# Patient Record
Sex: MALE | Race: WHITE | Employment: OTHER | URBAN - METROPOLITAN AREA
[De-identification: names, ages, dates, MRNs, and addresses within clinical notes are randomized per-mention and may not be internally consistent; named-entity substitution may affect disease eponyms.]

---

## 2017-08-19 PROBLEM — R47.01 APHASIA: Status: ACTIVE | Noted: 2017-08-19

## 2018-04-10 PROBLEM — Z98.890 S/P CORONARY ANGIOGRAM: Status: ACTIVE | Noted: 2018-04-10

## 2018-12-10 ENCOUNTER — APPOINTMENT (OUTPATIENT)
Dept: GENERAL RADIOLOGY | Age: 58
DRG: 101 | End: 2018-12-10
Payer: MEDICARE

## 2018-12-10 ENCOUNTER — HOSPITAL ENCOUNTER (INPATIENT)
Age: 58
LOS: 2 days | Discharge: HOME | DRG: 101 | End: 2018-12-12
Attending: EMERGENCY MEDICINE | Admitting: INTERNAL MEDICINE
Payer: MEDICARE

## 2018-12-10 ENCOUNTER — APPOINTMENT (OUTPATIENT)
Dept: CT IMAGING | Age: 58
DRG: 101 | End: 2018-12-10
Payer: MEDICARE

## 2018-12-10 DIAGNOSIS — R56.9 SEIZURES (HCC): Primary | ICD-10-CM

## 2018-12-10 DIAGNOSIS — R40.2410 GLASGOW COMA SCALE TOTAL SCORE 13-15, UNSPECIFIED COMA TIMING: ICD-10-CM

## 2018-12-10 LAB
BACTERIA: ABNORMAL /HPF
BASOPHILS ABSOLUTE: 0 K/UL (ref 0–0.2)
BASOPHILS RELATIVE PERCENT: 0.7 %
BILIRUBIN URINE: ABNORMAL MG/DL
BLOOD, URINE: NEGATIVE
CALCIUM IONIZED: 1.15 MMOL/L (ref 1.12–1.32)
CLARITY: ABNORMAL
CO2: 29 MMOL/L (ref 21–32)
COLOR: ABNORMAL
EKG ATRIAL RATE: 89 BPM
EKG DIAGNOSIS: NORMAL
EKG P AXIS: 58 DEGREES
EKG P-R INTERVAL: 184 MS
EKG Q-T INTERVAL: 430 MS
EKG QRS DURATION: 178 MS
EKG QTC CALCULATION (BAZETT): 523 MS
EKG R AXIS: -42 DEGREES
EKG T AXIS: 104 DEGREES
EKG VENTRICULAR RATE: 89 BPM
EOSINOPHILS ABSOLUTE: 0.1 K/UL (ref 0–0.6)
EOSINOPHILS RELATIVE PERCENT: 1.5 %
EPITHELIAL CELLS, UA: ABNORMAL /HPF
GFR AFRICAN AMERICAN: >60
GFR NON-AFRICAN AMERICAN: >60
GLUCOSE BLD-MCNC: 191 MG/DL (ref 70–99)
GLUCOSE URINE: NEGATIVE MG/DL
HCT VFR BLD CALC: 43.4 % (ref 40.5–52.5)
HEMOGLOBIN: 15 G/DL (ref 13.5–17.5)
KETONES, URINE: 15 MG/DL
LEUKOCYTE ESTERASE, URINE: NEGATIVE
LYMPHOCYTES ABSOLUTE: 1.1 K/UL (ref 1–5.1)
LYMPHOCYTES RELATIVE PERCENT: 19.1 %
MCH RBC QN AUTO: 32.4 PG (ref 26–34)
MCHC RBC AUTO-ENTMCNC: 34.5 G/DL (ref 31–36)
MCV RBC AUTO: 93.9 FL (ref 80–100)
MICROSCOPIC EXAMINATION: YES
MONOCYTES ABSOLUTE: 0.3 K/UL (ref 0–1.3)
MONOCYTES RELATIVE PERCENT: 5.7 %
MUCUS: ABNORMAL /LPF
NEUTROPHILS ABSOLUTE: 4.4 K/UL (ref 1.7–7.7)
NEUTROPHILS RELATIVE PERCENT: 73 %
NITRITE, URINE: NEGATIVE
PDW BLD-RTO: 14.1 % (ref 12.4–15.4)
PERFORMED ON: ABNORMAL
PERFORMED ON: NORMAL
PH UA: 6.5
PLATELET # BLD: 254 K/UL (ref 135–450)
PMV BLD AUTO: 8.1 FL (ref 5–10.5)
POC ANION GAP: 12 (ref 10–20)
POC BUN: 8 MG/DL (ref 7–18)
POC CHLORIDE: 99 MMOL/L (ref 99–110)
POC CREATININE: 0.8 MG/DL (ref 0.9–1.3)
POC POTASSIUM: 3.8 MMOL/L (ref 3.5–5.1)
POC SAMPLE TYPE: ABNORMAL
POC SAMPLE TYPE: NORMAL
POC SODIUM: 140 MMOL/L (ref 136–145)
POC TROPONIN I: 0.01 NG/ML (ref 0–0.1)
PROTEIN UA: 100 MG/DL
RBC # BLD: 4.63 M/UL (ref 4.2–5.9)
RBC UA: ABNORMAL /HPF (ref 0–2)
SPECIFIC GRAVITY UA: >=1.03
UROBILINOGEN, URINE: 0.2 E.U./DL
WBC # BLD: 6 K/UL (ref 4–11)
WBC UA: ABNORMAL /HPF (ref 0–5)

## 2018-12-10 PROCEDURE — 2580000003 HC RX 258: Performed by: INTERNAL MEDICINE

## 2018-12-10 PROCEDURE — 1200000000 HC SEMI PRIVATE

## 2018-12-10 PROCEDURE — 6360000004 HC RX CONTRAST MEDICATION: Performed by: EMERGENCY MEDICINE

## 2018-12-10 PROCEDURE — 84484 ASSAY OF TROPONIN QUANT: CPT

## 2018-12-10 PROCEDURE — 80175 DRUG SCREEN QUAN LAMOTRIGINE: CPT

## 2018-12-10 PROCEDURE — 70450 CT HEAD/BRAIN W/O DYE: CPT

## 2018-12-10 PROCEDURE — 99285 EMERGENCY DEPT VISIT HI MDM: CPT

## 2018-12-10 PROCEDURE — 85025 COMPLETE CBC W/AUTO DIFF WBC: CPT

## 2018-12-10 PROCEDURE — 71046 X-RAY EXAM CHEST 2 VIEWS: CPT

## 2018-12-10 PROCEDURE — 81001 URINALYSIS AUTO W/SCOPE: CPT

## 2018-12-10 PROCEDURE — 80047 BASIC METABLC PNL IONIZED CA: CPT

## 2018-12-10 PROCEDURE — 6370000000 HC RX 637 (ALT 250 FOR IP): Performed by: INTERNAL MEDICINE

## 2018-12-10 PROCEDURE — 93005 ELECTROCARDIOGRAM TRACING: CPT | Performed by: EMERGENCY MEDICINE

## 2018-12-10 PROCEDURE — 70496 CT ANGIOGRAPHY HEAD: CPT

## 2018-12-10 PROCEDURE — 70498 CT ANGIOGRAPHY NECK: CPT

## 2018-12-10 RX ORDER — COLCHICINE 0.6 MG/1
0.6 TABLET ORAL PRN
Status: DISCONTINUED | OUTPATIENT
Start: 2018-12-10 | End: 2018-12-10

## 2018-12-10 RX ORDER — LISINOPRIL 40 MG/1
40 TABLET ORAL NIGHTLY
Status: DISCONTINUED | OUTPATIENT
Start: 2018-12-10 | End: 2018-12-12 | Stop reason: HOSPADM

## 2018-12-10 RX ORDER — LIDOCAINE 4 G/G
1 PATCH TOPICAL DAILY
Status: DISCONTINUED | OUTPATIENT
Start: 2018-12-11 | End: 2018-12-11

## 2018-12-10 RX ORDER — DIAZEPAM 5 MG/1
5 TABLET ORAL EVERY 8 HOURS PRN
Status: DISCONTINUED | OUTPATIENT
Start: 2018-12-10 | End: 2018-12-12 | Stop reason: HOSPADM

## 2018-12-10 RX ORDER — ONDANSETRON 2 MG/ML
4 INJECTION INTRAMUSCULAR; INTRAVENOUS EVERY 6 HOURS PRN
Status: DISCONTINUED | OUTPATIENT
Start: 2018-12-10 | End: 2018-12-12 | Stop reason: HOSPADM

## 2018-12-10 RX ORDER — LAMOTRIGINE 150 MG/1
600 TABLET ORAL 2 TIMES DAILY
Status: DISCONTINUED | OUTPATIENT
Start: 2018-12-10 | End: 2018-12-12 | Stop reason: HOSPADM

## 2018-12-10 RX ORDER — CHLORTHALIDONE 25 MG/1
25 TABLET ORAL DAILY
COMMUNITY

## 2018-12-10 RX ORDER — CETIRIZINE HYDROCHLORIDE 10 MG/1
10 TABLET ORAL DAILY
Status: DISCONTINUED | OUTPATIENT
Start: 2018-12-11 | End: 2018-12-12 | Stop reason: HOSPADM

## 2018-12-10 RX ORDER — CHLORTHALIDONE 25 MG/1
12.5 TABLET ORAL EVERY OTHER DAY
Status: DISCONTINUED | OUTPATIENT
Start: 2018-12-11 | End: 2018-12-12 | Stop reason: HOSPADM

## 2018-12-10 RX ORDER — NICOTINE POLACRILEX 4 MG
15 LOZENGE BUCCAL PRN
Status: DISCONTINUED | OUTPATIENT
Start: 2018-12-10 | End: 2018-12-12 | Stop reason: HOSPADM

## 2018-12-10 RX ORDER — ATORVASTATIN CALCIUM 80 MG/1
80 TABLET, FILM COATED ORAL NIGHTLY
Status: DISCONTINUED | OUTPATIENT
Start: 2018-12-11 | End: 2018-12-12 | Stop reason: HOSPADM

## 2018-12-10 RX ORDER — SODIUM CHLORIDE 0.9 % (FLUSH) 0.9 %
10 SYRINGE (ML) INJECTION PRN
Status: DISCONTINUED | OUTPATIENT
Start: 2018-12-10 | End: 2018-12-12 | Stop reason: HOSPADM

## 2018-12-10 RX ORDER — ASPIRIN 81 MG/1
81 TABLET ORAL DAILY
Status: DISCONTINUED | OUTPATIENT
Start: 2018-12-11 | End: 2018-12-12 | Stop reason: HOSPADM

## 2018-12-10 RX ORDER — DEXTROSE MONOHYDRATE 50 MG/ML
100 INJECTION, SOLUTION INTRAVENOUS PRN
Status: DISCONTINUED | OUTPATIENT
Start: 2018-12-10 | End: 2018-12-12 | Stop reason: HOSPADM

## 2018-12-10 RX ORDER — CARVEDILOL 25 MG/1
25 TABLET ORAL 2 TIMES DAILY WITH MEALS
Status: DISCONTINUED | OUTPATIENT
Start: 2018-12-10 | End: 2018-12-12 | Stop reason: HOSPADM

## 2018-12-10 RX ORDER — ALLOPURINOL 300 MG/1
300 TABLET ORAL DAILY
Status: DISCONTINUED | OUTPATIENT
Start: 2018-12-11 | End: 2018-12-12 | Stop reason: HOSPADM

## 2018-12-10 RX ORDER — FAMOTIDINE 20 MG/1
40 TABLET, FILM COATED ORAL DAILY
Status: DISCONTINUED | OUTPATIENT
Start: 2018-12-11 | End: 2018-12-12 | Stop reason: HOSPADM

## 2018-12-10 RX ORDER — NORTRIPTYLINE HYDROCHLORIDE 25 MG/1
75 CAPSULE ORAL NIGHTLY
Status: DISCONTINUED | OUTPATIENT
Start: 2018-12-10 | End: 2018-12-12 | Stop reason: HOSPADM

## 2018-12-10 RX ORDER — GLIPIZIDE 5 MG/1
5 TABLET ORAL DAILY
Status: DISCONTINUED | OUTPATIENT
Start: 2018-12-11 | End: 2018-12-12 | Stop reason: HOSPADM

## 2018-12-10 RX ORDER — AMLODIPINE BESYLATE 10 MG/1
10 TABLET ORAL DAILY
Status: DISCONTINUED | OUTPATIENT
Start: 2018-12-11 | End: 2018-12-12 | Stop reason: HOSPADM

## 2018-12-10 RX ORDER — DEXTROSE MONOHYDRATE 25 G/50ML
12.5 INJECTION, SOLUTION INTRAVENOUS PRN
Status: DISCONTINUED | OUTPATIENT
Start: 2018-12-10 | End: 2018-12-12 | Stop reason: HOSPADM

## 2018-12-10 RX ORDER — LAMOTRIGINE 100 MG/1
200 TABLET ORAL PRN
Status: DISCONTINUED | OUTPATIENT
Start: 2018-12-10 | End: 2018-12-12 | Stop reason: HOSPADM

## 2018-12-10 RX ORDER — SODIUM CHLORIDE 0.9 % (FLUSH) 0.9 %
10 SYRINGE (ML) INJECTION EVERY 12 HOURS SCHEDULED
Status: DISCONTINUED | OUTPATIENT
Start: 2018-12-10 | End: 2018-12-12 | Stop reason: HOSPADM

## 2018-12-10 RX ORDER — COLCHICINE 0.6 MG/1
0.6 TABLET ORAL PRN
COMMUNITY
End: 2021-08-09 | Stop reason: CLARIF

## 2018-12-10 RX ADMIN — LISINOPRIL 40 MG: 40 TABLET ORAL at 23:11

## 2018-12-10 RX ADMIN — IOPAMIDOL 80 ML: 755 INJECTION, SOLUTION INTRAVENOUS at 16:47

## 2018-12-10 RX ADMIN — CARVEDILOL 25 MG: 25 TABLET, FILM COATED ORAL at 23:12

## 2018-12-10 RX ADMIN — Medication 10 ML: at 23:10

## 2018-12-10 RX ADMIN — COLCHICINE 0.6 MG: 0.6 TABLET, FILM COATED ORAL at 23:24

## 2018-12-10 RX ADMIN — LAMOTRIGINE 600 MG: 150 TABLET ORAL at 23:11

## 2018-12-10 RX ADMIN — NORTRIPTYLINE HYDROCHLORIDE 75 MG: 25 CAPSULE ORAL at 23:11

## 2018-12-10 ASSESSMENT — PAIN DESCRIPTION - LOCATION: LOCATION: CHEST

## 2018-12-10 ASSESSMENT — PAIN DESCRIPTION - PAIN TYPE: TYPE: ACUTE PAIN

## 2018-12-10 ASSESSMENT — PAIN DESCRIPTION - ONSET: ONSET: ON-GOING

## 2018-12-10 ASSESSMENT — PAIN DESCRIPTION - PROGRESSION: CLINICAL_PROGRESSION: GRADUALLY WORSENING

## 2018-12-10 ASSESSMENT — PAIN SCALES - GENERAL: PAINLEVEL_OUTOF10: 9

## 2018-12-10 ASSESSMENT — PAIN DESCRIPTION - DESCRIPTORS: DESCRIPTORS: CRUSHING

## 2018-12-10 ASSESSMENT — PAIN DESCRIPTION - ORIENTATION: ORIENTATION: MID

## 2018-12-10 ASSESSMENT — PAIN DESCRIPTION - FREQUENCY: FREQUENCY: INTERMITTENT

## 2018-12-10 NOTE — ED PROVIDER NOTES
history that includes eye surgery. His family history includes Asthma in his mother; Diabetes in his father, mother, and sister; Heart Attack in his father; Heart Disease in his father; High Blood Pressure in his father, mother, and sister; Obesity in his mother. He reports that he has quit smoking. He has never used smokeless tobacco. He reports that he does not drink alcohol or use drugs. Medications     Previous Medications    ALBUTEROL (PROVENTIL) (2.5 MG/3ML) 0.083% NEBULIZER SOLUTION    Take 2.5 mg by nebulization 2 times daily as needed for Wheezing or Shortness of Breath. ALLOPURINOL (ZYLOPRIM) 300 MG TABLET    Take 300 mg by mouth daily. AMLODIPINE (NORVASC) 5 MG TABLET    Take 10 mg by mouth daily     ASPIRIN 81 MG EC TABLET    Take 81 mg by mouth daily    ATORVASTATIN (LIPITOR) 80 MG TABLET    Take 80 mg by mouth daily    CARVEDILOL (COREG) 25 MG TABLET    Take 25 mg by mouth 2 times daily (with meals)     CHLORTHALIDONE (HYGROTON) 25 MG TABLET    Take 12.5 mg by mouth every other day    COLCHICINE (COLCRYS) 0.6 MG TABLET    Take 0.6 mg by mouth as needed for Pain    DIAZEPAM (VALIUM) 5 MG TABLET    Take 5 mg by mouth every 8 hours as needed for Anxiety. FAMOTIDINE (PEPCID) 40 MG TABLET    Take 40 mg by mouth daily    GLIPIZIDE (GLUCOTROL) 5 MG TABLET    Take 1 tablet by mouth daily. LAMOTRIGINE (LAMICTAL) 200 MG TABLET    Take 600 mg by mouth 2 times daily    LAMOTRIGINE (LAMICTAL) 200 MG TABLET    Take 200 mg by mouth as needed    LISINOPRIL (PRINIVIL;ZESTRIL) 40 MG TABLET    Take 40 mg by mouth nightly     LORATADINE (CLARITIN) 10 MG TABLET    Take 10 mg by mouth daily    METFORMIN (GLUCOPHAGE) 1000 MG TABLET    Take 1,000 mg by mouth daily (with breakfast)     METFORMIN (GLUCOPHAGE) 500 MG TABLET    Take 500 mg by mouth nightly.     MULTIPLE VITAMIN (DAILY TITUS) TABS    Take 1 tablet by mouth daily    NORTRIPTYLINE (PAMELOR) 75 MG CAPSULE    Take 75 mg by mouth nightly [facility]  New Prescriptions    No medications on file       DISPOSITION Admitted 12/10/2018 07:29:25 PM       Ines Fontaine MD  12/10/18 2022

## 2018-12-11 LAB
A/G RATIO: 1.3 (ref 1.1–2.2)
ALBUMIN SERPL-MCNC: 3.6 G/DL (ref 3.4–5)
ALP BLD-CCNC: 128 U/L (ref 40–129)
ALT SERPL-CCNC: 23 U/L (ref 10–40)
ANION GAP SERPL CALCULATED.3IONS-SCNC: 12 MMOL/L (ref 3–16)
AST SERPL-CCNC: 21 U/L (ref 15–37)
BASOPHILS ABSOLUTE: 0 K/UL (ref 0–0.2)
BASOPHILS RELATIVE PERCENT: 0.6 %
BILIRUB SERPL-MCNC: 0.4 MG/DL (ref 0–1)
BUN BLDV-MCNC: 10 MG/DL (ref 7–20)
CALCIUM SERPL-MCNC: 9 MG/DL (ref 8.3–10.6)
CHLORIDE BLD-SCNC: 101 MMOL/L (ref 99–110)
CO2: 27 MMOL/L (ref 21–32)
CREAT SERPL-MCNC: 0.9 MG/DL (ref 0.9–1.3)
EKG ATRIAL RATE: 71 BPM
EKG DIAGNOSIS: NORMAL
EKG P AXIS: -22 DEGREES
EKG P-R INTERVAL: 166 MS
EKG Q-T INTERVAL: 460 MS
EKG QRS DURATION: 176 MS
EKG QTC CALCULATION (BAZETT): 499 MS
EKG R AXIS: -53 DEGREES
EKG T AXIS: 115 DEGREES
EKG VENTRICULAR RATE: 71 BPM
EOSINOPHILS ABSOLUTE: 0.1 K/UL (ref 0–0.6)
EOSINOPHILS RELATIVE PERCENT: 2.1 %
GFR AFRICAN AMERICAN: >60
GFR NON-AFRICAN AMERICAN: >60
GLOBULIN: 2.8 G/DL
GLUCOSE BLD-MCNC: 103 MG/DL (ref 70–99)
GLUCOSE BLD-MCNC: 111 MG/DL (ref 70–99)
GLUCOSE BLD-MCNC: 123 MG/DL (ref 70–99)
GLUCOSE BLD-MCNC: 126 MG/DL (ref 70–99)
GLUCOSE BLD-MCNC: 184 MG/DL (ref 70–99)
GLUCOSE BLD-MCNC: 207 MG/DL (ref 70–99)
HCT VFR BLD CALC: 41.4 % (ref 40.5–52.5)
HEMOGLOBIN: 13.8 G/DL (ref 13.5–17.5)
LACTIC ACID: 1 MMOL/L (ref 0.4–2)
LYMPHOCYTES ABSOLUTE: 2.1 K/UL (ref 1–5.1)
LYMPHOCYTES RELATIVE PERCENT: 32.1 %
MAGNESIUM: 1.7 MG/DL (ref 1.8–2.4)
MCH RBC QN AUTO: 31.6 PG (ref 26–34)
MCHC RBC AUTO-ENTMCNC: 33.2 G/DL (ref 31–36)
MCV RBC AUTO: 95.1 FL (ref 80–100)
MONOCYTES ABSOLUTE: 0.4 K/UL (ref 0–1.3)
MONOCYTES RELATIVE PERCENT: 6.6 %
NEUTROPHILS ABSOLUTE: 3.9 K/UL (ref 1.7–7.7)
NEUTROPHILS RELATIVE PERCENT: 58.6 %
PDW BLD-RTO: 13.6 % (ref 12.4–15.4)
PERFORMED ON: ABNORMAL
PLATELET # BLD: 227 K/UL (ref 135–450)
PMV BLD AUTO: 7.8 FL (ref 5–10.5)
POTASSIUM REFLEX MAGNESIUM: 3.6 MMOL/L (ref 3.5–5.1)
RBC # BLD: 4.35 M/UL (ref 4.2–5.9)
SODIUM BLD-SCNC: 140 MMOL/L (ref 136–145)
TOTAL CK: 182 U/L (ref 39–308)
TOTAL PROTEIN: 6.4 G/DL (ref 6.4–8.2)
TROPONIN: <0.01 NG/ML
TROPONIN: <0.01 NG/ML
WBC # BLD: 6.6 K/UL (ref 4–11)

## 2018-12-11 PROCEDURE — 6360000002 HC RX W HCPCS: Performed by: INTERNAL MEDICINE

## 2018-12-11 PROCEDURE — 6370000000 HC RX 637 (ALT 250 FOR IP): Performed by: INTERNAL MEDICINE

## 2018-12-11 PROCEDURE — 83036 HEMOGLOBIN GLYCOSYLATED A1C: CPT

## 2018-12-11 PROCEDURE — 95819 EEG AWAKE AND ASLEEP: CPT

## 2018-12-11 PROCEDURE — 36415 COLL VENOUS BLD VENIPUNCTURE: CPT

## 2018-12-11 PROCEDURE — 93005 ELECTROCARDIOGRAM TRACING: CPT | Performed by: INTERNAL MEDICINE

## 2018-12-11 PROCEDURE — 1200000000 HC SEMI PRIVATE

## 2018-12-11 PROCEDURE — 84484 ASSAY OF TROPONIN QUANT: CPT

## 2018-12-11 PROCEDURE — 80053 COMPREHEN METABOLIC PANEL: CPT

## 2018-12-11 PROCEDURE — 93010 ELECTROCARDIOGRAM REPORT: CPT | Performed by: INTERNAL MEDICINE

## 2018-12-11 PROCEDURE — 2580000003 HC RX 258: Performed by: INTERNAL MEDICINE

## 2018-12-11 PROCEDURE — 83605 ASSAY OF LACTIC ACID: CPT

## 2018-12-11 PROCEDURE — 85025 COMPLETE CBC W/AUTO DIFF WBC: CPT

## 2018-12-11 PROCEDURE — 83735 ASSAY OF MAGNESIUM: CPT

## 2018-12-11 PROCEDURE — 82550 ASSAY OF CK (CPK): CPT

## 2018-12-11 RX ORDER — LIDOCAINE 4 G/G
1 PATCH TOPICAL DAILY
Status: DISCONTINUED | OUTPATIENT
Start: 2018-12-11 | End: 2018-12-12 | Stop reason: HOSPADM

## 2018-12-11 RX ORDER — DEXTROSE MONOHYDRATE 50 MG/ML
100 INJECTION, SOLUTION INTRAVENOUS PRN
Status: DISCONTINUED | OUTPATIENT
Start: 2018-12-11 | End: 2018-12-11 | Stop reason: SDUPTHER

## 2018-12-11 RX ORDER — MULTIVITAMIN WITH FOLIC ACID 400 MCG
1 TABLET ORAL DAILY
Status: DISCONTINUED | OUTPATIENT
Start: 2018-12-11 | End: 2018-12-12 | Stop reason: HOSPADM

## 2018-12-11 RX ORDER — OXYCODONE HYDROCHLORIDE AND ACETAMINOPHEN 5; 325 MG/1; MG/1
1 TABLET ORAL EVERY 6 HOURS PRN
Status: DISCONTINUED | OUTPATIENT
Start: 2018-12-11 | End: 2018-12-12 | Stop reason: HOSPADM

## 2018-12-11 RX ORDER — ALBUTEROL SULFATE 2.5 MG/3ML
2.5 SOLUTION RESPIRATORY (INHALATION) 2 TIMES DAILY PRN
Status: DISCONTINUED | OUTPATIENT
Start: 2018-12-11 | End: 2018-12-12 | Stop reason: HOSPADM

## 2018-12-11 RX ORDER — DEXTROSE MONOHYDRATE 25 G/50ML
12.5 INJECTION, SOLUTION INTRAVENOUS PRN
Status: DISCONTINUED | OUTPATIENT
Start: 2018-12-11 | End: 2018-12-11 | Stop reason: SDUPTHER

## 2018-12-11 RX ORDER — NICOTINE POLACRILEX 4 MG
15 LOZENGE BUCCAL PRN
Status: DISCONTINUED | OUTPATIENT
Start: 2018-12-11 | End: 2018-12-11 | Stop reason: SDUPTHER

## 2018-12-11 RX ADMIN — Medication 10 ML: at 08:36

## 2018-12-11 RX ADMIN — ENOXAPARIN SODIUM 40 MG: 40 INJECTION SUBCUTANEOUS at 08:36

## 2018-12-11 RX ADMIN — CARVEDILOL 25 MG: 25 TABLET, FILM COATED ORAL at 08:35

## 2018-12-11 RX ADMIN — AMLODIPINE BESYLATE 10 MG: 10 TABLET ORAL at 08:35

## 2018-12-11 RX ADMIN — INSULIN LISPRO 1 UNITS: 100 INJECTION, SOLUTION INTRAVENOUS; SUBCUTANEOUS at 22:50

## 2018-12-11 RX ADMIN — INSULIN LISPRO 2 UNITS: 100 INJECTION, SOLUTION INTRAVENOUS; SUBCUTANEOUS at 18:19

## 2018-12-11 RX ADMIN — NORTRIPTYLINE HYDROCHLORIDE 75 MG: 25 CAPSULE ORAL at 21:07

## 2018-12-11 RX ADMIN — ALLOPURINOL 300 MG: 300 TABLET ORAL at 08:35

## 2018-12-11 RX ADMIN — LAMOTRIGINE 600 MG: 150 TABLET ORAL at 08:35

## 2018-12-11 RX ADMIN — THERA TABS 1 TABLET: TAB at 16:52

## 2018-12-11 RX ADMIN — FAMOTIDINE 40 MG: 20 TABLET ORAL at 08:35

## 2018-12-11 RX ADMIN — Medication 10 ML: at 21:10

## 2018-12-11 RX ADMIN — CHLORTHALIDONE 12.5 MG: 25 TABLET ORAL at 08:35

## 2018-12-11 RX ADMIN — ATORVASTATIN CALCIUM 80 MG: 80 TABLET, FILM COATED ORAL at 21:07

## 2018-12-11 RX ADMIN — CETIRIZINE HYDROCHLORIDE 10 MG: 10 TABLET, FILM COATED ORAL at 08:35

## 2018-12-11 RX ADMIN — LISINOPRIL 40 MG: 40 TABLET ORAL at 21:08

## 2018-12-11 RX ADMIN — OXYCODONE AND ACETAMINOPHEN 1 TABLET: 5; 325 TABLET ORAL at 03:00

## 2018-12-11 RX ADMIN — OXYCODONE AND ACETAMINOPHEN 1 TABLET: 5; 325 TABLET ORAL at 21:41

## 2018-12-11 RX ADMIN — LAMOTRIGINE 600 MG: 150 TABLET ORAL at 21:07

## 2018-12-11 RX ADMIN — ASPIRIN 81 MG: 81 TABLET ORAL at 08:35

## 2018-12-11 RX ADMIN — CARVEDILOL 25 MG: 25 TABLET, FILM COATED ORAL at 16:52

## 2018-12-11 ASSESSMENT — PAIN SCALES - GENERAL
PAINLEVEL_OUTOF10: 0
PAINLEVEL_OUTOF10: 9

## 2018-12-11 ASSESSMENT — PAIN DESCRIPTION - ONSET: ONSET: ON-GOING

## 2018-12-11 ASSESSMENT — PAIN DESCRIPTION - DESCRIPTORS: DESCRIPTORS: STABBING

## 2018-12-11 ASSESSMENT — PAIN DESCRIPTION - FREQUENCY: FREQUENCY: CONTINUOUS

## 2018-12-11 ASSESSMENT — PAIN DESCRIPTION - ORIENTATION: ORIENTATION: MID

## 2018-12-11 ASSESSMENT — PAIN DESCRIPTION - LOCATION: LOCATION: CHEST

## 2018-12-11 ASSESSMENT — ACTIVITIES OF DAILY LIVING (ADL): EFFECT OF PAIN ON DAILY ACTIVITIES: DISCOMFORT

## 2018-12-11 ASSESSMENT — PAIN DESCRIPTION - PAIN TYPE: TYPE: ACUTE PAIN

## 2018-12-11 ASSESSMENT — PAIN DESCRIPTION - PROGRESSION: CLINICAL_PROGRESSION: NOT CHANGED

## 2018-12-11 NOTE — PROGRESS NOTES
Clinical Pharmacy Progress Note    Admit date: 12/10/2018     Subjective/Objective:  Pt admitted after tonic-clonic seizure 12/10. Pertinent medications:  Metformin 1000 mg PO qAM  Metformin 500 mg PO qPM    Patient had CTA of neck with contrast on 12/10 PM.    Assessment/Plan:  · Patient has received IV contrast.  Per [facility] policy, Metformin will be held for 48 hours. Have discontinued metformin. · Metformin will be automatically restarted 48hr after contrast if serum creatinine is within FDA approved guidelines (SCr < 1.4 in women / SCr < 1.5 in men). If patient is discharged prior to 48 hours, MetFORMIN will need to be addressed as part of the med reconciliation process. Thanks!   Yvrose Mcmahon, PharmD, [place]  [place] # [phone]  12/11/2018 12:26 PM

## 2018-12-11 NOTE — CONSULTS
Used    Alcohol use No    Drug use: No    Sexual activity: Not Currently     Other Topics Concern    Not on file     Social History Narrative    No narrative on file       Medications: Allergies   Allergen Reactions    Amoxicillin     Doxycycline     Doxycycline Hyclate     Ibuprofen     Penicillins Hives    Phenobarbital     Tetracycline     Aspirin Nausea And Vomiting       Prescriptions Prior to Admission: chlorthalidone (HYGROTON) 25 MG tablet, Take 12.5 mg by mouth every other day  colchicine (COLCRYS) 0.6 MG tablet, Take 0.6 mg by mouth as needed for Pain  nortriptyline (PAMELOR) 75 MG capsule, Take 75 mg by mouth nightly  lamoTRIgine (LAMICTAL) 200 MG tablet, Take 600 mg by mouth 2 times daily  lamoTRIgine (LAMICTAL) 200 MG tablet, Take 200 mg by mouth as needed  atorvastatin (LIPITOR) 80 MG tablet, Take 80 mg by mouth daily  metFORMIN (GLUCOPHAGE) 1000 MG tablet, Take 1,000 mg by mouth daily (with breakfast)   amLODIPine (NORVASC) 5 MG tablet, Take 10 mg by mouth daily   aspirin 81 MG EC tablet, Take 81 mg by mouth daily  carvedilol (COREG) 25 MG tablet, Take 25 mg by mouth 2 times daily (with meals)   famotidine (PEPCID) 40 MG tablet, Take 40 mg by mouth daily  loratadine (CLARITIN) 10 MG tablet, Take 10 mg by mouth daily  Multiple Vitamin (DAILY TITUS) TABS, Take 1 tablet by mouth daily  diazepam (VALIUM) 5 MG tablet, Take 5 mg by mouth every 8 hours as needed for Anxiety. metFORMIN (GLUCOPHAGE) 500 MG tablet, Take 500 mg by mouth nightly. lisinopril (PRINIVIL;ZESTRIL) 40 MG tablet, Take 40 mg by mouth nightly   glipiZIDE (GLUCOTROL) 5 MG tablet, Take 1 tablet by mouth daily. oxyCODONE-acetaminophen (PERCOCET) 5-325 MG per tablet, Take 1 tablet by mouth every 6 hours as needed. allopurinol (ZYLOPRIM) 300 MG tablet, Take 300 mg by mouth daily.     albuterol (PROVENTIL) (2.5 MG/3ML) 0.083% nebulizer solution, Take 2.5 mg by nebulization 2 times daily as needed for Wheezing or Shortness of %    Eosinophils % 1.5 %    Basophils % 0.7 %    Neutrophils # 4.4 1.7 - 7.7 K/uL    Lymphocytes # 1.1 1.0 - 5.1 K/uL    Monocytes # 0.3 0.0 - 1.3 K/uL    Eosinophils # 0.1 0.0 - 0.6 K/uL    Basophils # 0.0 0.0 - 0.2 K/uL   POCT Venous    Collection Time: 12/10/18  4:10 PM   Result Value Ref Range    POC Troponin I 0.01 0.00 - 0.10 ng/mL    Sample Type CONSTANCE     Performed on SEE BELOW    POCT Venous    Collection Time: 12/10/18  4:11 PM   Result Value Ref Range    POC Sodium 140 136 - 145 mmol/L    POC Potassium 3.8 3.5 - 5.1 mmol/L    POC Chloride 99 99 - 110 mmol/L    CO2 29 21 - 32 mmol/L    POC Anion Gap 12 10 - 20    POC Glucose 191 (H) 70 - 99 mg/dl    POC BUN 8 7 - 18 mg/dL    POC Creatinine 0.8 (L) 0.9 - 1.3 mg/dL    GFR Non-African American >60 >60    GFR African American >60     Calcium, Ion 1.15 1.12 - 1.32 mmol/L    Sample Type CONSTANCE     Performed on SEE BELOW    POC URINE with Microscopic    Collection Time: 12/10/18  4:55 PM   Result Value Ref Range    Color, UA Not Entered Straw/Yellow    Clarity, UA Not Entered Clear    Glucose, Ur Negative Negative mg/dL    Bilirubin Urine SMALL (A) Negative mg/dL    Ketones, Urine 15 (A) Negative mg/dL    Specific Gravity, UA >=1.030 1.005 - 1.030    Blood, Urine Negative Negative    pH, UA 6.5 5.0 - 8.0    Protein,  (A) Negative mg/dL    Urobilinogen, Urine 0.2 <2.0 E.U./dL    Nitrite, Urine Negative Negative    Leukocyte Esterase, Urine Negative Negative    Microscopic Examination Yes    Microscopic Urinalysis    Collection Time: 12/10/18  5:10 PM   Result Value Ref Range    Mucus, UA 2+ (A) /LPF    WBC, UA 3-5 0 - 5 /HPF    RBC, UA 0-2 0 - 2 /HPF    Epi Cells 0-2 /HPF    Bacteria, UA 2+ (A) /HPF   POCT Glucose    Collection Time: 12/11/18 12:09 AM   Result Value Ref Range    POC Glucose 111 (H) 70 - 99 mg/dl    Performed on ACCU-CHEK    Troponin    Collection Time: 12/11/18 12:11 AM   Result Value Ref Range    Troponin <0.01 <0.01 ng/mL   EKG 12 Lead K/uL    Basophils # 0.0 0.0 - 0.2 K/uL   Troponin    Collection Time: 12/11/18  5:45 AM   Result Value Ref Range    Troponin <0.01 <0.01 ng/mL       Scheduled Meds:   lidocaine  1 patch Transdermal Daily    allopurinol  300 mg Oral Daily    amLODIPine  10 mg Oral Daily    aspirin  81 mg Oral Daily    atorvastatin  80 mg Oral Nightly    carvedilol  25 mg Oral BID WC    chlorthalidone  12.5 mg Oral Every Other Day    famotidine  40 mg Oral Daily    glipiZIDE  5 mg Oral Daily    lamoTRIgine  600 mg Oral BID    lisinopril  40 mg Oral Nightly    cetirizine  10 mg Oral Daily    nortriptyline  75 mg Oral Nightly    sodium chloride flush  10 mL Intravenous 2 times per day    enoxaparin  40 mg Subcutaneous Daily       Continuous Infusions:    dextrose          ASSESSMENT & RECOMMENDATIONS:   Charmaine Pimentel 55 y/o with known generalized epilepsy disorder who presented with breakthrough seizure. 1. Seizures  2. MRDD  3. HTN  4. DM    ==============  RECOMMENDATIONS:  1. Continue Lamictal 600 mg Bid. 2. Check lamictal level. 3. EEG to assess for any epileptogenic features. 4. Seizure precautions. 5. Patient advised and instructed on seizure precautions he cannot engage in any activity where loss of consciousness would be dangerous to himself  or others (e.g. driving, climbing, operating heavy machinery, swimming alone, etc.) until approved by his physician (seizure-free for at least 3-6 months). He  expressed understanding of all instructions given and states he is mainly  wheelchair bound. 6. Infectious and metabolic workup as per Primary medical team.   7. Check CK level. Discussed at length with patient findings and recommendations.        A copy of this note was provided for Dr Phan Craig MD     Electronically signed by:    EDUARDA [facility]  [phone]     12/11/2018,7:11 AM

## 2018-12-11 NOTE — H&P
Hospital Medicine History & Physical      PCP: Edilma Wiley    Date of Admission: 12/10/2018    Date of Service: Pt seen/examined on 12/11/18 and Admitted to Inpatient with expected LOS greater than two midnights due to medical therapy. Chief Complaint: Seizures      History Of Present Illness:     56 y.o. male Nannette Slaughter who is a past medical history MRDD, generalized epilepsy and non epileptic spells, CAD, DM,  morbid obesity, obstructive sleep apnea of  has known history of seizures on Lamictal at home presented to the emergency room with complaints of witnessed seizures. Patient's sister was also the power of  visit the patient in his facility where he was found to be confused, and saw him to have a generalized clonic tonic seizure, she says the patient stopped breathing for a few seconds became cyanotic and she had to put her fingers in his mouth and give him through rescue breathing. EMS called, on arrival patient's continency breathing but confused. Initially in the emergency room he was slow to respond, on string to questions occasionally, but by the evening his confusion improved and he was alert and oriented to person place and time. He says he's been compliant with his medications. No bowel or bladder incontinence. Underwent CT head and CTA head and neck is negative for acute intracranial abnormalities, or flow limiting vessel stenosis. Labs in the emergency room were unremarkable. For his generalized epilepsy he follows a [place] neurolog[facility], and is maintained on Lamictal.  He was admitted the hospital on 04/2018 for chest pain and underwent cardiac cath, mild blocks and no stents were placed, found to have anterior wall hypokinesia and ejection fraction 35%. While on the floor he did not midsternal nonradiating chest pain 9 x 10 intensity.   Initial troponin negative    Past Medical History:          Diagnosis Date    Arthritis     Asthma     Bronchitis     CAD chronic plaquing of the carotid bifurcation, without any flow-limiting stenosis or occlusion          CT Head WO Contrast   Final Result   1. No acute intracranial abnormality. XR CHEST STANDARD (2 VW)   Final Result      No focal consolidation. ASSESSMENT:    Active Hospital Problems    Diagnosis Date Noted    Seizures ([facility] [address] [R56.9] 06/01/2014     1. Tonic-clonic seizures with post ictal confusion  2. History of CAD  3. Chest pain, recent stress test in 04/2018 was positive, underwent cardiac cath which showed some clot burden with no stents were placed. Initial troponin negative. 4. Left bundle branch block  5. Hypertension: Blood pressure 175/92 on admission  Morbid obesity due to excess calories Body mass index is 61.6 kg/m². - Complicating assessment and treatment. Placing patient at risk for multiple co-morbidities as well as early death and contributing to the patient's presentation.  on weight loss when appropriate. Type 2 diabetes non-insulin-dependent    PLAN:  Continue Lamictal prior to admission. Limited level ordered on admission and we will follow-up at the levels. Ativan ordered when necessary for seizures. Check EEG in morning. Neurology consult for evaluation. DVT Prophylaxis: Lovenox  Diet: Diet NPO Effective Now Exceptions are: Sips with Meds  Code Status: Full Code    PT/OT Eval Status: Ordered    Dispo - Admit as inpatient. I anticipate hospitalization spanning more than two midnights for investigation and treatment of the above medically necessary diagnoses. Nora Sainz MD    Thank you Juan Owusu for the opportunity to be involved in this patient's care. If you have any questions or concerns please feel free to contact me at [phone].

## 2018-12-11 NOTE — PROGRESS NOTES
Patient complaining of non radiating mid sternal chest pain/pressure rating 9/10. Notified on call hospitalist with new order for lidocaine patch and to trend troponins Q6H x 2 occurrences.

## 2018-12-11 NOTE — PROGRESS NOTES
RESPIRATORY THERAPY ASSESSMENT    Name:  Louisa Boland Record Number:  [id number]  Age: 56 y.o. Gender: male  : 1960  Today's Date:  2018  Room:  2498/0918-86    Assessment     Is the patient being admitted for a COPD or Asthma exacerbation? No   (If yes the patient will be seen every 4 hours for the first 24 hours and then reassessed)    Patient Admission Diagnosis      Allergies  Allergies   Allergen Reactions    Amoxicillin     Doxycycline     Doxycycline Hyclate     Ibuprofen     Penicillins Hives    Phenobarbital     Tetracycline     Aspirin Nausea And Vomiting       Minimum Predicted Vital Capacity:     918          Pulmonary History: former smoker  Home Oxygen Therapy:  room air  Home Respiratory Therapy:Albuterol prn  Current Respiratory Therapy:  HHN Albuterol prn          Respiratory Severity Index(RSI)   Patients with orders for inhalation medications, oxygen, or any therapeutic treatment modality will be placed on Respiratory Protocol. They will be assessed with the first treatment and at least every 72 hours thereafter. The following severity scale will be used to determine frequency of treatment intervention.     Smoking History: Smoking History Less than 1ppd or less than 15 pack year = 1    Social History  Social History   Substance Use Topics    Smoking status: Former Smoker    Smokeless tobacco: Never Used    Alcohol use No       Recent Surgical History: None = 0  Past Surgical History  Past Surgical History:   Procedure Laterality Date    EYE SURGERY         Level of Consciousness: Alert, Oriented, and Cooperative = 0    Level of Activity: Walking unassisted = 0    Respiratory Pattern: Regular Pattern; RR 8-20 = 0    Breath Sounds: Clear = 0    Sputum   ,  ,    Cough: Strong, spontaneous, non-productive = 0    Vital Signs   BP (!) 133/53   Pulse 70   Temp 98.3 °F (36.8 °C) (Oral)   Resp 18   Ht 5' 7\" (1.702 m)   Wt (!) 393 lb 4.8 oz (178.4 kg)   SpO2

## 2018-12-11 NOTE — PROGRESS NOTES
Hospitalist Progress Note      PCP: Michael Payer    Date of Admission: 12/10/2018  Hospital day - 1        Medications:  Reviewed    Infusion Medications    dextrose      dextrose       Scheduled Medications    lidocaine  1 patch Transdermal Daily    [START ON 12/12/2018] metFORMIN  1,000 mg Oral Daily with breakfast    metFORMIN  500 mg Oral Nightly    DAILY TITUS  1 tablet Oral Daily    insulin lispro  0-6 Units Subcutaneous TID WC    insulin lispro  0-3 Units Subcutaneous Nightly    allopurinol  300 mg Oral Daily    amLODIPine  10 mg Oral Daily    aspirin  81 mg Oral Daily    atorvastatin  80 mg Oral Nightly    carvedilol  25 mg Oral BID WC    chlorthalidone  12.5 mg Oral Every Other Day    famotidine  40 mg Oral Daily    glipiZIDE  5 mg Oral Daily    lamoTRIgine  600 mg Oral BID    lisinopril  40 mg Oral Nightly    cetirizine  10 mg Oral Daily    nortriptyline  75 mg Oral Nightly    sodium chloride flush  10 mL Intravenous 2 times per day    enoxaparin  40 mg Subcutaneous Daily     PRN Meds: oxyCODONE-acetaminophen, albuterol, glucose, dextrose, glucagon (rDNA), dextrose, diazepam, lamoTRIgine, sodium chloride flush, magnesium hydroxide, ondansetron, glucose, dextrose, glucagon (rDNA), dextrose      Intake/Output Summary (Last 24 hours) at 12/11/18 1218  Last data filed at 12/11/18 0300   Gross per 24 hour   Intake              100 ml   Output              400 ml   Net             -300 ml         Chief Complaint: GTC sz x 1         Subjective: 12.11- feels good, no new sz, hungry      ROS:  A 12 point review of symptoms is unremarkable with the exception of the chief complaint . Patient specifically denies cp  .     Exam performed by me:    BP (!) 133/53   Pulse 70   Temp 98.3 °F (36.8 °C) (Oral)   Resp 18   Ht 5' 7\" (1.702 m)   Wt (!) 393 lb 4.8 oz (178.4 kg)   SpO2 94%   BMI 61.60 kg/m²       General appearance: comfortable, distress- none   HEENT: Atraumatic, Pupils equal, round, and reactive to light. Extra ocular muscles intact. Neck: Supple, with full range of motion. No jugular venous distention. Respiratory:  Clear to  auscultation , accessory muscle use no, Rales/Ronchi no  Cardiovascular: Regular rate and rhythm with normal S1/S2 ,  Abdomen: Soft, non-tender, non-distended with normal bowel sounds. Musculoskelatal: No clubbing, no edema bilaterally. Dorsalis pedal pulses +   And capillary refills time is  <  than 3 secs. Skin: Skin color, texture, turgor normal.  Neurologic:   Neurovascularly intact grossly . Labs:   Recent Labs      12/10/18   1603  12/11/18   0545   WBC  6.0  6.6   HGB  15.0  13.8   HCT  43.4  41.4   PLT  254  227     Recent Labs      12/10/18   1611  12/11/18   0545   NA   --   140   K   --   3.6   CL   --   101   CO2  29  27   BUN   --   10   CREATININE  0.8*  0.9   CALCIUM   --   9.0     Recent Labs      12/11/18   0545   AST  21   ALT  23   BILITOT  0.4   ALKPHOS  128     No results for input(s): INR in the last 72 hours. Recent Labs      12/10/18   1610  12/11/18   0011  12/11/18   0545   TROPONINI  0.01  <0.01  <0.01       No flowsheet data found. Diagnostic Assesment and Plan :   1. Break through [address] , on lamictal, monitor levels, no new sz, sz precautions, neuro eval  2. htn controlled on amlodipine, coreg, hygroton, lisinopril,   Body mass index is 61.6 kg/m². 3. Morbid obesity, advised wt loss  4. Cad, chr sys chf, cont asa, lipitor, stable  5. Chr gout, no joint pain   6. DM ty 2 on OHA, monitor bs         DVT Prophylaxis: lvx   Diet: DIET GENERAL;  Code Status: Full Code    PT/OT Eval Status:. At his/her baseline . This chart was generated in part by using Dragon Dictation system and may contain errors related to that system including errors in grammar, punctuation, and spelling, as well as words and phrases that may be inappropriate. When dictating, effort is made to correct spelling/grammar errors.  If there are

## 2018-12-11 NOTE — PLAN OF CARE
Problem: Falls - Risk of: Intervention: Assess potential safety hazards  Pt free from injury or falls at this time, fall precautions in place, bed in low position, side rail up x2, Rivera Fall Risk: Medium (25-44), bed alarm on, reoriented to room and call light, reminded not to get up without assistance, call light in reach, will continue to monitor. Pt verbalizes understanding of fall risk procedures.

## 2018-12-12 VITALS
HEART RATE: 67 BPM | TEMPERATURE: 97.2 F | OXYGEN SATURATION: 94 % | WEIGHT: 315 LBS | DIASTOLIC BLOOD PRESSURE: 62 MMHG | SYSTOLIC BLOOD PRESSURE: 142 MMHG | BODY MASS INDEX: 49.44 KG/M2 | RESPIRATION RATE: 16 BRPM | HEIGHT: 67 IN

## 2018-12-12 LAB
ESTIMATED AVERAGE GLUCOSE: 162.8 MG/DL
GLUCOSE BLD-MCNC: 146 MG/DL (ref 70–99)
GLUCOSE BLD-MCNC: 148 MG/DL (ref 70–99)
HBA1C MFR BLD: 7.3 %
PERFORMED ON: ABNORMAL
PERFORMED ON: ABNORMAL

## 2018-12-12 PROCEDURE — 2580000003 HC RX 258: Performed by: INTERNAL MEDICINE

## 2018-12-12 PROCEDURE — 6370000000 HC RX 637 (ALT 250 FOR IP): Performed by: INTERNAL MEDICINE

## 2018-12-12 PROCEDURE — 6360000002 HC RX W HCPCS: Performed by: INTERNAL MEDICINE

## 2018-12-12 RX ADMIN — AMLODIPINE BESYLATE 10 MG: 10 TABLET ORAL at 09:12

## 2018-12-12 RX ADMIN — ALLOPURINOL 300 MG: 300 TABLET ORAL at 09:13

## 2018-12-12 RX ADMIN — THERA TABS 1 TABLET: TAB at 09:12

## 2018-12-12 RX ADMIN — CETIRIZINE HYDROCHLORIDE 10 MG: 10 TABLET, FILM COATED ORAL at 09:13

## 2018-12-12 RX ADMIN — ASPIRIN 81 MG: 81 TABLET ORAL at 09:12

## 2018-12-12 RX ADMIN — LAMOTRIGINE 600 MG: 150 TABLET ORAL at 09:25

## 2018-12-12 RX ADMIN — ENOXAPARIN SODIUM 40 MG: 40 INJECTION SUBCUTANEOUS at 09:13

## 2018-12-12 RX ADMIN — Medication 10 ML: at 09:13

## 2018-12-12 RX ADMIN — GLIPIZIDE 5 MG: 5 TABLET ORAL at 09:13

## 2018-12-12 RX ADMIN — INSULIN LISPRO 1 UNITS: 100 INJECTION, SOLUTION INTRAVENOUS; SUBCUTANEOUS at 14:08

## 2018-12-12 RX ADMIN — INSULIN LISPRO 1 UNITS: 100 INJECTION, SOLUTION INTRAVENOUS; SUBCUTANEOUS at 10:43

## 2018-12-12 RX ADMIN — FAMOTIDINE 40 MG: 20 TABLET ORAL at 09:13

## 2018-12-12 RX ADMIN — CARVEDILOL 25 MG: 25 TABLET, FILM COATED ORAL at 09:12

## 2018-12-12 ASSESSMENT — PAIN SCALES - GENERAL: PAINLEVEL_OUTOF10: 0

## 2018-12-12 NOTE — DISCHARGE INSTR - COC
Continuity of Care Form    Patient Name: Alma Hayden   :  1960  MRN:  [id number]    Admit date:  12/10/2018  Discharge date:  ***    Code Status Order: Full Code   Advance Directives:   Advance Care Flowsheet Documentation     Date/Time Healthcare Directive Type of Healthcare Directive Copy in [address] Agent's Name Healthcare Agent's Phone Number    12/10/18 2837  Yes, patient has an advance directive for healthcare treatment  Durable power of  for health care  No, copy requested from family  --  --  --          Admitting Physician:  Juan Figueroa MD  PCP: Brian Kapoor    Discharging Nurse: [facility] Unit/Room#: 9577/2394-15  Discharging Unit Phone Number: ***    Emergency Contact:   Extended Emergency Contact Information  Primary Emergency Contact: Lisa Albright  Address: [address] Phone: [phone]  Relation: Brother/Sister  Secondary Emergency Contact: Whit Yeung  Address: [address] Phone: [phone]  Relation: Brother/Sister    Past Surgical History:  Past Surgical History:   Procedure Laterality Date    EYE SURGERY         Immunization History:   Immunization History   Administered Date(s) Administered    Influenza Virus Vaccine 2018    Pneumococcal Polysaccharide ([id number]) 10/25/2013, 2014, 10/04/2014    Tdap (Boostrix, Adacel) 2013       Active Problems:  Patient Active Problem List   Diagnosis Code    Chest pain R07.9    Hypertension I10    Diabetes mellitus ([facility] [address] E11.9    Orthostatic hypotension I95.1    Syncope R55    Morbid obesity with BMI of 60.0-69.9, adult (HCC) E66.01, Z68.44    Chronic systolic heart failure (HCC) I50.22    Diabetes mellitus type 2 in obese ([facility] [address] E11.69, E66.9    Seizures ([facility] [address] R56.9    Light headed R42    Chest pain R07.9    Dizziness R42

## 2018-12-12 NOTE — PROGRESS NOTES
Neurology Follow Up  Note    Updates:  Seen for seizure. He has not had any further reported seizures. He has no ne complaints. ROS:  Constitutional: denies fatigue, malaise  Eyes: denies any vision changes,  Musculoskeletal: denies any pain or decreased ROM, no functional deficit. Neurological: denies headache, numbness or tingling, speech problems. Exam:  Blood pressure (!) 142/62, pulse 67, temperature 97.2 °F (36.2 °C), temperature source Oral, resp. rate 16, height 5' 7\" (1.702 m), weight (!) 393 lb 4.8 oz (178.4 kg), SpO2 94 %. Constitutional    Vital signs: BP, HR, and RR reviewed   General Alert, no distress, well-nourished  Eyes: fundoscopic exam not visualized. .   Cardiovascular: pulses symmetric in all 4 extremities. No peripheral edema. Psychiatric: cooperative with examination, no  psychotic behavior noted. Neurologic  Mental status:   orientation to person, place and time. General fund of knowledge grossly intact   Memory grossly intact   Attention intact as able to attend well to the exam     Language fluent in conversation   Comprehension intact; follows simple commands  Cranial nerves:   CN2: Visual Fields full w/o extinction on confrontational testing,   CN 3,4,6: extraocular muscles intact,  CN5: facial sensation symmetric   CN7:face symmetric without dysarthria,   CN8: hearing grossly intact  CN9: palate elevated symmetrically  CN11: trap full strength on shoulder shrug  CN12: tongue midline with protrusion  Strength: No prontator drift. Good strength in all 4 extremities   Deep tendon reflexes: normal in all 4 extremities  Sensory: light touch intact in all 4 extremities. No sensory extinction on double simultaneous stimulation  Cerebellar/coordination: finger nose finger normal without ataxia  Tone: normal in all 4 extremities  Gait: deferred due to safety concerns.        Labs:  Hepatic:   Recent Labs      12/11/18   0545   AST  21   ALT  23   BILITOT  0.4   ALKPHOS 128       HGBA1c:  Recent Labs      12/11/18   0545   LABA1C  7.3      12/11/2018 15:45   Total       12/17/2017 12:55   Lamotrigine Lvl 19.7 (H)       Studies:  CTA HEAD W CONTRAST   Final Result   1. Normal CT brain arteriogram.    Mild atherosclerotic chronic plaquing of the carotid bifurcation, without any flow-limiting stenosis or occlusion           CTA NECK W CONTRAST   Final Result   1. Normal CT brain arteriogram.    Mild atherosclerotic chronic plaquing of the carotid bifurcation, without any flow-limiting stenosis or occlusion           CT Head WO Contrast   Final Result   1. No acute intracranial abnormality     EEG 12/11/18  The EEG was abnormal due to the presence of:  Generalized slowing which is a non-specific finding consistent with a generalized disturbance of cerebral functioning including toxic, metabolic, or structural abnormalities that are multi-focal or diffuse      ASSESSMENT & RECOMMENDATIONS:   Troy Vincent 55 y/o with known generalized epilepsy disorder who presented with breakthrough seizure. EEG with no electrographic seizures.     1. Seizures  2. MRDD  3. HTN  4. DM     ==============  RECOMMENDATIONS:  1. Continue Lamictal 600 mg Bid. 2. Repeat  lamictal level in one week as outpatient (to be reported to his [address] [place] [address] [address] whom he should follow up with). 3. No further neurological workup. 4. He may be cleared for d/c from neurological standpoint.      A copy of this note was provided for Dr Brian Colby MD     [facility]  [phone]  Evenings, weekends, and off weeks please discuss with the covering neurologist.

## 2018-12-13 LAB — LAMOTRIGINE LEVEL: 12.6 UG/ML (ref 2.5–15)

## 2019-06-26 ENCOUNTER — HOSPITAL ENCOUNTER (EMERGENCY)
Age: 59
Discharge: HOME | End: 2019-06-26
Attending: EMERGENCY MEDICINE
Payer: MEDICARE

## 2019-06-26 ENCOUNTER — APPOINTMENT (OUTPATIENT)
Dept: GENERAL RADIOLOGY | Age: 59
End: 2019-06-26
Payer: MEDICARE

## 2019-06-26 VITALS
BODY MASS INDEX: 45.1 KG/M2 | HEART RATE: 80 BPM | WEIGHT: 315 LBS | HEIGHT: 70 IN | DIASTOLIC BLOOD PRESSURE: 66 MMHG | TEMPERATURE: 98.8 F | RESPIRATION RATE: 16 BRPM | OXYGEN SATURATION: 96 % | SYSTOLIC BLOOD PRESSURE: 131 MMHG

## 2019-06-26 DIAGNOSIS — R07.89 OTHER CHEST PAIN: Primary | ICD-10-CM

## 2019-06-26 LAB
ANION GAP SERPL CALCULATED.3IONS-SCNC: 12 MMOL/L (ref 3–16)
APTT: 31.8 SEC (ref 26–36)
BASOPHILS ABSOLUTE: 0 K/UL (ref 0–0.2)
BASOPHILS RELATIVE PERCENT: 0.7 %
BUN BLDV-MCNC: 15 MG/DL (ref 7–20)
CALCIUM SERPL-MCNC: 9.2 MG/DL (ref 8.3–10.6)
CHLORIDE BLD-SCNC: 102 MMOL/L (ref 99–110)
CO2: 24 MMOL/L (ref 21–32)
CREAT SERPL-MCNC: 0.9 MG/DL (ref 0.9–1.3)
EKG ATRIAL RATE: 80 BPM
EKG DIAGNOSIS: NORMAL
EKG P AXIS: 36 DEGREES
EKG P-R INTERVAL: 164 MS
EKG Q-T INTERVAL: 434 MS
EKG QRS DURATION: 182 MS
EKG QTC CALCULATION (BAZETT): 500 MS
EKG R AXIS: -35 DEGREES
EKG T AXIS: 115 DEGREES
EKG VENTRICULAR RATE: 80 BPM
EOSINOPHILS ABSOLUTE: 0.1 K/UL (ref 0–0.6)
EOSINOPHILS RELATIVE PERCENT: 2.5 %
GFR AFRICAN AMERICAN: >60
GFR NON-AFRICAN AMERICAN: >60
GLUCOSE BLD-MCNC: 180 MG/DL (ref 70–99)
HCT VFR BLD CALC: 42.2 % (ref 40.5–52.5)
HEMOGLOBIN: 14.1 G/DL (ref 13.5–17.5)
INR BLD: 0.96 (ref 0.86–1.14)
LYMPHOCYTES ABSOLUTE: 1.6 K/UL (ref 1–5.1)
LYMPHOCYTES RELATIVE PERCENT: 28.1 %
MCH RBC QN AUTO: 31.4 PG (ref 26–34)
MCHC RBC AUTO-ENTMCNC: 33.3 G/DL (ref 31–36)
MCV RBC AUTO: 94.3 FL (ref 80–100)
MONOCYTES ABSOLUTE: 0.4 K/UL (ref 0–1.3)
MONOCYTES RELATIVE PERCENT: 7.5 %
NEUTROPHILS ABSOLUTE: 3.5 K/UL (ref 1.7–7.7)
NEUTROPHILS RELATIVE PERCENT: 61.2 %
PDW BLD-RTO: 14.1 % (ref 12.4–15.4)
PLATELET # BLD: 218 K/UL (ref 135–450)
PMV BLD AUTO: 8.6 FL (ref 5–10.5)
POTASSIUM SERPL-SCNC: 4 MMOL/L (ref 3.5–5.1)
PRO-BNP: 102 PG/ML (ref 0–124)
PROTHROMBIN TIME: 11 SEC (ref 9.8–13)
RBC # BLD: 4.47 M/UL (ref 4.2–5.9)
SODIUM BLD-SCNC: 138 MMOL/L (ref 136–145)
TROPONIN: <0.01 NG/ML
TROPONIN: <0.01 NG/ML
WBC # BLD: 5.7 K/UL (ref 4–11)

## 2019-06-26 PROCEDURE — 85610 PROTHROMBIN TIME: CPT

## 2019-06-26 PROCEDURE — 99285 EMERGENCY DEPT VISIT HI MDM: CPT

## 2019-06-26 PROCEDURE — 85730 THROMBOPLASTIN TIME PARTIAL: CPT

## 2019-06-26 PROCEDURE — 6370000000 HC RX 637 (ALT 250 FOR IP): Performed by: EMERGENCY MEDICINE

## 2019-06-26 PROCEDURE — 36415 COLL VENOUS BLD VENIPUNCTURE: CPT

## 2019-06-26 PROCEDURE — 85025 COMPLETE CBC W/AUTO DIFF WBC: CPT

## 2019-06-26 PROCEDURE — 99284 EMERGENCY DEPT VISIT MOD MDM: CPT | Performed by: INTERNAL MEDICINE

## 2019-06-26 PROCEDURE — 93005 ELECTROCARDIOGRAM TRACING: CPT | Performed by: EMERGENCY MEDICINE

## 2019-06-26 PROCEDURE — 6360000002 HC RX W HCPCS: Performed by: EMERGENCY MEDICINE

## 2019-06-26 PROCEDURE — 96374 THER/PROPH/DIAG INJ IV PUSH: CPT

## 2019-06-26 PROCEDURE — 96375 TX/PRO/DX INJ NEW DRUG ADDON: CPT

## 2019-06-26 PROCEDURE — 80048 BASIC METABOLIC PNL TOTAL CA: CPT

## 2019-06-26 PROCEDURE — 84484 ASSAY OF TROPONIN QUANT: CPT

## 2019-06-26 PROCEDURE — 83880 ASSAY OF NATRIURETIC PEPTIDE: CPT

## 2019-06-26 PROCEDURE — 71046 X-RAY EXAM CHEST 2 VIEWS: CPT

## 2019-06-26 RX ORDER — ONDANSETRON 2 MG/ML
4 INJECTION INTRAMUSCULAR; INTRAVENOUS EVERY 6 HOURS PRN
Status: DISCONTINUED | OUTPATIENT
Start: 2019-06-26 | End: 2019-06-26 | Stop reason: HOSPADM

## 2019-06-26 RX ORDER — MORPHINE SULFATE 4 MG/ML
4 INJECTION, SOLUTION INTRAMUSCULAR; INTRAVENOUS ONCE
Status: COMPLETED | OUTPATIENT
Start: 2019-06-26 | End: 2019-06-26

## 2019-06-26 RX ADMIN — MORPHINE SULFATE 4 MG: 4 INJECTION INTRAVENOUS at 09:48

## 2019-06-26 RX ADMIN — ONDANSETRON 4 MG: 2 INJECTION INTRAMUSCULAR; INTRAVENOUS at 09:48

## 2019-06-26 RX ADMIN — NITROGLYCERIN 0.5 INCH: 20 OINTMENT TOPICAL at 09:14

## 2019-06-26 ASSESSMENT — PAIN DESCRIPTION - LOCATION: LOCATION: CHEST

## 2019-06-26 ASSESSMENT — PAIN DESCRIPTION - DESCRIPTORS: DESCRIPTORS: CONSTANT

## 2019-06-26 ASSESSMENT — PAIN SCALES - GENERAL
PAINLEVEL_OUTOF10: 10
PAINLEVEL_OUTOF10: 10

## 2019-06-26 ASSESSMENT — ENCOUNTER SYMPTOMS
NAUSEA: 0
VOMITING: 0
ABDOMINAL PAIN: 0
SHORTNESS OF BREATH: 1
CHEST TIGHTNESS: 0

## 2019-06-26 ASSESSMENT — PAIN DESCRIPTION - PAIN TYPE: TYPE: ACUTE PAIN

## 2019-06-26 ASSESSMENT — PAIN DESCRIPTION - FREQUENCY: FREQUENCY: CONTINUOUS

## 2019-06-26 NOTE — ED NOTES
Pt returned from X ray and roomed in room Ed 6      [facility]  06/26/19 2741

## 2019-06-26 NOTE — ED NOTES
Handoff report given to Justo Porter , nurse updated on plan of care.       Tiff Rosenberg RN  06/26/19 9715

## 2019-06-26 NOTE — ED PROVIDER NOTES
[address] ENCOUNTER          ATTENDING PHYSICIAN NOTE       Date of evaluation: 6/26/2019    Chief Complaint     Chest Pain; Shortness of Breath; and Sweats      History of Present Illness     Marga Dinh is a 66 y.o. male who presents with chest pain     Location: left chest   Onset: several days ago   Quality: sharp and pressure   Radiation: none  Severity: severe   Exacerbating or relieving factors: patient does not ambulate - no change with ambulation   Associated factors: Shortness of breath, denied any associated nausea or vomiting. Patient has a history of cardiomyopathy and congestive heart failure with an EF of 25 to 30%. He had a stress test in April 2018 that was concerning for ischemia, he had a cardiac cath performed at that time that showed no significant stenosis, no PCI was performed. Patient reports compliance with all of his medication. Denied any recent travel. Denied any worsening leg swelling. Review of Systems     Review of Systems   Constitutional: Negative for activity change, appetite change, chills and fever. Respiratory: Positive for shortness of breath. Negative for chest tightness. Cardiovascular: Positive for chest pain. Negative for palpitations and leg swelling. Gastrointestinal: Negative for abdominal pain, nausea and vomiting. Genitourinary: Negative for dysuria and hematuria. Musculoskeletal: Negative. Skin: Negative. Neurological: Negative for dizziness and syncope. Past Medical, Surgical, Family, and Social History     He has a past medical history of Arthritis, Asthma, Bronchitis, CAD (coronary artery disease), CHF (congestive heart failure) ([address] Chronic back pain, Diabetes mellitus ([address] Generalized headaches, Gout, Hypertension, MI, old, Morbid obesity ([address] and Seizures ([address] He has a past surgical history that includes eye surgery.   His family history includes Asthma in his mother; Diabetes in his father, mother, and sister; Heart Attack in his father; Heart Disease in his father; High Blood Pressure in his father, mother, and sister; Obesity in his mother. He reports that he has quit smoking. He has never used smokeless tobacco. He reports that he does not drink alcohol or use drugs. Medications     Discharge Medication List as of 6/26/2019 12:40 PM      CONTINUE these medications which have NOT CHANGED    Details   chlorthalidone (HYGROTON) 25 MG tablet Take 12.5 mg by mouth every other dayHistorical Med      colchicine (COLCRYS) 0.6 MG tablet Take 0.6 mg by mouth as needed for PainHistorical Med      nortriptyline (PAMELOR) 75 MG capsule Take 75 mg by mouth nightlyHistorical Med      !! lamoTRIgine (LAMICTAL) 200 MG tablet Take 600 mg by mouth 2 times dailyHistorical Med      !! lamoTRIgine (LAMICTAL) 200 MG tablet Take 200 mg by mouth as neededHistorical Med      atorvastatin (LIPITOR) 80 MG tablet Take 80 mg by mouth dailyHistorical Med      !! metFORMIN (GLUCOPHAGE) 1000 MG tablet Take 1,000 mg by mouth daily (with breakfast) Historical Med      amLODIPine (NORVASC) 5 MG tablet Take 10 mg by mouth daily Historical Med      aspirin 81 MG EC tablet Take 81 mg by mouth dailyHistorical Med      carvedilol (COREG) 25 MG tablet Take 25 mg by mouth 2 times daily (with meals) Historical Med      famotidine (PEPCID) 40 MG tablet Take 40 mg by mouth dailyHistorical Med      loratadine (CLARITIN) 10 MG tablet Take 10 mg by mouth dailyHistorical Med      Multiple Vitamin (DAILY TITUS) TABS Take 1 tablet by mouth dailyHistorical Med      diazepam (VALIUM) 5 MG tablet Take 5 mg by mouth every 8 hours as needed for Anxiety. !! metFORMIN (GLUCOPHAGE) 500 MG tablet Take 500 mg by mouth nightly. lisinopril (PRINIVIL;ZESTRIL) 40 MG tablet Take 40 mg by mouth nightly Historical Med      glipiZIDE (GLUCOTROL) 5 MG tablet Take 1 tablet by mouth daily. , Disp-60 tablet, R-0      oxyCODONE-acetaminophen (PERCOCET) 5-325 MG per tablet Take 1 tablet by mouth every 6 hours as needed. allopurinol (ZYLOPRIM) 300 MG tablet Take 300 mg by mouth daily. albuterol (PROVENTIL) (2.5 MG/3ML) 0.083% nebulizer solution Take 2.5 mg by nebulization 2 times daily as needed for Wheezing or Shortness of Breath. !! - Potential duplicate medications found. Please discuss with provider. Allergies     He is allergic to amoxicillin; doxycycline; doxycycline hyclate; ibuprofen; penicillins; phenobarbital; tetracycline; and aspirin. Physical Exam     INITIAL VITALS: BP: 134/69, Temp: 98.8 °F (37.1 °C), Pulse: 77, Resp: 22, SpO2: 94 %    Physical Exam   Constitutional: He is oriented to person, place, and time. He appears well-developed and well-nourished. HENT:   Head: Normocephalic. Cardiovascular: Normal rate, regular rhythm, normal heart sounds and intact distal pulses. Pulmonary/Chest: Effort normal and breath sounds normal. No stridor. No respiratory distress. He has no wheezes. He has no rales. Abdominal: Soft. He exhibits no distension and no mass. There is no tenderness. There is no guarding. Musculoskeletal: Normal range of motion. He exhibits no edema. Neurological: He is alert and oriented to person, place, and time. Skin: Skin is warm and dry. Vitals reviewed. Diagnostic Results     EKG at 0905  EKG Interpretation    Interpreted by me    Rhythm: normal sinus   Rate: normal  Axis: LAD  Ectopy: LBBB  Conduction: normal  ST Segments: no acute change  T Waves: no acute change  Q Waves: none    Clinical Impression: no acute ischemia or infarction, unchanged from prior     RADIOLOGY:  XR CHEST STANDARD (2 VW)   Final Result   Impression: Stable, mild cardiomegaly. No acute pulmonary abnormality or significant change.           LABS:   Results for orders placed or performed during the hospital encounter of 06/26/19   CBC auto differential   Result Value Ref Range    WBC 5.7 4.0 - 11.0 K/uL    RBC 4. 47 4.20 - 5.90 M/uL    Hemoglobin 14.1 13.5 - 17.5 g/dL    Hematocrit 42.2 40.5 - 52.5 %    MCV 94.3 80.0 - 100.0 fL    MCH 31.4 26.0 - 34.0 pg    MCHC 33.3 31.0 - 36.0 g/dL    RDW 14.1 12.4 - 15.4 %    Platelets 601 358 - 197 K/uL    MPV 8.6 5.0 - 10.5 fL    Neutrophils % 61.2 %    Lymphocytes % 28.1 %    Monocytes % 7.5 %    Eosinophils % 2.5 %    Basophils % 0.7 %    Neutrophils # 3.5 1.7 - 7.7 K/uL    Lymphocytes # 1.6 1.0 - 5.1 K/uL    Monocytes # 0.4 0.0 - 1.3 K/uL    Eosinophils # 0.1 0.0 - 0.6 K/uL    Basophils # 0.0 0.0 - 0.2 K/uL   Basic Metabolic Panel   Result Value Ref Range    Sodium 138 136 - 145 mmol/L    Potassium 4.0 3.5 - 5.1 mmol/L    Chloride 102 99 - 110 mmol/L    CO2 24 21 - 32 mmol/L    Anion Gap 12 3 - 16    Glucose 180 (H) 70 - 99 mg/dL    BUN 15 7 - 20 mg/dL    CREATININE 0.9 0.9 - 1.3 mg/dL    GFR Non-African American >60 >60    GFR African American >60 >60    Calcium 9.2 8.3 - 10.6 mg/dL   Troponin (lab)   Result Value Ref Range    Troponin <0.01 <0.01 ng/mL   Brain Natriuretic Peptide   Result Value Ref Range    Pro- 0 - 124 pg/mL   PT - INR   Result Value Ref Range    Protime 11.0 9.8 - 13.0 sec    INR 0.96 0.86 - 1.14   PTT   Result Value Ref Range    aPTT 31.8 26.0 - 36.0 sec   Troponin (lab)   Result Value Ref Range    Troponin <0.01 <0.01 ng/mL   EKG 12 Lead   Result Value Ref Range    Ventricular Rate 80 BPM    Atrial Rate 80 BPM    P-R Interval 164 ms    QRS Duration 182 ms    Q-T Interval 434 ms    QTc Calculation (Bazett) 500 ms    P Axis 36 degrees    R Axis -35 degrees    T Axis 115 degrees    Diagnosis       EKG performed in ER and to be interpreted by ER physician. Confirmed by MD, ER ([id number]),  Anastasia Mcdaniel ([facility]), YOBANI ([id number]) on 6/26/2019 3:07:14 PM         RECENT VITALS:  BP: 131/66,Temp: 98.8 °F (37.1 °C), Pulse: 80, Resp: 16, SpO2: 96 %     Procedures     none    ED Course     Nursing Notes, Past Medical Hx, Past Surgical Hx, Social Hx,Allergies, and Family Hx were reviewed. The patient was given the following medications:  Orders Placed This Encounter   Medications    nitroglycerin (NITRO-BID) 2 % ointment 0.5 inch    morphine injection 4 mg    DISCONTD: ondansetron (ZOFRAN) injection 4 mg       CONSULTS:  IP CONSULT TO CARDIOLOGY    MEDICAL DECISIONMAKING / ASSESSMENT / Vandana Michael is a 66 y.o. male with a history of cardiomyopathy and congestive heart failure with an estimated ejection fraction of 25 to 30% who presents today with several days of chest pain described as both sharp and pressure with associated shortness of breath. Patient has had prior cardiac imaging specifically a stress test in April 2018 that was concerning for ischemia. A cardiac cath was performed however patient did not require PCI at that time. Patient also had another stress test and cardiac cath one year prior 2017, no PCI. He has been taking all his medications as prescribed. On ED arrival initial EKG was negative for any ischemia or infarction. Cardiology was consulted. Dr. Elisabeth Garcia came down to evaluate the patient. He did not believe the patient's chest pain was cardiac in nature, thinks this was musculoskeletal, specifically with it being reproducible to palpation. He believes the patient has been sufficiently ruled out for acute MI given his previous cardiac cath. Recommended NSAIDs for his chest pain and rest.  Patient has a cardiologist who he follows with at [facility], advised to follow-up with his cardiologist as an outpatient. Patient was agreeable to discharge plan. Clinical Impression     1.  Other chest pain        Disposition     PATIENT REFERRED TO:  [address] [place] [address]  [phone]    Schedule an appointment as soon as possible for a visit in 1 week        DISCHARGE MEDICATIONS:  Discharge Medication List as of 6/26/2019 12:40 PM          DISPOSITION    Discharge Racquel Campos MD  07/02/19 4776

## 2019-06-26 NOTE — ED NOTES
Bed: 1TR-01  Expected date:   Expected time:   Means of arrival:   Comments:  Thiago Obrien 20, RN  06/26/19 0900

## 2019-06-26 NOTE — CONSULTS
Cardiology Consultation                                                                    Pt Name: Marga Dinh  Age: 66 y.o. Sex: male  : 1960  Location: [facility]    Referring Physician: No att. providers found  Primary cardiologist: Dr. Sudarshan Awad at [facility]      Reason for Consult:     Reason for Consultation/Chief Complaint: Chest pain    HPI:      Marga Dinh is a 66 y.o. male with a past medical history of morbid obesity, mild CAD, HFrEF (EF 20-25%), HTN, DM, HLP, debilitated and uses a wheelchair. Patient came to [facility] ED today with chest discomfort that woke him up from sleep in am. He reports pain is located on the left side of his chest, sharp and dull in nature and unrelated to activity. He has been having such pains the last couple of days, unrelated to position or activity. Cath 2018: normal coronaries, EF 35%. Cath 2017: ostial Cx 30-40%, moderate RCA disease    Echo 2018: moderate LVH, EF 60-48%, diastolic II. ECG c/w NSR, LBBB (QRS 180s), old, similar with prior ECG's)    At the ED, vital signs were stable, no supplemental oxygen, afebrile. CXR normal, ECG unchanged, troponin x 2 negative, proBNP normal.       Histories     Past Medical History:   has a past medical history of Arthritis, Asthma, Bronchitis, CAD (coronary artery disease), CHF (congestive heart failure) ([address] Chronic back pain, Diabetes mellitus ([address] Generalized headaches, Gout, Hypertension, MI, old, Morbid obesity ([address] and Seizures ([address] Surgical History:   has a past surgical history that includes eye surgery. Social History:   reports that he has quit smoking. He has never used smokeless tobacco. He reports that he does not drink alcohol or use drugs. Family History:  No evidence for sudden cardiac death or premature CAD      Medications:       Home Medications  Were reviewed and are listed in nursing record.  and/or listed below  Prior to Admission medications    Medication Sig Start Date End Date Taking? Authorizing Provider   chlorthalidone (HYGROTON) 25 MG tablet Take 12.5 mg by mouth every other day    Historical Provider, MD   colchicine (COLCRYS) 0.6 MG tablet Take 0.6 mg by mouth as needed for Pain    Historical Provider, MD   nortriptyline (PAMELOR) 75 MG capsule Take 75 mg by mouth nightly    Historical Provider, MD   lamoTRIgine (LAMICTAL) 200 MG tablet Take 600 mg by mouth 2 times daily    Historical Provider, MD   lamoTRIgine (LAMICTAL) 200 MG tablet Take 200 mg by mouth as needed    Historical Provider, MD   atorvastatin (LIPITOR) 80 MG tablet Take 80 mg by mouth daily    Historical Provider, MD   metFORMIN (GLUCOPHAGE) 1000 MG tablet Take 1,000 mg by mouth daily (with breakfast)     Historical Provider, MD   amLODIPine (NORVASC) 5 MG tablet Take 10 mg by mouth daily     Historical Provider, MD   aspirin 81 MG EC tablet Take 81 mg by mouth daily    Historical Provider, MD   carvedilol (COREG) 25 MG tablet Take 25 mg by mouth 2 times daily (with meals)     Historical Provider, MD   famotidine (PEPCID) 40 MG tablet Take 40 mg by mouth daily    Historical Provider, MD   loratadine (CLARITIN) 10 MG tablet Take 10 mg by mouth daily    Historical Provider, MD   Multiple Vitamin (DAILY TITUS) TABS Take 1 tablet by mouth daily    Historical Provider, MD   diazepam (VALIUM) 5 MG tablet Take 5 mg by mouth every 8 hours as needed for Anxiety. Historical Provider, MD   metFORMIN (GLUCOPHAGE) 500 MG tablet Take 500 mg by mouth nightly. Historical Provider, MD   lisinopril (PRINIVIL;ZESTRIL) 40 MG tablet Take 40 mg by mouth nightly     Historical Provider, MD   glipiZIDE (GLUCOTROL) 5 MG tablet Take 1 tablet by mouth daily. 10/25/13   Ghazala Redd MD   oxyCODONE-acetaminophen (PERCOCET) 5-325 MG per tablet Take 1 tablet by mouth every 6 hours as needed. Historical Provider, MD   allopurinol (ZYLOPRIM) 300 MG tablet Take 300 mg by mouth daily.       Historical Provider, MD albuterol (PROVENTIL) (2.5 MG/3ML) 0.083% nebulizer solution Take 2.5 mg by nebulization 2 times daily as needed for Wheezing or Shortness of Breath. Historical Provider, MD          Inpatient Medications:      IV drips:      PRN:  ondansetron    Allergy:     Amoxicillin; Doxycycline; Doxycycline hyclate; Ibuprofen; Penicillins; Phenobarbital; Tetracycline; and Aspirin       Review of Systems:     All 12 point review of symptoms completed. Pertinent positives identified in the HPI, all other review of symptoms negative as below. CONSTITUTIONAL: + fatigue  SKIN: No rash or pruritis. EYES: No visual changes or diplopia. No scleral icterus. ENT: No Headaches, hearing loss or vertigo. No mouth sores or sore throat. CARDIOVASCULAR: + chest pain/chest pressure/chest discomfort. No palpitations. No edema. RESPIRATORY: No dyspnea. No cough or wheezing, no sputum production. GASTROINTESTINAL: No N/V/D. No abdominal pain, appetite loss, blood in stools. GENITOURINARY: No dysuria, trouble voiding, or hematuria. MUSCULOSKELETAL:  No gait disturbance, weakness or joint complaints. NEUROLOGICAL: No headache, diplopia, change in muscle strength, numbness or tingling. No change in gait, balance, coordination, mood, affect, memory, mentation, behavior. ENDOCRINE: No excessive thirst, fluid intake, or urination. No tremor. HEMATOLOGIC: No abnormal bruising or bleeding. ALLERGY: No nasal congestion or hives.       Physical Examination:     Vitals:    06/26/19 0918 06/26/19 0920 06/26/19 0939 06/26/19 1215   BP:  110/63 (!) 106/49 131/66   Pulse: 74 73 73 80   Resp:  21 18 16   Temp:       TempSrc:       SpO2:  94% 96% 96%   Weight:       Height:  5' 10\" (1.778 m)         Wt Readings from Last 3 Encounters:   06/26/19 (!) 386 lb (175.1 kg)   12/10/18 (!) 393 lb 4.8 oz (178.4 kg)   04/10/18 (!) 366 lb 10 oz (166.3 kg)         General Appearance:  Alert, cooperative, no distress, appears stated age Appropriate weight

## 2019-06-26 NOTE — ED NOTES
Pt arrived via EMS from home pt placed in trama 1 on arrival. Pt was given 324 asa and one nitro tablet per ems. Pt states has left of mid sternal chest pain that her rates 10/10. Ekg was obtained immedality on arrival and given to MD for interpretation. Pt has hx of chf and had a cardiac cath in April 2018, pt denies any stents.  Pt also states he was short of breath      Lisa Elise RN  06/26/19 9294

## 2019-08-22 NOTE — PROCEDURES
multi-focal or diffuse. Clinical correlation is recommended.   The absence of epileptiform discharges on a single EEG does not rule out a diagnosis of  epilepsy or rule out non-convulsive or complex partial status epilepticus as a cause of altered mental status no

## 2019-10-12 ENCOUNTER — HOSPITAL ENCOUNTER (EMERGENCY)
Age: 59
Discharge: HOME | End: 2019-10-12
Attending: EMERGENCY MEDICINE
Payer: MEDICARE

## 2019-10-12 VITALS
OXYGEN SATURATION: 95 % | DIASTOLIC BLOOD PRESSURE: 74 MMHG | BODY MASS INDEX: 54.95 KG/M2 | SYSTOLIC BLOOD PRESSURE: 144 MMHG | RESPIRATION RATE: 18 BRPM | TEMPERATURE: 98.1 F | HEART RATE: 83 BPM | WEIGHT: 315 LBS

## 2019-10-12 DIAGNOSIS — B02.9 HERPES ZOSTER WITHOUT COMPLICATION: Primary | ICD-10-CM

## 2019-10-12 PROCEDURE — 99282 EMERGENCY DEPT VISIT SF MDM: CPT

## 2019-10-12 RX ORDER — VALACYCLOVIR HYDROCHLORIDE 1 G/1
1000 TABLET, FILM COATED ORAL 3 TIMES DAILY
Qty: 21 TABLET | Refills: 0 | Status: SHIPPED | OUTPATIENT
Start: 2019-10-12 | End: 2019-10-19

## 2019-10-25 ENCOUNTER — HOSPITAL ENCOUNTER (OUTPATIENT)
Age: 59
Setting detail: OBSERVATION
Discharge: HOME | End: 2019-10-27
Attending: EMERGENCY MEDICINE | Admitting: INTERNAL MEDICINE
Payer: MEDICARE

## 2019-10-25 ENCOUNTER — APPOINTMENT (OUTPATIENT)
Dept: GENERAL RADIOLOGY | Age: 59
End: 2019-10-25
Payer: MEDICARE

## 2019-10-25 DIAGNOSIS — R07.9 CHEST PAIN, UNSPECIFIED TYPE: Primary | ICD-10-CM

## 2019-10-25 DIAGNOSIS — R46.89 SPELL OF ABNORMAL BEHAVIOR: ICD-10-CM

## 2019-10-25 LAB
ANION GAP SERPL CALCULATED.3IONS-SCNC: 10 MMOL/L (ref 3–16)
BUN BLDV-MCNC: 11 MG/DL (ref 7–20)
CALCIUM SERPL-MCNC: 8.5 MG/DL (ref 8.3–10.6)
CHLORIDE BLD-SCNC: 103 MMOL/L (ref 99–110)
CO2: 27 MMOL/L (ref 21–32)
CREAT SERPL-MCNC: 0.9 MG/DL (ref 0.9–1.3)
D DIMER: <200 NG/ML DDU (ref 0–229)
GFR AFRICAN AMERICAN: >60
GFR NON-AFRICAN AMERICAN: >60
GLUCOSE BLD-MCNC: 214 MG/DL (ref 70–99)
POTASSIUM REFLEX MAGNESIUM: 3.9 MMOL/L (ref 3.5–5.1)
PRO-BNP: 224 PG/ML (ref 0–124)
SODIUM BLD-SCNC: 140 MMOL/L (ref 136–145)
TROPONIN: <0.01 NG/ML
TROPONIN: <0.01 NG/ML

## 2019-10-25 PROCEDURE — 85379 FIBRIN DEGRADATION QUANT: CPT

## 2019-10-25 PROCEDURE — 84484 ASSAY OF TROPONIN QUANT: CPT

## 2019-10-25 PROCEDURE — 93005 ELECTROCARDIOGRAM TRACING: CPT | Performed by: STUDENT IN AN ORGANIZED HEALTH CARE EDUCATION/TRAINING PROGRAM

## 2019-10-25 PROCEDURE — 71046 X-RAY EXAM CHEST 2 VIEWS: CPT

## 2019-10-25 PROCEDURE — 6370000000 HC RX 637 (ALT 250 FOR IP): Performed by: STUDENT IN AN ORGANIZED HEALTH CARE EDUCATION/TRAINING PROGRAM

## 2019-10-25 PROCEDURE — 99285 EMERGENCY DEPT VISIT HI MDM: CPT

## 2019-10-25 PROCEDURE — 83880 ASSAY OF NATRIURETIC PEPTIDE: CPT

## 2019-10-25 PROCEDURE — 96374 THER/PROPH/DIAG INJ IV PUSH: CPT

## 2019-10-25 PROCEDURE — 6360000002 HC RX W HCPCS: Performed by: STUDENT IN AN ORGANIZED HEALTH CARE EDUCATION/TRAINING PROGRAM

## 2019-10-25 PROCEDURE — 80048 BASIC METABOLIC PNL TOTAL CA: CPT

## 2019-10-25 RX ORDER — LIDOCAINE 4 G/G
1 PATCH TOPICAL ONCE
Status: COMPLETED | OUTPATIENT
Start: 2019-10-25 | End: 2019-10-26

## 2019-10-25 RX ORDER — LIDOCAINE 4 G/G
1 PATCH TOPICAL DAILY
Status: DISCONTINUED | OUTPATIENT
Start: 2019-10-26 | End: 2019-10-25

## 2019-10-25 RX ORDER — MORPHINE SULFATE 4 MG/ML
4 INJECTION, SOLUTION INTRAMUSCULAR; INTRAVENOUS ONCE
Status: DISCONTINUED | OUTPATIENT
Start: 2019-10-25 | End: 2019-10-25

## 2019-10-25 RX ORDER — ASPIRIN 81 MG/1
324 TABLET, CHEWABLE ORAL ONCE
Status: COMPLETED | OUTPATIENT
Start: 2019-10-25 | End: 2019-10-25

## 2019-10-25 RX ORDER — KETOROLAC TROMETHAMINE 30 MG/ML
30 INJECTION, SOLUTION INTRAMUSCULAR; INTRAVENOUS ONCE
Status: COMPLETED | OUTPATIENT
Start: 2019-10-25 | End: 2019-10-25

## 2019-10-25 RX ADMIN — ASPIRIN 81 MG 324 MG: 81 TABLET ORAL at 21:05

## 2019-10-25 RX ADMIN — KETOROLAC TROMETHAMINE 30 MG: 30 INJECTION, SOLUTION INTRAMUSCULAR at 23:55

## 2019-10-25 ASSESSMENT — ENCOUNTER SYMPTOMS
CONSTIPATION: 0
ABDOMINAL PAIN: 0
VOMITING: 0
CHOKING: 0
ABDOMINAL DISTENTION: 0
SHORTNESS OF BREATH: 0
BACK PAIN: 0
APNEA: 0
COUGH: 0
NAUSEA: 0
CHEST TIGHTNESS: 0
WHEEZING: 0
STRIDOR: 0
DIARRHEA: 0
SORE THROAT: 0
BLOOD IN STOOL: 0

## 2019-10-25 ASSESSMENT — PAIN SCALES - GENERAL
PAINLEVEL_OUTOF10: 8
PAINLEVEL_OUTOF10: 9

## 2019-10-25 ASSESSMENT — PAIN DESCRIPTION - PAIN TYPE: TYPE: ACUTE PAIN

## 2019-10-25 ASSESSMENT — PAIN DESCRIPTION - LOCATION: LOCATION: CHEST

## 2019-10-25 ASSESSMENT — PAIN DESCRIPTION - ORIENTATION: ORIENTATION: MID

## 2019-10-26 LAB
ANION GAP SERPL CALCULATED.3IONS-SCNC: 12 MMOL/L (ref 3–16)
BASOPHILS ABSOLUTE: 0 K/UL (ref 0–0.2)
BASOPHILS RELATIVE PERCENT: 0.6 %
BUN BLDV-MCNC: 15 MG/DL (ref 7–20)
CALCIUM SERPL-MCNC: 8.7 MG/DL (ref 8.3–10.6)
CHLORIDE BLD-SCNC: 103 MMOL/L (ref 99–110)
CO2: 26 MMOL/L (ref 21–32)
CREAT SERPL-MCNC: 1 MG/DL (ref 0.9–1.3)
EKG ATRIAL RATE: 76 BPM
EKG DIAGNOSIS: NORMAL
EKG P AXIS: 18 DEGREES
EKG P-R INTERVAL: 192 MS
EKG Q-T INTERVAL: 424 MS
EKG QRS DURATION: 148 MS
EKG QTC CALCULATION (BAZETT): 477 MS
EKG R AXIS: -48 DEGREES
EKG T AXIS: 119 DEGREES
EKG VENTRICULAR RATE: 76 BPM
EOSINOPHILS ABSOLUTE: 0.1 K/UL (ref 0–0.6)
EOSINOPHILS RELATIVE PERCENT: 2 %
GFR AFRICAN AMERICAN: >60
GFR NON-AFRICAN AMERICAN: >60
GLUCOSE BLD-MCNC: 145 MG/DL (ref 70–99)
GLUCOSE BLD-MCNC: 165 MG/DL (ref 70–99)
GLUCOSE BLD-MCNC: 179 MG/DL (ref 70–99)
GLUCOSE BLD-MCNC: 197 MG/DL (ref 70–99)
GLUCOSE BLD-MCNC: 241 MG/DL (ref 70–99)
GLUCOSE BLD-MCNC: 245 MG/DL (ref 70–99)
HCT VFR BLD CALC: 35.5 % (ref 40.5–52.5)
HEMOGLOBIN: 11.9 G/DL (ref 13.5–17.5)
LYMPHOCYTES ABSOLUTE: 2.1 K/UL (ref 1–5.1)
LYMPHOCYTES RELATIVE PERCENT: 30.7 %
MCH RBC QN AUTO: 33.4 PG (ref 26–34)
MCHC RBC AUTO-ENTMCNC: 33.5 G/DL (ref 31–36)
MCV RBC AUTO: 99.7 FL (ref 80–100)
MONOCYTES ABSOLUTE: 0.4 K/UL (ref 0–1.3)
MONOCYTES RELATIVE PERCENT: 6 %
NEUTROPHILS ABSOLUTE: 4.2 K/UL (ref 1.7–7.7)
NEUTROPHILS RELATIVE PERCENT: 60.7 %
PDW BLD-RTO: 13.8 % (ref 12.4–15.4)
PERFORMED ON: ABNORMAL
PLATELET # BLD: 248 K/UL (ref 135–450)
PMV BLD AUTO: 7.3 FL (ref 5–10.5)
POTASSIUM REFLEX MAGNESIUM: 3.7 MMOL/L (ref 3.5–5.1)
RBC # BLD: 3.56 M/UL (ref 4.2–5.9)
SODIUM BLD-SCNC: 141 MMOL/L (ref 136–145)
TROPONIN: <0.01 NG/ML
TROPONIN: <0.01 NG/ML
WBC # BLD: 7 K/UL (ref 4–11)

## 2019-10-26 PROCEDURE — 6360000002 HC RX W HCPCS: Performed by: INTERNAL MEDICINE

## 2019-10-26 PROCEDURE — 85025 COMPLETE CBC W/AUTO DIFF WBC: CPT

## 2019-10-26 PROCEDURE — 6370000000 HC RX 637 (ALT 250 FOR IP): Performed by: INTERNAL MEDICINE

## 2019-10-26 PROCEDURE — 36415 COLL VENOUS BLD VENIPUNCTURE: CPT

## 2019-10-26 PROCEDURE — 99214 OFFICE O/P EST MOD 30 MIN: CPT | Performed by: INTERNAL MEDICINE

## 2019-10-26 PROCEDURE — 80048 BASIC METABOLIC PNL TOTAL CA: CPT

## 2019-10-26 PROCEDURE — 96372 THER/PROPH/DIAG INJ SC/IM: CPT

## 2019-10-26 PROCEDURE — 96375 TX/PRO/DX INJ NEW DRUG ADDON: CPT

## 2019-10-26 PROCEDURE — 6360000002 HC RX W HCPCS

## 2019-10-26 PROCEDURE — 2580000003 HC RX 258: Performed by: INTERNAL MEDICINE

## 2019-10-26 PROCEDURE — G0378 HOSPITAL OBSERVATION PER HR: HCPCS

## 2019-10-26 PROCEDURE — 84484 ASSAY OF TROPONIN QUANT: CPT

## 2019-10-26 RX ORDER — DIPHENHYDRAMINE HYDROCHLORIDE 50 MG/ML
INJECTION INTRAMUSCULAR; INTRAVENOUS
Status: DISPENSED
Start: 2019-10-26 | End: 2019-10-26

## 2019-10-26 RX ORDER — INSULIN LISPRO 100 [IU]/ML
0-6 INJECTION, SOLUTION INTRAVENOUS; SUBCUTANEOUS NIGHTLY
Status: DISCONTINUED | OUTPATIENT
Start: 2019-10-26 | End: 2019-10-27 | Stop reason: HOSPADM

## 2019-10-26 RX ORDER — INSULIN LISPRO 100 [IU]/ML
0-12 INJECTION, SOLUTION INTRAVENOUS; SUBCUTANEOUS EVERY 4 HOURS
Status: DISCONTINUED | OUTPATIENT
Start: 2019-10-26 | End: 2019-10-26

## 2019-10-26 RX ORDER — ACETAMINOPHEN 325 MG/1
650 TABLET ORAL EVERY 4 HOURS PRN
Status: DISCONTINUED | OUTPATIENT
Start: 2019-10-26 | End: 2019-10-27 | Stop reason: HOSPADM

## 2019-10-26 RX ORDER — CHLORTHALIDONE 25 MG/1
12.5 TABLET ORAL EVERY OTHER DAY
Status: DISCONTINUED | OUTPATIENT
Start: 2019-10-26 | End: 2019-10-27 | Stop reason: HOSPADM

## 2019-10-26 RX ORDER — CETIRIZINE HYDROCHLORIDE 10 MG/1
10 TABLET ORAL DAILY
Status: DISCONTINUED | OUTPATIENT
Start: 2019-10-26 | End: 2019-10-27 | Stop reason: HOSPADM

## 2019-10-26 RX ORDER — ALBUTEROL SULFATE 2.5 MG/3ML
2.5 SOLUTION RESPIRATORY (INHALATION) 2 TIMES DAILY PRN
Status: DISCONTINUED | OUTPATIENT
Start: 2019-10-26 | End: 2019-10-27 | Stop reason: HOSPADM

## 2019-10-26 RX ORDER — ATORVASTATIN CALCIUM 80 MG/1
80 TABLET, FILM COATED ORAL DAILY
Status: DISCONTINUED | OUTPATIENT
Start: 2019-10-26 | End: 2019-10-27 | Stop reason: HOSPADM

## 2019-10-26 RX ORDER — CARVEDILOL 25 MG/1
25 TABLET ORAL 2 TIMES DAILY WITH MEALS
Status: DISCONTINUED | OUTPATIENT
Start: 2019-10-26 | End: 2019-10-27 | Stop reason: HOSPADM

## 2019-10-26 RX ORDER — LORAZEPAM 2 MG/ML
2 INJECTION INTRAMUSCULAR
Status: DISCONTINUED | OUTPATIENT
Start: 2019-10-26 | End: 2019-10-27 | Stop reason: HOSPADM

## 2019-10-26 RX ORDER — ONDANSETRON 2 MG/ML
4 INJECTION INTRAMUSCULAR; INTRAVENOUS EVERY 4 HOURS PRN
Status: DISCONTINUED | OUTPATIENT
Start: 2019-10-26 | End: 2019-10-27 | Stop reason: HOSPADM

## 2019-10-26 RX ORDER — LISINOPRIL 40 MG/1
40 TABLET ORAL NIGHTLY
Status: DISCONTINUED | OUTPATIENT
Start: 2019-10-26 | End: 2019-10-27 | Stop reason: HOSPADM

## 2019-10-26 RX ORDER — INSULIN LISPRO 100 [IU]/ML
0-12 INJECTION, SOLUTION INTRAVENOUS; SUBCUTANEOUS
Status: DISCONTINUED | OUTPATIENT
Start: 2019-10-26 | End: 2019-10-27 | Stop reason: HOSPADM

## 2019-10-26 RX ORDER — ASPIRIN 81 MG/1
81 TABLET ORAL DAILY
Status: DISCONTINUED | OUTPATIENT
Start: 2019-10-26 | End: 2019-10-27 | Stop reason: HOSPADM

## 2019-10-26 RX ORDER — SODIUM CHLORIDE 0.9 % (FLUSH) 0.9 %
10 SYRINGE (ML) INJECTION PRN
Status: DISCONTINUED | OUTPATIENT
Start: 2019-10-26 | End: 2019-10-27 | Stop reason: HOSPADM

## 2019-10-26 RX ORDER — AMLODIPINE BESYLATE 10 MG/1
10 TABLET ORAL DAILY
Status: DISCONTINUED | OUTPATIENT
Start: 2019-10-26 | End: 2019-10-27 | Stop reason: HOSPADM

## 2019-10-26 RX ORDER — ALLOPURINOL 300 MG/1
300 TABLET ORAL DAILY
Status: DISCONTINUED | OUTPATIENT
Start: 2019-10-26 | End: 2019-10-27 | Stop reason: HOSPADM

## 2019-10-26 RX ORDER — LAMOTRIGINE 150 MG/1
600 TABLET ORAL 2 TIMES DAILY
Status: DISCONTINUED | OUTPATIENT
Start: 2019-10-26 | End: 2019-10-27 | Stop reason: HOSPADM

## 2019-10-26 RX ORDER — FAMOTIDINE 20 MG/1
40 TABLET, FILM COATED ORAL DAILY
Status: DISCONTINUED | OUTPATIENT
Start: 2019-10-26 | End: 2019-10-27 | Stop reason: HOSPADM

## 2019-10-26 RX ORDER — LORAZEPAM 2 MG/ML
INJECTION INTRAMUSCULAR
Status: COMPLETED
Start: 2019-10-26 | End: 2019-10-26

## 2019-10-26 RX ORDER — DIAZEPAM 5 MG/1
5 TABLET ORAL EVERY 8 HOURS PRN
Status: DISCONTINUED | OUTPATIENT
Start: 2019-10-26 | End: 2019-10-27 | Stop reason: HOSPADM

## 2019-10-26 RX ORDER — DEXTROSE MONOHYDRATE 50 MG/ML
100 INJECTION, SOLUTION INTRAVENOUS PRN
Status: DISCONTINUED | OUTPATIENT
Start: 2019-10-26 | End: 2019-10-27 | Stop reason: HOSPADM

## 2019-10-26 RX ORDER — SODIUM CHLORIDE 0.9 % (FLUSH) 0.9 %
10 SYRINGE (ML) INJECTION EVERY 12 HOURS SCHEDULED
Status: DISCONTINUED | OUTPATIENT
Start: 2019-10-26 | End: 2019-10-27 | Stop reason: HOSPADM

## 2019-10-26 RX ORDER — NORTRIPTYLINE HYDROCHLORIDE 25 MG/1
75 CAPSULE ORAL NIGHTLY
Status: DISCONTINUED | OUTPATIENT
Start: 2019-10-26 | End: 2019-10-27 | Stop reason: HOSPADM

## 2019-10-26 RX ORDER — DEXTROSE MONOHYDRATE 25 G/50ML
12.5 INJECTION, SOLUTION INTRAVENOUS PRN
Status: DISCONTINUED | OUTPATIENT
Start: 2019-10-26 | End: 2019-10-27 | Stop reason: HOSPADM

## 2019-10-26 RX ORDER — NICOTINE POLACRILEX 4 MG
15 LOZENGE BUCCAL PRN
Status: DISCONTINUED | OUTPATIENT
Start: 2019-10-26 | End: 2019-10-27 | Stop reason: HOSPADM

## 2019-10-26 RX ADMIN — INSULIN LISPRO 2 UNITS: 100 INJECTION, SOLUTION INTRAVENOUS; SUBCUTANEOUS at 22:16

## 2019-10-26 RX ADMIN — LAMOTRIGINE 600 MG: 150 TABLET ORAL at 19:55

## 2019-10-26 RX ADMIN — LISINOPRIL 40 MG: 40 TABLET ORAL at 19:54

## 2019-10-26 RX ADMIN — AMLODIPINE BESYLATE 10 MG: 10 TABLET ORAL at 09:42

## 2019-10-26 RX ADMIN — LAMOTRIGINE 600 MG: 150 TABLET ORAL at 10:56

## 2019-10-26 RX ADMIN — ASPIRIN 81 MG: 81 TABLET, COATED ORAL at 09:42

## 2019-10-26 RX ADMIN — LORAZEPAM 2 MG: 2 INJECTION INTRAMUSCULAR; INTRAVENOUS at 00:12

## 2019-10-26 RX ADMIN — CARVEDILOL 25 MG: 25 TABLET, FILM COATED ORAL at 10:56

## 2019-10-26 RX ADMIN — CHLORTHALIDONE 12.5 MG: 25 TABLET ORAL at 09:43

## 2019-10-26 RX ADMIN — NORTRIPTYLINE HYDROCHLORIDE 75 MG: 25 CAPSULE ORAL at 19:54

## 2019-10-26 RX ADMIN — ALLOPURINOL 300 MG: 300 TABLET ORAL at 09:42

## 2019-10-26 RX ADMIN — ATORVASTATIN CALCIUM 80 MG: 80 TABLET, FILM COATED ORAL at 09:42

## 2019-10-26 RX ADMIN — Medication 10 ML: at 09:45

## 2019-10-26 RX ADMIN — CARVEDILOL 25 MG: 25 TABLET, FILM COATED ORAL at 17:46

## 2019-10-26 RX ADMIN — CETIRIZINE HYDROCHLORIDE 10 MG: 10 TABLET, FILM COATED ORAL at 09:42

## 2019-10-26 RX ADMIN — ENOXAPARIN SODIUM 40 MG: 40 INJECTION SUBCUTANEOUS at 09:46

## 2019-10-26 RX ADMIN — Medication 10 ML: at 19:55

## 2019-10-26 RX ADMIN — INSULIN LISPRO 2 UNITS: 100 INJECTION, SOLUTION INTRAVENOUS; SUBCUTANEOUS at 02:50

## 2019-10-26 RX ADMIN — INSULIN LISPRO 2 UNITS: 100 INJECTION, SOLUTION INTRAVENOUS; SUBCUTANEOUS at 12:29

## 2019-10-26 RX ADMIN — FAMOTIDINE 40 MG: 20 TABLET ORAL at 09:42

## 2019-10-26 ASSESSMENT — PAIN DESCRIPTION - DESCRIPTORS: DESCRIPTORS: ACHING

## 2019-10-26 ASSESSMENT — PAIN SCALES - GENERAL
PAINLEVEL_OUTOF10: 0
PAINLEVEL_OUTOF10: 9
PAINLEVEL_OUTOF10: 0

## 2019-10-26 ASSESSMENT — PAIN DESCRIPTION - ONSET: ONSET: ON-GOING

## 2019-10-26 ASSESSMENT — PAIN DESCRIPTION - LOCATION: LOCATION: CHEST

## 2019-10-26 ASSESSMENT — PAIN DESCRIPTION - PROGRESSION: CLINICAL_PROGRESSION: NOT CHANGED

## 2019-10-26 ASSESSMENT — PAIN DESCRIPTION - FREQUENCY: FREQUENCY: INTERMITTENT

## 2019-10-26 ASSESSMENT — PAIN DESCRIPTION - ORIENTATION: ORIENTATION: LEFT

## 2019-10-26 ASSESSMENT — PAIN DESCRIPTION - PAIN TYPE: TYPE: ACUTE PAIN

## 2019-10-27 VITALS
RESPIRATION RATE: 20 BRPM | DIASTOLIC BLOOD PRESSURE: 83 MMHG | HEIGHT: 69 IN | WEIGHT: 315 LBS | HEART RATE: 66 BPM | SYSTOLIC BLOOD PRESSURE: 126 MMHG | OXYGEN SATURATION: 96 % | TEMPERATURE: 97 F | BODY MASS INDEX: 46.65 KG/M2

## 2019-10-27 LAB
ANION GAP SERPL CALCULATED.3IONS-SCNC: 16 MMOL/L (ref 3–16)
BUN BLDV-MCNC: 13 MG/DL (ref 7–20)
CALCIUM SERPL-MCNC: 9.2 MG/DL (ref 8.3–10.6)
CHLORIDE BLD-SCNC: 99 MMOL/L (ref 99–110)
CO2: 25 MMOL/L (ref 21–32)
CREAT SERPL-MCNC: 1 MG/DL (ref 0.9–1.3)
GFR AFRICAN AMERICAN: >60
GFR NON-AFRICAN AMERICAN: >60
GLUCOSE BLD-MCNC: 144 MG/DL (ref 70–99)
GLUCOSE BLD-MCNC: 172 MG/DL (ref 70–99)
PERFORMED ON: ABNORMAL
POTASSIUM REFLEX MAGNESIUM: 4 MMOL/L (ref 3.5–5.1)
SODIUM BLD-SCNC: 140 MMOL/L (ref 136–145)

## 2019-10-27 PROCEDURE — 96372 THER/PROPH/DIAG INJ SC/IM: CPT

## 2019-10-27 PROCEDURE — 2580000003 HC RX 258: Performed by: INTERNAL MEDICINE

## 2019-10-27 PROCEDURE — 80048 BASIC METABOLIC PNL TOTAL CA: CPT

## 2019-10-27 PROCEDURE — 6370000000 HC RX 637 (ALT 250 FOR IP): Performed by: INTERNAL MEDICINE

## 2019-10-27 PROCEDURE — 6360000002 HC RX W HCPCS: Performed by: INTERNAL MEDICINE

## 2019-10-27 PROCEDURE — 36415 COLL VENOUS BLD VENIPUNCTURE: CPT

## 2019-10-27 PROCEDURE — G0378 HOSPITAL OBSERVATION PER HR: HCPCS

## 2019-10-27 RX ADMIN — Medication 10 ML: at 09:59

## 2019-10-27 RX ADMIN — LAMOTRIGINE 600 MG: 150 TABLET ORAL at 09:39

## 2019-10-27 RX ADMIN — ASPIRIN 81 MG: 81 TABLET, COATED ORAL at 09:52

## 2019-10-27 RX ADMIN — ALLOPURINOL 300 MG: 300 TABLET ORAL at 09:51

## 2019-10-27 RX ADMIN — ATORVASTATIN CALCIUM 80 MG: 80 TABLET, FILM COATED ORAL at 09:39

## 2019-10-27 RX ADMIN — CARVEDILOL 25 MG: 25 TABLET, FILM COATED ORAL at 09:39

## 2019-10-27 RX ADMIN — CETIRIZINE HYDROCHLORIDE 10 MG: 10 TABLET, FILM COATED ORAL at 09:39

## 2019-10-27 RX ADMIN — INSULIN LISPRO 2 UNITS: 100 INJECTION, SOLUTION INTRAVENOUS; SUBCUTANEOUS at 09:39

## 2019-10-27 RX ADMIN — FAMOTIDINE 40 MG: 20 TABLET ORAL at 09:39

## 2019-10-27 RX ADMIN — AMLODIPINE BESYLATE 10 MG: 10 TABLET ORAL at 09:39

## 2019-10-27 RX ADMIN — ENOXAPARIN SODIUM 40 MG: 40 INJECTION SUBCUTANEOUS at 09:44

## 2019-10-27 ASSESSMENT — PAIN SCALES - GENERAL
PAINLEVEL_OUTOF10: 0
PAINLEVEL_OUTOF10: 0

## 2019-11-25 ENCOUNTER — HOSPITAL ENCOUNTER (EMERGENCY)
Age: 59
Discharge: HOME | End: 2019-11-25
Attending: EMERGENCY MEDICINE
Payer: MEDICARE

## 2019-11-25 ENCOUNTER — APPOINTMENT (OUTPATIENT)
Dept: GENERAL RADIOLOGY | Age: 59
End: 2019-11-25
Payer: MEDICARE

## 2019-11-25 VITALS
OXYGEN SATURATION: 94 % | TEMPERATURE: 98.7 F | SYSTOLIC BLOOD PRESSURE: 175 MMHG | RESPIRATION RATE: 16 BRPM | DIASTOLIC BLOOD PRESSURE: 95 MMHG

## 2019-11-25 DIAGNOSIS — L03.031 CELLULITIS OF TOE OF RIGHT FOOT: Primary | ICD-10-CM

## 2019-11-25 PROCEDURE — 99283 EMERGENCY DEPT VISIT LOW MDM: CPT

## 2019-11-25 PROCEDURE — 73630 X-RAY EXAM OF FOOT: CPT

## 2019-11-25 RX ORDER — CLINDAMYCIN HYDROCHLORIDE 300 MG/1
300 CAPSULE ORAL 3 TIMES DAILY
Qty: 21 CAPSULE | Refills: 0 | Status: SHIPPED | OUTPATIENT
Start: 2019-11-25 | End: 2019-12-02

## 2019-11-25 RX ORDER — ACETAMINOPHEN 325 MG/1
650 TABLET ORAL ONCE
Status: DISCONTINUED | OUTPATIENT
Start: 2019-11-25 | End: 2019-11-25 | Stop reason: HOSPADM

## 2019-11-25 ASSESSMENT — ENCOUNTER SYMPTOMS
CONSTIPATION: 0
BLOOD IN STOOL: 0
VOMITING: 0
ABDOMINAL PAIN: 0
NAUSEA: 0
RHINORRHEA: 0
COUGH: 0
DIARRHEA: 0
SORE THROAT: 0

## 2019-12-31 ENCOUNTER — HOSPITAL ENCOUNTER (EMERGENCY)
Age: 59
Discharge: HOME | End: 2020-01-01
Attending: EMERGENCY MEDICINE
Payer: MEDICARE

## 2019-12-31 PROCEDURE — 6370000000 HC RX 637 (ALT 250 FOR IP): Performed by: EMERGENCY MEDICINE

## 2019-12-31 PROCEDURE — 99284 EMERGENCY DEPT VISIT MOD MDM: CPT

## 2019-12-31 PROCEDURE — 6370000000 HC RX 637 (ALT 250 FOR IP): Performed by: PODIATRIST

## 2019-12-31 RX ORDER — TRAMADOL HYDROCHLORIDE 50 MG/1
50 TABLET ORAL ONCE
Status: COMPLETED | OUTPATIENT
Start: 2019-12-31 | End: 2019-12-31

## 2019-12-31 RX ORDER — ACETAMINOPHEN 500 MG
1000 TABLET ORAL ONCE
Status: COMPLETED | OUTPATIENT
Start: 2019-12-31 | End: 2019-12-31

## 2019-12-31 RX ORDER — LAMOTRIGINE 100 MG/1
200 TABLET ORAL ONCE
Status: COMPLETED | OUTPATIENT
Start: 2019-12-31 | End: 2019-12-31

## 2019-12-31 RX ADMIN — LAMOTRIGINE 400 MG: 150 TABLET ORAL at 23:35

## 2019-12-31 RX ADMIN — ACETAMINOPHEN 1000 MG: 500 TABLET ORAL at 20:59

## 2019-12-31 RX ADMIN — TRAMADOL HYDROCHLORIDE 50 MG: 50 TABLET, FILM COATED ORAL at 20:59

## 2019-12-31 RX ADMIN — LAMOTRIGINE 200 MG: 100 TABLET ORAL at 22:36

## 2019-12-31 ASSESSMENT — PAIN DESCRIPTION - ORIENTATION: ORIENTATION: RIGHT

## 2019-12-31 ASSESSMENT — PAIN SCALES - GENERAL
PAINLEVEL_OUTOF10: 9
PAINLEVEL_OUTOF10: 5

## 2019-12-31 ASSESSMENT — PAIN DESCRIPTION - PAIN TYPE: TYPE: CHRONIC PAIN

## 2019-12-31 ASSESSMENT — PAIN DESCRIPTION - LOCATION: LOCATION: ANKLE;SHOULDER

## 2020-01-01 VITALS
TEMPERATURE: 97.8 F | RESPIRATION RATE: 17 BRPM | BODY MASS INDEX: 46.65 KG/M2 | WEIGHT: 315 LBS | DIASTOLIC BLOOD PRESSURE: 85 MMHG | HEART RATE: 75 BPM | SYSTOLIC BLOOD PRESSURE: 151 MMHG | OXYGEN SATURATION: 95 % | HEIGHT: 69 IN

## 2020-01-01 NOTE — ED NOTES
Multiple transportation agencies called. Pt requires stretcher transport. First available is 0300 with [facility].      Ashwin Cárdenas RN  12/31/19 2122

## 2020-01-01 NOTE — ED PROVIDER NOTES
tightness and shortness of breath. Cardiovascular: Negative for chest pain, palpitations and leg swelling. Gastrointestinal: Negative for abdominal pain, constipation, diarrhea, nausea and vomiting. Genitourinary: Negative for dysuria, frequency and urgency. Musculoskeletal: Positive for arthralgias, back pain and gait problem. Negative for joint swelling and myalgias. Neurological: Negative for dizziness, seizures, syncope, numbness and headaches. Hematological: Does not bruise/bleed easily. Psychiatric/Behavioral: Negative for agitation, behavioral problems and confusion. Past Medical, Surgical, Family, and Social History     He has a past medical history of Arthritis, Asthma, Bronchitis, CAD (coronary artery disease), CHF (congestive heart failure) ([facility] Chronic back pain, Diabetes mellitus ([facility] Generalized headaches, Gout, Hypertension, MI, old, Morbid obesity ([facility] Psychogenic nonepileptic seizure, and Seizures ([facility] He has a past surgical history that includes eye surgery. His family history includes Asthma in his mother; Diabetes in his father, mother, and sister; Heart Attack in his father; Heart Disease in his father; High Blood Pressure in his father, mother, and sister; Obesity in his mother. He reports that he has quit smoking. He has never used smokeless tobacco. He reports that he does not drink alcohol or use drugs. Medications     Previous Medications    ALBUTEROL (PROVENTIL) (2.5 MG/3ML) 0.083% NEBULIZER SOLUTION    Take 2.5 mg by nebulization 2 times daily as needed for Wheezing or Shortness of Breath. ALLOPURINOL (ZYLOPRIM) 300 MG TABLET    Take 300 mg by mouth daily.       AMLODIPINE (NORVASC) 5 MG TABLET    Take 10 mg by mouth daily     ASPIRIN 81 MG EC TABLET    Take 81 mg by mouth daily    ATORVASTATIN (LIPITOR) 80 MG TABLET    Take 80 mg by mouth daily    CARVEDILOL (COREG) 25 MG TABLET    Take 25 mg by mouth 2 times daily (with meals)     CHLORTHALIDONE inappropriate. When dictating, effort is made to correct spelling/grammar errors. If there are any questions or concerns please feel free to contact the dictating provider for clarification.            Latrell Campos, KEYLA  Resident  12/31/19 9506

## 2020-03-15 ENCOUNTER — HOSPITAL ENCOUNTER (EMERGENCY)
Age: 60
Discharge: HOME | End: 2020-03-16
Attending: EMERGENCY MEDICINE
Payer: MEDICARE

## 2020-03-15 PROCEDURE — 99284 EMERGENCY DEPT VISIT MOD MDM: CPT

## 2020-03-16 ENCOUNTER — APPOINTMENT (OUTPATIENT)
Dept: GENERAL RADIOLOGY | Age: 60
End: 2020-03-16
Payer: MEDICARE

## 2020-03-16 VITALS
DIASTOLIC BLOOD PRESSURE: 73 MMHG | TEMPERATURE: 98.4 F | RESPIRATION RATE: 18 BRPM | WEIGHT: 315 LBS | OXYGEN SATURATION: 98 % | HEART RATE: 67 BPM | SYSTOLIC BLOOD PRESSURE: 138 MMHG | BODY MASS INDEX: 55.38 KG/M2

## 2020-03-16 LAB
ANION GAP SERPL CALCULATED.3IONS-SCNC: 15 MMOL/L (ref 3–16)
BASOPHILS ABSOLUTE: 0 K/UL (ref 0–0.2)
BASOPHILS RELATIVE PERCENT: 0.7 %
BUN BLDV-MCNC: 11 MG/DL (ref 7–20)
CALCIUM SERPL-MCNC: 9.3 MG/DL (ref 8.3–10.6)
CHLORIDE BLD-SCNC: 102 MMOL/L (ref 99–110)
CO2: 20 MMOL/L (ref 21–32)
CREAT SERPL-MCNC: 0.9 MG/DL (ref 0.9–1.3)
EKG ATRIAL RATE: 66 BPM
EKG DIAGNOSIS: NORMAL
EKG P AXIS: 51 DEGREES
EKG P-R INTERVAL: 170 MS
EKG Q-T INTERVAL: 466 MS
EKG QRS DURATION: 182 MS
EKG QTC CALCULATION (BAZETT): 488 MS
EKG R AXIS: -54 DEGREES
EKG T AXIS: 101 DEGREES
EKG VENTRICULAR RATE: 66 BPM
EOSINOPHILS ABSOLUTE: 0.1 K/UL (ref 0–0.6)
EOSINOPHILS RELATIVE PERCENT: 2.1 %
GFR AFRICAN AMERICAN: >60
GFR NON-AFRICAN AMERICAN: >60
GLUCOSE BLD-MCNC: 148 MG/DL (ref 70–99)
HCT VFR BLD CALC: 45.8 % (ref 40.5–52.5)
HEMOGLOBIN: 14.9 G/DL (ref 13.5–17.5)
LYMPHOCYTES ABSOLUTE: 1.5 K/UL (ref 1–5.1)
LYMPHOCYTES RELATIVE PERCENT: 26 %
MCH RBC QN AUTO: 32.7 PG (ref 26–34)
MCHC RBC AUTO-ENTMCNC: 32.5 G/DL (ref 31–36)
MCV RBC AUTO: 100.5 FL (ref 80–100)
MONOCYTES ABSOLUTE: 0.3 K/UL (ref 0–1.3)
MONOCYTES RELATIVE PERCENT: 5.9 %
NEUTROPHILS ABSOLUTE: 3.9 K/UL (ref 1.7–7.7)
NEUTROPHILS RELATIVE PERCENT: 65.3 %
PDW BLD-RTO: 14.3 % (ref 12.4–15.4)
PLATELET # BLD: 207 K/UL (ref 135–450)
PMV BLD AUTO: 8.9 FL (ref 5–10.5)
POTASSIUM REFLEX MAGNESIUM: 4.2 MMOL/L (ref 3.5–5.1)
RBC # BLD: 4.55 M/UL (ref 4.2–5.9)
SODIUM BLD-SCNC: 137 MMOL/L (ref 136–145)
TROPONIN: <0.01 NG/ML
WBC # BLD: 5.9 K/UL (ref 4–11)

## 2020-03-16 PROCEDURE — 85025 COMPLETE CBC W/AUTO DIFF WBC: CPT

## 2020-03-16 PROCEDURE — 84484 ASSAY OF TROPONIN QUANT: CPT

## 2020-03-16 PROCEDURE — 36415 COLL VENOUS BLD VENIPUNCTURE: CPT

## 2020-03-16 PROCEDURE — 80048 BASIC METABOLIC PNL TOTAL CA: CPT

## 2020-03-16 PROCEDURE — 80175 DRUG SCREEN QUAN LAMOTRIGINE: CPT

## 2020-03-16 PROCEDURE — 73562 X-RAY EXAM OF KNEE 3: CPT

## 2020-03-16 PROCEDURE — 71046 X-RAY EXAM CHEST 2 VIEWS: CPT

## 2020-03-16 PROCEDURE — 93005 ELECTROCARDIOGRAM TRACING: CPT | Performed by: STUDENT IN AN ORGANIZED HEALTH CARE EDUCATION/TRAINING PROGRAM

## 2020-03-16 RX ORDER — LORAZEPAM 0.5 MG/1
0.5 TABLET ORAL 2 TIMES DAILY
Qty: 6 TABLET | Refills: 0 | Status: SHIPPED | OUTPATIENT
Start: 2020-03-16 | End: 2020-03-19

## 2020-03-16 ASSESSMENT — ENCOUNTER SYMPTOMS
EYE DISCHARGE: 0
SHORTNESS OF BREATH: 0
VOMITING: 0
COUGH: 0
RHINORRHEA: 0
BACK PAIN: 0
EYE REDNESS: 0
ABDOMINAL PAIN: 0
DIARRHEA: 0

## 2020-03-16 ASSESSMENT — PAIN DESCRIPTION - ORIENTATION: ORIENTATION: RIGHT

## 2020-03-16 ASSESSMENT — PAIN SCALES - GENERAL: PAINLEVEL_OUTOF10: 8

## 2020-03-16 ASSESSMENT — PAIN DESCRIPTION - PAIN TYPE: TYPE: ACUTE PAIN

## 2020-03-16 ASSESSMENT — PAIN DESCRIPTION - LOCATION: LOCATION: ARM

## 2020-03-17 ENCOUNTER — HOSPITAL ENCOUNTER (EMERGENCY)
Age: 60
Discharge: HOME | End: 2020-03-17
Attending: EMERGENCY MEDICINE
Payer: MEDICARE

## 2020-03-17 ENCOUNTER — APPOINTMENT (OUTPATIENT)
Dept: GENERAL RADIOLOGY | Age: 60
End: 2020-03-17
Payer: MEDICARE

## 2020-03-17 VITALS
WEIGHT: 315 LBS | HEART RATE: 66 BPM | SYSTOLIC BLOOD PRESSURE: 154 MMHG | OXYGEN SATURATION: 97 % | DIASTOLIC BLOOD PRESSURE: 78 MMHG | TEMPERATURE: 98.6 F | BODY MASS INDEX: 55.38 KG/M2 | RESPIRATION RATE: 18 BRPM

## 2020-03-17 LAB
ALBUMIN SERPL-MCNC: 4 G/DL (ref 3.4–5)
ALP BLD-CCNC: 128 U/L (ref 40–129)
ALT SERPL-CCNC: 20 U/L (ref 10–40)
ANION GAP SERPL CALCULATED.3IONS-SCNC: 14 MMOL/L (ref 3–16)
AST SERPL-CCNC: 18 U/L (ref 15–37)
BASOPHILS ABSOLUTE: 0.1 K/UL (ref 0–0.2)
BASOPHILS RELATIVE PERCENT: 1 %
BILIRUB SERPL-MCNC: 0.3 MG/DL (ref 0–1)
BILIRUBIN DIRECT: <0.2 MG/DL (ref 0–0.3)
BILIRUBIN URINE: NEGATIVE
BILIRUBIN, INDIRECT: NORMAL MG/DL (ref 0–1)
BLOOD, URINE: NEGATIVE
BUN BLDV-MCNC: 10 MG/DL (ref 7–20)
CALCIUM SERPL-MCNC: 9 MG/DL (ref 8.3–10.6)
CHLORIDE BLD-SCNC: 102 MMOL/L (ref 99–110)
CLARITY: CLEAR
CO2: 25 MMOL/L (ref 21–32)
COLOR: YELLOW
CREAT SERPL-MCNC: 0.9 MG/DL (ref 0.9–1.3)
EOSINOPHILS ABSOLUTE: 0.1 K/UL (ref 0–0.6)
EOSINOPHILS RELATIVE PERCENT: 2.4 %
GFR AFRICAN AMERICAN: >60
GFR NON-AFRICAN AMERICAN: >60
GLUCOSE BLD-MCNC: 129 MG/DL (ref 70–99)
GLUCOSE BLD-MCNC: 138 MG/DL (ref 70–99)
GLUCOSE URINE: 250 MG/DL
HCT VFR BLD CALC: 41.3 % (ref 40.5–52.5)
HEMOGLOBIN: 13.7 G/DL (ref 13.5–17.5)
KETONES, URINE: NEGATIVE MG/DL
LACTIC ACID: 1.5 MMOL/L (ref 0.4–2)
LAMOTRIGINE LEVEL: 21.2 UG/ML (ref 2.5–15)
LEUKOCYTE ESTERASE, URINE: NEGATIVE
LIPASE: 36 U/L (ref 13–60)
LYMPHOCYTES ABSOLUTE: 1.7 K/UL (ref 1–5.1)
LYMPHOCYTES RELATIVE PERCENT: 31.3 %
MCH RBC QN AUTO: 32.1 PG (ref 26–34)
MCHC RBC AUTO-ENTMCNC: 33.1 G/DL (ref 31–36)
MCV RBC AUTO: 96.9 FL (ref 80–100)
MICROSCOPIC EXAMINATION: YES
MONOCYTES ABSOLUTE: 0.4 K/UL (ref 0–1.3)
MONOCYTES RELATIVE PERCENT: 7.3 %
NEUTROPHILS ABSOLUTE: 3.2 K/UL (ref 1.7–7.7)
NEUTROPHILS RELATIVE PERCENT: 58 %
NITRITE, URINE: NEGATIVE
PDW BLD-RTO: 14 % (ref 12.4–15.4)
PERFORMED ON: ABNORMAL
PH UA: 5.5 (ref 5–8)
PLATELET # BLD: 207 K/UL (ref 135–450)
PMV BLD AUTO: 8.5 FL (ref 5–10.5)
POTASSIUM REFLEX MAGNESIUM: 3.8 MMOL/L (ref 3.5–5.1)
PROTEIN UA: 30 MG/DL
RBC # BLD: 4.27 M/UL (ref 4.2–5.9)
RBC UA: NORMAL /HPF (ref 0–4)
SODIUM BLD-SCNC: 141 MMOL/L (ref 136–145)
SPECIFIC GRAVITY UA: >=1.03 (ref 1–1.03)
TOTAL PROTEIN: 6.4 G/DL (ref 6.4–8.2)
TROPONIN: <0.01 NG/ML
URINE TYPE: ABNORMAL
UROBILINOGEN, URINE: 0.2 E.U./DL
WBC # BLD: 5.4 K/UL (ref 4–11)
WBC UA: NORMAL /HPF (ref 0–5)

## 2020-03-17 PROCEDURE — 80048 BASIC METABOLIC PNL TOTAL CA: CPT

## 2020-03-17 PROCEDURE — 99285 EMERGENCY DEPT VISIT HI MDM: CPT

## 2020-03-17 PROCEDURE — 71045 X-RAY EXAM CHEST 1 VIEW: CPT

## 2020-03-17 PROCEDURE — 83605 ASSAY OF LACTIC ACID: CPT

## 2020-03-17 PROCEDURE — 84484 ASSAY OF TROPONIN QUANT: CPT

## 2020-03-17 PROCEDURE — 85025 COMPLETE CBC W/AUTO DIFF WBC: CPT

## 2020-03-17 PROCEDURE — 80076 HEPATIC FUNCTION PANEL: CPT

## 2020-03-17 PROCEDURE — 81001 URINALYSIS AUTO W/SCOPE: CPT

## 2020-03-17 PROCEDURE — 83690 ASSAY OF LIPASE: CPT

## 2020-03-17 ASSESSMENT — ENCOUNTER SYMPTOMS
GASTROINTESTINAL NEGATIVE: 1
RESPIRATORY NEGATIVE: 1
EYES NEGATIVE: 1

## 2020-03-17 NOTE — ED TRIAGE NOTES
Pt arrives by EMS who gives the report of a 15 minute long seizure. EMS states that upon arrival there was active seizure activity so 10 mg of Versed was given. Pt responded well and was post-ictal for a bit, but is now alert and responding.

## 2020-03-17 NOTE — ED PROVIDER NOTES
ED Attending Attestation Note     Date of evaluation: 3/17/2020    This patient was seen by the resident. I have seen and examined the patient, agree with the workup, evaluation, management and diagnosis. The care plan has been discussed. My assessment reveals an overall well-appearing although slightly sleepy 26-year-old male patient who presents after a shaking episode. The patient carries both a diagnosis of seizure and nonepileptic seizures. He was seen here over the weekend for similar symptoms. He was given 10 mg of Versed by EMS. Collateral history which was obtained by the resident physician from the patient's girlfriend says that the episode today did not appear to be consistent with the patient's known history of seizure. No report of trauma or injury. He has no stigmata of trauma. Of note, his Lamictal level that was sent on Sunday is supratherapeutic at approximately 21. The patient states that he takes \"extra doses \"of Lamictal when he feels like he is going to have a seizure. .     Segundo Juárez MD  03/17/20 [id number]

## 2020-03-17 NOTE — ED PROVIDER NOTES
[facility] RESIDENT NOTE       Date of evaluation: 3/17/2020    Chief Complaint     Seizures    History of Present Illness     Selvin Jon is a 64 y.o. male with a past medical history of hypertension hyperlipidemia nonischemic cardiomyopathy morbid obesity, RDD, generalized epilepsy and nonepileptic spells who presented today after he was noted to have seizure activity. Patient is alert and oriented at the time of this interview, but said he does not remember, the events surrounding his seizure activity. The last thing he remembers was sitting in a chair eating lunch. He denies biting his tongue or any tongue pain currently. He denies any infectious symptoms such as fevers, chills, myalgias, sick contacts or recent travel. Denies any burning with urination cough or shortness of breath. Patient lives with his girlfriend who witnessed the latter parts of the seizure. Friend said she went downstairs and he was shaking in his chair. She promptly called EMS on arrival they administered the IM Versed. She denies any evidence of urinary incontinence. He does state the seizure did not appear to be similar to his typical seizures and he did grimace when they administered 10 mgof IM Versed. He also states he was talking during parts of his seizure. Review of Systems     Review of Systems   Constitutional: Negative. HENT: Negative. Eyes: Negative. Respiratory: Negative. Cardiovascular: Negative. Gastrointestinal: Negative. Endocrine: Negative. Genitourinary: Negative. Musculoskeletal: Negative. Neurological: Positive for seizures. Negative for dizziness, facial asymmetry, speech difficulty, weakness, numbness and headaches. All other systems reviewed and are negative.       Past Medical, Surgical, Family, and Social History     He has a past medical history of Arthritis, Asthma, Bronchitis, CAD (coronary artery disease), Differential   Result Value Ref Range    WBC 5.4 4.0 - 11.0 K/uL    RBC 4.27 4.20 - 5.90 M/uL    Hemoglobin 13.7 13.5 - 17.5 g/dL    Hematocrit 41.3 40.5 - 52.5 %    MCV 96.9 80.0 - 100.0 fL    MCH 32.1 26.0 - 34.0 pg    MCHC 33.1 31.0 - 36.0 g/dL    RDW 14.0 12.4 - 15.4 %    Platelets 946 047 - 395 K/uL    MPV 8.5 5.0 - 10.5 fL    Neutrophils % 58.0 %    Lymphocytes % 31.3 %    Monocytes % 7.3 %    Eosinophils % 2.4 %    Basophils % 1.0 %    Neutrophils Absolute 3.2 1.7 - 7.7 K/uL    Lymphocytes Absolute 1.7 1.0 - 5.1 K/uL    Monocytes Absolute 0.4 0.0 - 1.3 K/uL    Eosinophils Absolute 0.1 0.0 - 0.6 K/uL    Basophils Absolute 0.1 0.0 - 0.2 K/uL   Basic Metabolic Panel w/ Reflex to MG   Result Value Ref Range    Sodium 141 136 - 145 mmol/L    Potassium reflex Magnesium 3.8 3.5 - 5.1 mmol/L    Chloride 102 99 - 110 mmol/L    CO2 25 21 - 32 mmol/L    Anion Gap 14 3 - 16    Glucose 138 (H) 70 - 99 mg/dL    BUN 10 7 - 20 mg/dL    CREATININE 0.9 0.9 - 1.3 mg/dL    GFR Non-African American >60 >60    GFR African American >60 >60    Calcium 9.0 8.3 - 10.6 mg/dL   Troponin   Result Value Ref Range    Troponin <0.01 <0.01 ng/mL   Lactic Acid, Plasma   Result Value Ref Range    Lactic Acid 1.5 0.4 - 2.0 mmol/L   Lipase   Result Value Ref Range    Lipase 36.0 13.0 - 60.0 U/L   Hepatic Function Panel   Result Value Ref Range    Total Protein 6.4 6.4 - 8.2 g/dL    Alb 4.0 3.4 - 5.0 g/dL    Alkaline Phosphatase 128 40 - 129 U/L    ALT 20 10 - 40 U/L    AST 18 15 - 37 U/L    Total Bilirubin 0.3 0.0 - 1.0 mg/dL    Bilirubin, Direct <0.2 0.0 - 0.3 mg/dL    Bilirubin, Indirect see below 0.0 - 1.0 mg/dL   Urinalysis, reflex to microscopic (Lab)   Result Value Ref Range    Color, UA Yellow Straw/Yellow    Clarity, UA Clear Clear    Glucose, Ur 250 (A) Negative mg/dL    Bilirubin Urine Negative Negative    Ketones, Urine Negative Negative mg/dL    Specific Gravity, UA >=1.030 1.005 - 1.030    Blood, Urine Negative Negative    pH, UA recommended that the patient continue on his current medication regimen and follow-up as scheduled. The patient does have good followup in place and therefore we elected to discharge the patient and the recommendation to followup with their neurologist as soon as possible. The patient expressed understanding of strict return precautions including increased seizure frequency or evidence of systemic illness such as nausea vomiting or fever/chills. This patient was also evaluated by the attending physician. All care plans werediscussed and agreed upon. Clinical Impression     1.  Seizure disorder [facility])        Disposition     PATIENT REFERRED TO:  [address] [place] [address]  [phone]    Schedule an appointment as soon as possible for a visit         DISCHARGE MEDICATIONS:  New Prescriptions    No medications on file       DISPOSITION Decision To Discharge 03/17/2020 05:31:12 PM        Alee Cardoso MD  Resident  03/17/20 [phone]

## 2020-03-21 ENCOUNTER — HOSPITAL ENCOUNTER (EMERGENCY)
Age: 60
Discharge: HOME | End: 2020-03-22
Attending: EMERGENCY MEDICINE
Payer: MEDICARE

## 2020-03-21 PROCEDURE — 99284 EMERGENCY DEPT VISIT MOD MDM: CPT

## 2020-03-21 ASSESSMENT — ENCOUNTER SYMPTOMS
VOMITING: 0
SHORTNESS OF BREATH: 0
ABDOMINAL PAIN: 0
NAUSEA: 0

## 2020-03-22 ENCOUNTER — APPOINTMENT (OUTPATIENT)
Dept: CT IMAGING | Age: 60
End: 2020-03-22
Payer: MEDICARE

## 2020-03-22 VITALS
SYSTOLIC BLOOD PRESSURE: 123 MMHG | RESPIRATION RATE: 14 BRPM | OXYGEN SATURATION: 94 % | HEART RATE: 68 BPM | DIASTOLIC BLOOD PRESSURE: 57 MMHG | TEMPERATURE: 98 F

## 2020-03-22 LAB
ANION GAP SERPL CALCULATED.3IONS-SCNC: 13 MMOL/L (ref 3–16)
BASOPHILS ABSOLUTE: 0 K/UL (ref 0–0.2)
BASOPHILS RELATIVE PERCENT: 0.7 %
BUN BLDV-MCNC: 9 MG/DL (ref 7–20)
CALCIUM SERPL-MCNC: 9.4 MG/DL (ref 8.3–10.6)
CHLORIDE BLD-SCNC: 98 MMOL/L (ref 99–110)
CO2: 26 MMOL/L (ref 21–32)
CREAT SERPL-MCNC: 0.9 MG/DL (ref 0.9–1.3)
EOSINOPHILS ABSOLUTE: 0.1 K/UL (ref 0–0.6)
EOSINOPHILS RELATIVE PERCENT: 2.2 %
GFR AFRICAN AMERICAN: >60
GFR NON-AFRICAN AMERICAN: >60
GLUCOSE BLD-MCNC: 128 MG/DL (ref 70–99)
GLUCOSE BLD-MCNC: 159 MG/DL (ref 70–99)
HCT VFR BLD CALC: 45.6 % (ref 40.5–52.5)
HEMOGLOBIN: 15.5 G/DL (ref 13.5–17.5)
LYMPHOCYTES ABSOLUTE: 1.6 K/UL (ref 1–5.1)
LYMPHOCYTES RELATIVE PERCENT: 28.8 %
MCH RBC QN AUTO: 32.8 PG (ref 26–34)
MCHC RBC AUTO-ENTMCNC: 34.1 G/DL (ref 31–36)
MCV RBC AUTO: 96.4 FL (ref 80–100)
MONOCYTES ABSOLUTE: 0.3 K/UL (ref 0–1.3)
MONOCYTES RELATIVE PERCENT: 5.3 %
NEUTROPHILS ABSOLUTE: 3.4 K/UL (ref 1.7–7.7)
NEUTROPHILS RELATIVE PERCENT: 63 %
PDW BLD-RTO: 13.8 % (ref 12.4–15.4)
PERFORMED ON: ABNORMAL
PLATELET # BLD: 221 K/UL (ref 135–450)
PMV BLD AUTO: 9 FL (ref 5–10.5)
POTASSIUM REFLEX MAGNESIUM: 5.2 MMOL/L (ref 3.5–5.1)
RBC # BLD: 4.73 M/UL (ref 4.2–5.9)
SODIUM BLD-SCNC: 137 MMOL/L (ref 136–145)
WBC # BLD: 5.4 K/UL (ref 4–11)

## 2020-03-22 PROCEDURE — 70450 CT HEAD/BRAIN W/O DYE: CPT

## 2020-03-22 PROCEDURE — 80048 BASIC METABOLIC PNL TOTAL CA: CPT

## 2020-03-22 PROCEDURE — 85025 COMPLETE CBC W/AUTO DIFF WBC: CPT

## 2020-03-22 PROCEDURE — 80175 DRUG SCREEN QUAN LAMOTRIGINE: CPT

## 2020-03-22 NOTE — ED PROVIDER NOTES
a past surgical history that includes eye surgery. His family history includes Asthma in his mother; Diabetes in his father, mother, and sister; Heart Attack in his father; Heart Disease in his father; High Blood Pressure in his father, mother, and sister; Obesity in his mother. He reports that he has quit smoking. He has never used smokeless tobacco. He reports that he does not drink alcohol or use drugs. Medications     Previous Medications    ALBUTEROL (PROVENTIL) (2.5 MG/3ML) 0.083% NEBULIZER SOLUTION    Take 2.5 mg by nebulization 2 times daily as needed for Wheezing or Shortness of Breath. ALLOPURINOL (ZYLOPRIM) 300 MG TABLET    Take 300 mg by mouth daily. AMLODIPINE (NORVASC) 5 MG TABLET    Take 10 mg by mouth daily     ASPIRIN 81 MG EC TABLET    Take 81 mg by mouth daily    ATORVASTATIN (LIPITOR) 80 MG TABLET    Take 80 mg by mouth daily    CARVEDILOL (COREG) 25 MG TABLET    Take 25 mg by mouth 2 times daily (with meals)     CHLORTHALIDONE (HYGROTON) 25 MG TABLET    Take 12.5 mg by mouth every other day    COLCHICINE (COLCRYS) 0.6 MG TABLET    Take 0.6 mg by mouth as needed for Pain    DIAZEPAM (VALIUM) 5 MG TABLET    Take 5 mg by mouth every 8 hours as needed for Anxiety. FAMOTIDINE (PEPCID) 40 MG TABLET    Take 40 mg by mouth daily    GLIPIZIDE (GLUCOTROL) 5 MG TABLET    Take 1 tablet by mouth daily. LAMOTRIGINE (LAMICTAL) 200 MG TABLET    Take 600 mg by mouth 2 times daily    LAMOTRIGINE (LAMICTAL) 200 MG TABLET    Take 200 mg by mouth as needed    LISINOPRIL (PRINIVIL;ZESTRIL) 40 MG TABLET    Take 40 mg by mouth nightly     LORATADINE (CLARITIN) 10 MG TABLET    Take 10 mg by mouth daily    METFORMIN (GLUCOPHAGE) 1000 MG TABLET    Take 1,000 mg by mouth daily (with breakfast)     METFORMIN (GLUCOPHAGE) 500 MG TABLET    Take 500 mg by mouth nightly.     MULTIPLE VITAMIN (DAILY TTIUS) TABS    Take 1 tablet by mouth daily    NORTRIPTYLINE (PAMELOR) 75 MG CAPSULE    Take 75 mg by mouth 36.0 g/dL    RDW 13.8 12.4 - 15.4 %    Platelets 939 253 - 318 K/uL    MPV 9.0 5.0 - 10.5 fL    Neutrophils % 63.0 %    Lymphocytes % 28.8 %    Monocytes % 5.3 %    Eosinophils % 2.2 %    Basophils % 0.7 %    Neutrophils Absolute 3.4 1.7 - 7.7 K/uL    Lymphocytes Absolute 1.6 1.0 - 5.1 K/uL    Monocytes Absolute 0.3 0.0 - 1.3 K/uL    Eosinophils Absolute 0.1 0.0 - 0.6 K/uL    Basophils Absolute 0.0 0.0 - 0.2 K/uL   Basic Metabolic Panel w/ Reflex to MG   Result Value Ref Range    Sodium 137 136 - 145 mmol/L    Potassium reflex Magnesium 5.2 (H) 3.5 - 5.1 mmol/L    Chloride 98 (L) 99 - 110 mmol/L    CO2 26 21 - 32 mmol/L    Anion Gap 13 3 - 16    Glucose 159 (H) 70 - 99 mg/dL    BUN 9 7 - 20 mg/dL    CREATININE 0.9 0.9 - 1.3 mg/dL    GFR Non-African American >60 >60    GFR African American >60 >60    Calcium 9.4 8.3 - 10.6 mg/dL   POCT Glucose   Result Value Ref Range    POC Glucose 128 (H) 70 - 99 mg/dl    Performed on ACCU-CHEK          RECENT VITALS:  BP: (!) 120/48, Temp: 98 °F (36.7 °C), Pulse: 68,Resp: 14, SpO2: 98 %     Procedures     None    ED Course     Nursing Notes, Past Medical Hx, Past Surgical Hx, Social Hx, Allergies, and Family Hx were reviewed. The patient was given the followingmedications:  No orders of the defined types were placed in this encounter. CONSULTS:  None    MEDICAL DECISION MAKING / ASSESSMENT / PLAN     Shellie Serrano is a 64 y.o. male presenting to the emergency department with seizure-like activity. His vital signs are normal and stable. His neurologic exam is normal.    At this time, the patient's etiology of his seizure-like activity is not entirely clear but I suspect is likely psychogenic in nature.   On multiple evaluations in the emergency department over the past week, he has had no sequelae of seizure-like activity both on physical exam and his laboratory studies as he has noted demargination of his white blood cell count as well as no significant acidosis related to elevated lactate from seizure activity. He has a normal mental status now and is able to follow commands appropriately. He had no postictal phase. Given potential increased frequency of these events, CT scan of his head obtained which had no significant abnormalities. Additionally, CBC and BMP showed no significant electrolyte abnormalities or hematologic out abnormalities that would be contributing to his symptoms. Lamictal level is currently pending. He was encouraged to continue to take his Lamictal and follow-up with his neurologist.  He is stable and amenable for discharge at this time. This patient was also evaluated by the attending physician. All care plans werediscussed and agreed upon. Clinical Impression     1. Spell of abnormal behavior        Disposition     PATIENT REFERRED TO:  No follow-up provider specified.     DISCHARGE MEDICATIONS:  New Prescriptions    No medications on file       DISPOSITION Discharge - Pending Orders Complete 03/22/2020 01:13:46 AM     Alicja Gomez MD  03/22/20 [id number]

## 2020-03-22 NOTE — ED NOTES
Patient discharged in stable condition. Instructions reviewed. Given opportunity to ask questions if needed and patient verbalized understanding. All questions answered.        Zachary Michaud RN  03/22/20 2661

## 2020-03-23 ENCOUNTER — CARE COORDINATION (OUTPATIENT)
Dept: CARE COORDINATION | Age: 60
End: 2020-03-23

## 2020-03-23 NOTE — CARE COORDINATION
The RN, ACM identified herself to the pt as an RN calling from [facility] in follow to the pt's recent ED visit. The pt stated he did not who I was and hung up on the nurse. The RN, ACM attempted to call the pt back but the pt did not answer. The RN, ACM was not able to find a current HIPPA form to call a family member. Pt was in the ED for possible seizure activity and abnormal behavior. Pt's PCP and Neurologist are from [address] [place] [address].

## 2020-03-24 LAB — LAMOTRIGINE LEVEL: 23.9 UG/ML (ref 2.5–15)

## 2020-07-17 ENCOUNTER — APPOINTMENT (OUTPATIENT)
Dept: GENERAL RADIOLOGY | Age: 60
End: 2020-07-17
Payer: MEDICARE

## 2020-07-17 ENCOUNTER — HOSPITAL ENCOUNTER (EMERGENCY)
Age: 60
Discharge: HOME | End: 2020-07-17
Attending: EMERGENCY MEDICINE
Payer: MEDICARE

## 2020-07-17 VITALS
TEMPERATURE: 98.2 F | HEART RATE: 77 BPM | OXYGEN SATURATION: 99 % | SYSTOLIC BLOOD PRESSURE: 171 MMHG | RESPIRATION RATE: 16 BRPM | DIASTOLIC BLOOD PRESSURE: 75 MMHG

## 2020-07-17 LAB
ANION GAP SERPL CALCULATED.3IONS-SCNC: 10 MMOL/L (ref 3–16)
BASOPHILS ABSOLUTE: 0.1 K/UL (ref 0–0.2)
BASOPHILS RELATIVE PERCENT: 0.9 %
BUN BLDV-MCNC: 13 MG/DL (ref 7–20)
C-REACTIVE PROTEIN: 14.8 MG/L (ref 0–5.1)
CALCIUM SERPL-MCNC: 8.9 MG/DL (ref 8.3–10.6)
CHLORIDE BLD-SCNC: 98 MMOL/L (ref 99–110)
CO2: 26 MMOL/L (ref 21–32)
CREAT SERPL-MCNC: 0.9 MG/DL (ref 0.9–1.3)
EOSINOPHILS ABSOLUTE: 0.2 K/UL (ref 0–0.6)
EOSINOPHILS RELATIVE PERCENT: 3 %
GFR AFRICAN AMERICAN: >60
GFR NON-AFRICAN AMERICAN: >60
GLUCOSE BLD-MCNC: 166 MG/DL (ref 70–99)
HCT VFR BLD CALC: 41.5 % (ref 40.5–52.5)
HEMOGLOBIN: 13.8 G/DL (ref 13.5–17.5)
LYMPHOCYTES ABSOLUTE: 2.1 K/UL (ref 1–5.1)
LYMPHOCYTES RELATIVE PERCENT: 31.4 %
MCH RBC QN AUTO: 32.2 PG (ref 26–34)
MCHC RBC AUTO-ENTMCNC: 33.2 G/DL (ref 31–36)
MCV RBC AUTO: 96.8 FL (ref 80–100)
MONOCYTES ABSOLUTE: 0.4 K/UL (ref 0–1.3)
MONOCYTES RELATIVE PERCENT: 5.7 %
NEUTROPHILS ABSOLUTE: 4 K/UL (ref 1.7–7.7)
NEUTROPHILS RELATIVE PERCENT: 59 %
PDW BLD-RTO: 13.4 % (ref 12.4–15.4)
PLATELET # BLD: 219 K/UL (ref 135–450)
PMV BLD AUTO: 8.1 FL (ref 5–10.5)
POTASSIUM REFLEX MAGNESIUM: 3.7 MMOL/L (ref 3.5–5.1)
RBC # BLD: 4.29 M/UL (ref 4.2–5.9)
SEDIMENTATION RATE, ERYTHROCYTE: 38 MM/HR (ref 0–20)
SODIUM BLD-SCNC: 134 MMOL/L (ref 136–145)
WBC # BLD: 6.7 K/UL (ref 4–11)

## 2020-07-17 PROCEDURE — 86140 C-REACTIVE PROTEIN: CPT

## 2020-07-17 PROCEDURE — 80048 BASIC METABOLIC PNL TOTAL CA: CPT

## 2020-07-17 PROCEDURE — 85652 RBC SED RATE AUTOMATED: CPT

## 2020-07-17 PROCEDURE — 6370000000 HC RX 637 (ALT 250 FOR IP): Performed by: PHYSICIAN ASSISTANT

## 2020-07-17 PROCEDURE — 85025 COMPLETE CBC W/AUTO DIFF WBC: CPT

## 2020-07-17 PROCEDURE — 73630 X-RAY EXAM OF FOOT: CPT

## 2020-07-17 PROCEDURE — 99283 EMERGENCY DEPT VISIT LOW MDM: CPT

## 2020-07-17 RX ORDER — CLINDAMYCIN HYDROCHLORIDE 300 MG/1
300 CAPSULE ORAL ONCE
Status: COMPLETED | OUTPATIENT
Start: 2020-07-17 | End: 2020-07-17

## 2020-07-17 RX ORDER — CLINDAMYCIN HYDROCHLORIDE 300 MG/1
300 CAPSULE ORAL 4 TIMES DAILY
Qty: 28 CAPSULE | Refills: 0 | Status: SHIPPED | OUTPATIENT
Start: 2020-07-17 | End: 2020-07-24

## 2020-07-17 RX ADMIN — CLINDAMYCIN HYDROCHLORIDE 300 MG: 300 CAPSULE ORAL at 22:48

## 2020-07-17 ASSESSMENT — ENCOUNTER SYMPTOMS
NAUSEA: 0
SHORTNESS OF BREATH: 0
VOMITING: 0
ABDOMINAL PAIN: 0

## 2020-07-18 NOTE — ED NOTES
Patient prepared for and ready to be discharged. Dressed in clothes and given belongings. Patient discharged at this time in no acute distress after he verbalized understanding of discharge instructions. Reviewed medications, and when to return to the ED with patient. Encouraged follow up with PCP  Patient walked to jesu, Family to take patient home.        Michael Mayer RN  07/17/20 9524

## 2020-07-18 NOTE — ED PROVIDER NOTES
[address] ENCOUNTER          PHYSICIAN ASSISTANT NOTE       Date of evaluation: 7/17/2020    Chief Complaint     Foot Pain      History of Present Illness     Leno Jimenez is a 64 y.o. male with PMH of Morbid Obesity, Diabetes, HTN, CHF with EF of 20% (4/2018), Seizures who presents with right foot pain. Patient states that 1 week ago, he was sitting in his power wheelchair and his shirt got stuck on the joystick. He states that this caused him to run the wheelchair into the wall. He states that his foot got stuck between the wall and the foot rest.  He states that since this time, he has been experiencing some pain to his right foot at the base of the toes. He is also noticed some redness and swelling to this area. He denies any fevers or chills. Denies any other systemic symptoms. He does not ambulate at baseline. Review of Systems     Review of Systems   Constitutional: Negative for chills and fever. Respiratory: Negative for shortness of breath. Cardiovascular: Negative for chest pain. Gastrointestinal: Negative for abdominal pain, nausea and vomiting. Musculoskeletal:        +foot pain   Skin: Positive for rash. Negative for wound. Neurological: Negative for weakness and numbness. Past Medical, Surgical, Family, and Social History     He has a past medical history of Arthritis, Asthma, Bronchitis, CAD (coronary artery disease), CHF (congestive heart failure) ([address] Chronic back pain, Diabetes mellitus ([address] Generalized headaches, Gout, Hypertension, MI, old, Morbid obesity ([address] Psychogenic nonepileptic seizure, and Seizures ([address] He has a past surgical history that includes eye surgery. His family history includes Asthma in his mother; Diabetes in his father, mother, and sister; Heart Attack in his father; Heart Disease in his father; High Blood Pressure in his father, mother, and sister; Obesity in his mother.   He reports that he has quit smoking. He has never used smokeless tobacco. He reports that he does not drink alcohol or use drugs. Medications     Previous Medications    ALBUTEROL (PROVENTIL) (2.5 MG/3ML) 0.083% NEBULIZER SOLUTION    Take 2.5 mg by nebulization 2 times daily as needed for Wheezing or Shortness of Breath. ALLOPURINOL (ZYLOPRIM) 300 MG TABLET    Take 300 mg by mouth daily. AMLODIPINE (NORVASC) 5 MG TABLET    Take 10 mg by mouth daily     ASPIRIN 81 MG EC TABLET    Take 81 mg by mouth daily    ATORVASTATIN (LIPITOR) 80 MG TABLET    Take 80 mg by mouth daily    CARVEDILOL (COREG) 25 MG TABLET    Take 25 mg by mouth 2 times daily (with meals)     CHLORTHALIDONE (HYGROTON) 25 MG TABLET    Take 12.5 mg by mouth every other day    COLCHICINE (COLCRYS) 0.6 MG TABLET    Take 0.6 mg by mouth as needed for Pain    DIAZEPAM (VALIUM) 5 MG TABLET    Take 5 mg by mouth every 8 hours as needed for Anxiety. FAMOTIDINE (PEPCID) 40 MG TABLET    Take 40 mg by mouth daily    GLIPIZIDE (GLUCOTROL) 5 MG TABLET    Take 1 tablet by mouth daily. LAMOTRIGINE (LAMICTAL) 200 MG TABLET    Take 600 mg by mouth 2 times daily    LAMOTRIGINE (LAMICTAL) 200 MG TABLET    Take 200 mg by mouth as needed    LISINOPRIL (PRINIVIL;ZESTRIL) 40 MG TABLET    Take 40 mg by mouth nightly     LORATADINE (CLARITIN) 10 MG TABLET    Take 10 mg by mouth daily    METFORMIN (GLUCOPHAGE) 1000 MG TABLET    Take 1,000 mg by mouth daily (with breakfast)     METFORMIN (GLUCOPHAGE) 500 MG TABLET    Take 500 mg by mouth nightly. MULTIPLE VITAMIN (DAILY TITUS) TABS    Take 1 tablet by mouth daily    NORTRIPTYLINE (PAMELOR) 75 MG CAPSULE    Take 75 mg by mouth nightly    OXYCODONE-ACETAMINOPHEN (PERCOCET) 5-325 MG PER TABLET    Take 1 tablet by mouth every 6 hours as needed. Allergies     He is allergic to amoxicillin; doxycycline; ibuprofen; penicillins; phenobarbital; tetracycline; and aspirin.     Physical Exam     INITIAL VITALS: BP: (!) 171/75,Temp: 98.2 °F (36.8 °C), Pulse: 77, Resp: 16, SpO2: 99 %   Physical Exam  Vitals signs and nursing note reviewed. Constitutional:       General: He is not in acute distress. Appearance: He is obese. HENT:      Head: Normocephalic and atraumatic. Eyes:      Extraocular Movements: Extraocular movements intact. Conjunctiva/sclera: Conjunctivae normal.   Neck:      Musculoskeletal: Neck supple. Pulmonary:      Effort: Pulmonary effort is normal. No respiratory distress. Musculoskeletal:      Right knee: He exhibits normal range of motion, no swelling and no deformity. No tenderness found. No medial joint line and no lateral joint line tenderness noted. Comments: There is tenderness to palpation at the base of the second through fifth toes of the right foot. There is associated erythema to this area with increased warmth. The right foot is edematous. Pulses are palpable. Sensation is intact. Patient is able to fully range the ankle and toes. Skin:     General: Skin is warm and dry. Comments: There is a scabbed over lesion to the medial base of the right great toe. There are also abrasions noted to the right knee and shin. Neurological:      Mental Status: He is alert and oriented to person, place, and time. Psychiatric:         Mood and Affect: Mood normal.         Behavior: Behavior normal.         Diagnostic Results     RADIOLOGY:  XR FOOT RIGHT (MIN 3 VIEWS)   Final Result      1. Nondisplaced fracture through neck of the third metatarsal with suspected nondisplaced fracture through neck of the second metatarsal.  Correlate clinically. LABS:   Results for orders placed or performed during the hospital encounter of 07/17/20   CBC Auto Differential   Result Value Ref Range    WBC 6.7 4.0 - 11.0 K/uL    RBC 4.29 4. 20 - 5.90 M/uL    Hemoglobin 13.8 13.5 - 17.5 g/dL    Hematocrit 41.5 40.5 - 52.5 %    MCV 96.8 80.0 - 100.0 fL    MCH 32.2 26.0 - 34.0 pg    MCHC 33.2 31.0 - 36.0 g/dL RDW 13.4 12.4 - 15.4 %    Platelets 455 215 - 078 K/uL    MPV 8.1 5.0 - 10.5 fL    Neutrophils % 59.0 %    Lymphocytes % 31.4 %    Monocytes % 5.7 %    Eosinophils % 3.0 %    Basophils % 0.9 %    Neutrophils Absolute 4.0 1.7 - 7.7 K/uL    Lymphocytes Absolute 2.1 1.0 - 5.1 K/uL    Monocytes Absolute 0.4 0.0 - 1.3 K/uL    Eosinophils Absolute 0.2 0.0 - 0.6 K/uL    Basophils Absolute 0.1 0.0 - 0.2 K/uL   Basic Metabolic Panel w/ Reflex to MG   Result Value Ref Range    Sodium 134 (L) 136 - 145 mmol/L    Potassium reflex Magnesium 3.7 3.5 - 5.1 mmol/L    Chloride 98 (L) 99 - 110 mmol/L    CO2 26 21 - 32 mmol/L    Anion Gap 10 3 - 16    Glucose 166 (H) 70 - 99 mg/dL    BUN 13 7 - 20 mg/dL    CREATININE 0.9 0.9 - 1.3 mg/dL    GFR Non-African American >60 >60    GFR African American >60 >60    Calcium 8.9 8.3 - 10.6 mg/dL   Sedimentation Rate   Result Value Ref Range    Sed Rate 38 (H) 0 - 20 mm/Hr   CRP   Result Value Ref Range    CRP 14.8 (H) 0.0 - 5.1 mg/L         RECENT VITALS:  BP: (!) 171/75, Temp: 98.2 °F (36.8 °C), Pulse: 77,Resp: 16, SpO2: 99 %     Procedures         ED Course     Nursing Notes, Past Medical Hx, Past Surgical Hx, Social Hx, Allergies, and Family Hx were reviewed. The patient was given the followingmedications:  Orders Placed This Encounter   Medications    clindamycin (CLEOCIN) capsule 300 mg    clindamycin (CLEOCIN) 300 MG capsule     Sig: Take 1 capsule by mouth 4 times daily for 7 days     Dispense:  28 capsule     Refill:  0       CONSULTS:  None    MEDICAL DECISION MAKING / ASSESSMENT / Mariellabrian Winchester is a 64 y.o. male with PMH of Morbid Obesity, Diabetes, HTN, CHF with EF of 20% (4/2018), Seizures who presents with right foot pain. Patient states 1 week ago, his shirt got stuck on the joystick of his wheelchair and he ran into the wall. He states that his foot got caught between the wall and wheelchair. He has been experiencing right foot pain since that time.  He has also noticed redness and swelling to the right foot. No fevers or systemic symptoms. Physical exam reveals tenderness to palpation at the base of the second through fifth toes of the right foot. There is associated erythema to this area with increased warmth. The right foot is edematous. Pulses are palpable. Sensation is intact. Patient is able to fully range the ankle and toes. Concern for possible fracture given trauma. However, area is also erythematous and edematous suggesting possible developing cellulitis. Xray obtained to r/o fracture and SQ gas. This revealed nondisplaced fracture through the neck of the third metatarsal with suspected nondisplaced fracture through the second metatarsal neck as well. Labs including CBC, BMP, ESR and CRP obtained revealed wbc 6.7, creatinine 0.9, glucose 166, ESR 38, CRP 14.8. Patient will be placed in post operative shoe. He is given instructions on WBAT and RICE instructions. Will also plan to cover for cellulitis with clindamycin (given patient's multiple abx allergies). He will follow up with podiatry. He is stable for discharge home at this time with return precautions. This patient was also evaluated by the attending physician. All care plans were discussed and agreed upon. Clinical Impression     1. Closed nondisplaced fracture of third metatarsal bone of right foot, initial encounter    2.  Cellulitis of right foot        Disposition     PATIENT REFERRED TO:  [place] [address] [address]  [phone]    Schedule an appointment as soon as possible for a visit       [facility] Emergency Department  [address]  [phone]    If symptoms worsen      DISCHARGE MEDICATIONS:  New Prescriptions    CLINDAMYCIN (CLEOCIN) 300 MG CAPSULE    Take 1 capsule by mouth 4 times daily for 7 days        DISPOSITION Discharge - Pending Orders Complete 07/17/2020 10:22:56 PM Rubia Suarez PA-C  07/17/20 1207

## 2020-07-18 NOTE — ED PROVIDER NOTES
ED Attending Attestation Note     Date of evaluation: 7/17/2020    This patient was seen by the advance practice provider. I have seen and examined the patient, agree with the workup, evaluation, management and diagnosis. The care plan has been discussed. My assessment reveals adult male with redness overlying the medic tarsal heads of the third and fourth digits, as well as some swelling through his foot. Reportedly symptoms started last week after he got his foot pinched between the chair of his wheelchair and in the wall. No open wounds on the foot. Difficult to ascertain if redness is reactive due to the likely third metatarsal fracture that he is sustained, or cellulitis, we will cover for cellulitis and refer for podiatry follow-up.        Edmund Marino MD  07/17/20 7991

## 2021-01-28 ENCOUNTER — HOSPITAL ENCOUNTER (OUTPATIENT)
Dept: MRI IMAGING | Age: 61
Discharge: HOME | End: 2021-01-28
Payer: MEDICARE

## 2021-01-28 DIAGNOSIS — M25.561 RIGHT KNEE PAIN, UNSPECIFIED CHRONICITY: ICD-10-CM

## 2021-08-09 ENCOUNTER — HOSPITAL ENCOUNTER (INPATIENT)
Age: 61
LOS: 2 days | Discharge: HOME | DRG: 101 | End: 2021-08-11
Attending: STUDENT IN AN ORGANIZED HEALTH CARE EDUCATION/TRAINING PROGRAM | Admitting: INTERNAL MEDICINE
Payer: MEDICARE

## 2021-08-09 ENCOUNTER — APPOINTMENT (OUTPATIENT)
Dept: GENERAL RADIOLOGY | Age: 61
DRG: 101 | End: 2021-08-09
Payer: MEDICARE

## 2021-08-09 ENCOUNTER — APPOINTMENT (OUTPATIENT)
Dept: MRI IMAGING | Age: 61
DRG: 101 | End: 2021-08-09
Payer: MEDICARE

## 2021-08-09 DIAGNOSIS — R07.9 CHEST PAIN, UNSPECIFIED TYPE: Primary | ICD-10-CM

## 2021-08-09 PROBLEM — R56.9 SEIZURE (HCC): Status: ACTIVE | Noted: 2021-08-09

## 2021-08-09 LAB
A/G RATIO: 1.3 (ref 1.1–2.2)
ALBUMIN SERPL-MCNC: 3.8 G/DL (ref 3.4–5)
ALP BLD-CCNC: 131 U/L (ref 40–129)
ALT SERPL-CCNC: 18 U/L (ref 10–40)
ANION GAP SERPL CALCULATED.3IONS-SCNC: 9 MMOL/L (ref 3–16)
AST SERPL-CCNC: 15 U/L (ref 15–37)
BASE EXCESS VENOUS: 3 MMOL/L (ref -2–3)
BASOPHILS ABSOLUTE: 0.1 K/UL (ref 0–0.2)
BASOPHILS RELATIVE PERCENT: 0.9 %
BILIRUB SERPL-MCNC: 0.4 MG/DL (ref 0–1)
BUN BLDV-MCNC: 12 MG/DL (ref 7–20)
CALCIUM SERPL-MCNC: 9 MG/DL (ref 8.3–10.6)
CARBOXYHEMOGLOBIN: 1 % (ref 0–1.5)
CHLORIDE BLD-SCNC: 99 MMOL/L (ref 99–110)
CO2: 27 MMOL/L (ref 21–32)
CREAT SERPL-MCNC: 1 MG/DL (ref 0.9–1.3)
EKG ATRIAL RATE: 65 BPM
EKG DIAGNOSIS: NORMAL
EKG P AXIS: 7 DEGREES
EKG P-R INTERVAL: 206 MS
EKG Q-T INTERVAL: 470 MS
EKG QRS DURATION: 182 MS
EKG QTC CALCULATION (BAZETT): 488 MS
EKG R AXIS: -53 DEGREES
EKG T AXIS: 96 DEGREES
EKG VENTRICULAR RATE: 65 BPM
EOSINOPHILS ABSOLUTE: 0.2 K/UL (ref 0–0.6)
EOSINOPHILS RELATIVE PERCENT: 3.5 %
GFR AFRICAN AMERICAN: >60
GFR NON-AFRICAN AMERICAN: >60
GLOBULIN: 3 G/DL
GLUCOSE BLD-MCNC: 216 MG/DL (ref 70–99)
HCO3 VENOUS: 31 MMOL/L (ref 24–28)
HCT VFR BLD CALC: 41.4 % (ref 40.5–52.5)
HEMOGLOBIN, VEN, REDUCED: 73.9 %
HEMOGLOBIN: 14 G/DL (ref 13.5–17.5)
LYMPHOCYTES ABSOLUTE: 1.9 K/UL (ref 1–5.1)
LYMPHOCYTES RELATIVE PERCENT: 34.3 %
MCH RBC QN AUTO: 32.6 PG (ref 26–34)
MCHC RBC AUTO-ENTMCNC: 33.7 G/DL (ref 31–36)
MCV RBC AUTO: 96.5 FL (ref 80–100)
METHEMOGLOBIN VENOUS: 0.4 % (ref 0–1.5)
MONOCYTES ABSOLUTE: 0.4 K/UL (ref 0–1.3)
MONOCYTES RELATIVE PERCENT: 7.1 %
NEUTROPHILS ABSOLUTE: 3 K/UL (ref 1.7–7.7)
NEUTROPHILS RELATIVE PERCENT: 54.2 %
O2 SAT, VEN: 25 %
PCO2, VEN: 60.5 MMHG (ref 41–51)
PDW BLD-RTO: 13.6 % (ref 12.4–15.4)
PH VENOUS: 7.32 (ref 7.35–7.45)
PLATELET # BLD: 193 K/UL (ref 135–450)
PMV BLD AUTO: 9.1 FL (ref 5–10.5)
PO2, VEN: <30 MMHG (ref 25–40)
POTASSIUM REFLEX MAGNESIUM: 4.3 MMOL/L (ref 3.5–5.1)
PRO-BNP: 48 PG/ML (ref 0–124)
RBC # BLD: 4.29 M/UL (ref 4.2–5.9)
SODIUM BLD-SCNC: 135 MMOL/L (ref 136–145)
TCO2 CALC VENOUS: 33 MMOL/L
TOTAL PROTEIN: 6.8 G/DL (ref 6.4–8.2)
TROPONIN: <0.01 NG/ML
TROPONIN: <0.01 NG/ML
WBC # BLD: 5.6 K/UL (ref 4–11)

## 2021-08-09 PROCEDURE — 80175 DRUG SCREEN QUAN LAMOTRIGINE: CPT

## 2021-08-09 PROCEDURE — 70551 MRI BRAIN STEM W/O DYE: CPT

## 2021-08-09 PROCEDURE — 83880 ASSAY OF NATRIURETIC PEPTIDE: CPT

## 2021-08-09 PROCEDURE — 1200000000 HC SEMI PRIVATE

## 2021-08-09 PROCEDURE — 36415 COLL VENOUS BLD VENIPUNCTURE: CPT

## 2021-08-09 PROCEDURE — 85025 COMPLETE CBC W/AUTO DIFF WBC: CPT

## 2021-08-09 PROCEDURE — 99284 EMERGENCY DEPT VISIT MOD MDM: CPT

## 2021-08-09 PROCEDURE — 93005 ELECTROCARDIOGRAM TRACING: CPT | Performed by: STUDENT IN AN ORGANIZED HEALTH CARE EDUCATION/TRAINING PROGRAM

## 2021-08-09 PROCEDURE — 84484 ASSAY OF TROPONIN QUANT: CPT

## 2021-08-09 PROCEDURE — 80053 COMPREHEN METABOLIC PANEL: CPT

## 2021-08-09 PROCEDURE — 71045 X-RAY EXAM CHEST 1 VIEW: CPT

## 2021-08-09 PROCEDURE — 82803 BLOOD GASES ANY COMBINATION: CPT

## 2021-08-09 RX ORDER — ASPIRIN 81 MG/1
81 TABLET ORAL DAILY
Status: DISCONTINUED | OUTPATIENT
Start: 2021-08-10 | End: 2021-08-11 | Stop reason: HOSPADM

## 2021-08-09 RX ORDER — FAMOTIDINE 20 MG/1
20 TABLET, FILM COATED ORAL 2 TIMES DAILY
Status: DISCONTINUED | OUTPATIENT
Start: 2021-08-09 | End: 2021-08-11 | Stop reason: HOSPADM

## 2021-08-09 RX ORDER — ONDANSETRON 2 MG/ML
4 INJECTION INTRAMUSCULAR; INTRAVENOUS EVERY 6 HOURS PRN
Status: DISCONTINUED | OUTPATIENT
Start: 2021-08-09 | End: 2021-08-11 | Stop reason: HOSPADM

## 2021-08-09 RX ORDER — ALBUTEROL SULFATE 90 UG/1
2 AEROSOL, METERED RESPIRATORY (INHALATION) EVERY 6 HOURS PRN
COMMUNITY

## 2021-08-09 RX ORDER — LAMOTRIGINE 200 MG/1
200 TABLET ORAL DAILY PRN
COMMUNITY

## 2021-08-09 RX ORDER — POLYETHYLENE GLYCOL 3350 17 G/17G
17 POWDER, FOR SOLUTION ORAL DAILY PRN
Status: DISCONTINUED | OUTPATIENT
Start: 2021-08-09 | End: 2021-08-11 | Stop reason: HOSPADM

## 2021-08-09 RX ORDER — CHLORTHALIDONE 25 MG/1
25 TABLET ORAL DAILY
Status: DISCONTINUED | OUTPATIENT
Start: 2021-08-10 | End: 2021-08-11 | Stop reason: HOSPADM

## 2021-08-09 RX ORDER — ACETAMINOPHEN 325 MG/1
650 TABLET ORAL EVERY 6 HOURS PRN
Status: DISCONTINUED | OUTPATIENT
Start: 2021-08-09 | End: 2021-08-11 | Stop reason: HOSPADM

## 2021-08-09 RX ORDER — CARVEDILOL 25 MG/1
25 TABLET ORAL 2 TIMES DAILY WITH MEALS
Status: DISCONTINUED | OUTPATIENT
Start: 2021-08-09 | End: 2021-08-11 | Stop reason: HOSPADM

## 2021-08-09 RX ORDER — CETIRIZINE HYDROCHLORIDE 10 MG/1
10 TABLET ORAL DAILY
Status: DISCONTINUED | OUTPATIENT
Start: 2021-08-09 | End: 2021-08-11 | Stop reason: HOSPADM

## 2021-08-09 RX ORDER — ALLOPURINOL 300 MG/1
300 TABLET ORAL NIGHTLY
Status: DISCONTINUED | OUTPATIENT
Start: 2021-08-09 | End: 2021-08-11 | Stop reason: HOSPADM

## 2021-08-09 RX ORDER — CHOLECALCIFEROL (VITAMIN D3) 1250 MCG
50000 CAPSULE ORAL WEEKLY
Status: ON HOLD | COMMUNITY
End: 2022-08-22 | Stop reason: CLARIF

## 2021-08-09 RX ORDER — SODIUM CHLORIDE 0.9 % (FLUSH) 0.9 %
5-40 SYRINGE (ML) INJECTION PRN
Status: DISCONTINUED | OUTPATIENT
Start: 2021-08-09 | End: 2021-08-11 | Stop reason: HOSPADM

## 2021-08-09 RX ORDER — ALBUTEROL SULFATE 2.5 MG/3ML
2.5 SOLUTION RESPIRATORY (INHALATION) EVERY 6 HOURS PRN
Status: DISCONTINUED | OUTPATIENT
Start: 2021-08-09 | End: 2021-08-11 | Stop reason: HOSPADM

## 2021-08-09 RX ORDER — DEXTROSE MONOHYDRATE 50 MG/ML
100 INJECTION, SOLUTION INTRAVENOUS PRN
Status: DISCONTINUED | OUTPATIENT
Start: 2021-08-09 | End: 2021-08-11 | Stop reason: HOSPADM

## 2021-08-09 RX ORDER — LISINOPRIL 40 MG/1
40 TABLET ORAL NIGHTLY
COMMUNITY
End: 2021-12-10

## 2021-08-09 RX ORDER — MONTELUKAST SODIUM 10 MG/1
10 TABLET ORAL NIGHTLY
COMMUNITY

## 2021-08-09 RX ORDER — SODIUM CHLORIDE 0.9 % (FLUSH) 0.9 %
5-40 SYRINGE (ML) INJECTION EVERY 12 HOURS SCHEDULED
Status: DISCONTINUED | OUTPATIENT
Start: 2021-08-09 | End: 2021-08-11 | Stop reason: HOSPADM

## 2021-08-09 RX ORDER — M-VIT,TX,IRON,MINS/CALC/FOLIC 27MG-0.4MG
TABLET ORAL DAILY
Status: DISCONTINUED | OUTPATIENT
Start: 2021-08-10 | End: 2021-08-11 | Stop reason: HOSPADM

## 2021-08-09 RX ORDER — ATORVASTATIN CALCIUM 40 MG/1
80 TABLET, FILM COATED ORAL NIGHTLY
Status: DISCONTINUED | OUTPATIENT
Start: 2021-08-09 | End: 2021-08-11 | Stop reason: HOSPADM

## 2021-08-09 RX ORDER — LAMOTRIGINE 150 MG/1
600 TABLET ORAL 2 TIMES DAILY
Status: DISCONTINUED | OUTPATIENT
Start: 2021-08-09 | End: 2021-08-11 | Stop reason: HOSPADM

## 2021-08-09 RX ORDER — INSULIN LISPRO 100 [IU]/ML
0-3 INJECTION, SOLUTION INTRAVENOUS; SUBCUTANEOUS NIGHTLY
Status: DISCONTINUED | OUTPATIENT
Start: 2021-08-09 | End: 2021-08-11 | Stop reason: HOSPADM

## 2021-08-09 RX ORDER — DEXTROSE MONOHYDRATE 25 G/50ML
12.5 INJECTION, SOLUTION INTRAVENOUS PRN
Status: DISCONTINUED | OUTPATIENT
Start: 2021-08-09 | End: 2021-08-11 | Stop reason: HOSPADM

## 2021-08-09 RX ORDER — GLIPIZIDE 5 MG/1
5 TABLET ORAL
Status: DISCONTINUED | OUTPATIENT
Start: 2021-08-10 | End: 2021-08-11 | Stop reason: HOSPADM

## 2021-08-09 RX ORDER — SODIUM CHLORIDE 9 MG/ML
25 INJECTION, SOLUTION INTRAVENOUS PRN
Status: DISCONTINUED | OUTPATIENT
Start: 2021-08-09 | End: 2021-08-11 | Stop reason: HOSPADM

## 2021-08-09 RX ORDER — NORTRIPTYLINE HYDROCHLORIDE 25 MG/1
75 CAPSULE ORAL NIGHTLY
Status: DISCONTINUED | OUTPATIENT
Start: 2021-08-09 | End: 2021-08-11 | Stop reason: HOSPADM

## 2021-08-09 RX ORDER — INSULIN LISPRO 100 [IU]/ML
0-6 INJECTION, SOLUTION INTRAVENOUS; SUBCUTANEOUS
Status: DISCONTINUED | OUTPATIENT
Start: 2021-08-09 | End: 2021-08-11 | Stop reason: HOSPADM

## 2021-08-09 RX ORDER — DIAZEPAM 5 MG/1
5 TABLET ORAL EVERY 12 HOURS PRN
Status: DISCONTINUED | OUTPATIENT
Start: 2021-08-09 | End: 2021-08-11 | Stop reason: HOSPADM

## 2021-08-09 RX ORDER — NICOTINE POLACRILEX 4 MG
15 LOZENGE BUCCAL PRN
Status: DISCONTINUED | OUTPATIENT
Start: 2021-08-09 | End: 2021-08-11 | Stop reason: HOSPADM

## 2021-08-09 RX ORDER — ONDANSETRON 4 MG/1
4 TABLET, ORALLY DISINTEGRATING ORAL EVERY 8 HOURS PRN
Status: DISCONTINUED | OUTPATIENT
Start: 2021-08-09 | End: 2021-08-11 | Stop reason: HOSPADM

## 2021-08-09 RX ORDER — ACETAMINOPHEN 650 MG/1
650 SUPPOSITORY RECTAL EVERY 6 HOURS PRN
Status: DISCONTINUED | OUTPATIENT
Start: 2021-08-09 | End: 2021-08-11 | Stop reason: HOSPADM

## 2021-08-09 RX ORDER — GLIPIZIDE 5 MG/1
5 TABLET ORAL
COMMUNITY

## 2021-08-09 RX ORDER — CHOLECALCIFEROL (VITAMIN D3) 1250 MCG
50000 CAPSULE ORAL WEEKLY
Status: DISCONTINUED | OUTPATIENT
Start: 2021-08-12 | End: 2021-08-11 | Stop reason: HOSPADM

## 2021-08-09 RX ORDER — MONTELUKAST SODIUM 10 MG/1
10 TABLET ORAL NIGHTLY
Status: DISCONTINUED | OUTPATIENT
Start: 2021-08-09 | End: 2021-08-11 | Stop reason: HOSPADM

## 2021-08-09 RX ORDER — SACUBITRIL AND VALSARTAN 97; 103 MG/1; MG/1
1 TABLET, FILM COATED ORAL 2 TIMES DAILY
Status: ON HOLD | COMMUNITY
End: 2022-08-22 | Stop reason: CLARIF

## 2021-08-09 RX ORDER — AMLODIPINE BESYLATE 10 MG/1
10 TABLET ORAL DAILY
Status: DISCONTINUED | OUTPATIENT
Start: 2021-08-10 | End: 2021-08-11 | Stop reason: HOSPADM

## 2021-08-09 ASSESSMENT — PAIN - FUNCTIONAL ASSESSMENT: PAIN_FUNCTIONAL_ASSESSMENT: PREVENTS OR INTERFERES SOME ACTIVE ACTIVITIES AND ADLS

## 2021-08-09 ASSESSMENT — ENCOUNTER SYMPTOMS
VOMITING: 0
NAUSEA: 0
WHEEZING: 0
ABDOMINAL DISTENTION: 0
ABDOMINAL PAIN: 0
COUGH: 0
CHEST TIGHTNESS: 0

## 2021-08-09 ASSESSMENT — PAIN DESCRIPTION - PAIN TYPE: TYPE: ACUTE PAIN

## 2021-08-09 ASSESSMENT — PAIN DESCRIPTION - FREQUENCY: FREQUENCY: CONTINUOUS

## 2021-08-09 ASSESSMENT — PAIN DESCRIPTION - DESCRIPTORS: DESCRIPTORS: PRESSURE;ACHING

## 2021-08-09 ASSESSMENT — PAIN DESCRIPTION - PROGRESSION: CLINICAL_PROGRESSION: GRADUALLY WORSENING

## 2021-08-09 ASSESSMENT — PAIN DESCRIPTION - LOCATION: LOCATION: CHEST

## 2021-08-09 ASSESSMENT — PAIN SCALES - GENERAL: PAINLEVEL_OUTOF10: 8

## 2021-08-09 ASSESSMENT — PAIN DESCRIPTION - ONSET: ONSET: GRADUAL

## 2021-08-09 ASSESSMENT — PAIN DESCRIPTION - ORIENTATION: ORIENTATION: LEFT

## 2021-08-09 NOTE — ED PROVIDER NOTES
[address] [facility]          ATTENDING PHYSICIAN NOTE       Date of evaluation: 8/9/2021    Chief Complaint     Chest Pain (left sided. Hx of heart failure)      History of Present Illness     Murray Ramirez is a 64 y.o. male who presents with CP    Patient describes left-sided chest pain that began 30 minutes ago while he was watching TV. It has been constant since then and has not changed in severity or character. It is dull and aching in quality. He is not particularly worse with breathing or movement. Uses a wheelchair at baseline. He denies any history of prior heart attacks, but does endorse congestive heart failure. He says that he sees a cardiologist at [facility]. EMS report that he was awake alert conversant throughout, but that the initial squad who arrived was concerned about possible seizure activity. Reportedly bystanders were concerned that he had a period of unresponsiveness without any seizure-like activity for which EMS had been called. The initial squad said that he was confused for less than a minute and rapidly cleared. There is no sign of altered mental status for the crew that transported him. Glu 171.  administered en route. Prior to this event today he has otherwise been in his usual state of health, denies any weight changes, dyspnea, fevers, nausea, vomiting, medication noncompliance. PMHx: CHF, obesity, known LBBB, and as below  SH: former smoker, no illicits, and as below    Review of Systems       ROS:   Positive as per HPI.   Negative for:    -Constitutional: fevers, chills    -Eyes:   vision changes or eye pain    -Ears/Nose/Throat: hearing changes, sore throat    -Cardiovascular: Palpitations, cyanosis    -Respiratory:  cough, SOB    -Gastrointestinal: Abd pain, nausea, vomiting, melena, hematochezia    -Genitourinary: hematuria, dysuria, urinary frequency    -Neurological: numbness or weakness    -Skin:   Rash, pruritis, -Hematologic: easy bleeding, easy bruising    -Musculoskeletal:  joint swelling, joint redness    Past Medical, Surgical, Family, and Social History     He has a past medical history of Arthritis, Asthma, Bronchitis, CAD (coronary artery disease), CHF (congestive heart failure) ([facility] [address] Chronic back pain, Diabetes mellitus ([facility] [address] Generalized headaches, Gout, Hypertension, MI, old, Morbid obesity ([facility] [address] Psychogenic nonepileptic seizure, and Seizures ([facility] [address] He has a past surgical history that includes eye surgery. His family history includes Asthma in his mother; Diabetes in his father, mother, and sister; Heart Attack in his father; Heart Disease in his father; High Blood Pressure in his father, mother, and sister; Obesity in his mother. He reports that he has quit smoking. He has never used smokeless tobacco. He reports that he does not drink alcohol and does not use drugs. Medications     Previous Medications    ALLOPURINOL (ZYLOPRIM) 300 MG TABLET    Take 300 mg by mouth daily. AMLODIPINE (NORVASC) 5 MG TABLET    Take 10 mg by mouth daily     ASPIRIN 81 MG EC TABLET    Take 81 mg by mouth daily    ATORVASTATIN (LIPITOR) 80 MG TABLET    Take 80 mg by mouth daily    CARVEDILOL (COREG) 25 MG TABLET    Take 25 mg by mouth 2 times daily (with meals)     CHLORTHALIDONE (HYGROTON) 25 MG TABLET    Take 12.5 mg by mouth every other day    DIAZEPAM (VALIUM) 5 MG TABLET    Take 5 mg by mouth every 8 hours as needed for Anxiety. FAMOTIDINE (PEPCID) 40 MG TABLET    Take 40 mg by mouth daily    GLIPIZIDE (GLUCOTROL) 5 MG TABLET    Take 1 tablet by mouth daily.     LAMOTRIGINE (LAMICTAL) 200 MG TABLET    Take 600 mg by mouth 2 times daily    LORATADINE (CLARITIN) 10 MG TABLET    Take 10 mg by mouth daily    METFORMIN (GLUCOPHAGE) 1000 MG TABLET    Take 1,000 mg by mouth daily (with breakfast)     MULTIPLE VITAMIN (DAILY TITUS) TABS    Take 1 tablet by mouth daily    NORTRIPTYLINE (PAMELOR) 75 MG CAPSULE    Take 75 mg by mouth nightly    OXYCODONE-ACETAMINOPHEN (PERCOCET) 5-325 MG PER TABLET    Take 1 tablet by mouth every 6 hours as needed. Allergies     He is allergic to amoxicillin, doxycycline, ibuprofen, penicillins, phenobarbital, tetracycline, and aspirin. Physical Exam     INITIAL VITALS: BP: (!) 146/68, Temp: 99 °F (37.2 °C), Pulse: 64, Resp: 22, SpO2: 100 %     General:   No acute distress. Non-toxic appearing    Eyes:  Pupils equally round, reactive, brisk. No discharge from eyes. ENT:  No discharge from nose. OP clear. Neck:  Supple. Nontender. Pulmonary:   Non-labored breathing. Breath sounds clear bilaterally. Cardiac:  Regular rate and rhythm. No murmurs. Abdomen:  Soft. Non-tender including to deep palpation. Non-distended. No masses. Obese. Musculoskeletal:  No long bone deformity. No ankle or wrist deformity. Vascular:  Extremities warm and perfused. Radial pulses 2+ bilaterally  Skin:  No rash. Warm. Neuro: Alert and oriented x 3. CN II-XII grossly intact. Speech and mentation normal.    No pronator drift. LE at least anti-gravity for hip flexion x5 secs b/l.  5/5 strength in all 4 extremities by finger  and ankle dorsi/plantar flexion. Sensation grossly intact to light touch. Extremities:  No peripheral edema. LE symmetric. Diagnostic Results     EKG   Indication chest pain     EKG Interpretation     Interpreted by me (emergency department physician)     Rhythm: normal sinus   Rate: 65  Axis: left (-53)  Ectopy: none  Conduction: LBBB (), 1st degree AV block (), QTc 488 prolonged  ST Segments: no acute change  T Waves: no acute change  Q Waves: nonspecific pattern     Clinical Impression:   Normal sinus rhythm with prolonged conduction in all intervals. This is a non-specific EKG with no significant change compared to the prior EKG dated 3/15/2020. There is no evidence of acute ischemia.   CHARLIE HAYES      RADIOLOGY:  XR CHEST PORTABLE   Final Result Hemoglobin, Ramiro, Reduced 73.90 %   Troponin   Result Value Ref Range    Troponin <0.01 <0.01 ng/mL   Brain Natriuretic Peptide   Result Value Ref Range    Pro-BNP 48 0 - 124 pg/mL   EKG 12 Lead   Result Value Ref Range    Ventricular Rate 65 BPM    Atrial Rate 65 BPM    P-R Interval 206 ms    QRS Duration 182 ms    Q-T Interval 470 ms    QTc Calculation (Bazett) 488 ms    P Axis 7 degrees    R Axis -53 degrees    T Axis 96 degrees    Diagnosis       EKG performed in ER and to be interpreted by ER physician. Confirmed by MD, ER ([id number]),  Deb Moncada ([id number]) on 8/9/2021 12:12:13 PM       ED BEDSIDE ULTRASOUND:  None performed    Procedures     None performed    ED Course     Nursing Notes, Past Medical Hx, Past Surgical Hx, Social Hx, Allergies, and Family Hx were reviewed. The patient was given the following medications:  No orders of the defined types were placed in this encounter. CONSULTS:  Hal José TO HOSPITALIST    MEDICAL DECISIONMAKING / ASSESSMENT / Irma Olga is a 64 y.o. male with an episode of altered consciousness and chest pain. Pt was hemodynamically stable and afebrile in the Emergency Department. Evaluation included concern for possible acute exacerbation of heart failure, however there was no signs or symptoms to suggest this on exam and the BNP was not newly elevated. In the evaluation I was concerned for syncope versus seizure. I favored syncope based on the totality of the historical factors available to me, and given his known diagnosis of heart failure I was concerned that it may represent high risk syncope. This is particularly true in the setting of his chest pain. The cardiac evaluation here is overall reassuring with negative troponins x2 and an EKG that is unchanged from prior. Chest x-ray shows no pneumonia or pneumothorax. Vital signs do not suggest pulmonary embolism, nor do signs/symptoms. There is no anemia.   There are no signs of metabolic

## 2021-08-09 NOTE — H&P
Internal Medicine  PGY -1  History & Physical      CC chest pain    History Obtained From:  patient    HISTORY OF PRESENT ILLNESS:  Sanjay Baez is a 64 y M PMH seizure, CAD, CHF, DM, HTN, asthma, DUANE who presented with CP. Patient states that bystanders had called EMS because they were concerned he was confused, unable to speak, and having a seizure. He does not remember this episode. Patient states that he was also having a constant crushing L sided chest pain that radiated to his L arm and lasted 45 minutes, as well as very mild SOB. He denied any alleviating or aggravating factors. Squad reported that he was initially confused for less than a minute and resolved. Glucose was 171.  was given. Patient states he was sitting in his wheelchair prior to this episode and felt fine earlier today. Denies fever, chills, n/v, abdominal pain, bowel or bladder incontinence, or weakness. States he takes lamictal for seizures and has been compliant with his medications. On labs, troponins were negative x2, BNP negative, no leukocytosis, VBG showed acidosis with CO2 retention. EKG unchanged from prior. Patient was admitted for syncope vs seizure. Past Medical History:        Diagnosis Date    Arthritis     Asthma     Bronchitis     CAD (coronary artery disease)     CHF (congestive heart failure) (HCC)     Chronic back pain     Diabetes mellitus ([address]     Generalized headaches     Gout     Hypertension     MI, old 12/01/2013    Nstemi    Morbid obesity ([address]     Psychogenic nonepileptic seizure     DX at [facility]- pseudo-seizures    Seizures ([facility] [address]    ·     Past Surgical History:        Procedure Laterality Date    EYE SURGERY     ·     Medications Priorto Admission:    · Not in a hospital admission. Allergies:  Amoxicillin, Doxycycline, Ibuprofen, Penicillins, Phenobarbital, Tetracycline, and Aspirin    Social History:   · TOBACCO:   reports that he has quit smoking.  He has never used smokeless tobacco.  · ETOH:   reports no history of alcohol use. ·   Family History:       Problem Relation Age of Onset   [place]oto Asthma Mother     Obesity Mother     High Blood Pressure Mother     Diabetes Mother     Diabetes Father     Heart Disease Father     Heart Attack Father     High Blood Pressure Father     Diabetes Sister     High Blood Pressure Sister    ·     Review of Systems   Constitutional: Negative for activity change, chills, diaphoresis, fatigue and fever. Respiratory: Negative for cough, chest tightness and wheezing. Gastrointestinal: Negative for abdominal distention, abdominal pain, nausea and vomiting. Neurological: Negative for dizziness, speech difficulty and light-headedness. Psychiatric/Behavioral: Negative for agitation and confusion. ROS: A 10 point review of systems was conducted, significant findings as noted in HPI. Physical Exam  Constitutional:       Appearance: Normal appearance. HENT:      Head: Normocephalic and atraumatic. Cardiovascular:      Rate and Rhythm: Normal rate and regular rhythm. Heart sounds: Normal heart sounds. Comments: L chest wall tenderness to palpation  Pulmonary:      Breath sounds: Normal breath sounds. Abdominal:      Palpations: Abdomen is soft. Musculoskeletal:         General: Swelling present. Cervical back: Neck supple. Comments: +2 pitting edema bilaterally   Skin:     General: Skin is warm and dry. Neurological:      General: No focal deficit present. Mental Status: He is alert and oriented to person, place, and time.       Comments: Strength 5/5 extremities, sensation intact, CN II-XII intact   Psychiatric:         Mood and Affect: Mood normal.         Behavior: Behavior normal.       Physical exam:       Vitals:    08/09/21 1509   BP: (!) 149/66   Pulse: 63   Resp: 20   Temp:    SpO2: 96%       DATA:    Labs:  CBC:   Recent Labs     08/09/21  1222   WBC 5.6   HGB 14.0   HCT 41.4    high-grade stenosis or aneurysm.       ASSESSMENT AND PLAN:    Chest pain  -troponins negative x2  -EKG unchanged from prior  -tender to palpation  -continue aspirin 81 mg daily and lipitor 80 mg nightly  -considering low EF, will consult cardiology on recommendations regarding pacemaker  -stress test    Confusion  -likely syncopal episode vs seizure  -afebrile, no leukocytosis or metabolic derangement   -MRI ordered to rule out stroke    History of seizure  -continue lamictal  -Lamotrigine level ordered    Chronic HFrEF  -last echo 9/2020 with EF 25-30%  -CXR without acute cardiopulmonary disease  -continue entresto and coreg  -daily weights  -strict I and O  -repeat echo tomorrow    CO2 retention  -VBG shows respiratory acidosis with pH 7.318, pCO2 60.5, HCO3 31  -possibly related to below and contributing to episode of confusion    DUANE   -CPAP at night    Type 2 DM  -POCT, hypoglycemia protocol  -low dose sliding scale    HTN  -continue home medications    Will discuss with attending physician Dr. Jaun Miller Status: Full code  FEN: regular diet  PPX:   DISPO: EMMIE Ng DO  8/9/2021,  4:41 PM

## 2021-08-09 NOTE — ED NOTES
Home Medication List is complete. Spoke with patient; he is knowledgeable about his medications. Confirmed with pharmacy fills from [address] [place] [address].       Changes made to the Home Medication List:   Removed:    Colchicine   Albuterol nebs     Added:   Vitamin D weekly   Montelukast   Entresto    Changes:    Famotidine to prn   Percocet to q4h prn   Diazepam to q12h prn      Faye WeberD., [facility]   8/9/2021 5:08 PM  Wireless: [phone]

## 2021-08-09 NOTE — ED TRIAGE NOTES
Pt presents to ED via EMS for chest pain. EMS states pt may have had a seizure and was mildly postictal upon their arrival.  Pt GCS 15 at this time. Pt c/o left-sided chest pain and has Hx of heart failure. Pt is on O2 upon arrival at 2 L/min. Pt is not on home O2.

## 2021-08-10 PROBLEM — G40.919 BREAKTHROUGH SEIZURE (HCC): Status: ACTIVE | Noted: 2021-08-09

## 2021-08-10 PROBLEM — G40.309 NONINTRACTABLE GENERALIZED IDIOPATHIC EPILEPSY WITHOUT STATUS EPILEPTICUS (HCC): Status: ACTIVE | Noted: 2021-08-10

## 2021-08-10 LAB
ANION GAP SERPL CALCULATED.3IONS-SCNC: 10 MMOL/L (ref 3–16)
BASOPHILS ABSOLUTE: 0 K/UL (ref 0–0.2)
BASOPHILS RELATIVE PERCENT: 0.8 %
BUN BLDV-MCNC: 10 MG/DL (ref 7–20)
CALCIUM SERPL-MCNC: 9.2 MG/DL (ref 8.3–10.6)
CHLORIDE BLD-SCNC: 101 MMOL/L (ref 99–110)
CO2: 26 MMOL/L (ref 21–32)
CREAT SERPL-MCNC: 0.9 MG/DL (ref 0.9–1.3)
EOSINOPHILS ABSOLUTE: 0.2 K/UL (ref 0–0.6)
EOSINOPHILS RELATIVE PERCENT: 3.7 %
GFR AFRICAN AMERICAN: >60
GFR NON-AFRICAN AMERICAN: >60
GLUCOSE BLD-MCNC: 156 MG/DL (ref 70–99)
GLUCOSE BLD-MCNC: 185 MG/DL (ref 70–99)
GLUCOSE BLD-MCNC: 191 MG/DL (ref 70–99)
GLUCOSE BLD-MCNC: 195 MG/DL (ref 70–99)
GLUCOSE BLD-MCNC: 199 MG/DL (ref 70–99)
GLUCOSE BLD-MCNC: 254 MG/DL (ref 70–99)
HCT VFR BLD CALC: 39.7 % (ref 40.5–52.5)
HEMOGLOBIN: 13.6 G/DL (ref 13.5–17.5)
LV EF: 43 %
LVEF MODALITY: NORMAL
LYMPHOCYTES ABSOLUTE: 2.1 K/UL (ref 1–5.1)
LYMPHOCYTES RELATIVE PERCENT: 36.7 %
MAGNESIUM: 1.7 MG/DL (ref 1.8–2.4)
MAGNESIUM: 2.1 MG/DL (ref 1.8–2.4)
MCH RBC QN AUTO: 32.3 PG (ref 26–34)
MCHC RBC AUTO-ENTMCNC: 34.2 G/DL (ref 31–36)
MCV RBC AUTO: 94.3 FL (ref 80–100)
MONOCYTES ABSOLUTE: 0.4 K/UL (ref 0–1.3)
MONOCYTES RELATIVE PERCENT: 6.3 %
NEUTROPHILS ABSOLUTE: 3 K/UL (ref 1.7–7.7)
NEUTROPHILS RELATIVE PERCENT: 52.5 %
PDW BLD-RTO: 13.6 % (ref 12.4–15.4)
PERFORMED ON: ABNORMAL
PLATELET # BLD: 182 K/UL (ref 135–450)
PMV BLD AUTO: 8.7 FL (ref 5–10.5)
POTASSIUM REFLEX MAGNESIUM: 4 MMOL/L (ref 3.5–5.1)
RBC # BLD: 4.21 M/UL (ref 4.2–5.9)
SODIUM BLD-SCNC: 137 MMOL/L (ref 136–145)
WBC # BLD: 5.7 K/UL (ref 4–11)

## 2021-08-10 PROCEDURE — 6370000000 HC RX 637 (ALT 250 FOR IP): Performed by: STUDENT IN AN ORGANIZED HEALTH CARE EDUCATION/TRAINING PROGRAM

## 2021-08-10 PROCEDURE — 6360000002 HC RX W HCPCS

## 2021-08-10 PROCEDURE — APPNB60 APP NON BILLABLE TIME 46-60 MINS: Performed by: NURSE PRACTITIONER

## 2021-08-10 PROCEDURE — 97166 OT EVAL MOD COMPLEX 45 MIN: CPT

## 2021-08-10 PROCEDURE — 80048 BASIC METABOLIC PNL TOTAL CA: CPT

## 2021-08-10 PROCEDURE — 6360000004 HC RX CONTRAST MEDICATION: Performed by: STUDENT IN AN ORGANIZED HEALTH CARE EDUCATION/TRAINING PROGRAM

## 2021-08-10 PROCEDURE — C8929 TTE W OR WO FOL WCON,DOPPLER: HCPCS

## 2021-08-10 PROCEDURE — 83735 ASSAY OF MAGNESIUM: CPT

## 2021-08-10 PROCEDURE — 6360000002 HC RX W HCPCS: Performed by: STUDENT IN AN ORGANIZED HEALTH CARE EDUCATION/TRAINING PROGRAM

## 2021-08-10 PROCEDURE — 97535 SELF CARE MNGMENT TRAINING: CPT

## 2021-08-10 PROCEDURE — 2580000003 HC RX 258: Performed by: STUDENT IN AN ORGANIZED HEALTH CARE EDUCATION/TRAINING PROGRAM

## 2021-08-10 PROCEDURE — 85025 COMPLETE CBC W/AUTO DIFF WBC: CPT

## 2021-08-10 PROCEDURE — 1200000000 HC SEMI PRIVATE

## 2021-08-10 PROCEDURE — 99223 1ST HOSP IP/OBS HIGH 75: CPT | Performed by: PSYCHIATRY & NEUROLOGY

## 2021-08-10 PROCEDURE — 36415 COLL VENOUS BLD VENIPUNCTURE: CPT

## 2021-08-10 PROCEDURE — 99223 1ST HOSP IP/OBS HIGH 75: CPT | Performed by: INTERNAL MEDICINE

## 2021-08-10 PROCEDURE — 83036 HEMOGLOBIN GLYCOSYLATED A1C: CPT

## 2021-08-10 RX ORDER — HYDRALAZINE HYDROCHLORIDE 10 MG/1
5 TABLET, FILM COATED ORAL EVERY 8 HOURS PRN
Status: DISCONTINUED | OUTPATIENT
Start: 2021-08-10 | End: 2021-08-11 | Stop reason: HOSPADM

## 2021-08-10 RX ORDER — MAGNESIUM SULFATE IN WATER 40 MG/ML
2000 INJECTION, SOLUTION INTRAVENOUS ONCE
Status: COMPLETED | OUTPATIENT
Start: 2021-08-10 | End: 2021-08-10

## 2021-08-10 RX ADMIN — NORTRIPTYLINE HYDROCHLORIDE 75 MG: 25 CAPSULE ORAL at 05:02

## 2021-08-10 RX ADMIN — ALLOPURINOL 300 MG: 300 TABLET ORAL at 22:05

## 2021-08-10 RX ADMIN — CETIRIZINE HYDROCHLORIDE 10 MG: 10 TABLET, FILM COATED ORAL at 00:42

## 2021-08-10 RX ADMIN — CETIRIZINE HYDROCHLORIDE 10 MG: 10 TABLET, FILM COATED ORAL at 08:25

## 2021-08-10 RX ADMIN — SACUBITRIL AND VALSARTAN 1 TABLET: 97; 103 TABLET, FILM COATED ORAL at 08:26

## 2021-08-10 RX ADMIN — SODIUM CHLORIDE 25 ML: 9 INJECTION, SOLUTION INTRAVENOUS at 10:56

## 2021-08-10 RX ADMIN — INSULIN LISPRO 1 UNITS: 100 INJECTION, SOLUTION INTRAVENOUS; SUBCUTANEOUS at 16:56

## 2021-08-10 RX ADMIN — CARVEDILOL 25 MG: 25 TABLET, FILM COATED ORAL at 00:42

## 2021-08-10 RX ADMIN — AMLODIPINE BESYLATE 10 MG: 10 TABLET ORAL at 08:25

## 2021-08-10 RX ADMIN — SACUBITRIL AND VALSARTAN 1 TABLET: 97; 103 TABLET, FILM COATED ORAL at 22:07

## 2021-08-10 RX ADMIN — MONTELUKAST 10 MG: 10 TABLET, FILM COATED ORAL at 05:02

## 2021-08-10 RX ADMIN — MAGNESIUM SULFATE HEPTAHYDRATE 2000 MG: 2 INJECTION, SOLUTION INTRAVENOUS at 10:59

## 2021-08-10 RX ADMIN — CHLORTHALIDONE 25 MG: 25 TABLET ORAL at 08:25

## 2021-08-10 RX ADMIN — MONTELUKAST 10 MG: 10 TABLET, FILM COATED ORAL at 22:05

## 2021-08-10 RX ADMIN — ATORVASTATIN CALCIUM 80 MG: 40 TABLET, FILM COATED ORAL at 00:42

## 2021-08-10 RX ADMIN — ENOXAPARIN SODIUM 40 MG: 40 INJECTION SUBCUTANEOUS at 08:25

## 2021-08-10 RX ADMIN — ASPIRIN 81 MG: 81 TABLET, COATED ORAL at 08:25

## 2021-08-10 RX ADMIN — Medication 10 ML: at 08:26

## 2021-08-10 RX ADMIN — CARVEDILOL 25 MG: 25 TABLET, FILM COATED ORAL at 16:54

## 2021-08-10 RX ADMIN — GLIPIZIDE 5 MG: 5 TABLET ORAL at 08:25

## 2021-08-10 RX ADMIN — INSULIN LISPRO 3 UNITS: 100 INJECTION, SOLUTION INTRAVENOUS; SUBCUTANEOUS at 12:15

## 2021-08-10 RX ADMIN — NORTRIPTYLINE HYDROCHLORIDE 75 MG: 25 CAPSULE ORAL at 22:05

## 2021-08-10 RX ADMIN — PERFLUTREN 1.65 MG: 6.52 INJECTION, SUSPENSION INTRAVENOUS at 16:35

## 2021-08-10 RX ADMIN — MULTIPLE VITAMINS W/ MINERALS TAB 1 TABLET: TAB at 08:25

## 2021-08-10 RX ADMIN — FAMOTIDINE 20 MG: 20 TABLET, FILM COATED ORAL at 00:41

## 2021-08-10 RX ADMIN — LAMOTRIGINE 600 MG: 150 TABLET ORAL at 08:27

## 2021-08-10 RX ADMIN — CARVEDILOL 25 MG: 25 TABLET, FILM COATED ORAL at 08:25

## 2021-08-10 RX ADMIN — ENOXAPARIN SODIUM 40 MG: 40 INJECTION SUBCUTANEOUS at 00:42

## 2021-08-10 RX ADMIN — LAMOTRIGINE 600 MG: 150 TABLET ORAL at 00:43

## 2021-08-10 RX ADMIN — ATORVASTATIN CALCIUM 80 MG: 40 TABLET, FILM COATED ORAL at 22:05

## 2021-08-10 RX ADMIN — Medication 10 ML: at 00:45

## 2021-08-10 RX ADMIN — FAMOTIDINE 20 MG: 20 TABLET, FILM COATED ORAL at 22:05

## 2021-08-10 RX ADMIN — GLIPIZIDE 5 MG: 5 TABLET ORAL at 16:54

## 2021-08-10 RX ADMIN — INSULIN LISPRO 1 UNITS: 100 INJECTION, SOLUTION INTRAVENOUS; SUBCUTANEOUS at 08:31

## 2021-08-10 RX ADMIN — FAMOTIDINE 20 MG: 20 TABLET, FILM COATED ORAL at 08:25

## 2021-08-10 RX ADMIN — ALLOPURINOL 300 MG: 300 TABLET ORAL at 00:42

## 2021-08-10 ASSESSMENT — PAIN SCALES - GENERAL: PAINLEVEL_OUTOF10: 0

## 2021-08-10 NOTE — CONSULTS
Neurology Consult Note  Reason for Consult: \"breakthrough seizure on lamictal, AED recs. (-) MRI  Chief complaint: possible seizure  Dr Zack Spain MD asked me to see Kamilah De La Garza in consultation for evaluation of possible seizure. History of Present Illness:  Kamilah De La Garza is a 64 y.o. male with a history of seizures who presents with possible breakthrough seizure. He cannot remember what happened leading up to admission. He denies any recent tongue biting (though he is edentulous) or incontinent episodes. He came to the ER for chest pain. EMS reports indicate possible seizure on their initial visit - he was confused and not speaking coherently and this lasted less than a minute. At the time of my visit he is back to his baseline. He denies any missed doses of LTG. He has been on this for several years. He sees Dr. Landry Tidwell at [facility]. He has not been vomiting or ill recently. He reports he has been eating well and getting enough sleep. He does not drink alcohol or use illicit drugs. He denies any new medication changes. He has been in a wheelchair for the last 8-10 years. Prior AEDs: depakote, phenobarbital. He underwent EMU monitoring in the fall of 2020 which was non-diagnostic. 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 He does not drive. He has his own apartment but his girlfriend is in the same building and checks on him frequently.     Medical History:  Past Medical History:   Diagnosis Date    Arthritis     Asthma     Bronchitis     CAD (coronary artery disease)     CHF (congestive heart failure) (HCC)     Chronic back pain     Diabetes mellitus (HCC)     Generalized headaches     Gout     Hypertension     MI, old 12/01/2013    Nstemi    Morbid obesity ([facility] [address]     Psychogenic nonepileptic seizure     DX at [facility]- pseudo-seizures    Seizures ([facility] [address]      Past Surgical History:   Procedure Laterality Date    EYE SURGERY       Medications Prior to Admission: Mother     High Blood Pressure Mother     Diabetes Mother     Diabetes Father     Heart Disease Father     Heart Attack Father     High Blood Pressure Father     Diabetes Sister     High Blood Pressure Sister      Social History     Tobacco Use   Smoking Status Former Smoker   Smokeless Tobacco Never Used     Social History     Substance and Sexual Activity   Drug Use No     Social History     Substance and Sexual Activity   Alcohol Use No       ROS:  Constitutional- No weight loss or fevers  Eyes- No diplopia. No photophobia. Ears/nose/throat- No dysphagia. No Dysarthria  Cardiovascular- No palpitations. No chest pain  Respiratory- No dyspnea. No Cough  Gastrointestinal- No Abdominal pain. No Vomiting. Genitourinary- No incontinence. No urinary retention  Musculoskeletal- No myalgia. No arthralgia  Skin- No rash. No easy bruising. Psychiatric- No depression. No anxiety  Endocrine- +diabetes. No thyroid issues. Hematologic- No bleeding difficulty. No fatigue  Neurologic- No weakness. No Headache. Exam:  Blood pressure 136/74, pulse 67, temperature 97.5 °F (36.4 °C), temperature source Oral, resp. rate 18, height 5' 10\" (1.778 m), weight (!) 407 lb (184.6 kg), SpO2 95 %. Constitutional    Vital signs: BP, HR, and RR reviewed   General Alert, no distress, well-nourished  Eyes: unable to visualize fundi   Cardiovascular: pulses symmetric in all 4 extremities. No peripheral edema. Psychiatric: cooperative with examination, no  psychotic behavior noted.   Neurologic  Mental status: eyes open spontaneously  orientation to person and place, month, and year   General fund of knowledge grossly intact; names current president as \"Matthieu\" and last president as Reinaldo Gathers   Memory grossly intact; cannot name first president    Attention intact as able to attend well to the exam; able to read clock   Language fluent in conversation   Comprehension intact; follows simple commands and 2 step commands crossing midline  Cranial nerves:   CN2: Visual Fields full w/o extinction on confrontational testing  CN 3,4,6: extraocular muscles intact, pupils equal round and reactive  CN5: facial sensation symmetric   CN7: face symmetric without dysarthria  CN8: hearing grossly intact  CN9: palate elevated symmetrically  CN11: trap full strength on shoulder shrug  CN12: tongue midline with protrusion  Strength: No prontator drift. Good strength in all 4 extremities   Deep tendon reflexes: normal in all 4 extremities  Sensory: light touch intact in all 4 extremities. No sensory extinction on double simultaneous stimulation  Cerebellar/coordination: finger nose finger normal without ataxia  Tone: normal in all 4 extremities  Gait: wheelchair bound x 10 years    Labs  Creatinine 0.9 mg/dL      Studies  MRI brain: No evidence of acute abnormality identified. Summary of EMU admission 9/29/20-10/02/20: This prolonged 4 day video EEG monitoring did not capture any of   this patients typical events/seizures, despite AED decrease,   multiple nights of sleep deprivation, and multiple trials of   photic stimulation. Interictal EEG was remarkable for the   presence of frequent isolated generalized spike and wave   discharges, occasionally occurring in bursts, as well as frequent   polyspike and wave discharges. Collectively, the presence of these discharges is most consistent   with a diagnosis of seizures of generalized onset. Generalized   epileptiform activity represents the interictal expression of   generalized idiopathic (genetic) epilepsy.  No definitive   conclusions can be made in regards to the staring spell events   that he had described as these were not captured.      Scheduled Medications   magnesium sulfate  2,000 mg Intravenous Once    allopurinol  300 mg Oral Nightly    amLODIPine  10 mg Oral Daily    aspirin  81 mg Oral Daily    atorvastatin  80 mg Oral Nightly    carvedilol  25 mg Oral BID WC    chlorthalidone 25 mg Oral Daily    [START ON 8/12/2021] Vitamin D3  50,000 Units Oral Weekly    glipiZIDE  5 mg Oral BID AC    lamoTRIgine  600 mg Oral BID    montelukast  10 mg Oral Nightly    therapeutic multivitamin-minerals   Oral Daily    nortriptyline  75 mg Oral Nightly    sacubitril-valsartan  1 tablet Oral BID    cetirizine  10 mg Oral Daily    sodium chloride flush  5-40 mL Intravenous 2 times per day    enoxaparin  40 mg Subcutaneous Daily    insulin lispro  0-6 Units Subcutaneous TID WC    insulin lispro  0-3 Units Subcutaneous Nightly    famotidine  20 mg Oral BID     Impression:  Kamilah De La Garza is a 64 y.o. male with a history of seizures who presents with possible seizure. Per history in chart and given by patient, unclear if epileptic or non-epileptic in nature. No provocation identified. He reports compliance with Lamotrigine. Recommendations:  - Check Lamictal level (ordered 8/9)  - Continue observation x 24 hours. Notify neurology of any acute neurologic changes.   - No change to AED regimen at this time as discussed with Dr. Landry Tidwell (patient's neurologist)  - No indication for cEEG as he is at his neurocognitive baseline   - He does not drive  - F/U with Dr. Landry Tidwell as outpatient upon discharge. A copy of this note was provided for Dr Zack Spain MD.      Travis Chaney NP  Neurology and Neurocritical care  [facility] Neurology line: [phone]  [employer]: [facility] Neurology & Neurocritical Care NPs    I spent 60 minutes in the care of this patient. Over 50% of that time was in face-to-face counseling regarding disease process, diagnostic testing, preventative measures, and answering patient and family questions.

## 2021-08-10 NOTE — PROGRESS NOTES
Progress Note    Admit Date: 8/9/2021  Day: 2  Diet: Diet NPO Exceptions are: Other (Specify); Specify Other Exceptions: no caffeine    CC: Chest pain    Interval history:   Pt seen at beside  No further complaints since admission  Pt denies any fever, chills, nausea and vomiting. HPI:  Pt is a  64 y M PMHx of  seizure, CAD, CHF, DM, HTN, asthma, DUANE who presented with chest pain and seizure like symptoms. Patient states that bystanders had called EMS because they were concerned he was confused, unable to speak, and having a seizure. He does not remember this episode. Patient states that he was also having a constant crushing L sided chest pain that radiated to his L arm and lasted 45 minutes, as well as very mild SOB. He denied any alleviating or aggravating factors. Squad reported that he was initially confused for less than a minute and resolved. Glucose was 171.  was given. Patient states he was sitting in his wheelchair prior to this episode and felt fine earlier today. Denies fever, chills, n/v, abdominal pain, bowel or bladder incontinence, or weakness. States he takes lamictal for seizures and has been compliant with his medications.      ER course: On labs, troponins were negative x2, BNP negative, no leukocytosis, VBG showed acidosis with CO2 retention. EKG unchanged from prior. Patient was admitted for syncope vs seizure.     Medications:     Scheduled Meds:   allopurinol  300 mg Oral Nightly    amLODIPine  10 mg Oral Daily    aspirin  81 mg Oral Daily    atorvastatin  80 mg Oral Nightly    carvedilol  25 mg Oral BID WC    chlorthalidone  25 mg Oral Daily    [START ON 8/12/2021] Vitamin D3  50,000 Units Oral Weekly    glipiZIDE  5 mg Oral BID AC    lamoTRIgine  600 mg Oral BID    montelukast  10 mg Oral Nightly    therapeutic multivitamin-minerals   Oral Daily    nortriptyline  75 mg Oral Nightly    sacubitril-valsartan  1 tablet Oral BID    cetirizine  10 mg Oral Daily    sodium chloride flush  5-40 mL Intravenous 2 times per day    enoxaparin  40 mg Subcutaneous Daily    insulin lispro  0-6 Units Subcutaneous TID WC    insulin lispro  0-3 Units Subcutaneous Nightly    famotidine  20 mg Oral BID     Continuous Infusions:   sodium chloride      dextrose       PRN Meds:albuterol, diazePAM, sodium chloride flush, sodium chloride, ondansetron **OR** ondansetron, polyethylene glycol, acetaminophen **OR** acetaminophen, glucose, dextrose, glucagon (rDNA), dextrose, perflutren lipid microspheres    Objective:   Vitals:   T-max:  Patient Vitals for the past 8 hrs:   BP Temp Temp src Pulse Resp SpO2   08/10/21 0330 139/79 97.6 °F (36.4 °C) Oral 60 18 96 %   08/10/21 0026 (!) 157/83 97.6 °F (36.4 °C) Oral 65 18 94 %     No intake or output data in the 24 hours ending 08/10/21 0651    Review of Systems   All other systems reviewed and are negative. Physical Exam  Constitutional:       Appearance: Normal appearance. HENT:      Head: Normocephalic and atraumatic. Cardiovascular:      Rate and Rhythm: Normal rate and regular rhythm. Heart sounds: Normal heart sounds. Comments: L chest wall tenderness to palpation  Pulmonary:      Breath sounds: Normal breath sounds. Abdominal:      Palpations: Abdomen is soft. Musculoskeletal:         General: Swelling present. Cervical back: Neck supple. Comments: +2 pitting edema bilaterally   Skin:     General: Skin is warm and dry. Neurological:      General: No focal deficit present. Mental Status: He is alert and oriented to person, place, and time.       Comments: Strength 5/5 extremities, sensation intact, CN II-XII intact   Psychiatric:         Mood and Affect: Mood normal.         Behavior: Behavior normal.      LABS:    CBC:   Recent Labs     08/09/21  1222 08/10/21  0435   WBC 5.6 5.7   HGB 14.0 13.6   HCT 41.4 39.7*    182   MCV 96.5 94.3     Renal:    Recent Labs     08/09/21  1222 08/10/21  0435 * 137   K 4.3 4.0   CL 99 101   CO2 27 26   BUN 12 10   CREATININE 1.0 0.9   GLUCOSE 216* 199*   CALCIUM 9.0 9.2   MG  --  1.70*   ANIONGAP 9 10     Hepatic:   Recent Labs     08/09/21  1222   AST 15   ALT 18   BILITOT 0.4   PROT 6.8   LABALBU 3.8   ALKPHOS 131*     Troponin:   Recent Labs     08/09/21  1222 08/09/21  1431   TROPONINI <0.01 <0.01     BNP: No results for input(s): BNP in the last 72 hours. Lipids: No results for input(s): CHOL, HDL in the last 72 hours. Invalid input(s): LDLCALCU, TRIGLYCERIDE  ABGs:  No results for input(s): PHART, RRU3ZWG, PO2ART, HVQ1QQR, BEART, THGBART, R7IIESYY, EYT1VLA in the last 72 hours. INR: No results for input(s): INR in the last 72 hours. Lactate: No results for input(s): LACTATE in the last 72 hours. Cultures:  -----------------------------------------------------------------  RAD:   MRI BRAIN WO CONTRAST   Final Result      No evidence of acute abnormality identified. XR CHEST PORTABLE   Final Result      No evidence for acute cardiopulmonary disease. NM MYOCARDIAL SPECT REST EXERCISE OR RX    (Results Pending)       Assessment/Plan:   63 yo M with a PMHx of seizure, CAD, CHF, DM, HTN, asthma, DUANE who presented with an episode of confusion, dysarthria, and seizure like symptoms. Chest pain 2/2 likely cardiogenic   Pt has a BMI of 58.40. Pt has DM. EF 25% from last echo.  Pt has HTN  -troponins negative x2  -EKG unchanged from prior  -tender to palpation  -continue aspirin 81 mg daily and lipitor 80 mg nightly  -considering low EF, will consult cardiology on recommendations regarding pacemaker  -stress test RX- today pending results     Confusion 2/2 seizure  Pt has a history of seizures on medications.    -likely syncopal episode vs seizure  -afebrile, no leukocytosis or metabolic derangement   -MRI - negative for stroke or neurological causes   -continue lamictal  -Lamotrigine level ordered     Chronic HFrEF  Last echo 9/2020 with EF 25-30%  -CXR without acute cardiopulmonary disease  -continue entresto and coreg  -daily weights  -strict I and O  -repeat echo today     CO2 retention  -VBG shows respiratory acidosis with pH 7.318, pCO2 60.5, HCO3 31  -possibly related to below and contributing to episode of confusion  - o2 therapy when needed - NC 2 L     DUANE   -CPAP at night     Type 2 DM  -POCT, hypoglycemia protocol  -low dose sliding scale     HTN  -continue home medications-   -Norvasc 10mg   -Carvedilol 25mg   -Chlorthalidone 25mg      Gout  -continue allopurinol     Code Status:Full  FEN: NPO   PPX: Protonix   DISPO: Violeta Guardado MD, PGY-1  08/10/21  6:51 AM    This patient has been staffed and discussed with .

## 2021-08-10 NOTE — CONSULTS
Cardiology Consultation                                                                    Pt Name: Mayela Aragon  Age: 64 y.o. Sex: male  : 1960  Location: 6309/6309-01    Referring Physician: Jordis Goldberg, MD  Primary cardiologist:  cardiology / Dr Chetna Mesa      Reason for Consult:     Reason for Consultation/Chief Complaint: atypical chest pain    HPI:     Mayela Aragon is a 64 y.o. male with a past medical history of HFrEF due to [facility] (HTN), patient does not have an ICD/CRT-D, morbid obesity, MRDD, seizure / pseudoseizure disorder, asthma, DM.      Echo 2018: mo LVH, EF 81-68%, diastolic II     Nuc : ischemia in LAD and RCA territories     Cath 2018: minimal plaquing, otherwise normal coronaries, EF 35%. Nuc 2021: Scars in the inferior and anterior wall, LVEF 40%. Echo 2020: Mild LVD, LVEF 25-30%, regional wall motion abnormalities, no significant valvular abnormalities, normal RV. Patient presented to the emergency room on  via EMS when bystanders were concerned regarding his mental status (confused, unable to speak, having a seizure with no recollection of event). At the emergency room, patient was afebrile, vital signs within normal limits, normal oxygen saturation on room air. Patient reported mild left-sided chest discomfort, pressure-like, 3 out of 10 in intensity, it lasted about 20 minutes. Symptoms were not associated with acid reflux, were not pleuritic, nonpositional. He denies any similar symptoms over the last several months. Patient was admitted for rule out ACS and cardiology was consulted today. He was given full dose aspirin. Troponin x2.01, ECG consistent with NSR 65 bpm, first-degree AV block, LAFB, LBBB (180 ms).       Histories     Past Medical History:   has a past medical history of Arthritis, Asthma, Bronchitis, CAD (coronary artery disease), CHF (congestive heart failure) ([address] Chronic back pain, Diabetes mellitus ([facility] [address] Generalized headaches, Gout, Hypertension, MI, old, Morbid obesity ([facility] [address] Psychogenic nonepileptic seizure, and Seizures ([facility] [address] Surgical History:   has a past surgical history that includes eye surgery. Social History:   reports that he has quit smoking. He has never used smokeless tobacco. He reports that he does not drink alcohol and does not use drugs. Family History:  No evidence for sudden cardiac death or premature CAD      Medications:       Home Medications  Were reviewed and are listed in nursing record. and/or listed below  Prior to Admission medications    Medication Sig Start Date End Date Taking?  Authorizing Provider   albuterol sulfate HFA (VENTOLIN HFA) 108 (90 Base) MCG/ACT inhaler Inhale 2 puffs into the lungs every 6 hours as needed for Wheezing   Yes Historical Provider, MD   lamoTRIgine (LAMICTAL) 200 MG tablet Take 200 mg by mouth daily as needed   Yes Historical Provider, MD   glipiZIDE (GLUCOTROL) 5 MG tablet Take 5 mg by mouth 2 times daily (before meals)   Yes Historical Provider, MD   Cholecalciferol (VITAMIN D3) 1.25 MG (35923 UT) CAPS Take 50,000 Units by mouth once a week Takes on Thursdays   Yes Historical Provider, MD   sacubitril-valsartan (ENTRESTO)  MG per tablet Take 1 tablet by mouth 2 times daily   Yes Historical Provider, MD   montelukast (SINGULAIR) 10 MG tablet Take 10 mg by mouth nightly   Yes Historical Provider, MD   lisinopril (PRINIVIL;ZESTRIL) 40 MG tablet Take 40 mg by mouth nightly   Yes Historical Provider, MD   chlorthalidone (HYGROTON) 25 MG tablet Take 25 mg by mouth daily    Yes Historical Provider, MD   nortriptyline (PAMELOR) 75 MG capsule Take 75 mg by mouth nightly   Yes Historical Provider, MD   lamoTRIgine (LAMICTAL) 200 MG tablet Take 600 mg by mouth 2 times daily   Yes Historical Provider, MD   atorvastatin (LIPITOR) 80 MG tablet Take 80 mg by mouth nightly    Yes Historical Provider, MD   metFORMIN (GLUCOPHAGE) 1000 MG tablet Take 1,000 mg by mouth 2 times daily (with meals)    Yes Historical Provider, MD   amLODIPine (NORVASC) 10 MG tablet Take 10 mg by mouth daily    Yes Historical Provider, MD   aspirin 81 MG EC tablet Take 81 mg by mouth daily   Yes Historical Provider, MD   carvedilol (COREG) 25 MG tablet Take 25 mg by mouth 2 times daily (with meals)    Yes Historical Provider, MD   famotidine (PEPCID) 40 MG tablet Take 40 mg by mouth daily as needed (indigestion)    Yes Historical Provider, MD   loratadine (CLARITIN) 10 MG tablet Take 10 mg by mouth nightly    Yes Historical Provider, MD   Multiple Vitamin (DAILY TITUS) TABS Take 1 tablet by mouth daily   Yes Historical Provider, MD   diazepam (VALIUM) 5 MG tablet Take 5 mg by mouth every 12 hours as needed for Anxiety. Yes Historical Provider, MD   oxyCODONE-acetaminophen (PERCOCET) 5-325 MG per tablet Take 1 tablet by mouth every 4 hours as needed.     Yes Historical Provider, MD   allopurinol (ZYLOPRIM) 300 MG tablet Take 300 mg by mouth nightly    Yes Historical Provider, MD          Inpatient Medications:   magnesium sulfate  2,000 mg Intravenous Once    allopurinol  300 mg Oral Nightly    amLODIPine  10 mg Oral Daily    aspirin  81 mg Oral Daily    atorvastatin  80 mg Oral Nightly    carvedilol  25 mg Oral BID WC    chlorthalidone  25 mg Oral Daily    [START ON 8/12/2021] Vitamin D3  50,000 Units Oral Weekly    glipiZIDE  5 mg Oral BID AC    lamoTRIgine  600 mg Oral BID    montelukast  10 mg Oral Nightly    therapeutic multivitamin-minerals   Oral Daily    nortriptyline  75 mg Oral Nightly    sacubitril-valsartan  1 tablet Oral BID    cetirizine  10 mg Oral Daily    sodium chloride flush  5-40 mL Intravenous 2 times per day    enoxaparin  40 mg Subcutaneous Daily    insulin lispro  0-6 Units Subcutaneous TID WC    insulin lispro  0-3 Units Subcutaneous Nightly    famotidine  20 mg Oral BID       IV drips:   sodium chloride 25 mL (08/10/21 1056)    dextrose PRN:  hydrALAZINE, albuterol, diazePAM, sodium chloride flush, sodium chloride, ondansetron **OR** ondansetron, polyethylene glycol, acetaminophen **OR** acetaminophen, glucose, dextrose, glucagon (rDNA), dextrose, perflutren lipid microspheres    Allergy:     Amoxicillin, Doxycycline, Ibuprofen, Penicillins, Phenobarbital, Tetracycline, and Aspirin       Review of Systems:     All 12 point review of symptoms completed. Pertinent positives identified in the HPI, all other review of symptoms negative as below. CONSTITUTIONAL: No fatigue  SKIN: No rash or pruritis. EYES: No visual changes or diplopia. No scleral icterus. ENT: No Headaches, hearing loss or vertigo. No mouth sores or sore throat. CARDIOVASCULAR: No chest pain/chest pressure/chest discomfort. No palpitations. No edema. RESPIRATORY: No dyspnea. No cough or wheezing, no sputum production. GASTROINTESTINAL: No N/V/D. No abdominal pain, appetite loss, blood in stools. GENITOURINARY: No dysuria, trouble voiding, or hematuria. MUSCULOSKELETAL:  No gait disturbance, weakness or joint complaints. NEUROLOGICAL: No headache, diplopia, change in muscle strength, numbness or tingling. No change in gait, balance, coordination, mood, affect, memory, mentation, behavior. ENDOCRINE: No excessive thirst, fluid intake, or urination. No tremor. HEMATOLOGIC: No abnormal bruising or bleeding. ALLERGY: No nasal congestion or hives.       Physical Examination:     Vitals:    08/10/21 0026 08/10/21 0330 08/10/21 0748 08/10/21 1148   BP: (!) 157/83 139/79 (!) 151/88 136/74   Pulse: 65 60 62 67   Resp: 18 18 17 18   Temp: 97.6 °F (36.4 °C) 97.6 °F (36.4 °C) 97.9 °F (36.6 °C) 97.5 °F (36.4 °C)   TempSrc: Oral Oral Oral Oral   SpO2: 94% 96% 97% 95%   Weight:       Height:           Wt Readings from Last 3 Encounters:   08/09/21 (!) 407 lb (184.6 kg)   03/17/20 (!) 375 lb (170.1 kg)   03/16/20 (!) 375 lb (170.1 kg)         General Appearance:  Alert, cooperative, no distress, appears stated age Appropriate weight   Head:  Normocephalic, without obvious abnormality, atraumatic   Eyes:  PERRL, conjunctiva/corneas clear EOM intact  Ears normal   Throat no lesions       Nose: Nares normal, no drainage or sinus tenderness   Throat: Lips, mucosa, and tongue normal   Neck: Supple, symmetrical, trachea midline, no adenopathy, thyroid: not enlarged, symmetric, no tenderness/mass/nodules, no carotid bruit. Lungs:   Respirations unlabored, clear to auscultation bilaterally, without any wheezes, rubs or ronchi. Chest Wall:  No tenderness or deformity   Heart:  Regular rhythm, rate is controlled, S1, S2 normal, there is no murmur, there is no rub or gallop, no jvd, no bilateral lower extremity edema   Abdomen:   Soft, non-tender, bowel sounds active all four quadrants,  no masses, no organomegaly       Extremities: Extremities normal, atraumatic, no cyanosis. Pulses: 2+ and symmetric   Skin: Skin color, texture, turgor normal, no rashes or lesions   Pysch: Normal mood and affect   Neurologic: Normal gross motor and sensory exam.  Cranial nerves intact        Labs:     Recent Labs     08/09/21  1222 08/10/21  0435   * 137   K 4.3 4.0   BUN 12 10   CREATININE 1.0 0.9   CL 99 101   CO2 27 26   GLUCOSE 216* 199*   CALCIUM 9.0 9.2   MG  --  1.70*     Recent Labs     08/09/21  1222 08/09/21  1222 08/10/21  0435   WBC 5.6  --  5.7   HGB 14.0  --  13.6   HCT 41.4  --  39.7*     --  182   MCV 96.5   < > 94.3    < > = values in this interval not displayed. No results for input(s): CHOLTOT, TRIG, HDL in the last 72 hours. Invalid input(s): LIPIDCOMM, CHOLHDL, VLDCHOL, LDL  No results for input(s): PTT, INR in the last 72 hours. Invalid input(s): PT  Recent Labs     08/09/21  1222 08/09/21  1431   TROPONINI <0.01 <0.01     No results for input(s): BNP in the last 72 hours. No results for input(s): TSH in the last 72 hours.   No results for input(s): CHOL, HDL, including ECG's and telemetry, current and old medical records. The note was completed using EMR and Dragon dictation system. Every effort was made to ensure accuracy; however, inadvertent computerized transcription errors may be present. All questions and concerns were addressed to the patient/family. Alternatives to my treatment were discussed. I would like to thank you for providing me the opportunity to participate in the care of your patient. If you have any questions, please do not hesitate to contact me.      Jared Dang MD, [facility] [place], [address]  [facility] [address] [address]  Ph: [phone]  Fax: [phone]

## 2021-08-10 NOTE — PROGRESS NOTES
Occupational Therapy   Occupational Therapy Initial Assessment/ Treatment/ Discharge  Date: 8/10/2021   Patient Name: Brad Daley  MRN: [id number]     : 1960    Date of Service: 8/10/2021    Discharge Recommendations:     OT Equipment Recommendations  Equipment Needed: No  Brad Daley scored a  on the [facility] ADL Inpatient form. At this time, no further OT is recommended upon discharge. Recommend patient returns to prior setting with prior services. Assessment   Performance deficits / Impairments: Decreased balance;Decreased endurance  Assessment: Prior to admission pt was living alone, was independent with ADLs and IADLs, transportation services for community level mobility. Pt was using his electric scooter for mobility in his apartment. Pt now presents nearly back to his baseline, demonstrating spv for stand pivot transfer from bed to wc and simulated toilet transfer. Pt demonstrates no acute OT needs at this time, is planning to dc home alone with community services PRN. Will sign off on acute OT at this time. Prognosis: Fair  Decision Making: Medium Complexity  OT Education: OT Role;Plan of Care  Patient Education: verb understanding  REQUIRES OT FOLLOW UP: No  Activity Tolerance  Activity Tolerance: Patient Tolerated treatment well  Safety Devices  Safety Devices in place: Yes  Type of devices: Left in chair;Nurse notified;Call light within reach; Chair alarm in place           Patient Diagnosis(es): The encounter diagnosis was Chest pain, unspecified type. has a past medical history of Arthritis, Asthma, Bronchitis, CAD (coronary artery disease), CHF (congestive heart failure) ([address] Chronic back pain, Diabetes mellitus ([address] Generalized headaches, Gout, Hypertension, MI, old, Morbid obesity ([address] Psychogenic nonepileptic seizure, and Seizures ([address] has a past surgical history that includes eye surgery.            Restrictions  Position Activity Restriction  Other position/activity restrictions: up with assist    Subjective   General  Additional Pertinent Hx: Pt admitted to ED with c/o L side chest pain, episode of confusion and decreased responsiveness while in his community room at his apartment. CXR: (-), brain MRI: (-) ECHO: pending. PMHx includes: Arthritis, Asthma, Bronchitis, CAD (coronary artery disease), CHF (congestive heart failure) ([address] Chronic back pain, Diabetes mellitus ([address] Generalized headaches, Gout, Hypertension, MI, old (12/01/2013), Morbid obesity ([address] Psychogenic nonepileptic seizure, and Seizures ([facility] [address] Family / Caregiver Present: No  Referring Practitioner: Servando Mendez DO  Diagnosis: seizure  Subjective  Subjective: Pt semi supine in bed upon arrival, agreeable to OT eval and treat. Social/Functional History  Social/Functional History  Lives With: Alone (apartment for people with disabilities)  Type of Home: Apartment  Home Layout: One level  Home Access: Level entry, Elevator  Bathroom Shower/Tub: Walk-in shower, Shower chair without back  Bathroom Toilet: Standard (sink nearby for leverage)  Bathroom Equipment: Grab bars in shower  Home Equipment: Electric scooter, [employer], 4 wheeled walker (pull cord)  ADL Assistance: Independent  Homemaking Assistance: Independent  Ambulation Assistance: Independent (pt mainly uses electric scooter, occasionally 4ww for short distances.  Can fit scooter into bathroom)  Transfer Assistance: Independent  Active : No  Patient's  Info: SAY services  Leisure & Hobbies: bowling; artwork  Additional Comments: pt denies recent falls       Objective   Vision: Impaired  Vision Exceptions: Wears glasses for distance  Hearing: Within functional limits    Orientation  Overall Orientation Status: Within Functional Limits     Balance  Sitting Balance: Supervision  Standing Balance: Stand by assistance (stand pivot from bed to scooter)  Standing Balance  Time: 10-20 sec  Activity: stand pivot from bed to

## 2021-08-10 NOTE — CARE COORDINATION
CM following for  D/c planning :     Prior to admission pt was living alone, was independent with ADLs and IADLs, transportation services for community level mobility. Pt was using his electric scooter for mobility in his apartment. Admitted w/ CP ; cardiology  Consulted:  No stress test  At this time , Echo today ( still pending )  and  Plans to d/c w/  [facility] for  Two weeks at time of discharge . Cont to follow     Cont to follow should  And needs arise . Electronically signed by Paul Quiñones RN on 8/10/2021 at 4:50 PM         Paul Quiñones RN Case Manager  [facility]  [address] [place] [address] [facility].   [address] [place] [id number] [phone]  Fax [phone]

## 2021-08-11 VITALS
SYSTOLIC BLOOD PRESSURE: 126 MMHG | TEMPERATURE: 98.3 F | HEART RATE: 65 BPM | HEIGHT: 70 IN | RESPIRATION RATE: 18 BRPM | WEIGHT: 315 LBS | OXYGEN SATURATION: 95 % | BODY MASS INDEX: 45.1 KG/M2 | DIASTOLIC BLOOD PRESSURE: 74 MMHG

## 2021-08-11 LAB
ANION GAP SERPL CALCULATED.3IONS-SCNC: 11 MMOL/L (ref 3–16)
BASOPHILS ABSOLUTE: 0.1 K/UL (ref 0–0.2)
BASOPHILS RELATIVE PERCENT: 0.9 %
BUN BLDV-MCNC: 10 MG/DL (ref 7–20)
CALCIUM SERPL-MCNC: 9.6 MG/DL (ref 8.3–10.6)
CHLORIDE BLD-SCNC: 100 MMOL/L (ref 99–110)
CO2: 26 MMOL/L (ref 21–32)
CREAT SERPL-MCNC: 1 MG/DL (ref 0.9–1.3)
EOSINOPHILS ABSOLUTE: 0.2 K/UL (ref 0–0.6)
EOSINOPHILS RELATIVE PERCENT: 3.5 %
ESTIMATED AVERAGE GLUCOSE: 208.7 MG/DL
GFR AFRICAN AMERICAN: >60
GFR NON-AFRICAN AMERICAN: >60
GLUCOSE BLD-MCNC: 168 MG/DL (ref 70–99)
GLUCOSE BLD-MCNC: 185 MG/DL (ref 70–99)
GLUCOSE BLD-MCNC: 306 MG/DL (ref 70–99)
HBA1C MFR BLD: 8.9 %
HCT VFR BLD CALC: 40.7 % (ref 40.5–52.5)
HEMOGLOBIN: 13.9 G/DL (ref 13.5–17.5)
LAMOTRIGINE LEVEL: 15.1 UG/ML (ref 2.5–15)
LYMPHOCYTES ABSOLUTE: 1.9 K/UL (ref 1–5.1)
LYMPHOCYTES RELATIVE PERCENT: 30.6 %
MAGNESIUM: 1.9 MG/DL (ref 1.8–2.4)
MCH RBC QN AUTO: 32.5 PG (ref 26–34)
MCHC RBC AUTO-ENTMCNC: 34.1 G/DL (ref 31–36)
MCV RBC AUTO: 95.2 FL (ref 80–100)
MONOCYTES ABSOLUTE: 0.4 K/UL (ref 0–1.3)
MONOCYTES RELATIVE PERCENT: 6.8 %
NEUTROPHILS ABSOLUTE: 3.6 K/UL (ref 1.7–7.7)
NEUTROPHILS RELATIVE PERCENT: 58.2 %
PDW BLD-RTO: 13.6 % (ref 12.4–15.4)
PERFORMED ON: ABNORMAL
PERFORMED ON: ABNORMAL
PLATELET # BLD: 193 K/UL (ref 135–450)
PMV BLD AUTO: 8.8 FL (ref 5–10.5)
POTASSIUM REFLEX MAGNESIUM: 3.9 MMOL/L (ref 3.5–5.1)
RBC # BLD: 4.28 M/UL (ref 4.2–5.9)
SODIUM BLD-SCNC: 137 MMOL/L (ref 136–145)
WBC # BLD: 6.2 K/UL (ref 4–11)

## 2021-08-11 PROCEDURE — 6360000002 HC RX W HCPCS

## 2021-08-11 PROCEDURE — 2580000003 HC RX 258: Performed by: STUDENT IN AN ORGANIZED HEALTH CARE EDUCATION/TRAINING PROGRAM

## 2021-08-11 PROCEDURE — 99232 SBSQ HOSP IP/OBS MODERATE 35: CPT | Performed by: NURSE PRACTITIONER

## 2021-08-11 PROCEDURE — 85025 COMPLETE CBC W/AUTO DIFF WBC: CPT

## 2021-08-11 PROCEDURE — 6360000002 HC RX W HCPCS: Performed by: STUDENT IN AN ORGANIZED HEALTH CARE EDUCATION/TRAINING PROGRAM

## 2021-08-11 PROCEDURE — 93246 EXT ECG>7D<15D RECORDING: CPT | Performed by: INTERNAL MEDICINE

## 2021-08-11 PROCEDURE — 36415 COLL VENOUS BLD VENIPUNCTURE: CPT

## 2021-08-11 PROCEDURE — 6370000000 HC RX 637 (ALT 250 FOR IP): Performed by: STUDENT IN AN ORGANIZED HEALTH CARE EDUCATION/TRAINING PROGRAM

## 2021-08-11 PROCEDURE — 80048 BASIC METABOLIC PNL TOTAL CA: CPT

## 2021-08-11 PROCEDURE — 99233 SBSQ HOSP IP/OBS HIGH 50: CPT | Performed by: INTERNAL MEDICINE

## 2021-08-11 PROCEDURE — 6370000000 HC RX 637 (ALT 250 FOR IP)

## 2021-08-11 PROCEDURE — 83735 ASSAY OF MAGNESIUM: CPT

## 2021-08-11 RX ORDER — SPIRONOLACTONE 25 MG/1
12.5 TABLET ORAL DAILY
Qty: 45 TABLET | Refills: 1 | Status: SHIPPED | OUTPATIENT
Start: 2021-08-11

## 2021-08-11 RX ORDER — POTASSIUM CHLORIDE 20 MEQ/1
20 TABLET, EXTENDED RELEASE ORAL 2 TIMES DAILY WITH MEALS
Status: DISCONTINUED | OUTPATIENT
Start: 2021-08-11 | End: 2021-08-11 | Stop reason: HOSPADM

## 2021-08-11 RX ORDER — MAGNESIUM SULFATE IN WATER 40 MG/ML
2000 INJECTION, SOLUTION INTRAVENOUS ONCE
Status: COMPLETED | OUTPATIENT
Start: 2021-08-11 | End: 2021-08-11

## 2021-08-11 RX ADMIN — CETIRIZINE HYDROCHLORIDE 10 MG: 10 TABLET, FILM COATED ORAL at 08:40

## 2021-08-11 RX ADMIN — AMLODIPINE BESYLATE 10 MG: 10 TABLET ORAL at 08:40

## 2021-08-11 RX ADMIN — LAMOTRIGINE 600 MG: 150 TABLET ORAL at 08:41

## 2021-08-11 RX ADMIN — LAMOTRIGINE 600 MG: 150 TABLET ORAL at 01:02

## 2021-08-11 RX ADMIN — MULTIPLE VITAMINS W/ MINERALS TAB 1 TABLET: TAB at 08:40

## 2021-08-11 RX ADMIN — GLIPIZIDE 5 MG: 5 TABLET ORAL at 08:31

## 2021-08-11 RX ADMIN — Medication 10 ML: at 00:09

## 2021-08-11 RX ADMIN — SODIUM CHLORIDE 25 ML: 9 INJECTION, SOLUTION INTRAVENOUS at 10:46

## 2021-08-11 RX ADMIN — ASPIRIN 81 MG: 81 TABLET, COATED ORAL at 08:40

## 2021-08-11 RX ADMIN — FAMOTIDINE 20 MG: 20 TABLET, FILM COATED ORAL at 08:40

## 2021-08-11 RX ADMIN — Medication 10 ML: at 08:43

## 2021-08-11 RX ADMIN — MAGNESIUM SULFATE HEPTAHYDRATE 2000 MG: 2 INJECTION, SOLUTION INTRAVENOUS at 10:49

## 2021-08-11 RX ADMIN — POTASSIUM CHLORIDE 20 MEQ: 1500 TABLET, EXTENDED RELEASE ORAL at 10:40

## 2021-08-11 RX ADMIN — CHLORTHALIDONE 25 MG: 25 TABLET ORAL at 08:40

## 2021-08-11 RX ADMIN — SACUBITRIL AND VALSARTAN 1 TABLET: 97; 103 TABLET, FILM COATED ORAL at 09:52

## 2021-08-11 RX ADMIN — ENOXAPARIN SODIUM 40 MG: 40 INJECTION SUBCUTANEOUS at 08:41

## 2021-08-11 RX ADMIN — CARVEDILOL 25 MG: 25 TABLET, FILM COATED ORAL at 08:40

## 2021-08-11 ASSESSMENT — PAIN SCALES - GENERAL
PAINLEVEL_OUTOF10: 0
PAINLEVEL_OUTOF10: 0

## 2021-08-11 NOTE — PROGRESS NOTES
Cardiology Consult Service  Daily Progress Note        Admit Date:  8/9/2021  Primary cardiologist: [facility] / Dr Ozzy Davis    Reason for Consultation/Chief Complaint: atypical chest pain    Subjective:     Silvana Veliz a 64 y. o. male with a past medical history of HFrEF due to [facility] (HTN), patient does not have an ICD/CRT-D, morbid obesity, MRDD, seizure / pseudoseizure disorder, asthma, DM.      Echo 04/2018: mo LVH, EF 51-47%, diastolic II     Nuc 55/2158: ischemia in LAD and RCA territories     Cath 04/2018: minimal plaquing, otherwise normal coronaries, EF 35%.       Nuc 01/2021: Scars in the inferior and anterior wall, LVEF 40%.    Echo 09/2020: Mild LVD, LVEF 25-30%, regional wall motion abnormalities, no significant valvular abnormalities, normal RV.     Patient presented to the emergency room on 8/9 via EMS when bystanders were concerned regarding his mental status (confused, unable to speak, having a seizure with no recollection of event). At the emergency room, patient was afebrile, vital signs within normal limits, normal oxygen saturation on room air. Patient reported mild left-sided chest discomfort, pressure-like, 3 out of 10 in intensity, it lasted about 20 minutes. Symptoms were not associated with acid reflux, were not pleuritic, nonpositional. He denies any similar symptoms over the last several months. Patient was admitted for rule out ACS and cardiology was consulted today. He was given full dose aspirin.      Troponin x2.01, ECG consistent with NSR 65 bpm, first-degree AV block, LAFB, LBBB (180 ms). Echo 08/2021: Normal LV, LVEF 40-45%, normal RV and valves. Interval history: There were no overnight events. Patient denies any chest pains. Creatinine stable at 1.0.     Objective:     Medications:   magnesium sulfate  2,000 mg Intravenous Once    potassium chloride  20 mEq Oral BID WC    allopurinol  300 mg Oral Nightly    amLODIPine  10 mg Oral Daily    aspirin  81 mg Oral obvious abnormality, atraumatic   Eyes:  PERRL, conjunctiva/corneas clear EOM intact  Ears normal   Throat no lesions       Nose: Nares normal, no drainage or sinus tenderness   Throat: Lips, mucosa, and tongue normal   Neck: Supple, symmetrical, trachea midline, no adenopathy, thyroid: not enlarged, symmetric, no tenderness/mass/nodules, no carotid bruit. Lungs:   Normal respiratory rate, lungs clear to auscultation without any wheezes, rubs or ronchi bilaterally. Chest Wall:  No tenderness or deformity   Heart:  Regular rhythm, rate is controlled, S1, S2 normal, there is no murmur, there is no rub or gallop, no jvd, no bilateral lower extremity edema   Abdomen:   Soft, non-tender, bowel sounds active all four quadrants,  no masses, no organomegaly       Extremities: Extremities normal, atraumatic, no cyanosis. Pulses: 2+ and symmetric   Skin: Skin color, texture, turgor normal, no rashes or lesions   Pysch: Normal mood and affect   Neurologic: Normal gross motor and sensory exam.  Cranial nerves intact       Labs:   Recent Labs     08/09/21  1222 08/10/21  0435 08/10/21  1522 08/11/21  0659   * 137  --  137   K 4.3 4.0  --  3.9   BUN 12 10  --  10   CREATININE 1.0 0.9  --  1.0   CL 99 101  --  100   CO2 27 26  --  26   GLUCOSE 216* 199*  --  185*   CALCIUM 9.0 9.2  --  9.6   MG  --  1.70* 2.10 1.90     Recent Labs     08/09/21  1222 08/09/21  1222 08/10/21  0435 08/10/21  0435 08/11/21  0659   WBC 5.6  --  5.7  --  6.2   HGB 14.0  --  13.6  --  13.9   HCT 41.4  --  39.7*  --  40.7     --  182  --  193   MCV 96.5   < > 94.3   < > 95.2    < > = values in this interval not displayed. No results for input(s): CHOLTOT, TRIG, HDL in the last 72 hours. Invalid input(s): LIPIDCOMM, CHOLHDL, VLDCHOL, LDL  No results for input(s): PTT, INR in the last 72 hours.     Invalid input(s): PT  Recent Labs     08/09/21  1222 08/09/21  1431   TROPONINI <0.01 <0.01     No results for input(s): BNP in the last 72 hours. No results for input(s): NTPROBNP in the last 72 hours. No results for input(s): TSH in the last 72 hours. Imaging:       Assessment & Plan:     1.  Atypical chest pain:  Patient symptoms were quite atypical. He was ruled out for ACS with unremarkable troponin (ECG is nondiagnostic due to LBBB). Patient has not had any recent exertional symptoms to suggest significant CAD. He had an unremarkable LHC in 2018 revealing minimal CAD. Unfortunately his nuclear stress test x2 were abnormal (falsely abnormal).    -I would not repeat a stress test at this time  -No further testing is necessary.     2.  Essential hypertension:  Patient's blood pressure is well controlled.     -Continue amlodipine 10 mg daily, carvedilol 25 twice daily, chlorthalidone 25 daily, Entresto 97/103 1 tab p.o. twice daily.     3. Chronic HFrEF:   Patient is euvolemic and hemo stable. His NYHA FC is IIIA due to morbid obesity, ACC stage C. His systolic heart failure is nonischemic in etiology (hypertension induced and LBBB). He did have evidence of possible scar formation on nuclear stress test and regional wall motion abnormalities on echo, therefore I cannot exclude infiltrative cardiomyopathy     -C/w current meds per #2.   -Will obtain an echo this admission. If LVEF remains low, may consider outpatient cardiac MRI (will defer to patient's primary cardiologist at [facility]). After cardiac MRI, patient would benefit from CRT-D (will defer to his primary cardiologist at [facility])  -We will start low-dose spironolactone 12.5 mg daily     4. Recent seizure activity:  I cannot exclude syncopal episode prior to this admission. Patient is at high risk for tachyarrhythmias which could precipitate seizure activity and/or syncope.      -Zio monitor for 2 weeks prior to release patient home. Patient may be discharged home today on the above meds and follow up with me in 1 week with a BMP prior to visit. Please call me with any questions. I have spent 35 minutes of face to face time with the patient with more than 50% spent counseling and coordinating care. I have personally reviewed the reports and images of labs, radiological studies, cardiac studies including ECG's and telemetry, current and old medical records. The note was completed using EMR and Dragon dictation system. Every effort was made to ensure accuracy; however, inadvertent computerized transcription errors may be present. All questions and concerns were addressed to the patient/family. Alternatives to my treatment were discussed. Thank you for allowing to us to participate in the care or Pippa Waller. Please call our service with questions.     Kimberly Edward MD, [facility], [address]  The [address] [address]  Ph: [phone]  Fax: [phone]

## 2021-08-11 NOTE — CARE COORDINATION
Case Management Assessment            Discharge Note                    Date / Time of Note: 8/11/2021 9:40 AM                  Discharge Note Completed by: ALEJANDRO Cárdenas    Patient Name: Get Copeland   YOB: 1960  Diagnosis: Seizure [facility]) [R56.9]  Chest pain, unspecified type [R07.9]   Date / Time: 8/9/2021 11:32 AM    Current PCP: Mallika Schuler patient: No    Hospitalization in the last 30 days: No    Advance Directives:  Code Status: Full Code  [place] DNR form completed and on chart: Not Indicated    Financial:  Payor: MEDICARE / Plan: MEDICARE PART A AND B / Product Type: *No Product type* /      Pharmacy:    [facility]/pharmacy #[id number]- [address] - P [phone] - F [phone]  [address] [place] [address]  Phone: [phone] Fax: [phone]      Assistance purchasing medications?:    Assistance provided by Case Management: None at this time    Does patient want to participate in local refill/ meds to beds program?:      Meds To Beds General Rules:  1. Can ONLY be done Monday- Friday between 8:30am-5pm  2. Prescription(s) must be in pharmacy by 3pm to be filled same day  3. Copy of patient's insurance/ prescription drug card and patient face sheet must be sent along with the prescription(s)  4. Cost of Rx cannot be added to hospital bill. If financial assistance is needed, please contact unit  or ;  or  CANNOT provide pharmacy voucher for patients co-pays  5.  Patients can then  the prescription on their way out of the hospital at discharge, or pharmacy can deliver to the bedside if staff is available. (payment due at time of pick-up or delivery - cash, check, or card accepted)     Able to afford home medications/ co-pay costs: Yes    ADLS:  Current PT AM-PAC Score:   /24  Current OT AM-PAC Score: 21 /24      DISCHARGE Disposition: Home- No Services Needed    LOC at

## 2021-08-11 NOTE — PROGRESS NOTES
14 day Zio placed on pt per Dr. Santo Hardin [id number]. Pt educated and all questions answered. Bedside RN made aware.

## 2021-08-11 NOTE — PROGRESS NOTES
Neurology Progress Note    Updates:  No events overnight. Patient doing well. Exam:  -Mental status: Alert and oriented to person, place, month, year pleasant & appropriate  -Speech & Language: no aphasia; no dysarthria  -Cranial nerves: pupils symmetric; chronic dysconjugate gaze; eyes midline; no facial asymmetry  -Motor: moving all extremities symmetrically and fully  -Other: no adventitious movements noted  Other Systems  -General Appearance: well-developed, well-nourished, no apparent distress  -Neck: supple  -Lungs: breathing unlabored, regular, no audible wheezes  -CV: pulses strong x 4 extremities  -Abd: flat    Neurological ROS: negative for - confusion, speech problems or weakness     Labs:  Glucose 168 mg/dL    Studies:  MRI brain 8/9/21: no acute abnormality     Impression:  Mayela Aragon is a 64 y.o. male with a history of seizures (on LTG) who presents with possible seizure and atypical chest pain. Recommendations:  - Continue home AED regimen (LTG)  - LTG level sent - need not return prior to d/c  - F/U with Dr. Tony Bull    Neurology will sign off. OK to d/c when otherwise ready.      A copy of this note was provided for Dr Jordis Goldberg, MD.     Uriel Lawler, AINSLEY  [facility] Neurology line: [phone]  [employer]: [facility] NPs

## 2021-08-11 NOTE — DISCHARGE SUMMARY
INTERNAL MEDICINE DEPARTMENT AT [address] [place] [address]  DISCHARGE SUMMARY    Patient ID: Murray Ramirez                                             Discharge Date: 8/11/2021   Patient's PCP: Bismark Alvarado                                          Discharge Physician: Brian Jose MD MD  Admit Date: 8/9/2021   Admitting Physician: [facility], MD    PROBLEMS DURING HOSPITALIZATION:  Present on Admission:   Breakthrough seizure ([facility] [address] [address]   Nonintractable generalized idiopathic epilepsy without status epilepticus ([facility] [address]   Chest pain   Morbid obesity with BMI of 50.0-59.9, adult ([facility] [address]      DISCHARGE DIAGNOSES:    HPI:  Pt is a  64 y M PMHx of  seizure, CAD, CHF, DM, HTN, asthma, DUANE who presented with chest pain and seizure like symptoms. Patient states that bystanders had called EMS because they were concerned he was confused, unable to speak, and having a seizure. He does not remember this episode. Patient states that he was also having a constant crushing L sided chest pain that radiated to his L arm and lasted 45 minutes, as well as very mild SOB. He denied any alleviating or aggravating factors. Squad reported that he was initially confused for less than a minute and resolved. Glucose was 171.  was given. Patient states he was sitting in his wheelchair prior to this episode and felt fine earlier today. Denies fever, chills, n/v, abdominal pain, bowel or bladder incontinence, or weakness. States he takes lamictal for seizures and has been compliant with his medications. The following issues were addressed during hospitalization:    Confusion 2/2 seizure like symptoms   Pt was seen by bystanders with having jerking movements and LOC. Pt had post ictal symptoms. Neurology was consulted and they concluded the pts symptoms could be psychogenic or non compliance with medication.  His Lamictal was continued through out his stay and was prescribed this to take until seen by his PCP/neurologist for further kg)   SpO2 99%   BMI 58.40 kg/m²   Constitutional:       Appearance: Normal appearance. HENT:      Head: Normocephalic and atraumatic. Cardiovascular:      Rate and Rhythm: Normal rate and regular rhythm.      Heart sounds: Normal heart sounds.      Comments: L chest wall tenderness to palpation  Pulmonary:      Breath sounds: Normal breath sounds. Abdominal:      Palpations: Abdomen is soft. Musculoskeletal:         General: Swelling present.      Cervical back: Neck supple.      Comments: +2 pitting edema bilaterally   Skin:     General: Skin is warm and dry.    Neurological:      General: No focal deficit present.      Mental Status: He is alert and oriented to person, place, and time.      Comments: Strength 5/5 extremities, sensation intact, CN II-XII intact   Psychiatric:         Mood and Affect: Mood normal.         Behavior: Behavior normal.     Consults: cardiology and neurology  Significant Diagnostic Studies:   ECHO of heart  Treatments: IV hydration  Disposition: long term care facility  Discharged Condition: Stable  Follow Up: Primary Care Physician in one week    DISCHARGE MEDICATION:     Medication List      CONTINUE taking these medications    allopurinol 300 MG tablet  Commonly known as: ZYLOPRIM     amLODIPine 10 MG tablet  Commonly known as: NORVASC     aspirin 81 MG EC tablet     atorvastatin 80 MG tablet  Commonly known as: LIPITOR     carvedilol 25 MG tablet  Commonly known as: COREG     chlorthalidone 25 MG tablet  Commonly known as: HYGROTON     Daily Cookie Tabs     diazePAM 5 MG tablet  Commonly known as: VALIUM     Entresto  MG per tablet  Generic drug: sacubitril-valsartan     famotidine 40 MG tablet  Commonly known as: PEPCID     glipiZIDE 5 MG tablet  Commonly known as: GLUCOTROL     * lamoTRIgine 200 MG tablet  Commonly known as: LAMICTAL     * lamoTRIgine 200 MG tablet  Commonly known as: LAMICTAL     lisinopril 40 MG tablet  Commonly known as: PRINIVIL;ZESTRIL loratadine 10 MG tablet  Commonly known as: CLARITIN     metFORMIN 1000 MG tablet  Commonly known as: GLUCOPHAGE     montelukast 10 MG tablet  Commonly known as: SINGULAIR     nortriptyline 75 MG capsule  Commonly known as: PAMELOR     oxyCODONE-acetaminophen 5-325 MG per tablet  Commonly known as: PERCOCET     Ventolin  (90 Base) MCG/ACT inhaler  Generic drug: albuterol sulfate HFA     Vitamin D3 1.25 MG (13208 UT) Caps         * This list has 2 medication(s) that are the same as other medications prescribed for you. Read the directions carefully, and ask your doctor or other care provider to review them with you. Activity: activity as tolerated  Diet: regular diet  Wound Care: none needed    Time Spent on discharge is more than 30 minutes    Signed:   Beryl Welsh MD,  MD, PGY-1  8/11/2021

## 2021-08-12 NOTE — ED PROVIDER NOTES
(congestive heart failure) (HCC), Chronic back pain, Diabetes mellitus ([facility] Generalized headaches, Gout, Hypertension, MI, old, Morbid obesity ([facility] [address] Psychogenic nonepileptic seizure, and Seizures ([facility] [address] He has a past surgical history that includes eye surgery. His family history includes Asthma in his mother; Diabetes in his father, mother, and sister; Heart Attack in his father; Heart Disease in his father; High Blood Pressure in his father, mother, and sister; Obesity in his mother. He reports that he has quit smoking. He has never used smokeless tobacco. He reports that he does not drink alcohol or use drugs. Medications     Previous Medications    ALBUTEROL (PROVENTIL) (2.5 MG/3ML) 0.083% NEBULIZER SOLUTION    Take 2.5 mg by nebulization 2 times daily as needed for Wheezing or Shortness of Breath. ALLOPURINOL (ZYLOPRIM) 300 MG TABLET    Take 300 mg by mouth daily. AMLODIPINE (NORVASC) 5 MG TABLET    Take 10 mg by mouth daily     ASPIRIN 81 MG EC TABLET    Take 81 mg by mouth daily    ATORVASTATIN (LIPITOR) 80 MG TABLET    Take 80 mg by mouth daily    CARVEDILOL (COREG) 25 MG TABLET    Take 25 mg by mouth 2 times daily (with meals)     CHLORTHALIDONE (HYGROTON) 25 MG TABLET    Take 12.5 mg by mouth every other day    COLCHICINE (COLCRYS) 0.6 MG TABLET    Take 0.6 mg by mouth as needed for Pain    DIAZEPAM (VALIUM) 5 MG TABLET    Take 5 mg by mouth every 8 hours as needed for Anxiety. FAMOTIDINE (PEPCID) 40 MG TABLET    Take 40 mg by mouth daily    GLIPIZIDE (GLUCOTROL) 5 MG TABLET    Take 1 tablet by mouth daily.     LAMOTRIGINE (LAMICTAL) 200 MG TABLET    Take 600 mg by mouth 2 times daily    LAMOTRIGINE (LAMICTAL) 200 MG TABLET    Take 200 mg by mouth as needed    LISINOPRIL (PRINIVIL;ZESTRIL) 40 MG TABLET    Take 40 mg by mouth nightly     LORATADINE (CLARITIN) 10 MG TABLET    Take 10 mg by mouth daily    METFORMIN (GLUCOPHAGE) 1000 MG TABLET    Take 1,000 mg by mouth daily (with breakfast) METFORMIN (GLUCOPHAGE) 500 MG TABLET    Take 500 mg by mouth nightly. MULTIPLE VITAMIN (DAILY TITUS) TABS    Take 1 tablet by mouth daily    NORTRIPTYLINE (PAMELOR) 75 MG CAPSULE    Take 75 mg by mouth nightly    OXYCODONE-ACETAMINOPHEN (PERCOCET) 5-325 MG PER TABLET    Take 1 tablet by mouth every 6 hours as needed. Allergies     He is allergic to amoxicillin; doxycycline; doxycycline hyclate; ibuprofen; penicillins; phenobarbital; tetracycline; and aspirin. Physical Exam     INITIAL VITALS: BP: 113/80, Temp: 98.4 °F (36.9 °C), Pulse: 72, Resp: 18, SpO2: 98 %   Physical Exam  Vitals signs reviewed. Constitutional:       General: He is not in acute distress. Appearance: He is obese. He is not ill-appearing, toxic-appearing or diaphoretic. HENT:      Head: Normocephalic and atraumatic. Jaw: There is normal jaw occlusion. Mouth/Throat:      Lips: No lesions. Tongue: No lesions. Tongue does not deviate from midline. Eyes:      Comments: PERRLA   Cardiovascular:      Pulses: No decreased pulses. Radial pulses are 2+ on the right side and 2+ on the left side. Heart sounds: Heart sounds are distant (limited due to body habitus). No murmur. No systolic murmur. No diastolic murmur. No friction rub. No gallop. Pulmonary:      Effort: Pulmonary effort is normal.      Breath sounds: No decreased breath sounds, wheezing, rhonchi or rales. Abdominal:      General: Abdomen is flat. Tenderness: There is no abdominal tenderness. There is no guarding or rebound. Hernia: No hernia is present. Musculoskeletal:      Right shoulder: He exhibits tenderness. Right elbow: He exhibits normal range of motion. Right knee: He exhibits no swelling, no effusion and no deformity. Tenderness found. No medial joint line and no lateral joint line tenderness noted. Neurological:      Mental Status: He is alert. GCS: GCS eye subscore is 4. GCS verbal subscore is 5. GCS motor subscore is 6. Cranial Nerves: Cranial nerves are intact. Sensory: Sensation is intact. Motor: Motor function is intact. No atrophy. Comments: NIHSS: 0          DiagnosticResults     EKG   Interpreted in conjunction with emergencydepartment physician No att. providers found  Rhythm: normal sinus   Rate: normal  Axis: left  Ectopy: none  Conduction: LBBB (complete)  ST Segments: normal  T Ryanne Rival in  I and aVl  Q Waves: none  Clinical Impression: normal sinus rhythm with LBBB  Comparison:  When compared to EKG from 10/25/2019, LBBB appears old without any additional acute changes. RADIOLOGY:  XR KNEE RIGHT (3 VIEWS)   Final Result     No acute fracture or traumatic malalignment. XR CHEST STANDARD (2 VW)   Final Result     Bibasilar airspace opacities which may represent an inflammatory or    infectious process.               LABS:   Results for orders placed or performed during the hospital encounter of 18/19/41   Basic Metabolic Panel w/ Reflex to MG   Result Value Ref Range    Sodium 137 136 - 145 mmol/L    Potassium reflex Magnesium 4.2 3.5 - 5.1 mmol/L    Chloride 102 99 - 110 mmol/L    CO2 20 (L) 21 - 32 mmol/L    Anion Gap 15 3 - 16    Glucose 148 (H) 70 - 99 mg/dL    BUN 11 7 - 20 mg/dL    CREATININE 0.9 0.9 - 1.3 mg/dL    GFR Non-African American >60 >60    GFR African American >60 >60    Calcium 9.3 8.3 - 10.6 mg/dL   CBC Auto Differential   Result Value Ref Range    WBC 5.9 4.0 - 11.0 K/uL    RBC 4.55 4.20 - 5.90 M/uL    Hemoglobin 14.9 13.5 - 17.5 g/dL    Hematocrit 45.8 40.5 - 52.5 %    .5 (H) 80.0 - 100.0 fL    MCH 32.7 26.0 - 34.0 pg    MCHC 32.5 31.0 - 36.0 g/dL    RDW 14.3 12.4 - 15.4 %    Platelets 930 111 - 067 K/uL    MPV 8.9 5.0 - 10.5 fL    Neutrophils % 65.3 %    Lymphocytes % 26.0 %    Monocytes % 5.9 %    Eosinophils % 2.1 %    Basophils % 0.7 %    Neutrophils Absolute 3.9 1.7 - 7.7 K/uL    Lymphocytes Absolute 1.5 1.0 - 5.1 K/uL Monocytes Absolute 0.3 0.0 - 1.3 K/uL    Eosinophils Absolute 0.1 0.0 - 0.6 K/uL    Basophils Absolute 0.0 0.0 - 0.2 K/uL   Troponin   Result Value Ref Range    Troponin <0.01 <0.01 ng/mL       ED BEDSIDE ULTRASOUND:  N/a     RECENT VITALS:  BP: 129/71, Temp: 98.4 °F (36.9 °C), Pulse: 65,Resp: 18, SpO2: 98 %     Procedures     N/a     ED Course     Nursing Notes, Past Medical Hx, Past Surgical Hx, Social Hx, Allergies, and Family Hx were reviewed. The patient was given the followingmedications:  Orders Placed This Encounter   Medications    LORazepam (ATIVAN) 0.5 MG tablet     Sig: Take 1 tablet by mouth 2 times daily for 3 days. Dispense:  6 tablet     Refill:  0       CONSULTS:  None    MEDICAL DECISION MAKING / ASSESSMENT / PLAN     Jesus Alberto Vargas is a 64 y.o. male with a past medical history of CAD, seizures versus pseudoseizures, diabetes presenting after an episode of altered mental status and possible seizure. ED Course as of Mar 16 0251   Mon Mar 16, 2020   264 68-year-old male with a medical history of CAD, COPD, seizures on lamotrigine presenting from his assisted living facility for altered mental status. On initial exam, patient is hemodynamically within normal limits and in no acute distress. I am most suspicious for a seizure versus PNES as a cause of his symptoms. Patient does not appear postictal at this time. He has a nonfocal neurologic exam, I am less suspicious for ischemic stroke as a cause of his symptoms given that his initial NIH stroke scale is 0. Only other symptoms patient is reporting is some right-sided cramping chest pain, that I feel is likely related to musculoskeletal pain but we will also pursue a work-up for ACS at this time. [LF]   0102 Patient reports that he slipped when care providers attempted to stand him from his chair. He is now reporting pain over the medial aspect of his right knee, I do not note any obvious injury or deformity.   We will plan to obtain plain films of the affected joint here.    [LF]   0156 Patient's chest x-ray read as bilateral infiltrates concerning for an infectious versus inflammatory process. Given the patient has no infectious pulmonary symptoms currently and is afebrile with a normal oxygen saturation, feel this is likely more related to baseline atelectasis as opposed to an acute infectious process that would require antibiotics. This appears similar in nature when compared to chest x-ray from October 2019. Workup otherwise within normal limits such that I feel patient is stable for discharge home. I discussed return precautions with patient at length and provided him with a prescription for Ativan bridge. Still pending lamotrigine levels, which will not result during ED admission. Patient discharged home in stable condition. [LF]      ED Course User Index  [LF] Harley Bolton MD         This patient was also evaluated by the attending physician. All care plans werediscussed and agreed upon. Clinical Impression     1. Seizure-like activity (HCC)        Disposition     PATIENT REFERRED TO:  Kavya Madrid MD      DISCHARGE MEDICATIONS:  New Prescriptions    LORAZEPAM (ATIVAN) 0.5 MG TABLET    Take 1 tablet by mouth 2 times daily for 3 days.        DISPOSITION  Discharge to to home        Harley Bolton MD  Resident  03/16/20 7344 no anemia, no easy bruising, no jaundice, no swollen lymph nodes.

## 2021-09-07 PROCEDURE — 93248 EXT ECG>7D<15D REV&INTERPJ: CPT | Performed by: INTERNAL MEDICINE

## 2021-09-15 DIAGNOSIS — R55 SYNCOPE, UNSPECIFIED SYNCOPE TYPE: ICD-10-CM

## 2021-11-26 ENCOUNTER — APPOINTMENT (OUTPATIENT)
Dept: CT IMAGING | Age: 61
End: 2021-11-26
Payer: MEDICARE

## 2021-11-26 ENCOUNTER — APPOINTMENT (OUTPATIENT)
Dept: GENERAL RADIOLOGY | Age: 61
End: 2021-11-26
Payer: MEDICARE

## 2021-11-26 ENCOUNTER — HOSPITAL ENCOUNTER (EMERGENCY)
Age: 61
Discharge: HOME | End: 2021-11-26
Attending: EMERGENCY MEDICINE
Payer: MEDICARE

## 2021-11-26 VITALS
DIASTOLIC BLOOD PRESSURE: 76 MMHG | TEMPERATURE: 98.4 F | HEIGHT: 69 IN | HEART RATE: 73 BPM | RESPIRATION RATE: 20 BRPM | BODY MASS INDEX: 46.65 KG/M2 | WEIGHT: 315 LBS | OXYGEN SATURATION: 100 % | SYSTOLIC BLOOD PRESSURE: 172 MMHG
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
## 2021-11-26 LAB
ANION GAP SERPL CALCULATED.3IONS-SCNC: 11 MMOL/L (ref 3–16)
BASOPHILS ABSOLUTE: 0.1 K/UL (ref 0–0.2)
BASOPHILS RELATIVE PERCENT: 1 %
BUN BLDV-MCNC: 10 MG/DL (ref 7–20)
CALCIUM SERPL-MCNC: 8.8 MG/DL (ref 8.3–10.6)
CHLORIDE BLD-SCNC: 97 MMOL/L (ref 99–110)
CO2: 29 MMOL/L (ref 21–32)
CREAT SERPL-MCNC: 0.9 MG/DL (ref 0.9–1.3)
EOSINOPHILS ABSOLUTE: 0.1 K/UL (ref 0–0.6)
EOSINOPHILS RELATIVE PERCENT: 2.8 %
GFR AFRICAN AMERICAN: >60
GFR NON-AFRICAN AMERICAN: >60
GLUCOSE BLD-MCNC: 233 MG/DL (ref 70–99)
HCT VFR BLD CALC: 42.4 % (ref 40.5–52.5)
HEMOGLOBIN: 14.1 G/DL (ref 13.5–17.5)
LYMPHOCYTES ABSOLUTE: 1.5 K/UL (ref 1–5.1)
LYMPHOCYTES RELATIVE PERCENT: 28.9 %
MCH RBC QN AUTO: 31.8 PG (ref 26–34)
MCHC RBC AUTO-ENTMCNC: 33.2 G/DL (ref 31–36)
MCV RBC AUTO: 95.9 FL (ref 80–100)
MONOCYTES ABSOLUTE: 0.3 K/UL (ref 0–1.3)
MONOCYTES RELATIVE PERCENT: 5.9 %
NEUTROPHILS ABSOLUTE: 3.3 K/UL (ref 1.7–7.7)
NEUTROPHILS RELATIVE PERCENT: 61.4 %
PDW BLD-RTO: 13.5 % (ref 12.4–15.4)
PLATELET # BLD: 214 K/UL (ref 135–450)
PMV BLD AUTO: 8.9 FL (ref 5–10.5)
POTASSIUM REFLEX MAGNESIUM: 3.8 MMOL/L (ref 3.5–5.1)
RBC # BLD: 4.42 M/UL (ref 4.2–5.9)
SODIUM BLD-SCNC: 137 MMOL/L (ref 136–145)
WBC # BLD: 5.3 K/UL (ref 4–11)

## 2021-11-26 PROCEDURE — 70450 CT HEAD/BRAIN W/O DYE: CPT

## 2021-11-26 PROCEDURE — 36415 COLL VENOUS BLD VENIPUNCTURE: CPT

## 2021-11-26 PROCEDURE — 73030 X-RAY EXAM OF SHOULDER: CPT

## 2021-11-26 PROCEDURE — 99285 EMERGENCY DEPT VISIT HI MDM: CPT

## 2021-11-26 PROCEDURE — 73560 X-RAY EXAM OF KNEE 1 OR 2: CPT

## 2021-11-26 PROCEDURE — 73060 X-RAY EXAM OF HUMERUS: CPT

## 2021-11-26 PROCEDURE — 80048 BASIC METABOLIC PNL TOTAL CA: CPT

## 2021-11-26 PROCEDURE — 85025 COMPLETE CBC W/AUTO DIFF WBC: CPT

## 2021-11-26 ASSESSMENT — PAIN DESCRIPTION - FREQUENCY: FREQUENCY: CONTINUOUS

## 2021-11-26 ASSESSMENT — PAIN SCALES - GENERAL
PAINLEVEL_OUTOF10: 8
PAINLEVEL_OUTOF10: 9

## 2021-11-26 ASSESSMENT — PAIN DESCRIPTION - PAIN TYPE: TYPE: ACUTE PAIN

## 2021-11-26 ASSESSMENT — PAIN DESCRIPTION - DESCRIPTORS: DESCRIPTORS: ACHING

## 2021-11-26 ASSESSMENT — PAIN DESCRIPTION - LOCATION
LOCATION: HEAD
LOCATION: HEAD;SHOULDER;KNEE

## 2021-11-26 NOTE — ED PROVIDER NOTES
ED Attending Attestation Note     Date of evaluation: 11/26/2021    This patient was seen by the resident. I have seen and examined the patient, agree with the workup, evaluation, management and diagnosis. The care plan has been discussed. My assessment reveals a well-appearing gentleman in no acute distress with GCS 15, no external evidence of head trauma, no cervical spine midline tenderness, normal range of motion of his bilateral upper and lower extremities. He reports chronic knee pain.     Mechanical fall from wheelchair  Has R arm/shoulder, head and bilateral knee pain  Not on anticoagulation          Ousmane Parra MD  11/26/21 8708

## 2021-11-26 NOTE — ED PROVIDER NOTES
[id number] [facility] RESIDENT NOTE       Date of evaluation: 11/26/2021    Chief Complaint     Fall (mechanical fall hit head shoulder pain)      History of Present Illness     Elizabeth Chen is a 64 y.o. male who presented with fall. Pt experienced a mechanical fall where he lost balance and fell on the floor. He normally uses wheel chair for ambulation. He was trying to move from his wheelchair to the bed when he had the fall. Pt is complaining of pain in the back of his head, right shoulder pain, right arm pain, pain in both of his knees which all started after having the fall. Pt also experienced a fall last night but he had only little of pain in the back of head after that fall. He denied any lightheadedness/dizziness or LOC before and after both falls. Aside from the pain he has been having, he had no other complaints. Denies fever, night sweats, chills, chest pain, cough, dyspnea, palpitations, nausea, vomiting, diarrhea, dysuria. Review of Systems     Review of Systems  See above    Past Medical, Surgical, Family, and Social History     He has a past medical history of Arthritis, Asthma, Bronchitis, CAD (coronary artery disease), CHF (congestive heart failure) ([address] Chronic back pain, Diabetes mellitus ([address] Generalized headaches, Gout, Hypertension, MI, old, Morbid obesity ([address] Psychogenic nonepileptic seizure, and Seizures ([address] He has a past surgical history that includes eye surgery. His family history includes Asthma in his mother; Diabetes in his father, mother, and sister; Heart Attack in his father; Heart Disease in his father; High Blood Pressure in his father, mother, and sister; Obesity in his mother. He reports that he has quit smoking. He has never used smokeless tobacco. He reports that he does not drink alcohol and does not use drugs.     Medications     Previous Medications    ALBUTEROL SULFATE HFA (VENTOLIN HFA) 108 (90 BASE) MCG/ACT INHALER    Inhale 2 puffs into the lungs every 6 hours as needed for Wheezing    ALLOPURINOL (ZYLOPRIM) 300 MG TABLET    Take 300 mg by mouth nightly     AMLODIPINE (NORVASC) 10 MG TABLET    Take 10 mg by mouth daily     ASPIRIN 81 MG EC TABLET    Take 81 mg by mouth daily    ATORVASTATIN (LIPITOR) 80 MG TABLET    Take 80 mg by mouth nightly     CARVEDILOL (COREG) 25 MG TABLET    Take 25 mg by mouth 2 times daily (with meals)     CHLORTHALIDONE (HYGROTON) 25 MG TABLET    Take 25 mg by mouth daily     CHOLECALCIFEROL (VITAMIN D3) 1.25 MG (68339 UT) CAPS    Take 50,000 Units by mouth once a week Takes on Thursdays    DIAZEPAM (VALIUM) 5 MG TABLET    Take 5 mg by mouth every 12 hours as needed for Anxiety. FAMOTIDINE (PEPCID) 40 MG TABLET    Take 40 mg by mouth daily as needed (indigestion)     GLIPIZIDE (GLUCOTROL) 5 MG TABLET    Take 5 mg by mouth 2 times daily (before meals)    LAMOTRIGINE (LAMICTAL) 200 MG TABLET    Take 600 mg by mouth 2 times daily    LAMOTRIGINE (LAMICTAL) 200 MG TABLET    Take 200 mg by mouth daily as needed    LISINOPRIL (PRINIVIL;ZESTRIL) 40 MG TABLET    Take 40 mg by mouth nightly    LORATADINE (CLARITIN) 10 MG TABLET    Take 10 mg by mouth nightly     METFORMIN (GLUCOPHAGE) 1000 MG TABLET    Take 1,000 mg by mouth 2 times daily (with meals)     MONTELUKAST (SINGULAIR) 10 MG TABLET    Take 10 mg by mouth nightly    MULTIPLE VITAMIN (DAILY TITUS) TABS    Take 1 tablet by mouth daily    NORTRIPTYLINE (PAMELOR) 75 MG CAPSULE    Take 75 mg by mouth nightly    OXYCODONE-ACETAMINOPHEN (PERCOCET) 5-325 MG PER TABLET    Take 1 tablet by mouth every 4 hours as needed. SACUBITRIL-VALSARTAN (ENTRESTO)  MG PER TABLET    Take 1 tablet by mouth 2 times daily    SPIRONOLACTONE (ALDACTONE) 25 MG TABLET    Take 0.5 tablets by mouth daily       Allergies     He is allergic to amoxicillin, doxycycline, ibuprofen, penicillins, phenobarbital, tetracycline, and aspirin.     Physical Exam 2 VIEWS)   Final Result      No acute bony pathology identified      XR KNEE RIGHT (1-2 VIEWS)   Final Result      No acute bony pathology seen. Severe degenerative changes. Moderate joint effusion possibly related to degenerative disease. Loose bodies likely          LABS:   Results for orders placed or performed during the hospital encounter of 11/26/21   CBC auto differential   Result Value Ref Range    WBC 5.3 4.0 - 11.0 K/uL    RBC 4.42 4.20 - 5.90 M/uL    Hemoglobin 14.1 13.5 - 17.5 g/dL    Hematocrit 42.4 40.5 - 52.5 %    MCV 95.9 80.0 - 100.0 fL    MCH 31.8 26.0 - 34.0 pg    MCHC 33.2 31.0 - 36.0 g/dL    RDW 13.5 12.4 - 15.4 %    Platelets 013 585 - 538 K/uL    MPV 8.9 5.0 - 10.5 fL    Neutrophils % 61.4 %    Lymphocytes % 28.9 %    Monocytes % 5.9 %    Eosinophils % 2.8 %    Basophils % 1.0 %    Neutrophils Absolute 3.3 1.7 - 7.7 K/uL    Lymphocytes Absolute 1.5 1.0 - 5.1 K/uL    Monocytes Absolute 0.3 0.0 - 1.3 K/uL    Eosinophils Absolute 0.1 0.0 - 0.6 K/uL    Basophils Absolute 0.1 0.0 - 0.2 K/uL   Basic Metabolic Panel w/ Reflex to MG   Result Value Ref Range    Sodium 137 136 - 145 mmol/L    Potassium reflex Magnesium 3.8 3.5 - 5.1 mmol/L    Chloride 97 (L) 99 - 110 mmol/L    CO2 29 21 - 32 mmol/L    Anion Gap 11 3 - 16    Glucose 233 (H) 70 - 99 mg/dL    BUN 10 7 - 20 mg/dL    CREATININE 0.9 0.9 - 1.3 mg/dL    GFR Non-African American >60 >60    GFR African American >60 >60    Calcium 8.8 8.3 - 10.6 mg/dL       ED BEDSIDE ULTRASOUND:      RECENT VITALS:  BP: (!) 172/76, Temp: 98.4 °F (36.9 °C),Pulse: 73, Resp: 20, SpO2: 100 %     Procedures         ED Course     Nursing Notes, Past Medical Hx, Past Surgical Hx, Social Hx, Allergies, and FamilyHx were reviewed. The patient was giventhe following medications:  No orders of the defined types were placed in this encounter.       CONSULTS:  None    MEDICAL DECISION MAKING / ASSESSMENT / PLAN     Ellen Ballesteros is a 64 y.o. male with PMH of

## 2021-11-26 NOTE — ED NOTES
Patient prepared for and ready to be discharged. Patient discharged at this time in no acute distress after verbalizing understanding of discharge instructions. Patient left after receiving After Visit Summary instructions.       Olive Marley RN  11/26/21 6775

## 2021-12-10 ENCOUNTER — OFFICE VISIT (OUTPATIENT)
Dept: CARDIOLOGY CLINIC | Age: 61
End: 2021-12-10
Payer: MEDICARE

## 2021-12-10 VITALS
WEIGHT: 315 LBS | BODY MASS INDEX: 50.62 KG/M2 | HEART RATE: 71 BPM | SYSTOLIC BLOOD PRESSURE: 158 MMHG | OXYGEN SATURATION: 96 % | DIASTOLIC BLOOD PRESSURE: 76 MMHG | HEIGHT: 66 IN

## 2021-12-10 DIAGNOSIS — I50.22 CHRONIC SYSTOLIC HEART FAILURE (HCC): ICD-10-CM

## 2021-12-10 DIAGNOSIS — I10 HYPERTENSION, UNSPECIFIED TYPE: Primary | ICD-10-CM

## 2021-12-10 PROCEDURE — G8484 FLU IMMUNIZE NO ADMIN: HCPCS | Performed by: INTERNAL MEDICINE

## 2021-12-10 PROCEDURE — 1036F TOBACCO NON-USER: CPT | Performed by: INTERNAL MEDICINE

## 2021-12-10 PROCEDURE — G8417 CALC BMI ABV UP PARAM F/U: HCPCS | Performed by: INTERNAL MEDICINE

## 2021-12-10 PROCEDURE — 99214 OFFICE O/P EST MOD 30 MIN: CPT | Performed by: INTERNAL MEDICINE

## 2021-12-10 PROCEDURE — 3017F COLORECTAL CA SCREEN DOC REV: CPT | Performed by: INTERNAL MEDICINE

## 2021-12-10 PROCEDURE — G8427 DOCREV CUR MEDS BY ELIG CLIN: HCPCS | Performed by: INTERNAL MEDICINE

## 2021-12-10 RX ORDER — CARVEDILOL 12.5 MG/1
12.5 TABLET ORAL 2 TIMES DAILY WITH MEALS
Qty: 60 TABLET | Refills: 2 | Status: ON HOLD | OUTPATIENT
Start: 2021-12-10 | End: 2022-08-22 | Stop reason: CLARIF

## 2021-12-10 RX ORDER — CARVEDILOL 25 MG/1
12.5 TABLET ORAL 2 TIMES DAILY WITH MEALS
Qty: 60 TABLET | Refills: 0 | Status: SHIPPED
Start: 2021-12-10 | End: 2021-12-10

## 2021-12-10 NOTE — PATIENT INSTRUCTIONS
1. Stop taking lisinopril  2. Start taking Coreg 12.5 mg twice daily  3.  Take your BP and HR daily for the next 2 weeks and call with readings, Joanna Osborne, ph: [phone]

## 2021-12-10 NOTE — PROGRESS NOTES
Cc: HFrEF due to HTN, LBBB    HPI:     Andrea Player a 60 y. o. male with a past medical history of HFrEF due to NICMO (HTN), patient does not have an ICD/CRT-D, morbid obesity, MRDD, seizure / pseudoseizure disorder, asthma, DM.      Echo 04/2018: mo LVH, EF 95-13%, diastolic II     Nuc 64/1057: [id number] in LAD and RCA territories     Cath 04/2018: minimal plaquing, otherwise normal coronaries, EF 35%.     Echo 09/2020: Mild LVD, LVEF 25-30%, regional wall motion abnormalities, no significant valvular abnormalities, normal RV.     Nuc 01/2021: Scars in the inferior and anterior wall, LVEF 40%.     Patient was admitted at [facility] for recurrent seizure and atypical chest pains.      ECG 8/9/21: NSR 65 bpm, first-degree AV block, LAFB, LBBB (180 ms) - old.     Echo 08/2021: Normal LV, LVEF 40-45%, normal RV and valves. Labs 11/26/21: Cr 0.9, K 3.8. Patient is here for a follow up with his caregiver. He uses a motorized wheelchair. He reports /70s at home. His diet has excess salt. He is compliant with his meds but takes coreg 12.5 mg once every TTSat and takes Entresto and lisinopril. Histories     Past Medical History:   has a past medical history of Arthritis, Asthma, Bronchitis, CAD (coronary artery disease), CHF (congestive heart failure) ([address] Chronic back pain, Diabetes mellitus ([address] Generalized headaches, Gout, Hypertension, MI, old, Morbid obesity ([address] Psychogenic nonepileptic seizure, and Seizures ([address] Surgical History:   has a past surgical history that includes eye surgery. Social History:   reports that he has quit smoking. He has never used smokeless tobacco. He reports that he does not drink alcohol and does not use drugs. Family History:  No evidence for sudden cardiac death or premature CAD      Medications:     Home medications were reviewed and are listed below    Prior to Admission medications    Medication Sig Start Date End Date Taking?  Authorizing Provider carvedilol (COREG) 25 MG tablet Take 0.5 tablets by mouth 2 times daily (with meals) 1/2 a pill T, Thurs and Saturday 12/10/21  Yes Medina Rankin MD   spironolactone (ALDACTONE) 25 MG tablet Take 0.5 tablets by mouth daily 8/11/21  Yes Carlos Crockett DO   albuterol sulfate HFA (VENTOLIN HFA) 108 (90 Base) MCG/ACT inhaler Inhale 2 puffs into the lungs every 6 hours as needed for Wheezing   Yes Historical Provider, MD   lamoTRIgine (LAMICTAL) 200 MG tablet Take 200 mg by mouth daily as needed   Yes Historical Provider, MD   glipiZIDE (GLUCOTROL) 5 MG tablet Take 5 mg by mouth 2 times daily (before meals)   Yes Historical Provider, MD   Cholecalciferol (VITAMIN D3) 1.25 MG (81927 UT) CAPS Take 50,000 Units by mouth once a week Takes on Thursdays   Yes Historical Provider, MD   sacubitril-valsartan (ENTRESTO)  MG per tablet Take 1 tablet by mouth 2 times daily   Yes Historical Provider, MD   montelukast (SINGULAIR) 10 MG tablet Take 10 mg by mouth nightly   Yes Historical Provider, MD   chlorthalidone (HYGROTON) 25 MG tablet Take 25 mg by mouth daily    Yes Historical Provider, MD   nortriptyline (PAMELOR) 75 MG capsule Take 75 mg by mouth nightly   Yes Historical Provider, MD   lamoTRIgine (LAMICTAL) 200 MG tablet Take 600 mg by mouth 2 times daily   Yes Historical Provider, MD   atorvastatin (LIPITOR) 80 MG tablet Take 80 mg by mouth nightly    Yes Historical Provider, MD   metFORMIN (GLUCOPHAGE) 1000 MG tablet Take 1,000 mg by mouth 2 times daily (with meals)    Yes Historical Provider, MD   amLODIPine (NORVASC) 10 MG tablet Take 10 mg by mouth daily    Yes Historical Provider, MD   aspirin 81 MG EC tablet Take 81 mg by mouth daily   Yes Historical Provider, MD   famotidine (PEPCID) 40 MG tablet Take 40 mg by mouth daily as needed (indigestion)    Yes Historical Provider, MD   loratadine (CLARITIN) 10 MG tablet Take 10 mg by mouth nightly    Yes Historical Provider, MD   Multiple Vitamin (DAILY TITUS) TABS Take 1 tablet by mouth daily   Yes Historical Provider, MD   diazepam (VALIUM) 5 MG tablet Take 5 mg by mouth every 12 hours as needed for Anxiety. Yes Historical Provider, MD   oxyCODONE-acetaminophen (PERCOCET) 5-325 MG per tablet Take 1 tablet by mouth every 4 hours as needed. Yes Historical Provider, MD   allopurinol (ZYLOPRIM) 300 MG tablet Take 300 mg by mouth nightly    Yes Historical Provider, MD          Allergy:     Amoxicillin, Doxycycline, Ibuprofen, Penicillins, Phenobarbital, Tetracycline, and Aspirin       Review of Systems:     All 12 point review of symptoms completed. Pertinent positives identified in the HPI, all other review of symptoms negative as below. CONSTITUTIONAL: No fatigue  SKIN: No rash or pruritis. EYES: No visual changes or diplopia. No scleral icterus. ENT: No Headaches, hearing loss or vertigo. No mouth sores or sore throat. CARDIOVASCULAR: No chest pain/chest pressure/chest discomfort. No palpitations. No edema. RESPIRATORY: No dyspnea. No cough or wheezing, no sputum production. GASTROINTESTINAL: No N/V/D. No abdominal pain, appetite loss, blood in stools. GENITOURINARY: No dysuria, trouble voiding, or hematuria. MUSCULOSKELETAL:  No gait disturbance, weakness or joint complaints. NEUROLOGICAL: No headache, diplopia, change in muscle strength, numbness or tingling. No change in gait, balance, coordination, mood, affect, memory, mentation, behavior. PSHYCH: No anxiety, loss of interest, change in sexual behavior, feelings of self-harm, or confusion. ENDOCRINE: No excessive thirst, fluid intake, or urination. No tremor. HEMATOLOGIC: No abnormal bruising or bleeding. ALLERGY: No nasal congestion or hives.       Physical Examination:     Vitals:    12/10/21 1458   BP: (!) 158/76   Pulse: 71   SpO2: 96%   Weight: (!) 381 lb (172.8 kg)   Height: 5' 6\" (1.676 m)       Wt Readings from Last 3 Encounters:   12/10/21 (!) 381 lb (172.8 kg)   11/26/21 (!) 400 lb (181.4 kg)   08/09/21 (!) 407 lb (184.6 kg)         General Appearance:  Alert, cooperative, no distress, appears stated age Appropriate weight   Head:  Normocephalic, without obvious abnormality, atraumatic   Eyes:  PERRL, conjunctiva/corneas clear EOM intact  Ears normal   Throat no lesions       Nose: Nares normal, no drainage or sinus tenderness   Throat: Lips, mucosa, and tongue normal   Neck: Supple, symmetrical, trachea midline, no adenopathy, thyroid: not enlarged, symmetric, no tenderness/mass/nodules, no carotid bruit       Lungs:   Clear to auscultation bilaterally, respirations unlabored   Chest Wall:  No tenderness or deformity   Heart:  Regular rhythm, rate is controlled, S1, S2 normal, there is no murmur, there is no rub or gallop, cannot assess jvd, min bilateral lower extremity edema   Abdomen:   Soft, non-tender, bowel sounds active all four quadrants,  no masses, no organomegaly       Extremities: Extremities normal, atraumatic, no cyanosis   Pulses: 2+ and symmetric   Skin: Skin color, texture, turgor normal, no rashes or lesions   Pysch: Normal mood and affect   Neurologic: Normal gross motor and sensory exam.  Cranial nerves intact        Labs:     Lab Results   Component Value Date    WBC 5.3 11/26/2021    HGB 14.1 11/26/2021    HCT 42.4 11/26/2021    MCV 95.9 11/26/2021     11/26/2021     Lab Results   Component Value Date     11/26/2021    K 3.8 11/26/2021    CL 97 (L) 11/26/2021    CO2 29 11/26/2021    BUN 10 11/26/2021    CREATININE 0.9 11/26/2021    GLUCOSE 233 (H) 11/26/2021    CALCIUM 8.8 11/26/2021    PROT 6.8 08/09/2021    LABALBU 3.8 08/09/2021    BILITOT 0.4 08/09/2021    ALKPHOS 131 (H) 08/09/2021    AST 15 08/09/2021    ALT 18 08/09/2021    LABGLOM >60 11/26/2021    GFRAA >60 11/26/2021    AGRATIO 1.3 08/09/2021    GLOB 3.0 08/09/2021         Lab Results   Component Value Date    CHOL 117 06/02/2014    CHOL 141 12/02/2013    CHOL 129 10/25/2013     Lab Results Component Value Date    TRIG 157 (H) 06/02/2014    TRIG 216 (H) 12/02/2013    TRIG 245 (H) 10/25/2013     Lab Results   Component Value Date    HDL 38 (L) 08/19/2017    HDL 38 (L) 06/02/2014    HDL 46 12/02/2013     Lab Results   Component Value Date    LDLCALC 48 08/19/2017    LDLCALC 48 06/02/2014    LDLCALC 52 12/02/2013     Lab Results   Component Value Date    LABVLDL 42 08/19/2017    LABVLDL 31 06/02/2014    LABVLDL 43 12/02/2013     No results found for: CHOLHDLRATIO    Lab Results   Component Value Date    INR 0.96 06/26/2019    INR 0.99 04/09/2018    INR 0.94 06/01/2014    PROTIME 11.0 06/26/2019    PROTIME 11.2 04/09/2018    PROTIME 10.2 06/01/2014       The ASCVD Risk score (Danial Valentin, et al., 2013) failed to calculate for the following reasons:    Cannot find a previous HDL lab    Cannot find a previous total cholesterol lab      Assessment / Plan:      Diagnosis Orders   1. Hypertension, unspecified type     2. Chronic systolic heart failure (HCC)          1.  Atypical chest pain:  Patient symptoms were quite atypical. He was ruled out for ACS with unremarkable troponin (ECG is nondiagnostic due to LBBB). Patient has not had any recent exertional symptoms to suggest significant CAD. He had an unremarkable LHC in 2018 revealing minimal CAD. Unfortunately his nuclear stress test x2 were abnormal (falsely abnormal).    -I would not repeat a stress test at this time  -No further testing is necessary.     2.  Essential hypertension:  Patient's blood pressure is elevated.      -Continue with amlodipine 10 daily (may stop if BP too low)  - Will increase Coreg to 12.5 bid   - Cw Entresto 97/103 mg bid and stop lisinopril  - Cw chlorthalidone 25 daily and nicol 12.5 daily  - Check BP and HR x 2 weeks and call us with values for further med adjustments. - Low salt diet     3. Chronic HFrEF:   Patient is euvolemic and hemo stable. His NYHA FC is IIIA due to morbid obesity, ACC stage C.  His systolic heart failure is nonischemic in etiology (hypertension induced and LBBB). He did have evidence of possible scar formation on nuclear stress test and regional wall motion abnormalities on echo, therefore I cannot exclude infiltrative cardiomyopathy     -Adjust meds per #2.   -Will reassess LVEF once optimized on HF meds. Return in about 5 weeks (around 1/14/2022). I have spent 35 minutes of face to face time with the patient with more than 50% spent counseling and coordinating care. I have personally reviewed the reports and images of labs, radiological studies, cardiac studies including ECG's and telemetry, current and old medical records. The note was completed using EMR and Dragon dictation system. Every effort was made to ensure accuracy; however, inadvertent computerized transcription errors may be present. All questions and concerns were addressed to the patient/family. Alternatives to my treatment were discussed. I would like to thank you for providing me the opportunity to participate in the care of your patient. If you have any questions, please do not hesitate to contact me.      Nayely Ramírez MD, [facility] - [place] [address] Phone: [phone]  Heart Failure Hotline: [phone]  Fax: [phone]

## 2021-12-15 ENCOUNTER — TELEPHONE (OUTPATIENT)
Dept: CARDIOLOGY CLINIC | Age: 61
End: 2021-12-15

## 2022-02-13 ENCOUNTER — APPOINTMENT (OUTPATIENT)
Dept: CT IMAGING | Age: 62
DRG: 101 | End: 2022-02-13
Payer: MEDICARE

## 2022-02-13 ENCOUNTER — HOSPITAL ENCOUNTER (INPATIENT)
Age: 62
LOS: 2 days | Discharge: HOME | DRG: 101 | End: 2022-02-16
Attending: EMERGENCY MEDICINE | Admitting: FAMILY MEDICINE
Payer: MEDICARE

## 2022-02-13 ENCOUNTER — APPOINTMENT (OUTPATIENT)
Dept: GENERAL RADIOLOGY | Age: 62
DRG: 101 | End: 2022-02-13
Payer: MEDICARE

## 2022-02-13 DIAGNOSIS — R47.01 APHASIA: Primary | ICD-10-CM

## 2022-02-13 DIAGNOSIS — R46.89 SPELL OF ABNORMAL BEHAVIOR: ICD-10-CM

## 2022-02-13 PROCEDURE — 70450 CT HEAD/BRAIN W/O DYE: CPT

## 2022-02-13 PROCEDURE — 99282 EMERGENCY DEPT VISIT SF MDM: CPT

## 2022-02-13 PROCEDURE — 71045 X-RAY EXAM CHEST 1 VIEW: CPT

## 2022-02-14 ENCOUNTER — APPOINTMENT (OUTPATIENT)
Dept: MRI IMAGING | Age: 62
DRG: 101 | End: 2022-02-14
Payer: MEDICARE

## 2022-02-14 PROBLEM — R56.9 SEIZURE (HCC): Status: ACTIVE | Noted: 2022-02-14

## 2022-02-14 LAB
ANION GAP SERPL CALCULATED.3IONS-SCNC: 14 MMOL/L (ref 3–16)
BASOPHILS ABSOLUTE: 0 K/UL (ref 0–0.2)
BASOPHILS RELATIVE PERCENT: 0.7 %
BUN BLDV-MCNC: 16 MG/DL (ref 7–20)
CALCIUM SERPL-MCNC: 9.7 MG/DL (ref 8.3–10.6)
CHLORIDE BLD-SCNC: 97 MMOL/L (ref 99–110)
CO2: 24 MMOL/L (ref 21–32)
CREAT SERPL-MCNC: 1 MG/DL (ref 0.9–1.3)
EKG ATRIAL RATE: 89 BPM
EKG DIAGNOSIS: NORMAL
EKG P AXIS: 70 DEGREES
EKG P-R INTERVAL: 202 MS
EKG Q-T INTERVAL: 418 MS
EKG QRS DURATION: 162 MS
EKG QTC CALCULATION (BAZETT): 508 MS
EKG R AXIS: -44 DEGREES
EKG T AXIS: 118 DEGREES
EKG VENTRICULAR RATE: 89 BPM
EOSINOPHILS ABSOLUTE: 0.1 K/UL (ref 0–0.6)
EOSINOPHILS RELATIVE PERCENT: 1.6 %
GFR AFRICAN AMERICAN: >60
GFR NON-AFRICAN AMERICAN: >60
GLUCOSE BLD-MCNC: 150 MG/DL (ref 70–99)
GLUCOSE BLD-MCNC: 161 MG/DL (ref 70–99)
GLUCOSE BLD-MCNC: 178 MG/DL (ref 70–99)
GLUCOSE BLD-MCNC: 184 MG/DL (ref 70–99)
GLUCOSE BLD-MCNC: 222 MG/DL (ref 70–99)
HCT VFR BLD CALC: 44.2 % (ref 40.5–52.5)
HEMOGLOBIN: 15 G/DL (ref 13.5–17.5)
LYMPHOCYTES ABSOLUTE: 1.5 K/UL (ref 1–5.1)
LYMPHOCYTES RELATIVE PERCENT: 24.8 %
MCH RBC QN AUTO: 32.5 PG (ref 26–34)
MCHC RBC AUTO-ENTMCNC: 34 G/DL (ref 31–36)
MCV RBC AUTO: 95.6 FL (ref 80–100)
MONOCYTES ABSOLUTE: 0.3 K/UL (ref 0–1.3)
MONOCYTES RELATIVE PERCENT: 4.3 %
NEUTROPHILS ABSOLUTE: 4.2 K/UL (ref 1.7–7.7)
NEUTROPHILS RELATIVE PERCENT: 68.6 %
PDW BLD-RTO: 14.1 % (ref 12.4–15.4)
PERFORMED ON: ABNORMAL
PLATELET # BLD: 275 K/UL (ref 135–450)
PMV BLD AUTO: 8.5 FL (ref 5–10.5)
POTASSIUM SERPL-SCNC: 4.1 MMOL/L (ref 3.5–5.1)
PROLACTIN: 10.5 NG/ML
RBC # BLD: 4.62 M/UL (ref 4.2–5.9)
SODIUM BLD-SCNC: 135 MMOL/L (ref 136–145)
WBC # BLD: 6.2 K/UL (ref 4–11)

## 2022-02-14 PROCEDURE — 2580000003 HC RX 258: Performed by: FAMILY MEDICINE

## 2022-02-14 PROCEDURE — 85025 COMPLETE CBC W/AUTO DIFF WBC: CPT

## 2022-02-14 PROCEDURE — 6370000000 HC RX 637 (ALT 250 FOR IP): Performed by: INTERNAL MEDICINE

## 2022-02-14 PROCEDURE — 84146 ASSAY OF PROLACTIN: CPT

## 2022-02-14 PROCEDURE — 6370000000 HC RX 637 (ALT 250 FOR IP): Performed by: FAMILY MEDICINE

## 2022-02-14 PROCEDURE — 2060000000 HC ICU INTERMEDIATE R&B

## 2022-02-14 PROCEDURE — 36415 COLL VENOUS BLD VENIPUNCTURE: CPT

## 2022-02-14 PROCEDURE — APPNB60 APP NON BILLABLE TIME 46-60 MINS: Performed by: NURSE PRACTITIONER

## 2022-02-14 PROCEDURE — 80048 BASIC METABOLIC PNL TOTAL CA: CPT

## 2022-02-14 PROCEDURE — 2500000003 HC RX 250 WO HCPCS: Performed by: INTERNAL MEDICINE

## 2022-02-14 PROCEDURE — 6360000002 HC RX W HCPCS: Performed by: FAMILY MEDICINE

## 2022-02-14 PROCEDURE — 93005 ELECTROCARDIOGRAM TRACING: CPT | Performed by: EMERGENCY MEDICINE

## 2022-02-14 PROCEDURE — 95813 EEG EXTND MNTR 61-119 MIN: CPT

## 2022-02-14 RX ORDER — FAMOTIDINE 20 MG/1
20 TABLET, FILM COATED ORAL DAILY PRN
Status: DISCONTINUED | OUTPATIENT
Start: 2022-02-14 | End: 2022-02-16 | Stop reason: HOSPADM

## 2022-02-14 RX ORDER — SODIUM CHLORIDE 0.9 % (FLUSH) 0.9 %
5-40 SYRINGE (ML) INJECTION PRN
Status: DISCONTINUED | OUTPATIENT
Start: 2022-02-14 | End: 2022-02-16 | Stop reason: HOSPADM

## 2022-02-14 RX ORDER — GLIPIZIDE 5 MG/1
5 TABLET ORAL
Status: DISCONTINUED | OUTPATIENT
Start: 2022-02-14 | End: 2022-02-16 | Stop reason: HOSPADM

## 2022-02-14 RX ORDER — ALBUTEROL SULFATE 2.5 MG/3ML
2.5 SOLUTION RESPIRATORY (INHALATION) EVERY 4 HOURS PRN
Status: DISCONTINUED | OUTPATIENT
Start: 2022-02-14 | End: 2022-02-16 | Stop reason: HOSPADM

## 2022-02-14 RX ORDER — CHLORTHALIDONE 25 MG/1
25 TABLET ORAL DAILY
Status: DISCONTINUED | OUTPATIENT
Start: 2022-02-14 | End: 2022-02-16 | Stop reason: HOSPADM

## 2022-02-14 RX ORDER — ONDANSETRON 2 MG/ML
4 INJECTION INTRAMUSCULAR; INTRAVENOUS EVERY 6 HOURS PRN
Status: DISCONTINUED | OUTPATIENT
Start: 2022-02-14 | End: 2022-02-16 | Stop reason: HOSPADM

## 2022-02-14 RX ORDER — MULTIVITAMIN WITH IRON
1 TABLET ORAL DAILY
Status: DISCONTINUED | OUTPATIENT
Start: 2022-02-14 | End: 2022-02-16 | Stop reason: HOSPADM

## 2022-02-14 RX ORDER — DIAZEPAM 5 MG/1
5 TABLET ORAL EVERY 12 HOURS PRN
Status: DISCONTINUED | OUTPATIENT
Start: 2022-02-14 | End: 2022-02-16 | Stop reason: HOSPADM

## 2022-02-14 RX ORDER — NICOTINE POLACRILEX 4 MG
15 LOZENGE BUCCAL PRN
Status: DISCONTINUED | OUTPATIENT
Start: 2022-02-14 | End: 2022-02-16 | Stop reason: HOSPADM

## 2022-02-14 RX ORDER — MONTELUKAST SODIUM 10 MG/1
10 TABLET ORAL NIGHTLY
Status: DISCONTINUED | OUTPATIENT
Start: 2022-02-14 | End: 2022-02-16 | Stop reason: HOSPADM

## 2022-02-14 RX ORDER — NORTRIPTYLINE HYDROCHLORIDE 25 MG/1
75 CAPSULE ORAL NIGHTLY
Status: DISCONTINUED | OUTPATIENT
Start: 2022-02-14 | End: 2022-02-16 | Stop reason: HOSPADM

## 2022-02-14 RX ORDER — INSULIN LISPRO 100 [IU]/ML
0-6 INJECTION, SOLUTION INTRAVENOUS; SUBCUTANEOUS NIGHTLY
Status: DISCONTINUED | OUTPATIENT
Start: 2022-02-14 | End: 2022-02-16 | Stop reason: HOSPADM

## 2022-02-14 RX ORDER — AMLODIPINE BESYLATE 10 MG/1
10 TABLET ORAL DAILY
Status: DISCONTINUED | OUTPATIENT
Start: 2022-02-14 | End: 2022-02-16 | Stop reason: HOSPADM

## 2022-02-14 RX ORDER — CHOLECALCIFEROL (VITAMIN D3) 1250 MCG
50000 CAPSULE ORAL WEEKLY
Status: DISCONTINUED | OUTPATIENT
Start: 2022-02-14 | End: 2022-02-14

## 2022-02-14 RX ORDER — LORAZEPAM 2 MG/ML
1 INJECTION INTRAMUSCULAR EVERY 5 MIN PRN
Status: DISCONTINUED | OUTPATIENT
Start: 2022-02-14 | End: 2022-02-16 | Stop reason: HOSPADM

## 2022-02-14 RX ORDER — CLINDAMYCIN PHOSPHATE 600 MG/50ML
600 INJECTION INTRAVENOUS EVERY 8 HOURS
Status: DISCONTINUED | OUTPATIENT
Start: 2022-02-14 | End: 2022-02-16 | Stop reason: HOSPADM

## 2022-02-14 RX ORDER — LAMOTRIGINE 150 MG/1
600 TABLET ORAL 2 TIMES DAILY
Status: DISCONTINUED | OUTPATIENT
Start: 2022-02-14 | End: 2022-02-16 | Stop reason: HOSPADM

## 2022-02-14 RX ORDER — ONDANSETRON 4 MG/1
4 TABLET, ORALLY DISINTEGRATING ORAL EVERY 8 HOURS PRN
Status: DISCONTINUED | OUTPATIENT
Start: 2022-02-14 | End: 2022-02-16 | Stop reason: HOSPADM

## 2022-02-14 RX ORDER — SODIUM CHLORIDE 9 MG/ML
25 INJECTION, SOLUTION INTRAVENOUS PRN
Status: DISCONTINUED | OUTPATIENT
Start: 2022-02-14 | End: 2022-02-16 | Stop reason: HOSPADM

## 2022-02-14 RX ORDER — ACETAMINOPHEN 650 MG/1
650 SUPPOSITORY RECTAL EVERY 6 HOURS PRN
Status: DISCONTINUED | OUTPATIENT
Start: 2022-02-14 | End: 2022-02-16 | Stop reason: HOSPADM

## 2022-02-14 RX ORDER — DEXTROSE MONOHYDRATE 50 MG/ML
100 INJECTION, SOLUTION INTRAVENOUS PRN
Status: DISCONTINUED | OUTPATIENT
Start: 2022-02-14 | End: 2022-02-16 | Stop reason: HOSPADM

## 2022-02-14 RX ORDER — ASPIRIN 81 MG/1
81 TABLET ORAL DAILY
Status: DISCONTINUED | OUTPATIENT
Start: 2022-02-14 | End: 2022-02-16 | Stop reason: HOSPADM

## 2022-02-14 RX ORDER — SODIUM CHLORIDE 0.9 % (FLUSH) 0.9 %
5-40 SYRINGE (ML) INJECTION EVERY 12 HOURS SCHEDULED
Status: DISCONTINUED | OUTPATIENT
Start: 2022-02-14 | End: 2022-02-16 | Stop reason: HOSPADM

## 2022-02-14 RX ORDER — INSULIN LISPRO 100 [IU]/ML
0-12 INJECTION, SOLUTION INTRAVENOUS; SUBCUTANEOUS
Status: DISCONTINUED | OUTPATIENT
Start: 2022-02-14 | End: 2022-02-16 | Stop reason: HOSPADM

## 2022-02-14 RX ORDER — CARVEDILOL 12.5 MG/1
12.5 TABLET ORAL
Status: DISCONTINUED | OUTPATIENT
Start: 2022-02-15 | End: 2022-02-16 | Stop reason: HOSPADM

## 2022-02-14 RX ORDER — DEXTROSE MONOHYDRATE 25 G/50ML
25 INJECTION, SOLUTION INTRAVENOUS
Status: DISCONTINUED | OUTPATIENT
Start: 2022-02-14 | End: 2022-02-14 | Stop reason: CLARIF

## 2022-02-14 RX ORDER — ATORVASTATIN CALCIUM 80 MG/1
80 TABLET, FILM COATED ORAL NIGHTLY
Status: DISCONTINUED | OUTPATIENT
Start: 2022-02-14 | End: 2022-02-14

## 2022-02-14 RX ORDER — POLYETHYLENE GLYCOL 3350 17 G/17G
17 POWDER, FOR SOLUTION ORAL DAILY PRN
Status: DISCONTINUED | OUTPATIENT
Start: 2022-02-14 | End: 2022-02-16 | Stop reason: HOSPADM

## 2022-02-14 RX ORDER — OXYCODONE HYDROCHLORIDE AND ACETAMINOPHEN 5; 325 MG/1; MG/1
1 TABLET ORAL EVERY 4 HOURS PRN
Status: DISCONTINUED | OUTPATIENT
Start: 2022-02-14 | End: 2022-02-16 | Stop reason: HOSPADM

## 2022-02-14 RX ORDER — CETIRIZINE HYDROCHLORIDE 10 MG/1
10 TABLET ORAL DAILY
Status: DISCONTINUED | OUTPATIENT
Start: 2022-02-14 | End: 2022-02-16 | Stop reason: HOSPADM

## 2022-02-14 RX ORDER — ACETAMINOPHEN 325 MG/1
650 TABLET ORAL EVERY 6 HOURS PRN
Status: DISCONTINUED | OUTPATIENT
Start: 2022-02-14 | End: 2022-02-16 | Stop reason: HOSPADM

## 2022-02-14 RX ORDER — CLINDAMYCIN HYDROCHLORIDE 300 MG/1
300 CAPSULE ORAL 4 TIMES DAILY
Status: ON HOLD | COMMUNITY
End: 2022-03-09 | Stop reason: HOSPADM

## 2022-02-14 RX ORDER — SPIRONOLACTONE 25 MG/1
12.5 TABLET ORAL DAILY
Status: DISCONTINUED | OUTPATIENT
Start: 2022-02-14 | End: 2022-02-14

## 2022-02-14 RX ORDER — LORAZEPAM 2 MG/ML
4 INJECTION INTRAMUSCULAR PRN
Status: DISCONTINUED | OUTPATIENT
Start: 2022-02-14 | End: 2022-02-16 | Stop reason: HOSPADM

## 2022-02-14 RX ORDER — ALLOPURINOL 300 MG/1
300 TABLET ORAL NIGHTLY
Status: DISCONTINUED | OUTPATIENT
Start: 2022-02-14 | End: 2022-02-16 | Stop reason: HOSPADM

## 2022-02-14 RX ADMIN — INSULIN LISPRO 2 UNITS: 100 INJECTION, SOLUTION INTRAVENOUS; SUBCUTANEOUS at 23:41

## 2022-02-14 RX ADMIN — CLINDAMYCIN PHOSPHATE 600 MG: 600 INJECTION, SOLUTION INTRAVENOUS at 15:26

## 2022-02-14 RX ADMIN — SACUBITRIL AND VALSARTAN 1 TABLET: 97; 103 TABLET, FILM COATED ORAL at 12:45

## 2022-02-14 RX ADMIN — LAMOTRIGINE 600 MG: 150 TABLET ORAL at 11:05

## 2022-02-14 RX ADMIN — CHLORTHALIDONE 25 MG: 25 TABLET ORAL at 12:44

## 2022-02-14 RX ADMIN — FAMOTIDINE 20 MG: 20 TABLET ORAL at 12:45

## 2022-02-14 RX ADMIN — OXYCODONE HYDROCHLORIDE AND ACETAMINOPHEN 1 TABLET: 5; 325 TABLET ORAL at 17:10

## 2022-02-14 RX ADMIN — NORTRIPTYLINE HYDROCHLORIDE 75 MG: 25 CAPSULE ORAL at 20:59

## 2022-02-14 RX ADMIN — LAMOTRIGINE 600 MG: 150 TABLET ORAL at 20:59

## 2022-02-14 RX ADMIN — DIAZEPAM 5 MG: 5 TABLET ORAL at 18:37

## 2022-02-14 RX ADMIN — CLINDAMYCIN PHOSPHATE 600 MG: 600 INJECTION, SOLUTION INTRAVENOUS at 23:24

## 2022-02-14 RX ADMIN — SODIUM CHLORIDE, PRESERVATIVE FREE 10 ML: 5 INJECTION INTRAVENOUS at 21:00

## 2022-02-14 RX ADMIN — ENOXAPARIN SODIUM 40 MG: 100 INJECTION SUBCUTANEOUS at 12:29

## 2022-02-14 RX ADMIN — THERA TABS 1 TABLET: TAB at 12:57

## 2022-02-14 RX ADMIN — AMLODIPINE BESYLATE 10 MG: 10 TABLET ORAL at 12:44

## 2022-02-14 RX ADMIN — OXYCODONE HYDROCHLORIDE AND ACETAMINOPHEN 1 TABLET: 5; 325 TABLET ORAL at 23:24

## 2022-02-14 RX ADMIN — ASPIRIN 81 MG: 81 TABLET, COATED ORAL at 12:45

## 2022-02-14 RX ADMIN — INSULIN LISPRO 2 UNITS: 100 INJECTION, SOLUTION INTRAVENOUS; SUBCUTANEOUS at 19:11

## 2022-02-14 RX ADMIN — MONTELUKAST 10 MG: 10 TABLET, FILM COATED ORAL at 20:59

## 2022-02-14 RX ADMIN — SACUBITRIL AND VALSARTAN 1 TABLET: 97; 103 TABLET, FILM COATED ORAL at 20:59

## 2022-02-14 RX ADMIN — ALLOPURINOL 300 MG: 300 TABLET ORAL at 20:59

## 2022-02-14 RX ADMIN — CETIRIZINE HYDROCHLORIDE 10 MG: 10 TABLET, FILM COATED ORAL at 12:54

## 2022-02-14 RX ADMIN — SODIUM CHLORIDE, PRESERVATIVE FREE 10 ML: 5 INJECTION INTRAVENOUS at 12:57

## 2022-02-14 ASSESSMENT — PAIN - FUNCTIONAL ASSESSMENT: PAIN_FUNCTIONAL_ASSESSMENT: PREVENTS OR INTERFERES SOME ACTIVE ACTIVITIES AND ADLS

## 2022-02-14 ASSESSMENT — PAIN SCALES - GENERAL
PAINLEVEL_OUTOF10: 6
PAINLEVEL_OUTOF10: 6
PAINLEVEL_OUTOF10: 0

## 2022-02-14 ASSESSMENT — PAIN SCALES - PAIN ASSESSMENT IN ADVANCED DEMENTIA (PAINAD)
NEGVOCALIZATION: 0
CONSOLABILITY: 0
FACIALEXPRESSION: 0
BREATHING: 0
BODYLANGUAGE: 0
TOTALSCORE: 0

## 2022-02-14 ASSESSMENT — PAIN DESCRIPTION - ORIENTATION: ORIENTATION: LOWER

## 2022-02-14 ASSESSMENT — PAIN DESCRIPTION - PROGRESSION: CLINICAL_PROGRESSION: GRADUALLY WORSENING

## 2022-02-14 ASSESSMENT — PAIN DESCRIPTION - ONSET: ONSET: GRADUAL

## 2022-02-14 ASSESSMENT — PAIN DESCRIPTION - DESCRIPTORS: DESCRIPTORS: ACHING;DISCOMFORT

## 2022-02-14 ASSESSMENT — PAIN DESCRIPTION - FREQUENCY: FREQUENCY: CONTINUOUS

## 2022-02-14 ASSESSMENT — PAIN DESCRIPTION - LOCATION: LOCATION: BACK

## 2022-02-14 ASSESSMENT — PAIN DESCRIPTION - PAIN TYPE: TYPE: CHRONIC PAIN

## 2022-02-14 NOTE — PROGRESS NOTES
Pt admitted to room 5509 from the ED. Pt only responding to his name but, not following directions. Pt had a witness seizure that lasted about 30 sec. Pt head turned to the right along with eyes deviated to the right with lip twitching. Pt was post-ictal when he arrived to the unit as well as now. VSS with the exception of the B/P. A 4 eye was completed with the oncoming nurse. Pt placed on camera. Will continue to monitor.

## 2022-02-14 NOTE — PROGRESS NOTES
RN accompanied patient to MRI for safety. Upon arrival patient had spontaneous loss of bowel and bladder function, patient wasn't able to  respond to voice from MRI staff, RN was only able to see subtle twitching. Neurology NP notified, Charge nurse called to bring ativan  to this RN, by the time medication arrived patient was responding. Patient brought back to room and cleaned up of bowel and urine. Neurology NP placed new orders. Will continue to monitor.

## 2022-02-14 NOTE — PROGRESS NOTES
Physician Progress Note      PATIENT:               Maral Merritt  CSN #:                  [id number]  :                       1960  ADMIT DATE:       2022 11:23 PM  [address] [place] DATE:  RESPONDING  PROVIDER #:        Tammi Guzmán MD          QUERY TEXT:    Pt admitted with seizures. Pt noted to have PMH CHF and is receiving Coreg and   Norvasc. If possible, please document in progress notes and discharge summary   if you are evaluating and/or treating any of the following: The medical record reflects the following:  Risk Factors: 63 y.o. male with HTN, PMH Morbid obesity, seizures  Clinical Indicators: Per last ECHO 2021 EF 40-45%  Treatment: Norvasc, Coreg  Options provided:  -- Chronic Systolic CHF/HFrEF  -- Other - I will add my own diagnosis  -- Disagree - Not applicable / Not valid  -- Disagree - Clinically unable to determine / Unknown  -- Refer to Clinical Documentation Reviewer    PROVIDER RESPONSE TEXT:    This patient has chronic systolic CHF/HFrEF. Query created by: Kimberly Erwin on 2022 10:59 AM      QUERY TEXT:    Patient admitted with BMI 62.77. Morbid obesity only listed under PMH and   active problem list without current date. If possible, please document in   progress notes and discharge summary if you are evaluating and /or treating   any of the following: The medical record reflects the following:  Risk Factors: 63 y.o. male with HTN, seizures, PMH Morbid obesity  Clinical Indicators: BMI 62.77 kg/m2  Treatment: I&Os, wts  Options provided:  -- Morbid obesity w/BMI 62.77 kg/m2  -- Other - I will add my own diagnosis  -- Disagree - Not applicable / Not valid  -- Disagree - Clinically unable to determine / Unknown  -- Refer to Clinical Documentation Reviewer    PROVIDER RESPONSE TEXT:    This patient has morbid obesity w/BMI 62.77 kg/m2.     Query created by: Kimberly Erwin on 2022 11:03 AM      Electronically signed by:  Tammi Guzmán MD 2022 11:31 AM

## 2022-02-14 NOTE — CONSULTS
Neurology / Neurocritical Care Consult Note    Delta Puente MD is requesting this consult. Reason for Consult: Concern for seizure   Admission Chief Complaint: spell concerning for seizure    History of Present Illness     Nette Masters is a 63 y.o. y/o male with PMH significant for asthma, CAD, CHF, DM, MI, PNES, and seizures who presented from home after having an event of generalized tonic clonic movements that started one hour prior to presentation. Prior AEDs: depakote, phenobarbital. He underwent EMU monitoring in the fall of 2020 which was non-diagnostic. 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 He does not drive. He has his own apartment but his girlfriend is in the same building and checks on him frequently. History taken from chart review as patient is not verbally responding at this time.         REVIEW OF SYSTEMS:   MELL    Past Medical, Surgical, Family, and Social History   PAST MEDICAL HISTORY:  Past Medical History:   Diagnosis Date    Arthritis     Asthma     Bronchitis     CAD (coronary artery disease)     CHF (congestive heart failure) ([address]     Chronic back pain     Diabetes mellitus ([address]     Generalized headaches     Gout     Hypertension     MI, old 12/01/2013    [place]    Morbid obesity ([address]     Psychogenic nonepileptic seizure     DX [facility]- pseudo-seizures    Seizures ([address]      SURGICAL HISTORY:  Past Surgical History:   Procedure Laterality Date    EYE SURGERY       FAMILY HISTORY & SOCIAL HISTORY:  Family history non-contributory  Family History   Problem Relation Age of Onset    Asthma Mother     Obesity Mother     High Blood Pressure Mother     Diabetes Mother     Diabetes Father     Heart Disease Father     Heart Attack Father     High Blood Pressure Father     Diabetes Sister     High Blood Pressure Sister      Social History     Tobacco History     Smoking Status  Former Smoker    Smokeless Tobacco Use  Never Used Alcohol History     Alcohol Use Status  No          Drug Use     Drug Use Status  No          Sexual Activity     Sexually Active  Not Currently                Allergies & Outpatient Medications   ALLERGIES:  Allergies   Allergen Reactions    Amoxicillin     Doxycycline     Ibuprofen     Penicillins Hives    Phenobarbital     Tetracycline     Aspirin Nausea And Vomiting     Sister says he can take the baby asprin     HOME MEDICATIONS:  Current Discharge Medication List      CONTINUE these medications which have NOT CHANGED    Details   cyanocobalamin 1000 MCG tablet Take 1,000 mcg by mouth daily      clindamycin (CLEOCIN) 300 MG capsule Take 300 mg by mouth 4 times daily       carvedilol (COREG) 12.5 MG tablet Take 1 tablet by mouth 2 times daily (with meals)  Qty: 60 tablet, Refills: 2    Associated Diagnoses: Chronic systolic heart failure ([address]  Hypertension, unspecified type      albuterol sulfate HFA (VENTOLIN HFA) 108 (90 Base) MCG/ACT inhaler Inhale 2 puffs into the lungs every 6 hours as needed for Wheezing      glipiZIDE (GLUCOTROL) 5 MG tablet Take 5 mg by mouth 2 times daily (before meals)      sacubitril-valsartan (ENTRESTO)  MG per tablet Take 1 tablet by mouth 2 times daily      montelukast (SINGULAIR) 10 MG tablet Take 10 mg by mouth nightly      chlorthalidone (HYGROTON) 25 MG tablet Take 25 mg by mouth daily       nortriptyline (PAMELOR) 75 MG capsule Take 75 mg by mouth nightly      !! lamoTRIgine (LAMICTAL) 200 MG tablet Take 600 mg by mouth 2 times daily      metFORMIN (GLUCOPHAGE) 1000 MG tablet Take 1,000 mg by mouth 2 times daily (with meals)       amLODIPine (NORVASC) 10 MG tablet Take 10 mg by mouth daily       aspirin 81 MG EC tablet Take 81 mg by mouth daily      famotidine (PEPCID) 40 MG tablet Take 40 mg by mouth daily as needed (indigestion)       loratadine (CLARITIN) 10 MG tablet Take 10 mg by mouth nightly       diazepam (VALIUM) 5 MG tablet Take 5 mg by mouth every 12 hours as needed for Anxiety. oxyCODONE-acetaminophen (PERCOCET) 5-325 MG per tablet Take 1 tablet by mouth every 4 hours as needed. allopurinol (ZYLOPRIM) 300 MG tablet Take 300 mg by mouth nightly       spironolactone (ALDACTONE) 25 MG tablet Take 0.5 tablets by mouth daily  Qty: 45 tablet, Refills: 1      !! lamoTRIgine (LAMICTAL) 200 MG tablet Take 200 mg by mouth daily as needed      Cholecalciferol (VITAMIN D3) 1.25 MG (02603 UT) CAPS Take 50,000 Units by mouth once a week Takes on Thursdays      atorvastatin (LIPITOR) 80 MG tablet Take 80 mg by mouth nightly       Multiple Vitamin (DAILY TITUS) TABS Take 1 tablet by mouth daily       ! ! - Potential duplicate medications found. Please discuss with provider. Physical Exam   PHYSICAL EXAM:  Vitals:    02/14/22 0500 02/14/22 0600 02/14/22 0615 02/14/22 0715   BP: (!) 156/82 (!) 154/126  (!) 168/84   Pulse: 93 96  98   Resp: 21 18     Temp:  98.8 °F (37.1 °C)     TempSrc:  Oral     SpO2: 95% 97%     Weight:   (!) 388 lb 14.3 oz (176.4 kg)          General: Alert, no distress, well-nourished  Neurologic  Mental status: Alert. Not verbally responding. Follows commands in all 4 extremities     Cranial nerves:   CN2: Visual fields full w/o extinction on confrontational testing CN 3,4,6: Pupils equal and reactive to light, extraocular muscles intact  CN5: Facial sensation symmetric   CN7: Face symmetric  CN8: Hearing symmetric to spoken voice  CN9: Palate elevated symmetrically  CN11: Traps full strength on shoulder shrug  CN12: Tongue midline with protrusion    Motor Exam:  Strong and symmetric bilaterally throughout       OTHER SYSTEMS:  Cardiovascular: Warm, appears well perfused   Respiratory: Easy, non-labored respiratory pattern   Abdominal: Abdomen is without distention   Extremities: Upper and lower extremities are atraumatic in appearance without deformity. No swelling or erythema.        Diagnostic Testing Results   IMAGES:  Images personally reviewed and agree w/ radiology interpretation. Head CT w/o Contrast:   No acute intracranial hemorrhage or mass effect. Mild atrophy. LABS:  All results below personally reviewed. Pertinent positives & negatives are addressed in Impression & Recommendations below. LABS   Metabolic Panel Recent Labs     02/14/22  0156   *   K 4.1   CL 97*   CO2 24   BUN 16   CREATININE 1.0   GLUCOSE 178*   CALCIUM 9.7      CBC / Coags Recent Labs     02/14/22  0156   WBC 6.2   RBC 4.62   HGB 15.0   HCT 44.2         Other No results for input(s): LABA1C, LDLCALC, TRIG, TSH, [id number], FOLATE, LABSALI, COVID19 in the last 72 hours. No results for input(s): PHENYTOIN, KEPPRA, LACOSA, LAMO, VALPROATE, LACTSEPSIS, LACTA in the last 72 hours. CURRENT SCHEDULED MEDICATIONS   Inpatient Medications     allopurinol, 300 mg, Oral, Nightly    [Held by provider] metFORMIN, 1,000 mg, Oral, BID WC    amLODIPine, 10 mg, Oral, Daily    aspirin, 81 mg, Oral, Daily    cetirizine, 10 mg, Oral, Daily    multivitamin, 1 tablet, Oral, Daily    nortriptyline, 75 mg, Oral, Nightly    lamoTRIgine, 600 mg, Oral, BID    chlorthalidone, 25 mg, Oral, Daily    [Held by provider] glipiZIDE, 5 mg, Oral, BID AC    sacubitril-valsartan, 1 tablet, Oral, BID    montelukast, 10 mg, Oral, Nightly    spironolactone, 12.5 mg, Oral, Daily    [START ON 2/15/2022] carvedilol, 12.5 mg, Oral, Once per day on Tue Thu Sat    sodium chloride flush, 5-40 mL, IntraVENous, 2 times per day    enoxaparin, 40 mg, SubCUTAneous, Daily    insulin lispro, 0-12 Units, SubCUTAneous, TID WC    insulin lispro, 0-6 Units, SubCUTAneous, Nightly   Infusions    sodium chloride      dextrose        Antibiotics   Recent Abx Admin      No antibiotic orders with administrations found. IMPRESSION & RECOMMENDATIONS     IMPRESSION: 63year old man with seizures and psychogenic non-epileptic spells.   Will hook up to cvEEG for differential diagnosis of spells. Spell semiology 1: staring with repeated licking of upper lip.   15 seconds  Spell semiology 2: generalized shaking      RECOMMENDATIONS:  -Continue home Lamictal 600mg PO BID  -cvEEG   -if electrographic seizures on cvEEG, add keppra 500mg IV BID  -Seizure precautions   -Q4H vital signs and neuro checks    EDUARDA LINCOLN - CNP   Neurology & Neurocritical Care   Neurology Line: [phone]  [employer]: [facility] NPs  2/14/2022 12:34 PM

## 2022-02-14 NOTE — PROGRESS NOTES
CONTINUOUS EEG    Name:  Louisa Boland Record Number:  [id number]  Age: 63 y.o. Gender: male  : 1960  Today's Date:  2022  Room:  5509/5509-01  Vital Signs   BP (!) 168/84   Pulse 98   Temp 98.8 °F (37.1 °C) (Oral)   Resp 18   Wt (!) 388 lb 14.3 oz (176.4 kg)   SpO2 97%   BMI 62.77 kg/m²           Continuous EEG Testing Start Time:  1144    Continuous EEG Testing End Time:      Comments: Taiwo sandoval [facility] contacted 1157 to begin monitoring. Impedence on all leads are within normal limits. Plan of Care: Begin monitoring.     Electronically signed by Brannon Cervantes on 2022 at 12:03 PM

## 2022-02-14 NOTE — RT PROTOCOL NOTE
RT Inhaler-Nebulizer Bronchodilator Protocol Note    There is a bronchodilator order in the chart from a provider indicating to follow the RT Bronchodilator Protocol and there is an Initiate RT Inhaler-Nebulizer Bronchodilator Protocol order as well (see protocol at bottom of note). CXR Findings:  XR CHEST PORTABLE    Result Date: 2/14/2022  No acute findings in the chest.        The findings from the last RT Protocol Assessment were as follows:   History Pulmonary Disease: Smoker 15 pack years or more  Respiratory Pattern: Regular pattern and RR 12-20 bpm  Breath Sounds: Slightly diminished and/or crackles  Cough: Strong, spontaneous, non-productive  Indication for Bronchodilator Therapy: On home bronchodilators  Bronchodilator Assessment Score: 3    Aerosolized bronchodilator medication orders have been revised according to the RT Inhaler-Nebulizer Bronchodilator Protocol below. Respiratory Therapist to perform RT Therapy Protocol Assessment initially then follow the protocol. Repeat RT Therapy Protocol Assessment PRN for score 0-3 or on second treatment, BID, and PRN for scores above 3. No Indications - adjust the frequency to every 6 hours PRN wheezing or bronchospasm, if no treatments needed after 48 hours then discontinue using Per Protocol order mode. If indication present, adjust the RT bronchodilator orders based on the Bronchodilator Assessment Score as indicated below. Use Inhaler orders unless patient has one or more of the following: on home nebulizer, not able to hold breath for 10 seconds, is not alert and oriented, cannot activate and use MDI correctly, or respiratory rate 25 breaths per minute or more, then use the equivalent nebulizer order(s) with same Frequency and PRN reasons based on the score. If a patient is on this medication at home then do not decrease Frequency below that used at home.     0-3 - enter or revise RT bronchodilator order(s) to equivalent RT Bronchodilator order with Frequency of every 4 hours PRN for wheezing or increased work of breathing using Per Protocol order mode. 4-6 - enter or revise RT Bronchodilator order(s) to two equivalent RT bronchodilator orders with one order with BID Frequency and one order with Frequency of every 4 hours PRN wheezing or increased work of breathing using Per Protocol order mode. 7-10 - enter or revise RT Bronchodilator order(s) to two equivalent RT bronchodilator orders with one order with TID Frequency and one order with Frequency of every 4 hours PRN wheezing or increased work of breathing using Per Protocol order mode. 11-13 - enter or revise RT Bronchodilator order(s) to one equivalent RT bronchodilator order with QID Frequency and an Albuterol order with Frequency of every 4 hours PRN wheezing or increased work of breathing using Per Protocol order mode. Greater than 13 - enter or revise RT Bronchodilator order(s) to one equivalent RT bronchodilator order with every 4 hours Frequency and an Albuterol order with Frequency of every 2 hours PRN wheezing or increased work of breathing using Per Protocol order mode. RT to enter RT Home Evaluation for COPD & MDI Assessment order using Per Protocol order mode.     Electronically signed by Trinity Nguyễn RCP on 2/14/2022 at 8:30 AM

## 2022-02-14 NOTE — PROGRESS NOTES
Patient in 67. Patient not speaking at this time but looks at nurse when name is called. Does not follow commands at this time. Specialty mattress ordered. Will continue to monitor.      Vitals:    02/14/22 0715   BP: (!) 168/84   Pulse: 98   Resp:    Temp:    SpO2:

## 2022-02-14 NOTE — PROGRESS NOTES
4 Eyes Admission Assessment     I agree as the admission nurse that 2 RN's have performed a thorough Head to Toe Skin Assessment on the patient. ALL assessment sites listed below have been assessed on admission. Areas assessed by both nurses:  [x]   Head, Face, and Ears   [x]   Shoulders, Back, and Chest  [x]   Arms, Elbows, and Hands   [x]   Coccyx, Sacrum, and Ischium Blanchable redness on buttocks, small bruise possible on buttocks  [x]   Legs, Feet, and Heels        Does the Patient have Skin Breakdown?   No         James Prevention initiated:  Yes   Wound Care Orders initiated:  No      [facility] nurse consulted for Pressure Injury (Stage 3,4, Unstageable, DTI, NWPT, and Complex wounds) or James score 18 or lower:  No      Nurse 1 eSignature: Electronically signed by Colletta Snider, RN on 2/14/22 at 7:48 AM EST    **SHARE this note so that the co-signing nurse is able to place an eSignature**    Nurse 2 eSignature: Electronically signed by Roxy Jensen RN on 2/14/22 at 8:02 AM EST

## 2022-02-14 NOTE — PROGRESS NOTES
Patient is now following simple nurse commands. Stated name and that he is in the hospital. Denies pain at this time. Will continue to monitor. Patient has been taking antibiotics for left ring finger infection. Pharmacy verified medication and added to home med list. Attending aware.      Vitals:    02/14/22 1230   BP: 135/84   Pulse: 92   Resp: 18   Temp: 98.1 °F (36.7 °C)   SpO2: 93%

## 2022-02-14 NOTE — ED PROVIDER NOTES
[address] ENCOUNTER          ATTENDING PHYSICIAN NOTE       Date of evaluation: 2/13/2022    Chief Complaint     Seizures (had some \"seizure-like\" activity per girlfriend. Per EMS patient was slightly confused when they arrived and became more alert over time. )      History of Present Illness     Conchis Richardson is a 63 y.o. male who presents to the emergency department after a possible seizure. The patient has a known history of a seizure disorder. He is on Lamictal according to her chart review. EMS reports that they were called for seizure-like activity. On their arrival the patient was confused and did not really participate in the history. He was awake. They took history from significant other on scene who noted that patient had reportedly had seizure activity. The patient had a slight improvement of his mental status during the time of transport. With the patient's significant other who notes that the patient has had some generalized weakness and been off generally throughout the course of the day he was walking to the bathroom and got up and became very shaky and started to go down to the ground she attempted stop him and asked when she called 911. Patient does usually get around the house in apartment with assistance of a motorized scooter. Patient also is noted to have spells and episodes like this from time to time when Carlos Baig gets out of his schedule\"    Review of Systems     As documented in the HPI, otherwise all other systems were reviewed and were negative. Past Medical, Surgical, Family, and Social History     He has a past medical history of Arthritis, Asthma, Bronchitis, CAD (coronary artery disease), CHF (congestive heart failure) ([address] Chronic back pain, Diabetes mellitus ([address] Generalized headaches, Gout, Hypertension, MI, old, Morbid obesity ([address] Psychogenic nonepileptic seizure, and Seizures ([address]   He has a past surgical history that includes eye surgery. His family history includes Asthma in his mother; Diabetes in his father, mother, and sister; Heart Attack in his father; Heart Disease in his father; High Blood Pressure in his father, mother, and sister; Obesity in his mother. He reports that he has quit smoking. He has never used smokeless tobacco. He reports that he does not drink alcohol and does not use drugs. Medications     Previous Medications    ALBUTEROL SULFATE HFA (VENTOLIN HFA) 108 (90 BASE) MCG/ACT INHALER    Inhale 2 puffs into the lungs every 6 hours as needed for Wheezing    ALLOPURINOL (ZYLOPRIM) 300 MG TABLET    Take 300 mg by mouth nightly     AMLODIPINE (NORVASC) 10 MG TABLET    Take 10 mg by mouth daily     ASPIRIN 81 MG EC TABLET    Take 81 mg by mouth daily    ATORVASTATIN (LIPITOR) 80 MG TABLET    Take 80 mg by mouth nightly     CARVEDILOL (COREG) 12.5 MG TABLET    Take 1 tablet by mouth 2 times daily (with meals)    CHLORTHALIDONE (HYGROTON) 25 MG TABLET    Take 25 mg by mouth daily     CHOLECALCIFEROL (VITAMIN D3) 1.25 MG (44361 UT) CAPS    Take 50,000 Units by mouth once a week Takes on Thursdays    DIAZEPAM (VALIUM) 5 MG TABLET    Take 5 mg by mouth every 12 hours as needed for Anxiety.      FAMOTIDINE (PEPCID) 40 MG TABLET    Take 40 mg by mouth daily as needed (indigestion)     GLIPIZIDE (GLUCOTROL) 5 MG TABLET    Take 5 mg by mouth 2 times daily (before meals)    LAMOTRIGINE (LAMICTAL) 200 MG TABLET    Take 600 mg by mouth 2 times daily    LAMOTRIGINE (LAMICTAL) 200 MG TABLET    Take 200 mg by mouth daily as needed    LORATADINE (CLARITIN) 10 MG TABLET    Take 10 mg by mouth nightly     METFORMIN (GLUCOPHAGE) 1000 MG TABLET    Take 1,000 mg by mouth 2 times daily (with meals)     MONTELUKAST (SINGULAIR) 10 MG TABLET    Take 10 mg by mouth nightly    MULTIPLE VITAMIN (DAILY TITUS) TABS    Take 1 tablet by mouth daily    NORTRIPTYLINE (PAMELOR) 75 MG CAPSULE    Take 75 mg by mouth nightly    OXYCODONE-ACETAMINOPHEN (PERCOCET) 5-325 MG PER TABLET    Take 1 tablet by mouth every 4 hours as needed. SACUBITRIL-VALSARTAN (ENTRESTO)  MG PER TABLET    Take 1 tablet by mouth 2 times daily    SPIRONOLACTONE (ALDACTONE) 25 MG TABLET    Take 0.5 tablets by mouth daily       Allergies     He is allergic to amoxicillin, doxycycline, ibuprofen, penicillins, phenobarbital, tetracycline, and aspirin.     Physical Exam     INITIAL VITALS: BP: (!) 159/72, Temp: 98.6 °F (37 °C), Pulse: 88, Resp: 18, SpO2: 98 %   General: 53-year-old male sitting in bed no apparent cardiorespiratory distress  HEENT:  head is atraumatic, pupils equal round and reactive to light, sclera are clear, oropharynx is nonerythematous  Neck: supple, no lymphadenopathy  Chest: nonlabored respirations, equal chest rise bilaterally, no accessory muscle use, clear to auscultation bilaterally  Cardiovascular: Regular, rate, and rhythm, 2+ radial pulses bilaterally, capillary refill 2 seconds  Abdominal: Soft, nontender, nondistended, positive bowel sounds throughout, no rebound or guarding  Skin: Warm, dry well perfused, no rashes  Musculoskeletal: no obvious deformities, no tenderness to palpation diffusely  Neurologic: Patient is awake and alert will regard to loud verbal stimuli will answer brief questions, moves all 4 extremities equally sensation intact light touch in all 4 extremities equally, cranial nerves II through XII intact, the patient has an expressive aphasia is able to answer questions occasionally to 1 or 2 word responses    Diagnostic Results     EKG   Left axis deviation left bundle branch block sinus rhythm prolonged QRS at 162 ms KS about 202 ms QTC of 508 ms no evidence of any significant ST segment changes that would be consistent with active ischemia comparison to a prior EKG that was done on 8/9/2021 the left bundle branch block is old    RADIOLOGY:  XR CHEST PORTABLE   Final Result   No acute findings in the chest.          CT HEAD WO CONTRAST Final Result      No acute intracranial hemorrhage or mass effect. Mild atrophy. LABS:   Results for orders placed or performed during the hospital encounter of 93/61/38   Basic Metabolic Panel   Result Value Ref Range    Sodium 135 (L) 136 - 145 mmol/L    Potassium 4.1 3.5 - 5.1 mmol/L    Chloride 97 (L) 99 - 110 mmol/L    CO2 24 21 - 32 mmol/L    Anion Gap 14 3 - 16    Glucose 178 (H) 70 - 99 mg/dL    BUN 16 7 - 20 mg/dL    CREATININE 1.0 0.9 - 1.3 mg/dL    GFR Non-African American >60 >60    GFR African American >60 >60    Calcium 9.7 8.3 - 10.6 mg/dL   CBC Auto Differential   Result Value Ref Range    WBC 6.2 4.0 - 11.0 K/uL    RBC 4.62 4.20 - 5.90 M/uL    Hemoglobin 15.0 13.5 - 17.5 g/dL    Hematocrit 44.2 40.5 - 52.5 %    MCV 95.6 80.0 - 100.0 fL    MCH 32.5 26.0 - 34.0 pg    MCHC 34.0 31.0 - 36.0 g/dL    RDW 14.1 12.4 - 15.4 %    Platelets 208 396 - 985 K/uL    MPV 8.5 5.0 - 10.5 fL    Neutrophils % 68.6 %    Lymphocytes % 24.8 %    Monocytes % 4.3 %    Eosinophils % 1.6 %    Basophils % 0.7 %    Neutrophils Absolute 4.2 1.7 - 7.7 K/uL    Lymphocytes Absolute 1.5 1.0 - 5.1 K/uL    Monocytes Absolute 0.3 0.0 - 1.3 K/uL    Eosinophils Absolute 0.1 0.0 - 0.6 K/uL    Basophils Absolute 0.0 0.0 - 0.2 K/uL         RECENT VITALS:  BP: (!) 153/76, Temp: 98.6 °F (37 °C), Pulse: 88, Resp: 18, SpO2: 97 %       ED Course     Nursing Notes, Past Medical Hx, Past Surgical Hx, Social Hx, Allergies, and Family Hx were reviewed. The patient was given the following medications:  No orders of the defined types were placed in this encounter. CONSULTS:  IP CONSULT TO HOSPITALIST  IP CONSULT TO HOSPITALIST    MEDICAL DECISION MAKING / ASSESSMENT / Aleksandra Teresa is a 63 y.o. male who presented to the emergency department with the complaint of a possible seizure. Patient has a known history of seizures is on Lamictal also has a history of psychogenic nonepileptiform seizure-like activity. According to the significant other patient had an episode earlier today where he had some nondescript shaking type episode and fell down to the ground. The significant other also noted that the patient had been somewhat confused and off throughout the course of the day today with some occasional trouble speaking. On physical examination had no outward signs of any trauma. Initial assessment the patient was found to have significant difficulty in participating with the examination appeared to be somewhat confused and ultimately was found to likely have significant aphasia. Review of the patient's records shows that he has had similar deficits with prior seizures so concern at this time is for a prolonged postictal period,  Omnroe's paralysis type picture, or potential new stroke. Given the fact that the patient had some symptoms of speech difficulty earlier in the day according to family and the fact that the patient has had a similar presentation with postictal periods in the past I did not feels if the patient was a candidate for TPA. He did have a CT of the head which showed no intracranial hemorrhage or mass-effect CBC and a basic metabolic profile that were without any significant abnormalities and chest x-ray with no acute cardiopulmonary disease. Clinical Impression     1. Aphasia    2. Spell of abnormal behavior        Disposition     PATIENT REFERRED TO:  No follow-up provider specified.     DISCHARGE MEDICATIONS:  New Prescriptions    No medications on file       DISPOSITION Decision To Admit 02/14/2022 03:26:42 AM         Tommy Villalobos MD  02/14/22 5812

## 2022-02-14 NOTE — PROGRESS NOTES
Patient calls out to nurse for needs, including warm blankets and that he spilled his water. Answering orientation questions, to person and place, not aware of times and situation. Complained of right knee pain (baseline, gave percocet, per [facility]). Tolerating diet and drinking well. Continuous EEG in place. Will continue to monitor.    Vitals:    02/14/22 1715   BP: 131/86   Pulse: 93   Resp: 18   Temp: 97.9 °F (36.6 °C)   SpO2: 98%

## 2022-02-14 NOTE — PROGRESS NOTES
Hospitalist Progress Note      PCP: Kimberli Munoz    Date of Admission: 2/13/2022    CC seizure. Hospital course  Patient is 61-year-old gentleman with history of  Nonischemic cardiomyopathy, hypertension, MRDD, seizure, diabetes mellitus type 2 came into ER for confusion. Subjective  Patient seen and examined today. Confused. Open up eyes. Show me thumbs up. ROS limited. Assessment plan  #Acute metabolic encephalopathy concern for seizures. #Seizures. On continuous EEG. Seizure prophylaxis  Continue home Vimpat. Neurology on board. On Lamictal 600 mg twice daily. #Nonischemic cardiomyopathy  #Heart failure with reduced EF not in exacerbation. Continue aspirin, amlodipine, chlorthalidone, Entresto    #Diabetes mellitus type 2  Hold Metformin, glipizide  Contention sliding scale. History of MRDD    Patient is a recent incision and drainage of hand at outside facility. no cultures availabel to review. Will start on clindamycin for now. Continue to monitor.         Medications:  Reviewed    Infusion Medications    sodium chloride      dextrose       Scheduled Medications    allopurinol  300 mg Oral Nightly    [Held by provider] metFORMIN  1,000 mg Oral BID     amLODIPine  10 mg Oral Daily    aspirin  81 mg Oral Daily    cetirizine  10 mg Oral Daily    multivitamin  1 tablet Oral Daily    nortriptyline  75 mg Oral Nightly    lamoTRIgine  600 mg Oral BID    chlorthalidone  25 mg Oral Daily    [Held by provider] glipiZIDE  5 mg Oral BID AC    sacubitril-valsartan  1 tablet Oral BID    montelukast  10 mg Oral Nightly    [START ON 2/15/2022] carvedilol  12.5 mg Oral Once per day on Tue Thu Sat    sodium chloride flush  5-40 mL IntraVENous 2 times per day    enoxaparin  40 mg SubCUTAneous Daily    insulin lispro  0-12 Units SubCUTAneous TID     insulin lispro  0-6 Units SubCUTAneous Nightly    clindamycin (CLEOCIN) IV  600 mg IntraVENous Q8H     PRN Meds: oxyCODONE-acetaminophen, diazePAM, famotidine, albuterol, sodium chloride flush, sodium chloride, LORazepam, ondansetron **OR** ondansetron, polyethylene glycol, acetaminophen **OR** acetaminophen, thiamine (VITAMIN B1) IVPB, LORazepam, dextrose bolus (hypoglycemia), glucose, glucagon (rDNA), dextrose, dextrose bolus (hypoglycemia) **OR** dextrose bolus (hypoglycemia)    No intake or output data in the 24 hours ending 02/14/22 1506    Physical Exam Performed:    /84   Pulse 92   Temp 98.1 °F (36.7 °C) (Oral)   Resp 18   Wt (!) 388 lb 14.3 oz (176.4 kg)   SpO2 93%   BMI 62.77 kg/m²     General appearance: Elderly, ill looking. HEENT: Pupils equal, round, and reactive to light. Conjunctivae/corneas clear. Neck: Supple, with full range of motion. No jugular venous distention. Trachea midline. Respiratory:  Normal respiratory effort. Clear to auscultation, bilaterally without Rales/Wheezes/Rhonchi. Cardiovascular: Regular rate and rhythm with normal S1/S2 without murmurs, rubs or gallops. Abdomen: Soft, non-tender, non-distended with normal bowel sounds. Musculoskeletal: No clubbing, cyanosis or edema bilaterally. Neurologic: Opens eyes. Follows some command by grasping hand. Exam limited due to clinical condition  Psychiatric: Exam limited due to clinical condition. Capillary Refill: Brisk,< 3 seconds   Peripheral Pulses: +2 palpable, equal bilaterally       Labs:   Recent Labs     02/14/22  0156   WBC 6.2   HGB 15.0   HCT 44.2        Recent Labs     02/14/22  0156   *   K 4.1   CL 97*   CO2 24   BUN 16   CREATININE 1.0   CALCIUM 9.7     No results for input(s): AST, ALT, BILIDIR, BILITOT, ALKPHOS in the last 72 hours. No results for input(s): INR in the last 72 hours. No results for input(s): Darvin Killings in the last 72 hours.     Urinalysis:      Lab Results   Component Value Date    NITRU Negative 03/17/2020    WBCUA 0-2 03/17/2020    BACTERIA 2+ 12/10/2018    RBCUA 0-2 03/17/2020    BLOODU Negative 03/17/2020    SPECGRAV >=1.030 03/17/2020    GLUCOSEU 250 03/17/2020       Radiology:  XR CHEST PORTABLE   Final Result   No acute findings in the chest.          CT HEAD WO CONTRAST   Final Result      No acute intracranial hemorrhage or mass effect. Mild atrophy. Assessment/Plan:    Active Hospital Problems    Diagnosis Date Noted    Seizure [facility]) [R56.9] 02/14/2022         DVT Prophylaxis: Lovenox  Diet: ADULT DIET; Regular  Code Status: Full Code    PT/OT Eval Status: Once more stable. Dispo -inpatient. Pending clinical course.     Fei Perea MD     This chart was likely completed using voice recognition technology and may contain unintended grammatical , phraseology,and/or punctuation errors

## 2022-02-14 NOTE — CARE COORDINATION
Case Management Assessment           Initial Evaluation                Date / Time of Evaluation: 2/14/2022 4:03 PM                 Assessment Completed by: Arnold Glasgow RN    Patient Name: Adrian Sharp     YOB: 1960  Diagnosis: Aphasia [R47.01]  Seizure ([address] [R56.9]  Spell of abnormal behavior [R46.89]     Date / Time: 2/13/2022 11:23 PM    Patient Admission Status: Inpatient    If patient is discharged prior to next notation, then this note serves as note for discharge by case management. Current PCP: Mallika Schuler Patient: No    Chart Reviewed: Yes  Patient/ Family Interviewed: Yes    Initial assessment completed at bedside with: patient    Hospitalization in the last 30 days: No    Emergency Contacts:  Extended Emergency Contact Information  Primary Emergency Contact: Lisa Albright  Address: Becka Sommer, [address] [place] [address] Phone: [phone]  Relation: Brother/Sister  Secondary Emergency Contact: Whit Yeung  Address: Becka Sommer, [address] [place] [address] Phone: [phone]  Relation: Brother/Sister    Advance Directives:   Code Status: Full Code        Financial  Payor: Jasmyn Iqbal / Plan: MEDICARE PART A AND B / Product Type: *No Product type* /     Pre-cert required for SNF: No    Pharmacy    [id number] [facility] - P [phone] - F [phone]  [address] [place] [id number]  Phone: [phone] Fax: [phone]      Potential assistance Purchasing Medications:    Does Patient want to participate in local refill/ meds to beds program?:      Meds To Beds General Rules:  1. Can ONLY be done Monday- Friday between 8:30am-5pm  2. Prescription(s) must be in pharmacy by 3pm to be filled same day  3. Copy of patient's insurance/ prescription drug card and patient face sheet must be sent along with the prescription(s)  4.  Cost of Rx cannot be added to the discharge plan Yes    Freedom of choice list was provided with basic dialogue that supports the patient's individualized plan of care/goals and shares the quality data associated with the providers.  Yes    Care Transition patient: No    Cristobal Mehta RN  [facility]  Case Management Department  Ph: [phone]

## 2022-02-14 NOTE — H&P
[facility] ([facility]) Internal Medicine History and Physical    Chief Complaint   Patient presents with    Seizures     had some \"seizure-like\" activity per girlfriend. Per EMS patient was slightly confused when they arrived and became more alert over time. HPI:  63 y.o. yo male with history of  has a past medical history of Arthritis, Asthma, Bronchitis, CAD (coronary artery disease), CHF (congestive heart failure) ([address] Chronic back pain, Diabetes mellitus ([address] Generalized headaches, Gout, Hypertension, MI, old, Morbid obesity ([address] Psychogenic nonepileptic seizure, and Seizures ([address] he presents to [facility] ed  From home after significant other noticed him having tonic clonic movements that began 1hr prior to arrival lasting several minutes, no inciting event, exacerbating or relieving factors. Since then he has been persistently confused and intermittently nonverbal. In the ed head ct is normal. No glaring electrolyte abnormalities. Will admit for seizures. And for neuro to evaluate.      ROS:  Unable to perform 2/2 pt's condition    Past Medical History:  Past Medical History:   Diagnosis Date    Arthritis     Asthma     Bronchitis     CAD (coronary artery disease)     [facility]congest[facility] failure) ([facility])     Chronic back pain     Diabetes mellitus ([address]     Generalized headaches     Gout     Hypertension     MI, old 12/01/2013    Nstemi    Morbid obesity ([address]     Psychogenic nonepileptic seizure     DX at [facility]- pseudo-seizures    Seizures ([address]          Surgical History:  Social History     Socioeconomic History    Marital status: Single     Spouse name: Not on file    Number of children: Not on file    Years of education: Not on file    Highest education level: Not on file   Occupational History    Not on file   Tobacco Use    Smoking status: Former Smoker    Smokeless tobacco: Never Used   Substance and Sexual Activity    Alcohol use: No    Drug use: No    Sexual activity: Not Currently   Other Topics Concern    Not on file   Social History Narrative    Not on file     Social Determinants of Health     Financial Resource Strain:     Difficulty of Paying Living Expenses: Not on file   Food Insecurity:     Worried About Running Out of Food in the Last Year: Not on file    Mary of Food in the Last Year: Not on file   Transportation Needs:     Lack of Transportation (Medical): Not on file    Lack of Transportation (Non-Medical): Not on file   Physical Activity:     Days of Exercise per Week: Not on file    Minutes of Exercise per Session: Not on file   Stress:     Feeling of Stress : Not on file   Social Connections:     Frequency of Communication with Friends and Family: Not on file    Frequency of Social Gatherings with Friends and Family: Not on file    Attends Anabaptist Services: Not on file    Active Member of [address] [place] [address] [employer] or Organizations: Not on file    Attends Club or Organization Meetings: Not on file    Marital Status: Not on file   Intimate Partner Violence:     Fear of Current or Ex-Partner: Not on file    Emotionally Abused: Not on file    Physically Abused: Not on file    Sexually Abused: Not on file   Housing Stability:     Unable to Pay for Housing in the Last Year: Not on file    Number of [place] in the Last Year: Not on file    Unstable Housing in the Last Year: Not on file         Family History:  Family History   Problem Relation Age of Onset    Asthma Mother     Obesity Mother     High Blood Pressure Mother     Diabetes Mother     Diabetes Father     Heart Disease Father     Heart Attack Father     High Blood Pressure Father     Diabetes Sister     High Blood Pressure Sister        Home Meds:  No current facility-administered medications on file prior to encounter.      Current Outpatient Medications on File Prior to Encounter   Medication Sig Dispense Refill    carvedilol (COREG) 12.5 MG tablet Take 1 tablet by mouth 2 times daily (with meals) 60 tablet 2    spironolactone (ALDACTONE) 25 MG tablet Take 0.5 tablets by mouth daily 45 tablet 1    albuterol sulfate HFA (VENTOLIN HFA) 108 (90 Base) MCG/ACT inhaler Inhale 2 puffs into the lungs every 6 hours as needed for Wheezing      lamoTRIgine (LAMICTAL) 200 MG tablet Take 200 mg by mouth daily as needed      glipiZIDE (GLUCOTROL) 5 MG tablet Take 5 mg by mouth 2 times daily (before meals)      Cholecalciferol (VITAMIN D3) 1.25 MG (67469 UT) CAPS Take 50,000 Units by mouth once a week Takes on Thursdays      sacubitril-valsartan (ENTRESTO)  MG per tablet Take 1 tablet by mouth 2 times daily      montelukast (SINGULAIR) 10 MG tablet Take 10 mg by mouth nightly      chlorthalidone (HYGROTON) 25 MG tablet Take 25 mg by mouth daily       nortriptyline (PAMELOR) 75 MG capsule Take 75 mg by mouth nightly      lamoTRIgine (LAMICTAL) 200 MG tablet Take 600 mg by mouth 2 times daily      atorvastatin (LIPITOR) 80 MG tablet Take 80 mg by mouth nightly       metFORMIN (GLUCOPHAGE) 1000 MG tablet Take 1,000 mg by mouth 2 times daily (with meals)       amLODIPine (NORVASC) 10 MG tablet Take 10 mg by mouth daily       aspirin 81 MG EC tablet Take 81 mg by mouth daily      famotidine (PEPCID) 40 MG tablet Take 40 mg by mouth daily as needed (indigestion)       loratadine (CLARITIN) 10 MG tablet Take 10 mg by mouth nightly       Multiple Vitamin (DAILY TITUS) TABS Take 1 tablet by mouth daily      diazepam (VALIUM) 5 MG tablet Take 5 mg by mouth every 12 hours as needed for Anxiety.  oxyCODONE-acetaminophen (PERCOCET) 5-325 MG per tablet Take 1 tablet by mouth every 4 hours as needed.        allopurinol (ZYLOPRIM) 300 MG tablet Take 300 mg by mouth nightly          Physical Exam:  Vitals:    02/14/22 0430   BP: (!) 155/79   Pulse: 91   Resp: 10   Temp:    SpO2: 97%         Based on new provisions and guidance offered in setting of COVID 19 outbreak and in order to preserve personal protective equipment in accordance with the flexibilities announced by CMS limited examination is performed. General: Well appearing, not diaphoretic, no acute distress  Head: Normocephalic, atraumatic  Eyes: No ocular discharge, no scleral injection, no icterus  Ears: Normal external auricles  Nose: No rhinorrhea, no epistaxis  Throat: Not examined, no difficulty swallowing  Pulm: No apparent respiratory distress  Cardio: Normal rate, regular rhythm, no peripheral edema  GI: non-distended  Neuro: confused, cn2-12 grossly intact, strength 5/5 bilaterally. Psych: Cooperative  Skin: no jaundice    Assessment and Plan:   # breakthrough seizures  # epilepsy  # htn  # acute encephalopathy    - seizure precautions  - npo, ivf  - ativan for seizures prn  - neurology consulted for breakthrough seizures, postictal  - consider continuous eeg  - Continue to monitor electrolytes and replete as appropriate  - Pt high risk for vte, lmwh, scd for vte ppx.  - Continue medications for chronic problems    I have personally reviewed pertinent laboratory, ekg and imaging results, as well as documentation in the patient's emr. Laboratory and imaging orders per the above plan have been addressed, see orders above.  Patient's case, assessment and plan have been discussed on this date with patient

## 2022-02-14 NOTE — PLAN OF CARE
Problem: Safety:  Goal: Ability to remain free from injury will improve  Outcome: Ongoing   Pt remains free from injury at this time. Pt currently on bedrest. AVS and bed alarm activated for safety, bed locked and in lowest position. Call light is in reach.

## 2022-02-15 LAB
ANION GAP SERPL CALCULATED.3IONS-SCNC: 13 MMOL/L (ref 3–16)
BUN BLDV-MCNC: 16 MG/DL (ref 7–20)
CALCIUM SERPL-MCNC: 9.2 MG/DL (ref 8.3–10.6)
CHLORIDE BLD-SCNC: 96 MMOL/L (ref 99–110)
CO2: 26 MMOL/L (ref 21–32)
CREAT SERPL-MCNC: 1 MG/DL (ref 0.9–1.3)
GFR AFRICAN AMERICAN: >60
GFR NON-AFRICAN AMERICAN: >60
GLUCOSE BLD-MCNC: 160 MG/DL (ref 70–99)
GLUCOSE BLD-MCNC: 165 MG/DL (ref 70–99)
GLUCOSE BLD-MCNC: 240 MG/DL (ref 70–99)
GLUCOSE BLD-MCNC: 288 MG/DL (ref 70–99)
GLUCOSE BLD-MCNC: 295 MG/DL (ref 70–99)
HCT VFR BLD CALC: 39.8 % (ref 40.5–52.5)
HEMOGLOBIN: 13.3 G/DL (ref 13.5–17.5)
MAGNESIUM: 1.6 MG/DL (ref 1.8–2.4)
MCH RBC QN AUTO: 32 PG (ref 26–34)
MCHC RBC AUTO-ENTMCNC: 33.4 G/DL (ref 31–36)
MCV RBC AUTO: 95.6 FL (ref 80–100)
PDW BLD-RTO: 14.6 % (ref 12.4–15.4)
PERFORMED ON: ABNORMAL
PLATELET # BLD: 243 K/UL (ref 135–450)
PMV BLD AUTO: 8.5 FL (ref 5–10.5)
POTASSIUM REFLEX MAGNESIUM: 3.4 MMOL/L (ref 3.5–5.1)
RBC # BLD: 4.17 M/UL (ref 4.2–5.9)
SODIUM BLD-SCNC: 135 MMOL/L (ref 136–145)
WBC # BLD: 5.4 K/UL (ref 4–11)

## 2022-02-15 PROCEDURE — 6370000000 HC RX 637 (ALT 250 FOR IP): Performed by: FAMILY MEDICINE

## 2022-02-15 PROCEDURE — 97530 THERAPEUTIC ACTIVITIES: CPT

## 2022-02-15 PROCEDURE — 97116 GAIT TRAINING THERAPY: CPT

## 2022-02-15 PROCEDURE — 83735 ASSAY OF MAGNESIUM: CPT

## 2022-02-15 PROCEDURE — 97162 PT EVAL MOD COMPLEX 30 MIN: CPT

## 2022-02-15 PROCEDURE — 95813 EEG EXTND MNTR 61-119 MIN: CPT

## 2022-02-15 PROCEDURE — 2500000003 HC RX 250 WO HCPCS: Performed by: INTERNAL MEDICINE

## 2022-02-15 PROCEDURE — 97167 OT EVAL HIGH COMPLEX 60 MIN: CPT

## 2022-02-15 PROCEDURE — 6360000002 HC RX W HCPCS: Performed by: FAMILY MEDICINE

## 2022-02-15 PROCEDURE — 99232 SBSQ HOSP IP/OBS MODERATE 35: CPT | Performed by: NURSE PRACTITIONER

## 2022-02-15 PROCEDURE — 85027 COMPLETE CBC AUTOMATED: CPT

## 2022-02-15 PROCEDURE — 36415 COLL VENOUS BLD VENIPUNCTURE: CPT

## 2022-02-15 PROCEDURE — 80048 BASIC METABOLIC PNL TOTAL CA: CPT

## 2022-02-15 PROCEDURE — 2060000000 HC ICU INTERMEDIATE R&B

## 2022-02-15 PROCEDURE — 2580000003 HC RX 258: Performed by: FAMILY MEDICINE

## 2022-02-15 PROCEDURE — 6360000002 HC RX W HCPCS: Performed by: INTERNAL MEDICINE

## 2022-02-15 RX ORDER — MAGNESIUM SULFATE IN WATER 40 MG/ML
2000 INJECTION, SOLUTION INTRAVENOUS ONCE
Status: COMPLETED | OUTPATIENT
Start: 2022-02-15 | End: 2022-02-15

## 2022-02-15 RX ADMIN — INSULIN LISPRO 6 UNITS: 100 INJECTION, SOLUTION INTRAVENOUS; SUBCUTANEOUS at 17:34

## 2022-02-15 RX ADMIN — INSULIN LISPRO 2 UNITS: 100 INJECTION, SOLUTION INTRAVENOUS; SUBCUTANEOUS at 07:58

## 2022-02-15 RX ADMIN — MAGNESIUM SULFATE HEPTAHYDRATE 2000 MG: 2 INJECTION, SOLUTION INTRAVENOUS at 09:19

## 2022-02-15 RX ADMIN — LAMOTRIGINE 600 MG: 150 TABLET ORAL at 08:08

## 2022-02-15 RX ADMIN — OXYCODONE HYDROCHLORIDE AND ACETAMINOPHEN 1 TABLET: 5; 325 TABLET ORAL at 03:36

## 2022-02-15 RX ADMIN — CLINDAMYCIN PHOSPHATE 600 MG: 600 INJECTION, SOLUTION INTRAVENOUS at 07:20

## 2022-02-15 RX ADMIN — SACUBITRIL AND VALSARTAN 1 TABLET: 97; 103 TABLET, FILM COATED ORAL at 22:14

## 2022-02-15 RX ADMIN — SODIUM CHLORIDE, PRESERVATIVE FREE 10 ML: 5 INJECTION INTRAVENOUS at 07:57

## 2022-02-15 RX ADMIN — ENOXAPARIN SODIUM 40 MG: 100 INJECTION SUBCUTANEOUS at 07:57

## 2022-02-15 RX ADMIN — CETIRIZINE HYDROCHLORIDE 10 MG: 10 TABLET, FILM COATED ORAL at 07:56

## 2022-02-15 RX ADMIN — INSULIN LISPRO 3 UNITS: 100 INJECTION, SOLUTION INTRAVENOUS; SUBCUTANEOUS at 21:01

## 2022-02-15 RX ADMIN — CHLORTHALIDONE 25 MG: 25 TABLET ORAL at 07:57

## 2022-02-15 RX ADMIN — CLINDAMYCIN PHOSPHATE 600 MG: 600 INJECTION, SOLUTION INTRAVENOUS at 16:41

## 2022-02-15 RX ADMIN — INSULIN LISPRO 4 UNITS: 100 INJECTION, SOLUTION INTRAVENOUS; SUBCUTANEOUS at 12:05

## 2022-02-15 RX ADMIN — LAMOTRIGINE 600 MG: 150 TABLET ORAL at 22:14

## 2022-02-15 RX ADMIN — CARVEDILOL 12.5 MG: 12.5 TABLET, FILM COATED ORAL at 07:57

## 2022-02-15 RX ADMIN — SODIUM CHLORIDE, PRESERVATIVE FREE 10 ML: 5 INJECTION INTRAVENOUS at 21:12

## 2022-02-15 RX ADMIN — OXYCODONE HYDROCHLORIDE AND ACETAMINOPHEN 1 TABLET: 5; 325 TABLET ORAL at 16:40

## 2022-02-15 RX ADMIN — ASPIRIN 81 MG: 81 TABLET, COATED ORAL at 07:57

## 2022-02-15 RX ADMIN — ALLOPURINOL 300 MG: 300 TABLET ORAL at 21:09

## 2022-02-15 RX ADMIN — THERA TABS 1 TABLET: TAB at 07:57

## 2022-02-15 RX ADMIN — SACUBITRIL AND VALSARTAN 1 TABLET: 97; 103 TABLET, FILM COATED ORAL at 08:08

## 2022-02-15 RX ADMIN — NORTRIPTYLINE HYDROCHLORIDE 75 MG: 25 CAPSULE ORAL at 21:10

## 2022-02-15 RX ADMIN — AMLODIPINE BESYLATE 10 MG: 10 TABLET ORAL at 07:57

## 2022-02-15 RX ADMIN — MONTELUKAST 10 MG: 10 TABLET, FILM COATED ORAL at 21:09

## 2022-02-15 RX ADMIN — DIAZEPAM 5 MG: 5 TABLET ORAL at 03:36

## 2022-02-15 RX ADMIN — OXYCODONE HYDROCHLORIDE AND ACETAMINOPHEN 1 TABLET: 5; 325 TABLET ORAL at 09:21

## 2022-02-15 ASSESSMENT — PAIN DESCRIPTION - ORIENTATION: ORIENTATION: LOWER

## 2022-02-15 ASSESSMENT — PAIN SCALES - GENERAL
PAINLEVEL_OUTOF10: 6
PAINLEVEL_OUTOF10: 6
PAINLEVEL_OUTOF10: 0
PAINLEVEL_OUTOF10: 0

## 2022-02-15 ASSESSMENT — PAIN DESCRIPTION - PROGRESSION: CLINICAL_PROGRESSION: NOT CHANGED

## 2022-02-15 ASSESSMENT — PAIN DESCRIPTION - FREQUENCY: FREQUENCY: CONTINUOUS

## 2022-02-15 ASSESSMENT — PAIN DESCRIPTION - ONSET: ONSET: ON-GOING

## 2022-02-15 ASSESSMENT — PAIN DESCRIPTION - LOCATION: LOCATION: BACK

## 2022-02-15 ASSESSMENT — PAIN - FUNCTIONAL ASSESSMENT: PAIN_FUNCTIONAL_ASSESSMENT: PREVENTS OR INTERFERES SOME ACTIVE ACTIVITIES AND ADLS

## 2022-02-15 ASSESSMENT — PAIN DESCRIPTION - DESCRIPTORS: DESCRIPTORS: ACHING;CONSTANT;DISCOMFORT

## 2022-02-15 ASSESSMENT — PAIN DESCRIPTION - PAIN TYPE: TYPE: CHRONIC PAIN

## 2022-02-15 NOTE — PROGRESS NOTES
NEUROLOGY / NEUROCRITICAL CARE PROGRESS NOTE       Patient Name: Waleska Springer YOB: 1960   Sex: Male Age: 63 yrs     CC / Reason for Consult: breakthrough seizure    Interval Hx / Changes over last 24 hours:     Reviewed cvEEG report, no electrographic seizures or push button events      ROS: no new complaints    HISTORY   Admission HPI:   The patient is a 63 y.o. male with significant past medical history of epileptic and non-epileptic spells, CAD, CHF, DM 2. He presneted to the ER after an event that appeared generalized tonic clonic movements. He is on Lamotrigine at home. Previously he had been on depakote and phenobarbital.           PMH Past Medical History:   Diagnosis Date    Arthritis     Asthma     Bronchitis     CAD (coronary artery disease)     CHF (congestive heart failure) (HCC)     Chronic back pain     [facility]     Generalized headaches     Gout     Hypertension     MI, old 12/01/2013    Nstemi    Morbid obesity ([facility]     Psychogenic nonepileptic seizure     DX at [facility]- pseudo-seizures    Seizures (HCC)       Allergies Allergies   Allergen Reactions    Amoxicillin     Doxycycline     Ibuprofen     Penicillins Hives    Phenobarbital     Tetracycline     Aspirin Nausea And Vomiting     Sister says he can take the baby asprin      Diet ADULT DIET; Regular   Isolation No active isolations     LABS   Metabolic Panel Recent Labs     02/14/22  0156 02/15/22  0604   * 135*   K 4.1 3.4*   CL 97* 96*   CO2 24 26   BUN 16 16   CREATININE 1.0 1.0   GLUCOSE 178* 160*   CALCIUM 9.7 9.2   MG  --  1.60*      CBC / Coags Recent Labs     02/14/22  0156 02/15/22  0604   WBC 6.2 5.4   RBC 4.62 4.17*   HGB 15.0 13.3*   HCT 44.2 39.8*    243      Other No results for input(s): LABA1C, LDLCALC, TRIG, TSH, [id number], FOLATE, LABSALI, COVID19 in the last 72 hours.   No results for input(s): PHENYTOIN, KEPPRA, LACOSA, LAMO, VALPROATE, LACTSEPSIS, LACTA in the last 72 hours. CURRENT SCHEDULED MEDICATIONS   Inpatient Medications     magnesium sulfate, 2,000 mg, IntraVENous, Once    allopurinol, 300 mg, Oral, Nightly    [Held by provider] metFORMIN, 1,000 mg, Oral, BID WC    amLODIPine, 10 mg, Oral, Daily    aspirin, 81 mg, Oral, Daily    cetirizine, 10 mg, Oral, Daily    multivitamin, 1 tablet, Oral, Daily    nortriptyline, 75 mg, Oral, Nightly    lamoTRIgine, 600 mg, Oral, BID    chlorthalidone, 25 mg, Oral, Daily    [Held by provider] glipiZIDE, 5 mg, Oral, BID AC    sacubitril-valsartan, 1 tablet, Oral, BID    montelukast, 10 mg, Oral, Nightly    carvedilol, 12.5 mg, Oral, Once per day on  Sat    sodium chloride flush, 5-40 mL, IntraVENous, 2 times per day    enoxaparin, 40 mg, SubCUTAneous, Daily    insulin lispro, 0-12 Units, SubCUTAneous, TID WC    insulin lispro, 0-6 Units, SubCUTAneous, Nightly    clindamycin (CLEOCIN) IV, 600 mg, IntraVENous, Q8H   Infusions    sodium chloride      dextrose        Antibiotics   Recent Abx Admin                   clindamycin (CLEOCIN) 600 mg in dextrose 5 % 50 mL IVPB (mg) 600 mg New Bag 02/15/22 0720     600 mg New Bag 22 2324     600 mg New Bag  1526                  DIAGNOSTICS   IMAGES:  Images personally reviewed and agree w/ radiology interpretation. CVEE2022 11:44am - 21:00pm   SEIZURES:  None  INTERICTAL:  Near continuous fluctuating 1-2 Hz generalized periodic spike and polyspike discharges (GPDs) that do not clear build up or evolve. PUSHBUTTONS:  11:51am for unclear reason, but no change in the background is observed. GPDs are ongoing   BACKGROUND:  Abundant poly frequency delta/theta slowing of the background with frequent superimposed faster frequencies.     EKG:  regular     CLINICAL INTERPRETATION:  This was an abnormal tracing for age and state due to a severe generalized slow wave abnormality indicating diffuse cerebral dysfunction which can be of multiple causes, including structural, or vascular abnormalities, toxic/metabolic conditions, hydrocephalus, or postictal conditions. Ongoing GPDs lie along the ictal-interictal continuum and may be associated with or represent seizures. On going status cannot be rules out. Clinical correlation is advised. PHYSICAL EXAMINATION     PHYSICAL EXAM:  Vitals:    02/14/22 1915 02/14/22 2300 02/15/22 0320 02/15/22 0654   BP: (!) 148/88 127/72 125/85 (!) 143/92   Pulse: 92 91 81 73   Resp: 18 18 18 16   Temp: 98.5 °F (36.9 °C) 98.5 °F (36.9 °C) 98.6 °F (37 °C) 98.5 °F (36.9 °C)   TempSrc: Oral Oral Oral Oral   SpO2: 95% 94% 95% 95%   Weight:             General: Alert, no distress, obese   Neurologic  Mental status:   orientation to person, place, time, situation   Attention intact as able to attend well to the exam     Language fluent in conversation   Comprehension intact; follows simple commands    Cranial nerves:   CN2: Visual fields full w/o extinction on confrontational testing   CN 3,4,6: Pupils equal and reactive to light, extraocular muscles intact  CN5: Facial sensation symmetric   CN7: Face symmetric bilaterally   CN8: Hearing symmetric to spoken voice  CN9: Palate elevated symmetrically  CN11: Traps full strength on shoulder shrug  CN12: Tongue midline with protrusion    Motor Exam:  Strong and symmetric in the bilateral upper and lower extremities    Sensory: light touch intact and symmetric in all 4 extremities. OTHER SYSTEMS:  Cardiovascular: Warm, appears well perfused   Respiratory: Easy, non-labored respiratory pattern   Abdominal: Abdomen is without distention   Extremities: Upper and lower extremities are atraumatic in appearance without deformity. No swelling or erythema. ASSESSMENT & RECOMMENDATIONS   Assessment:63year old man with seizures and psychogenic non-epileptic spells. Will hook up to cvEEG for differential diagnosis of spells.      Spell semiology 1: staring with repeated licking of upper lip.   15 seconds  Spell semiology 2: generalized shaking      Plan:  -Continue home Lamictal 600mg PO BID  -cvEEG              -if electrographic seizures on cvEEG, add keppra 500mg IV BID  -Seizure precautions   -Q4H vital signs and neuro checks  -Ok to get up to bathroom and sit in chair with Gali Díaz   Neurology & Neurocritical Care   Neurology Line: [phone]  Perfect[employer]: [facility]rology [facility] Neuro Critical [facility] NPs  2/15/2022 10:15 AM

## 2022-02-15 NOTE — PROGRESS NOTES
Physical Therapy    Facility/Department: [facility] [address]  Initial Assessment and treatment    NAME: Lorrene Meckel  : 1960  MRN: [id number]    Date of Service: 2/15/2022    Discharge Recommendations:    Lorrene Meckel scored a 10/24 on the AM-PAC short mobility form. Current research shows that an AM-PAC score of 17 or less is typically not associated with a discharge to the patient's home setting. Based on the patient's AM-PAC score and their current functional mobility deficits, it is recommended that the patient have 3-5 sessions per week of Physical Therapy at d/c to increase the patient's independence. Please see assessment section for further patient specific details. PT Equipment Recommendations  Equipment Needed: No (defer to next level of care)    Assessment   Body structures, Functions, Activity limitations: Decreased functional mobility ; Decreased balance;Decreased strength;Decreased safe awareness;Decreased endurance  Assessment: Patient tolerated session fair with mobility being limited due to weakness and overall decreased activity tolerance. Patient able to transfer to EOB with SBA but then required assist x 2 to stand from raised bed and take steps over to bedside chair. Patient demonstrating unsteady gait during transfer and was unable to come to full stand from bedside chair due to weakness. Patient reports plan to d/c home where he lives alone with limited assist; shows overall decreased insight into deficits as he is currently declining inpatient therapy upon discharge. Recommend continued skilled PT. Will follow while in acute care setting to maximize independence with mobility. If d/c home, recommend 24 hour supervision/assist x 2 and S3 home therapy. Treatment Diagnosis: impaired mobility associated with aphasia  Decision Making: Medium Complexity  PT Education: General Safety;Goals;Gait Training;PT Role;Plan of Care; Functional Mobility Training;Transfer Training  Patient Education: patient would benefit from continued reinforcement. REQUIRES PT FOLLOW UP: Yes  Activity Tolerance  Activity Tolerance: Patient limited by fatigue;Patient limited by endurance       Patient Diagnosis(es): The primary encounter diagnosis was Aphasia. A diagnosis of Spell of abnormal behavior was also pertinent to this visit. has a past medical history of Arthritis, Asthma, Bronchitis, CAD (coronary artery disease), CHF (congestive heart failure) ([address] Chronic back pain, Diabetes mellitus ([address] Generalized headaches, Gout, Hypertension, MI, old, Morbid obesity ([address] Psychogenic nonepileptic seizure, and Seizures ([address] has a past surgical history that includes eye surgery. Restrictions  Position Activity Restriction  Other position/activity restrictions: -Ok to get up to bathroom and sit in chair with cvEEG-per neurology note, activity as tolerated, seizure precautions  Vision/Hearing  Vision: Within Functional Limits  Hearing: Within functional limits     Subjective  General  Chart Reviewed: Yes  Patient assessed for rehabilitation services?: Yes  Additional Pertinent Hx: Patient is a 62 y/o male admitted 2/13 with seizures. CT head and chest x-ray (-) for acute findings. PMH significant for asthma, CAD, CHF, diabetes, MI, HTN, chronic back pain, morbid obesity, seizures. Response To Previous Treatment: Not applicable  Family / Caregiver Present: No  Referring Practitioner: Fei Perea MD  Referral Date : 02/15/22  Diagnosis: aphasia  Follows Commands: Within Functional Limits  General Comment  Comments: Patient supine in bed upon arrival.  Subjective  Subjective: Patient agreeable to PT evaluation with encouragement.   Pain Screening  Patient Currently in Pain: Denies  Vital Signs  Patient Currently in Pain: Denies       Orientation  Orientation  Overall Orientation Status: Within Functional Limits (to conversation)  Social/Functional History  Social/Functional History  Lives With: Alone  Type of Home: Apartment (apt for people with disabilities)  Home Layout: One level  Home Access: Level entry,Elevator  Bathroom Toilet: Standard (sink next to commode for leverage)  Bathroom Equipment: Grab bars in shower (shower stool)  Bathroom Accessibility:  (pt is able to fit scooter into bathroom)  Home Equipment: 4 wheeled walker,Electric scooter,Wheelchair-manual  ADL Assistance: [address] [place] [address] Avenue: [address] [place] [address] Place:  (pt is non-ambulatory)  Transfer Assistance: Independent  Active : No  Patient's  Info: SAY services  Leisure & Hobbies: bowling, art  Cognition   Cognition  Overall Cognitive Status: WFL  Cognition Comment: except for decreased insight into deficits    Objective          AROM RLE (degrees)  RLE AROM: WFL  AROM LLE (degrees)  LLE AROM : WFL  Strength RLE  Strength RLE: Exception  Comment: appears grossly weakened with functional mobility. Strength LLE  Strength LLE: Exception  Comment: appears grossly weakened with functional mobility.         Bed mobility  Supine to Sit: Stand by assistance (head of bed elevated, increased time/effort)  Scooting: Stand by assistance (to EOB)  Transfers  Sit to Stand: 2 Person Assistance;Dependent/Total (mod assist x 2 from EOB on fourth trial, 3 unsuccussful attempts prior)  Stand to sit: 2 Person Assistance;Dependent/Total (mod assist x 2 to bedside chair, poor eccentric control)  Bed to Chair: 2 Person Assistance;Dependent/Total (mod assist x 2 to take steps without an assistive device)  Comment: patient attempted to transfer sit to stand from recliner, able to obtain 50% stance with min assist but unable to come to full stance  Ambulation  Ambulation?: Yes  Ambulation 1  Surface: level tile  Device: No Device  Assistance: 2 Person assistance;Dependent/Total (mod assist x 2)  Quality of Gait: decreased step length/height bilaterally with shuffling steps, unsteady, impulsive, going to sit prior to being fully turned to chair, wide base of support  Distance: 3-4 steps from EOB to bedside chair  Stairs/Curb  Stairs?: No     Balance  Sitting - Static: Fair;+ (supervision to sit EOB)  Standing - Dynamic: Poor (mod assist x 2 to take steps)        Plan   Plan  Times per week: 2-5  Current Treatment Recommendations: [place] Training,Patient/Caregiver Education & Training,Transfer Training,Gait Training,Functional Mobility Training  Safety Devices  Type of devices: Nurse notified,Chair alarm in place,Call light within reach,Gait belt,Left in chair    OutComes Score                                                  AM-PAC Score  AM-PAC Inpatient Mobility Raw Score : 10 (02/15/22 1505)  AM-PAC Inpatient T-Scale Score : 32.29 (02/15/22 1505)  Mobility Inpatient CMS 0-100% Score: 76.75 (02/15/22 1505)  Mobility Inpatient CMS G-Code Modifier : CL (02/15/22 1505)          Goals  Short term goals  Time Frame for Short term goals: discharge  Short term goal 1: patient will perform bed mobility with supervision  Short term goal 2: patient will perform transfers sit<>stand with minimal assist  Short term goal 3: patient will perform transfers bed<>chair with minimal assistance  Patient Goals   Patient goals : to go home       Therapy Time   Individual Concurrent Group Co-treatment   Time In 1326         Time Out 1404         Minutes 38         Timed Code Treatment Minutes: 23 Minutes    Timed Code Treatment Minutes:  23 Minutes    Total Treatment Minutes:    23 minutes treatment + 15 minutes evaluation = 38 total treatment minutes  If patient discharges prior to next session this note will serve as a discharge summary. Please see below for the latest assessment towards goals.    [address]

## 2022-02-15 NOTE — PROGRESS NOTES
Patient a/o x4. Vitals stable. Pain treated with PO medication. Anxious overnight. No signs of seizure activity. cEEG in place. Resting fairly well overnight.

## 2022-02-15 NOTE — PROGRESS NOTES
Occupational Therapy   Occupational Therapy Initial Assessment/tx  Date: 2/15/2022   Patient Name: Trenton Méndez  MRN: [id number]     : 1960    Date of Service: 2/15/2022    Discharge Recommendations:  Trenton Méndez scored a 17/24 on the AM-PAC ADL Inpatient form. Current research shows that an AM-PAC score of 17 or less is typically not associated with a discharge to the patient's home setting. Based on the patient's AM-PAC score and their current ADL deficits, it is recommended that the patient have 3-5 sessions per week of Occupational Therapy at d/c to increase the patient's independence. Please see assessment section for further patient specific details. If patient discharges prior to next session this note will serve as a discharge summary. Please see below for the latest assessment towards goals. OT Equipment Recommendations  Equipment Needed: No    Assessment   Performance deficits / Impairments: Decreased functional mobility ; Decreased ADL status  Assessment: Pt admitted with possible seizure, is on continuous EEG, but allowed to transfer and go to bathroom while on continuous EEG per neurology note. He is functioning below baseline level, needing mod A of 2 to transfer to [address]. Prior to admission, pt used scooter in apt, was indep with pivot transfers, ADLs, and IADLs. Recommend continued inpatient OT at discharge to increase functional status. If pt returns home, he needs 24 hr A of 2 and home OT (level S3). Treatment Diagnosis: impaired ADLs and mobiltiy  Prognosis: Good  Decision Making: High Complexity  OT Education: OT Role;Transfer Training  Patient Education: needs practice  REQUIRES OT FOLLOW UP: Yes  Activity Tolerance  Activity Tolerance: Patient Tolerated treatment well  Safety Devices  Safety Devices in place: Yes  Type of devices: Left in chair;Nurse notified;Call light within reach; Chair alarm in place           Patient Diagnosis(es): The primary encounter diagnosis was Aphasia. A diagnosis of Spell of abnormal behavior was also pertinent to this visit. has a past medical history of Arthritis, Asthma, Bronchitis, CAD (coronary artery disease), CHF (congestive heart failure) ([address] Chronic back pain, Diabetes mellitus ([address] Generalized headaches, Gout, Hypertension, MI, old, Morbid obesity ([address] Psychogenic nonepileptic seizure, and Seizures ([facility] has a past surgical history that includes eye surgery. Treatment Diagnosis: impaired ADLs and mobiltiy      Restrictions  Position Activity Restriction  Other position/activity restrictions: -Ok to get up to bathroom and sit in chair with cvEEG-per neurology note, activity as tolerated, seizure precautions    Subjective   General  Chart Reviewed: Yes  Patient assessed for rehabilitation services?: Yes  Additional Pertinent Hx: PMH:  asthma, CAD, CHF, DM, HTN, chronic back pain, gout, MI, seizure  Family / Caregiver Present: No  Referring Practitioner: Dr. Cassandra Ruby  Diagnosis: Pt admitted with possible seizure, AMS, non-verbal-head CT=neg, CXR=neg  Subjective  Subjective: Pt in bed, agreeable to work with OT.   Patient Currently in Pain: Denies  Vital Signs  Patient Currently in Pain: Denies  Social/Functional History  Social/Functional History  Lives With: Alone  Type of Home: Apartment (apt for people with disabilities)  Home Layout: One level  Home Access: Level entry,Elevator  Bathroom Toilet: Standard (sink next to commode for leverage)  Bathroom Equipment: Grab bars in shower (shower stool)  Bathroom Accessibility:  (pt is able to fit scooter into bathroom)  Home Equipment: 4 wheeled walker,Electric scooter,Wheelchair-manual  ADL Assistance: Independent  Homemaking Assistance: Independent  Ambulation Assistance:  (pt is non-ambulatory)  Transfer Assistance: Independent  Active : No  Patient's  Info: SAY services  Leisure & Hobbies: bowling, art       Objective        Orientation  Overall Orientation Status: Within Functional Limits (to conversation)     Toilet Transfers  Toilet - Technique: Stand pivot  Equipment Used:  (simulated 3 in1 commode)  Toilet Transfer: 2 Person assistance (mod A of 2)  ADL  Feeding: Independent        Bed mobility  Supine to Sit: Stand by assistance (with head of bed elevated, increased time/effort)  Scooting: Stand by assistance  Transfers  Sit to stand: 2 Person assistance (3 trials without success, on 4th trial from elevated bed with mod A of 2)  Stand to sit: 2 Person assistance (no eccentric control)  Transfer Comments: sit to stand from arm chair with Janelle, to partial stance     Cognition  Overall Cognitive Status: [facility]  Cognition Comment: except for decreased insight into deficits                 LUE AROM (degrees)  LUE AROM : WFL  Left Hand AROM (degrees)  Left Hand AROM: WFL  RUE AROM (degrees)  RUE AROM : WFL  Right Hand AROM (degrees)  Right Hand AROM: WFL  LUE Strength  Gross LUE Strength: WFL  RUE Strength  Gross RUE Strength: WFL     Hand Dominance  Hand Dominance: Right         Patient participated in OT eval and tx including ADLs and transfer     Plan   Plan  Times per week: 2-5  Current Treatment Recommendations: Balance Training,Functional Mobility Training,Endurance Training,Self-Care / ADL,Safety Education & Training    G-Code     OutComes Score                                                  AM-PAC Score        AM-[facility] Inpatient Daily Activity Raw Score: 17 (02/15/22 1410)  AM-PAC Inpatient ADL T-Scale Score : 37.26 (02/15/22 1410)  ADL Inpatient CMS 0-100% Score: 50.11 (02/15/22 1410)  ADL Inpatient CMS G-Code Modifier : CK (02/15/22 1410)    Goals  Short term goals  Time Frame for Short term goals: discharge  Short term goal 1: pt to transfer to 3 in1 commode with Janelle  Short term goal 2: pt to move supine to sit I/S  Short term goal 3: pt to participate in LE dressing while seated on side of bed  Patient Goals   Patient goals : to return home, be indep       Therapy Time   Individual Concurrent Group Co-treatment   Time In 0186         Time Out 1404         Minutes 38              Timed Code Treatment Minutes:   23    Total Treatment Minutes:  LORIE BrunsonR/L 94 31 11

## 2022-02-15 NOTE — PROGRESS NOTES
CONTINUOUS EEG    Name:  Louisa Boland Record Number:  [id number]  Age: 63 y.o. Gender: male  : 1960  Today's Date:  2022  Room:  55095509-01  Vital Signs   BP (!) 148/88   Pulse 92   Temp 98.5 °F (36.9 °C) (Oral)   Resp 18   Wt (!) 388 lb 14.3 oz (176.4 kg)   SpO2 95%   BMI 62.77 kg/m²       Patient currently on continuous EEG monitoring. All EEG leads are currently in place with no current issues. Verified [facility] connection via team viewer. Checked in with patient RN for current plan of care. Comments:Lead reattached. Continue monitoring. All leads within 10K limit.     Electronically signed by Alvin Evans on 2022 at 9:11 PM

## 2022-02-15 NOTE — PLAN OF CARE
Problem: Coping:  Goal: Ability to cope will improve  Description: Ability to cope will improve  Outcome: Ongoing   Becomes tearful at times, gave meds per [facility]. Problem: Health Behavior:  Goal: Ability to manage health-related needs will improve  Description: Ability to manage health-related needs will improve  Outcome: Ongoing       Problem: Physical Regulation:  Goal: Ability to maintain a stable neurologic state will improve  Description: Ability to maintain a stable neurologic state will improve  Outcome: Ongoing   Neurologic state improving.

## 2022-02-15 NOTE — PLAN OF CARE
Problem: Safety:  Goal: Ability to remain free from injury will improve  2/15/2022 0244 by Kael Owens RN  Outcome: Ongoing   No signs of seizure activity this shift. CEEG in place. Problem: Self-Concept:  Goal: Level of anxiety will decrease  2/15/2022 0244 by Kael Owens RN  Outcome: Ongoing  Patient remains anxious and restless overnight. Problem: Pain:  Goal: Control of chronic pain  Outcome: Ongoing  Complains of chronic pain to back and knee. Treated with PO medication.

## 2022-02-15 NOTE — PROGRESS NOTES
Hospitalist Progress Note      PCP: Jae Blue    Date of Admission: 2/13/2022    CC seizure. Hospital course  Patient is 61-year-old gentleman with history of  Nonischemic cardiomyopathy, hypertension, MRDD, seizure, diabetes mellitus type 2 came into ER for confusion. Subjective  Patient is alert oriented x3 denies any nausea, vomiting, fever, chills. He knows that he is in the hospital and he came in because of seizures. Assessment plan  #Acute metabolic encephalopathy concern for seizures. #Seizures. On continuous EEG. Did not showed any epileptiform discharges. Seizure prophylaxis  Continue home Vimpat. Neurology on board. On Lamictal 600 mg twice daily. #Nonischemic cardiomyopathy  #Heart failure with reduced EF not in exacerbation. Continue aspirin, amlodipine, chlorthalidone, Entresto    #Diabetes mellitus type 2  Hold Metformin, glipizide  Contention sliding scale. History of MRDD    Patient is a recent incision and drainage of hand at outside facility. no cultures availabel to review. on clindamycin for now. Continue to monitor.         Medications:  Reviewed    Infusion Medications    sodium chloride      dextrose       Scheduled Medications    allopurinol  300 mg Oral Nightly    [Held by provider] metFORMIN  1,000 mg Oral BID WC    amLODIPine  10 mg Oral Daily    aspirin  81 mg Oral Daily    cetirizine  10 mg Oral Daily    multivitamin  1 tablet Oral Daily    nortriptyline  75 mg Oral Nightly    lamoTRIgine  600 mg Oral BID    chlorthalidone  25 mg Oral Daily    [Held by provider] glipiZIDE  5 mg Oral BID AC    sacubitril-valsartan  1 tablet Oral BID    montelukast  10 mg Oral Nightly    carvedilol  12.5 mg Oral Once per day on Tue Thu Sat    sodium chloride flush  5-40 mL IntraVENous 2 times per day    enoxaparin  40 mg SubCUTAneous Daily    insulin lispro  0-12 Units SubCUTAneous TID WC    insulin lispro  0-6 Units SubCUTAneous Nightly    clindamycin (CLEOCIN) IV  600 mg IntraVENous Q8H     PRN Meds: oxyCODONE-acetaminophen, diazePAM, famotidine, albuterol, sodium chloride flush, sodium chloride, LORazepam, ondansetron **OR** ondansetron, polyethylene glycol, acetaminophen **OR** acetaminophen, LORazepam, dextrose bolus (hypoglycemia), glucose, glucagon (rDNA), dextrose, dextrose bolus (hypoglycemia) **OR** dextrose bolus (hypoglycemia)      Intake/Output Summary (Last 24 hours) at 2/15/2022 1210  Last data filed at 2/15/2022 1000  Gross per 24 hour   Intake 480 ml   Output 0 ml   Net 480 ml       Physical Exam Performed:    /79   Pulse 81   Temp 98.7 °F (37.1 °C) (Oral)   Resp 18   Wt (!) 388 lb 14.3 oz (176.4 kg)   SpO2 92%   BMI 62.77 kg/m²     General appearance: Elderly, on continuous EEG  HEENT: Pupils equal, round, and reactive to light. Conjunctivae/corneas clear. Neck: Supple, with full range of motion. No jugular venous distention. Trachea midline. Respiratory:  Normal respiratory effort. Clear to auscultation, bilaterally without Rales/Wheezes/Rhonchi. Cardiovascular: Regular rate and rhythm with normal S1/S2 without murmurs, rubs or gallops. Abdomen: Soft, non-tender, non-distended with normal bowel sounds. Musculoskeletal: No clubbing, cyanosis or edema bilaterally. Left index finger covered with dressing. Neurologic: Alert oriented x3, CN II to XII intact, no focal neuro deficit noted. Psychiatric: Alert and oriented, calm. Capillary Refill: Brisk,< 3 seconds   Peripheral Pulses: +2 palpable, equal bilaterally       Labs:   Recent Labs     02/14/22  0156 02/15/22  0604   WBC 6.2 5.4   HGB 15.0 13.3*   HCT 44.2 39.8*    243     Recent Labs     02/14/22  0156 02/15/22  0604   * 135*   K 4.1 3.4*   CL 97* 96*   CO2 24 26   BUN 16 16   CREATININE 1.0 1.0   CALCIUM 9.7 9.2     No results for input(s): AST, ALT, BILIDIR, BILITOT, ALKPHOS in the last 72 hours.   No results for input(s): INR in the last 72 hours. No results for input(s): Mary Ann Divine in the last 72 hours. Urinalysis:      Lab Results   Component Value Date    NITRU Negative 03/17/2020    WBCUA 0-2 03/17/2020    BACTERIA 2+ 12/10/2018    RBCUA 0-2 03/17/2020    BLOODU Negative 03/17/2020    SPECGRAV >=1.030 03/17/2020    GLUCOSEU 250 03/17/2020       Radiology:  XR CHEST PORTABLE   Final Result   No acute findings in the chest.          CT HEAD WO CONTRAST   Final Result      No acute intracranial hemorrhage or mass effect. Mild atrophy. Assessment/Plan:    Active Hospital Problems    Diagnosis Date Noted    Seizure [facility]) [R56.9] 02/14/2022         DVT Prophylaxis: Lovenox  Diet: ADULT DIET; Regular; 3 carb choices (45 gm/meal); Low Fat/Low Chol/High Fiber/2 gm Na  Code Status: Full Code    PT/OT Eval Status: Consulted    Dispo -inpatient. Discharge in 24 to 48 hours.   Discussed with neuro NP will keep on continuous EEG until discharge as patient has history of psychogenic seizures    Donya Johnson MD     This chart was likely completed using voice recognition technology and may contain unintended grammatical , phraseology,and/or punctuation errors

## 2022-02-15 NOTE — CARE COORDINATION
Social Work Note                    Date: 2/15/2022     Patient Name: Agustín Sainz    Date of Admission: 2/13/2022 11:23 PM  YOB: 1960    Length of Stay: 1  GMLOS: 2.37 (AMLOS)           Diagnosis:Aphasia [R47.01]  Seizure ([address] [R56.9]  Spell of abnormal behavior [R46.89]     ________________________________________________________________________________________  Discharge Plan: Home    Insurance: Payor: MEDICARE / Plan: MEDICARE PART A AND B / Product Type: *No Product type* /   Pre-cert needed for placement?: N/a  Pre-cert initiated?: n/a    Tentative ischarge date: 2/16 vs 2/17    Current barriers: cvEEG, therapy eval,    Referrals completed: Not Applicable    Resources/information provided: Not indicated at this time   ________________________________________________________________________________________  PT AM-PAC:   / 24     OT AM-PAC:   / 24     DME Needs for discharge: defer   ________________________________________________________________________________________  Notes  SW rounded on this date. Attempted to meet with patient at bedside. Patient was sleeping and on CvEEG. Therapy to evaluate once able. SW reviewed chart. As of last admission 6 months ago resides at his own apartment. Reportedly has an electric scooter that he uses to get around inside if his apartment. Reportedly also has access to a cane, walker, and wheelchair as well. UPDATE:   SW spoke with patient. He was up in the chair on CvEEG. Patient had worked with therapy prior to SW arrival. He stated he plans to return home at discharge. He was not interested in exploring placement at discharge. SW confirmed home DME. SW noted concerns with limited support. ChasityW continues to follow at this time.   nt and/or family were provided with choice of provider?:  Yes     Patient and/or family are in agreement with the discharge plan at this time?: yes     Care Transition Patient: No    KATHARINA Khan  Social Work  [facility]  Ph: [phone]

## 2022-02-16 VITALS
DIASTOLIC BLOOD PRESSURE: 79 MMHG | RESPIRATION RATE: 16 BRPM | TEMPERATURE: 97.8 F | SYSTOLIC BLOOD PRESSURE: 124 MMHG | WEIGHT: 315 LBS | BODY MASS INDEX: 72.23 KG/M2 | HEART RATE: 81 BPM | OXYGEN SATURATION: 92 %

## 2022-02-16 LAB
GLUCOSE BLD-MCNC: 210 MG/DL (ref 70–99)
GLUCOSE BLD-MCNC: 305 MG/DL (ref 70–99)
PERFORMED ON: ABNORMAL
PERFORMED ON: ABNORMAL

## 2022-02-16 PROCEDURE — 6370000000 HC RX 637 (ALT 250 FOR IP): Performed by: FAMILY MEDICINE

## 2022-02-16 PROCEDURE — 2500000003 HC RX 250 WO HCPCS: Performed by: INTERNAL MEDICINE

## 2022-02-16 PROCEDURE — 95813 EEG EXTND MNTR 61-119 MIN: CPT

## 2022-02-16 PROCEDURE — 6360000002 HC RX W HCPCS: Performed by: FAMILY MEDICINE

## 2022-02-16 PROCEDURE — 99231 SBSQ HOSP IP/OBS SF/LOW 25: CPT | Performed by: NURSE PRACTITIONER

## 2022-02-16 PROCEDURE — 2580000003 HC RX 258: Performed by: FAMILY MEDICINE

## 2022-02-16 RX ADMIN — INSULIN LISPRO 8 UNITS: 100 INJECTION, SOLUTION INTRAVENOUS; SUBCUTANEOUS at 12:28

## 2022-02-16 RX ADMIN — ASPIRIN 81 MG: 81 TABLET, COATED ORAL at 09:48

## 2022-02-16 RX ADMIN — CLINDAMYCIN PHOSPHATE 600 MG: 600 INJECTION, SOLUTION INTRAVENOUS at 10:03

## 2022-02-16 RX ADMIN — THERA TABS 1 TABLET: TAB at 09:48

## 2022-02-16 RX ADMIN — CLINDAMYCIN PHOSPHATE 600 MG: 600 INJECTION, SOLUTION INTRAVENOUS at 03:06

## 2022-02-16 RX ADMIN — AMLODIPINE BESYLATE 10 MG: 10 TABLET ORAL at 09:49

## 2022-02-16 RX ADMIN — ENOXAPARIN SODIUM 40 MG: 100 INJECTION SUBCUTANEOUS at 09:49

## 2022-02-16 RX ADMIN — SACUBITRIL AND VALSARTAN 1 TABLET: 97; 103 TABLET, FILM COATED ORAL at 09:49

## 2022-02-16 RX ADMIN — CETIRIZINE HYDROCHLORIDE 10 MG: 10 TABLET, FILM COATED ORAL at 09:49

## 2022-02-16 RX ADMIN — CHLORTHALIDONE 25 MG: 25 TABLET ORAL at 09:49

## 2022-02-16 RX ADMIN — INSULIN LISPRO 4 UNITS: 100 INJECTION, SOLUTION INTRAVENOUS; SUBCUTANEOUS at 09:50

## 2022-02-16 RX ADMIN — OXYCODONE HYDROCHLORIDE AND ACETAMINOPHEN 1 TABLET: 5; 325 TABLET ORAL at 03:04

## 2022-02-16 RX ADMIN — SODIUM CHLORIDE, PRESERVATIVE FREE 10 ML: 5 INJECTION INTRAVENOUS at 10:04

## 2022-02-16 RX ADMIN — LAMOTRIGINE 600 MG: 150 TABLET ORAL at 09:49

## 2022-02-16 ASSESSMENT — PAIN SCALES - GENERAL
PAINLEVEL_OUTOF10: 8
PAINLEVEL_OUTOF10: 0

## 2022-02-16 NOTE — CARE COORDINATION
Case Management            Discharge Note                    Date / Time of Note: 2/16/2022 12:00 PM                  Discharge Note Completed by: KATHARINA Baugh    Patient Name: Bryce Rosario   YOB: 1960  Diagnosis: Aphasia [R47.01]  Seizure ([facility] [R56.9]  Spell of abnormal behavior [R46.89]   Date / Time: 2/13/2022 11:23 PM    Current PCP: Mallika Schuler patient: No    Hospitalization in the last 30 days: No    Advance Directives:  Code Status: Full Code  [place] DNR form completed and on chart: Not Indicated    Financial:  Payor: MEDICARE / Plan: MEDICARE PART A AND B / Product Type: *No Product type* /      Pharmacy:    [facility] [phone] - F [phone]  [address] [place] [address]  Phone: [phone] Fax: [phone]      Assistance purchasing medications?:    Assistance provided by Case Management: None at this time    Does patient want to participate in local refill/ meds to beds program?:      Meds To Beds General Rules:  1. Can ONLY be done Monday- Friday between 8:30am-5pm  2. Prescription(s) must be in pharmacy by 3pm to be filled same day  3. Copy of patient's insurance/ prescription drug card and patient face sheet must be sent along with the prescription(s)  4. Cost of Rx cannot be added to hospital bill. If financial assistance is needed, please contact unit  or ;  or  CANNOT provide pharmacy voucher for patients co-pays  5.  Patients can then  the prescription on their way out of the hospital at discharge, or pharmacy can deliver to the bedside if staff is available. (payment due at time of pick-up or delivery - cash, check, or card accepted)     Able to afford home medications/ co-pay costs: Yes    ADLS:  Current PT AM-PAC Score: 10 /24  Current OT AM-PAC Score: 17 /24      Discharge Disposition: Home- No Services Needed    LOC at discharge: Not Applicable  NISH Completed: Not Indicated    Notification completed in HENS/PAS?:  Not Applicable    IMM Completed:   Not Indicated    Transportation:  Transportation Plan for discharge: family   Mode of Transport: Private Car    Transportation form completed: Not Indicated    Home Care:  [address] ordered at discharge: declined  [address]: Not Applicable     Durable Medical Equipment:  DME Provider: N/A   Equipment obtained during hospitalization: N/A     Home Oxygen and Respiratory Equipment:  Oxygen needed at discharge?: Not Indicated    Additional CM Notes:   SW met with patient at bedside this AM. Patient feels ready for discharge. He has been cleared by the medical team. He denies any home care needs or services at discharge. He has reached out to his sister for transportation to return home. Declined any other SW needs at this time.      The Plan for Transition of Care is related to the following treatment goals Aphasia [R47.01]  Seizure ([address] [R56.9]  Spell of abnormal behavior [R46.89]      The Patient and/or patient representative  was provided with a choice of provider and agrees with the discharge plan Yes    Care Transitions patient: No     KATHARINA Gu  The [address] Work  Ph: [phone]

## 2022-02-16 NOTE — PROGRESS NOTES
Pt discharged with all belongings. IVs removed. Pt's friend at bedside to transfer. All questions answered.

## 2022-02-16 NOTE — PROGRESS NOTES
CONTINUOUS EEG    Name:  Louisa Boland Record Number:  [id number]  Age: 63 y.o. Gender: male  : 1960  Today's Date:  2022  Room:  [facility]-01  Vital Signs   /79   Pulse 81   Temp 97.8 °F (36.6 °C) (Oral)   Resp 16   Wt (!) 447 lb 8 oz (203 kg)   SpO2 92%   BMI 72.23 kg/m²           Continuous EEG Testing Start Time:      Continuous EEG Testing End Time:   12:44PM    Comments: JOE Galdamez pt's test has completed. [facility] was contacted via team viewer. Plan of Care: Removed all leads and cleansed pt's scalp.     Electronically signed by Deon Reyes on 2022 at 1:24 PM

## 2022-02-16 NOTE — DISCHARGE SUMMARY
Hospital Medicine Discharge Summary    Patient ID: Valerie Scott      Patient's PCP: Britany Esquivel    Admit Date: 2/13/2022     Discharge Date:   2/16/2022    Admitting Physician: Steve Ken MD     Discharge Physician: Alexis Raines MD     Discharge Diagnoses: Active Hospital Problems    Diagnosis Date Noted    Seizure [facility]) [R56.9] 02/14/2022       The patient was seen and examined on day of discharge and this discharge summary is in conjunction with any daily progress note from day of discharge. Hospital Course: Patient is 65-year-old gentleman with history of NICM, hypertension, MRDD, seizure, diabetes mellitus type 2 came into ER for confusion concern for seizure. Patient was put on continuous EEG did not showed any epileptiform discharges. Patient mentation improved within 24 hours. Seen and examined on day of discharge denies nausea, vomiting, fever, chills, shortness of breath, chest.  Patient is medically stable to be discharged after clearance from neurology. Neurology recommended follow-up with neurologist at [facility]    Final diagnosis  #Acute metabolic encephalopathy concern for seizures  #Seizures  Continuous EEG did not showed any prepump discharge. Continue home dose of Lamictal.  Follow-up with neurology at [facility]. #Nonischemic cardiomyopathy  #Heart failure reduced EF not in exacerbation  #Diabetes mellitus type 2  #History of MRDD    Physical Exam Performed:     /79   Pulse 81   Temp 97.8 °F (36.6 °C) (Oral)   Resp 16   Wt (!) 447 lb 8 oz (203 kg)   SpO2 92%   BMI 72.23 kg/m²       General appearance: Elderly, no apparent distress, appears stated age and cooperative. HEENT:  Normal cephalic, atraumatic without obvious deformity. Pupils equal, round, and reactive to light. Extra ocular muscles intact. Conjunctivae/corneas clear. Neck: Supple, with full range of motion. No jugular venous distention. Trachea midline. Respiratory:  Normal respiratory effort. Clear to auscultation, bilaterally without Rales/Wheezes/Rhonchi. Cardiovascular:  Regular rate and rhythm with normal S1/S2 without murmurs, rubs or gallops. Abdomen: Soft, non-tender, non-distended with normal bowel sounds. Musculoskeletal:  No clubbing, cyanosis or edema bilaterally. Full range of motion without deformity. Skin: Skin color, texture, turgor normal.  No rashes or lesions. Neurologic:  Neurovascularly intact without any focal sensory/motor deficits. Cranial nerves: II-XII intact, grossly non-focal.  Psychiatric:  Alert and oriented, thought content appropriate, normal insight  Capillary Refill: Brisk,< 3 seconds   Peripheral Pulses: +2 palpable, equal bilaterally       Labs: For convenience and continuity at follow-up the following most recent labs are provided:      CBC:    Lab Results   Component Value Date    WBC 5.4 02/15/2022    HGB 13.3 02/15/2022    HCT 39.8 02/15/2022     02/15/2022       Renal:    Lab Results   Component Value Date     02/15/2022    K 3.4 02/15/2022    CL 96 02/15/2022    CO2 26 02/15/2022    BUN 16 02/15/2022    CREATININE 1.0 02/15/2022    CALCIUM 9.2 02/15/2022    PHOS 2.8 04/10/2018         Significant Diagnostic Studies    Radiology:   XR CHEST PORTABLE   Final Result   No acute findings in the chest.          CT HEAD WO CONTRAST   Final Result      No acute intracranial hemorrhage or mass effect. Mild atrophy. Consults:     IP CONSULT TO HOSPITALIST  IP CONSULT TO HOSPITALIST  IP CONSULT TO NEUROLOGY  IP CONSULT TO HOME CARE NEEDS    Disposition: Home with home care. Condition at Discharge: Stable    Discharge Instructions/Follow-up: PCP, neurology.     Code Status:  Full Code     Activity: activity as tolerated    Diet: diabetic diet      Discharge Medications:     Current Discharge Medication List           Details   cyanocobalamin 1000 MCG tablet Take 1,000 mcg by mouth daily      clindamycin (CLEOCIN) 300 MG capsule Take 300 mg by mouth 4 times daily       carvedilol (COREG) 12.5 MG tablet Take 1 tablet by mouth 2 times daily (with meals)  Qty: 60 tablet, Refills: 2    Associated Diagnoses: Chronic systolic heart failure ([facility] [address] Hypertension, unspecified type      albuterol sulfate HFA (VENTOLIN HFA) 108 (90 Base) MCG/ACT inhaler Inhale 2 puffs into the lungs every 6 hours as needed for Wheezing      glipiZIDE (GLUCOTROL) 5 MG tablet Take 5 mg by mouth 2 times daily (before meals)      sacubitril-valsartan (ENTRESTO)  MG per tablet Take 1 tablet by mouth 2 times daily      montelukast (SINGULAIR) 10 MG tablet Take 10 mg by mouth nightly      chlorthalidone (HYGROTON) 25 MG tablet Take 25 mg by mouth daily       nortriptyline (PAMELOR) 75 MG capsule Take 75 mg by mouth nightly      !! lamoTRIgine (LAMICTAL) 200 MG tablet Take 600 mg by mouth 2 times daily      metFORMIN (GLUCOPHAGE) 1000 MG tablet Take 1,000 mg by mouth 2 times daily (with meals)       amLODIPine (NORVASC) 10 MG tablet Take 10 mg by mouth daily       aspirin 81 MG EC tablet Take 81 mg by mouth daily      famotidine (PEPCID) 40 MG tablet Take 40 mg by mouth daily as needed (indigestion)       loratadine (CLARITIN) 10 MG tablet Take 10 mg by mouth nightly       diazepam (VALIUM) 5 MG tablet Take 5 mg by mouth every 12 hours as needed for Anxiety. oxyCODONE-acetaminophen (PERCOCET) 5-325 MG per tablet Take 1 tablet by mouth every 4 hours as needed.        allopurinol (ZYLOPRIM) 300 MG tablet Take 300 mg by mouth nightly       spironolactone (ALDACTONE) 25 MG tablet Take 0.5 tablets by mouth daily  Qty: 45 tablet, Refills: 1      !! lamoTRIgine (LAMICTAL) 200 MG tablet Take 200 mg by mouth daily as needed      Cholecalciferol (VITAMIN D3) 1.25 MG (53147 UT) CAPS Take 50,000 Units by mouth once a week Takes on Thursdays      atorvastatin (LIPITOR) 80 MG tablet Take 80 mg by mouth nightly       Multiple Vitamin (DAILY TITUS) TABS Take 1 tablet by mouth daily       !! - Potential duplicate medications found. Please discuss with provider. Time Spent on discharge is more than 30 minutes in the examination, evaluation, counseling and review of medications and discharge plan. Signed:    Vincenzo Monte MD   2/16/2022      Thank you Jose Aragon for the opportunity to be involved in this patient's care. If you have any questions or concerns please feel free to contact me at [phone].     This chart was likely completed using voice recognition technology and may contain unintended grammatical , phraseology,and/or punctuation errors

## 2022-02-16 NOTE — PROGRESS NOTES
CONTINUOUS EEG    Name:  Louisa Boland Record Number:  [id number]  Age: 63 y.o. Gender: male  : 1960  Today's Date:  2/15/2022  Room:  [facility]5509-01  Vital Signs   /76   Pulse 73   Temp 97.8 °F (36.6 °C) (Axillary)   Resp 16   Wt (!) 388 lb 14.3 oz (176.4 kg)   SpO2 90%   BMI 62.77 kg/m²       Patient currently on continuous EEG monitoring. All EEG leads are currently in place with no current issues. Verified [employer] connection via team viewer. Checked in with patient RN for current plan of care. Comments: Muliple leads reattached. Continue monitoring. All leads within 10K limit.     Electronically signed by Alvin Evans on 2/15/2022 at 8:53 PM 10

## 2022-02-16 NOTE — PROCEDURES
Continuous EEG monitoring record    Patient name: Yesika Hernandez    START: 02/15/2022 @ 11:00am   END: 02/16/2022 @ 12:45pm       Electroencephalographer: Bess Cardenas MD PhD      CLINICAL DETAILS:  This EEG was performed on this 63 y. o.yo male admitted for Altered mental Status and concer for subclinical seizures      TECHNICAL DETAILS:  Continuous video-EEG monitoring was performed with 27 surface scalp electrodes placed according to the International 10-20 electrode placement system, using a 32-channel [employer] headbox. All EEG and video information was acquired digitally, including the use of automated spike and seizure detection software to detect epileptiform activity. An event button was also available to be depressed during clinical events. 02/15/2022 21:00pm to 12:45pm  on 02/16/2022  SEIZURES:  None  INTERICTAL:  Frequent poorly formed generalized transient discharges   PUSHBUTTONS:  None  BACKGROUND:  Abundant poly frequency delta/theta slowing of the background with frequent superimposed faster frequencies. EKG:  Regular      02/15/2022 11:00am to 21:00pm   SEIZURES:  None  INTERICTAL:  Frequent generalized transient discharges   PUSHBUTTONS:  None  BACKGROUND:  Abundant poly frequency delta/theta slowing of the background with frequent superimposed faster frequencies. EKG:  Regular      CLINICAL INTERPRETATION:  This was an abnormal tracing for age and state due to a moderate to severe generalized slow wave abnormality indicating diffuse cerebral dysfunction which can be of multiple causes, including structural, or vascular abnormalities, toxic/metabolic conditions, hydrocephalus, or postictal conditions. Transient generalized discharges indicate a diffuse cortical hyperexcitability that may be associated with generalized seizures. No seizures were seen during this recording. Clinical correlation is advised.         Bess Cardenas MD PhD
represent seizures. On going status cannot be rules out. Clinical correlation is advised.         Velma Zarate MD PhD

## 2022-02-16 NOTE — PROGRESS NOTES
NEUROLOGY / NEUROCRITICAL CARE PROGRESS NOTE       Patient Name: Jarrett Hutton YOB: 1960   Sex: Male Age: 63 yrs     CC / Reason for Consult: breakthrough seizure    Interval Hx / Changes over last 24 hours:     Reviewed cvEEG report, no electrographic seizures or push button events      ROS: no new complaints    HISTORY   Admission HPI:   The patient is a 63 y.o. male with significant past medical history of epileptic and non-epileptic spells, CAD, CHF, DM 2. He presneted to the ER after an event that appeared generalized tonic clonic movements. He is on Lamotrigine at home. Previously he had been on depakote and phenobarbital.           PMH Past Medical History:   Diagnosis Date    Arthritis     Asthma     Bronchitis     CAD (coronary artery disease)     CHF (congestive heart failure) (HCC)     Chronic back pain     Diabetes mellitus ([address]     Generalized headaches     Gout     Hypertension     MI, old 12/01/2013    Nstemi    Morbid obesity ([facility] [address]     Psychogenic nonepileptic seizure     DX at [facility]- pseudo-seizures    Seizures ([facility])       Allergies Allergies   Allergen Reactions    Amoxicillin     Doxycycline     Ibuprofen     Penicillins Hives    Phenobarbital     Tetracycline     Aspirin Nausea And Vomiting     Sister says he can take the baby asprin      Diet ADULT DIET; Regular; 3 carb choices (45 gm/meal); Low Fat/Low Chol/High Fiber/2 gm Na   Isolation No active isolations     LABS   Metabolic Panel Recent Labs     02/14/22 0156 02/15/22  0604   * 135*   K 4.1 3.4*   CL 97* 96*   CO2 24 26   BUN 16 16   CREATININE 1.0 1.0   GLUCOSE 178* 160*   CALCIUM 9.7 9.2   MG  --  1.60*      CBC / Coags Recent Labs     02/14/22  0156 02/15/22  0604   WBC 6.2 5.4   RBC 4.62 4.17*   HGB 15.0 13.3*   HCT 44.2 39.8*    243      Other No results for input(s): LABA1C, LDLCALC, TRIG, TSH, [id number], FOLATE, LABSALI, COVID19 in the last 72 hours.   No results for input(s): PHENYTOIN, KEPPRA, LACOSA, LAMO, VALPROATE, LACTSEPSIS, LACTA in the last 72 hours. CURRENT SCHEDULED MEDICATIONS   Inpatient Medications     allopurinol, 300 mg, Oral, Nightly    [Held by provider] metFORMIN, 1,000 mg, Oral, BID WC    amLODIPine, 10 mg, Oral, Daily    aspirin, 81 mg, Oral, Daily    cetirizine, 10 mg, Oral, Daily    multivitamin, 1 tablet, Oral, Daily    nortriptyline, 75 mg, Oral, Nightly    lamoTRIgine, 600 mg, Oral, BID    chlorthalidone, 25 mg, Oral, Daily    [Held by provider] glipiZIDE, 5 mg, Oral, BID AC    sacubitril-valsartan, 1 tablet, Oral, BID    montelukast, 10 mg, Oral, Nightly    carvedilol, 12.5 mg, Oral, Once per day on Tue Thu Sat    sodium chloride flush, 5-40 mL, IntraVENous, 2 times per day    enoxaparin, 40 mg, SubCUTAneous, Daily    insulin lispro, 0-12 Units, SubCUTAneous, TID WC    insulin lispro, 0-6 Units, SubCUTAneous, Nightly    clindamycin (CLEOCIN) IV, 600 mg, IntraVENous, Q8H   Infusions    sodium chloride      dextrose        Antibiotics   Recent Abx Admin                   clindamycin (CLEOCIN) 600 mg in dextrose 5 % 50 mL IVPB (mg) 600 mg New Bag 02/16/22 0306     600 mg New Bag 02/15/22 1641                  DIAGNOSTICS   IMAGES:  Images personally reviewed and agree w/ radiology interpretation. CVEEG:  START: 02/15/2022 @ 11:00am   END: 02/16/2022 @ 09:00am       Electroencephalographer: Freda Nix MD PhD      CLINICAL DETAILS:  This EEG was performed on this 63 y. o.yo male admitted for Altered mental Status and concer for subclinical seizures      TECHNICAL DETAILS:  Continuous video-EEG monitoring was performed with 27 surface scalp electrodes placed according to the International 10-20 electrode placement system, using a 32-channel [employer] headbox. All EEG and video information was acquired digitally, including the use of automated spike and seizure detection software to detect epileptiform activity.  An event button was also available to be depressed during clinical events. 02/15/2022 21:00pm to 09:00am on 02/16/2022  SEIZURES:  None  INTERICTAL:  Frequent poorly formed generalized transient discharges   PUSHBUTTONS:  None  BACKGROUND:  Abundant poly frequency delta/theta slowing of the background with frequent superimposed faster frequencies. EKG:  Regular        02/15/2022 11:00am to 21:00pm   SEIZURES:  None  INTERICTAL:  Frequent generalized transient discharges   PUSHBUTTONS:  None  BACKGROUND:  Abundant poly frequency delta/theta slowing of the background with frequent superimposed faster frequencies. EKG:  Regular        CLINICAL INTERPRETATION:  This was an abnormal tracing for age and state due to a moderate to severe generalized slow wave abnormality indicating diffuse cerebral dysfunction which can be of multiple causes, including structural, or vascular abnormalities, toxic/metabolic conditions, hydrocephalus, or postictal conditions. Transient generalized discharges indicate a diffuse cortical hyperexcitability that may be associated with generalized seizures. No seizures were seen during this recording. Clinical correlation is advised. PHYSICAL EXAMINATION     PHYSICAL EXAM:  Vitals:    02/15/22 1442 02/15/22 1845 02/15/22 2346 02/16/22 0257   BP: 118/79 128/76 128/85 122/79   Pulse: 78 73 72 78   Resp: 16 16 16 16   Temp: 97.5 °F (36.4 °C) 97.8 °F (36.6 °C) 98.6 °F (37 °C) 97.5 °F (36.4 °C)   TempSrc: Oral Axillary Axillary Axillary   SpO2: 94% 90% 92% 93%   Weight:             General: Asleep, awakens easily.  no distress, obese   Neurologic  Mental status:   orientation to person, place, time, situation   Attention intact as able to attend well to the exam     Language fluent in conversation   Comprehension intact      Motor Exam:  Strong and symmetric in the bilateral upper and lower extremities      OTHER SYSTEMS:  Cardiovascular: Warm, appears well perfused   Respiratory: Easy, non-labored respiratory pattern   Abdominal: Abdomen is without distention   Extremities: Upper and lower extremities are atraumatic in appearance without deformity. No swelling or erythema. ASSESSMENT & RECOMMENDATIONS   Assessment:63year old man with seizures and psychogenic non-epileptic spells. Will hook up to cvEEG for differential diagnosis of spells. Spell semiology 1: staring with repeated licking of upper lip. 15 seconds. Non-epileptic     Spell semiology 2: generalized tonic clonic      Plan:  -Continue home Lamictal 600mg PO BID  -Discontinue cvEEG as he has been seizure free for 48 hours  -No further inpatient neurologic workup, ok to discharge home from neurologic perspective. Follow up with [facility] Karen [facility]breu at [facility]  -Having a seizure while driving puts that patient at risk for injuring themselves, or others. If a patient drives while having uncontrolled seizures, they may be held liable for injuries to others. No driving until they have been spell free for at least 3 months. Inform patient that in other states and countries the driving limitation may be longer and to check with applicable local laws prior to travel. Injury Risk: Please avoid working from [employer], swimming alone or taking baths in a bathtub, use showers only.            [address] [facility], APRN - CNP   Neurology & Neurocritical Care   Neurology Line: [phone]  [employer]: [facility] NPs  2/16/2022 9:11 AM

## 2022-02-16 NOTE — PROGRESS NOTES
Pt is A&O, VSS. Tolerating diet well. Voiding adequately per urinal with assistance. Up x2 with maxi move and is wheelchair bound at home. Denies any pain, n/v or SOB at this time. Will d/c home later today. All fall precautions in place. Call light within reach.

## 2022-02-16 NOTE — PLAN OF CARE
Problem: Self-Concept:  Goal: Level of anxiety will decrease  Description: Level of anxiety will decrease  Outcome: Ongoing  Pt asleep in bed at this time and showing no s/s of anxiety. Problem: Pain:  Goal: Pain level will decrease  Description: Pain level will decrease  Outcome: Ongoing  Pt asleep in bed at this time and showing no s/s of pain. PRN medications available if needed. Problem: Falls - Risk of:  Goal: Absence of physical injury  Description: Absence of physical injury  Outcome: Ongoing  Fall precautions in place. Bed is in lowest position, wheels locked, bed alarm on, non skid socks on. Call light and bedside table within reach. Pt calls out appropriately. Pt is up x2 with maxi. Will continue to assess and monitor.

## 2022-03-07 ENCOUNTER — APPOINTMENT (OUTPATIENT)
Dept: GENERAL RADIOLOGY | Age: 62
DRG: 101 | End: 2022-03-07
Payer: MEDICARE

## 2022-03-07 ENCOUNTER — APPOINTMENT (OUTPATIENT)
Dept: CT IMAGING | Age: 62
DRG: 101 | End: 2022-03-07
Payer: MEDICARE

## 2022-03-07 ENCOUNTER — HOSPITAL ENCOUNTER (INPATIENT)
Age: 62
LOS: 2 days | Discharge: HOME | DRG: 101 | End: 2022-03-09
Attending: EMERGENCY MEDICINE | Admitting: INTERNAL MEDICINE
Payer: MEDICARE

## 2022-03-07 DIAGNOSIS — R56.9 SEIZURE (HCC): ICD-10-CM

## 2022-03-07 DIAGNOSIS — G40.919 BREAKTHROUGH SEIZURE (HCC): ICD-10-CM

## 2022-03-07 DIAGNOSIS — R41.82 ALTERED MENTAL STATUS, UNSPECIFIED ALTERED MENTAL STATUS TYPE: Primary | ICD-10-CM

## 2022-03-07 DIAGNOSIS — F41.9 ANXIETY: ICD-10-CM

## 2022-03-07 PROBLEM — G93.40 ACUTE ENCEPHALOPATHY: Status: ACTIVE | Noted: 2022-03-07

## 2022-03-07 LAB
ALBUMIN SERPL-MCNC: 3.9 G/DL (ref 3.4–5)
ALP BLD-CCNC: 152 U/L (ref 40–129)
ALT SERPL-CCNC: 19 U/L (ref 10–40)
AMMONIA: 18 UMOL/L (ref 16–60)
ANION GAP SERPL CALCULATED.3IONS-SCNC: 10 MMOL/L (ref 3–16)
AST SERPL-CCNC: 14 U/L (ref 15–37)
BASE EXCESS VENOUS: 3.2 MMOL/L (ref -2–3)
BASOPHILS ABSOLUTE: 0 K/UL (ref 0–0.2)
BASOPHILS RELATIVE PERCENT: 0.5 %
BILIRUB SERPL-MCNC: 0.4 MG/DL (ref 0–1)
BILIRUBIN DIRECT: <0.2 MG/DL (ref 0–0.3)
BILIRUBIN, INDIRECT: ABNORMAL MG/DL (ref 0–1)
BUN BLDV-MCNC: 16 MG/DL (ref 7–20)
CALCIUM SERPL-MCNC: 9.3 MG/DL (ref 8.3–10.6)
CARBOXYHEMOGLOBIN: 1.2 % (ref 0–1.5)
CHLORIDE BLD-SCNC: 97 MMOL/L (ref 99–110)
CO2: 28 MMOL/L (ref 21–32)
CREAT SERPL-MCNC: 1.2 MG/DL (ref 0.9–1.3)
EKG ATRIAL RATE: 95 BPM
EKG DIAGNOSIS: NORMAL
EKG P AXIS: 60 DEGREES
EKG P-R INTERVAL: 182 MS
EKG Q-T INTERVAL: 398 MS
EKG QRS DURATION: 152 MS
EKG QTC CALCULATION (BAZETT): 500 MS
EKG R AXIS: -20 DEGREES
EKG T AXIS: 107 DEGREES
EKG VENTRICULAR RATE: 95 BPM
EOSINOPHILS ABSOLUTE: 0.1 K/UL (ref 0–0.6)
EOSINOPHILS RELATIVE PERCENT: 1.2 %
ETHANOL: NORMAL MG/DL (ref 0–0.08)
GFR AFRICAN AMERICAN: >60
GFR NON-AFRICAN AMERICAN: >60
GLUCOSE BLD-MCNC: 106 MG/DL (ref 70–99)
GLUCOSE BLD-MCNC: 185 MG/DL (ref 70–99)
GLUCOSE BLD-MCNC: 204 MG/DL (ref 70–99)
HCO3 VENOUS: 30.1 MMOL/L (ref 24–28)
HCT VFR BLD CALC: 43.6 % (ref 40.5–52.5)
HEMOGLOBIN, VEN, REDUCED: 64.6 %
HEMOGLOBIN: 14.7 G/DL (ref 13.5–17.5)
LACTIC ACID: 2 MMOL/L (ref 0.4–2)
LIPASE: 48 U/L (ref 13–60)
LYMPHOCYTES ABSOLUTE: 2 K/UL (ref 1–5.1)
LYMPHOCYTES RELATIVE PERCENT: 31.4 %
MCH RBC QN AUTO: 32.5 PG (ref 26–34)
MCHC RBC AUTO-ENTMCNC: 33.8 G/DL (ref 31–36)
MCV RBC AUTO: 96.4 FL (ref 80–100)
METHEMOGLOBIN VENOUS: 0.3 % (ref 0–1.5)
MONOCYTES ABSOLUTE: 0.4 K/UL (ref 0–1.3)
MONOCYTES RELATIVE PERCENT: 5.8 %
NEUTROPHILS ABSOLUTE: 4 K/UL (ref 1.7–7.7)
NEUTROPHILS RELATIVE PERCENT: 61.1 %
O2 SAT, VEN: 34 %
PCO2, VEN: 52.6 MMHG (ref 41–51)
PDW BLD-RTO: 14.6 % (ref 12.4–15.4)
PERFORMED ON: ABNORMAL
PERFORMED ON: ABNORMAL
PH VENOUS: 7.37 (ref 7.35–7.45)
PLATELET # BLD: 278 K/UL (ref 135–450)
PMV BLD AUTO: 8.8 FL (ref 5–10.5)
PO2, VEN: <30 MMHG (ref 25–40)
POTASSIUM REFLEX MAGNESIUM: 4.1 MMOL/L (ref 3.5–5.1)
PRO-BNP: 187 PG/ML (ref 0–124)
RBC # BLD: 4.52 M/UL (ref 4.2–5.9)
SODIUM BLD-SCNC: 135 MMOL/L (ref 136–145)
TCO2 CALC VENOUS: 32 MMOL/L
TOTAL PROTEIN: 6.6 G/DL (ref 6.4–8.2)
TROPONIN: <0.01 NG/ML
TSH REFLEX: 2.8 UIU/ML (ref 0.27–4.2)
VITAMIN B-12: 1164 PG/ML (ref 211–911)
WBC # BLD: 6.5 K/UL (ref 4–11)

## 2022-03-07 PROCEDURE — 36415 COLL VENOUS BLD VENIPUNCTURE: CPT

## 2022-03-07 PROCEDURE — 71045 X-RAY EXAM CHEST 1 VIEW: CPT

## 2022-03-07 PROCEDURE — 83036 HEMOGLOBIN GLYCOSYLATED A1C: CPT

## 2022-03-07 PROCEDURE — 73560 X-RAY EXAM OF KNEE 1 OR 2: CPT

## 2022-03-07 PROCEDURE — 82077 ASSAY SPEC XCP UR&BREATH IA: CPT

## 2022-03-07 PROCEDURE — 70450 CT HEAD/BRAIN W/O DYE: CPT

## 2022-03-07 PROCEDURE — 93005 ELECTROCARDIOGRAM TRACING: CPT | Performed by: EMERGENCY MEDICINE

## 2022-03-07 PROCEDURE — 99285 EMERGENCY DEPT VISIT HI MDM: CPT

## 2022-03-07 PROCEDURE — 85025 COMPLETE CBC W/AUTO DIFF WBC: CPT

## 2022-03-07 PROCEDURE — 84443 ASSAY THYROID STIM HORMONE: CPT

## 2022-03-07 PROCEDURE — 94640 AIRWAY INHALATION TREATMENT: CPT

## 2022-03-07 PROCEDURE — 6370000000 HC RX 637 (ALT 250 FOR IP): Performed by: INTERNAL MEDICINE

## 2022-03-07 PROCEDURE — 83605 ASSAY OF LACTIC ACID: CPT

## 2022-03-07 PROCEDURE — 82140 ASSAY OF AMMONIA: CPT

## 2022-03-07 PROCEDURE — 80076 HEPATIC FUNCTION PANEL: CPT

## 2022-03-07 PROCEDURE — 93010 ELECTROCARDIOGRAM REPORT: CPT | Performed by: INTERNAL MEDICINE

## 2022-03-07 PROCEDURE — 82607 VITAMIN B-12: CPT

## 2022-03-07 PROCEDURE — 2580000003 HC RX 258: Performed by: INTERNAL MEDICINE

## 2022-03-07 PROCEDURE — 84484 ASSAY OF TROPONIN QUANT: CPT

## 2022-03-07 PROCEDURE — 1200000000 HC SEMI PRIVATE

## 2022-03-07 PROCEDURE — 80048 BASIC METABOLIC PNL TOTAL CA: CPT

## 2022-03-07 PROCEDURE — 83690 ASSAY OF LIPASE: CPT

## 2022-03-07 PROCEDURE — 82803 BLOOD GASES ANY COMBINATION: CPT

## 2022-03-07 PROCEDURE — 6360000002 HC RX W HCPCS: Performed by: INTERNAL MEDICINE

## 2022-03-07 PROCEDURE — 83880 ASSAY OF NATRIURETIC PEPTIDE: CPT

## 2022-03-07 RX ORDER — SENNA AND DOCUSATE SODIUM 50; 8.6 MG/1; MG/1
1 TABLET, FILM COATED ORAL 2 TIMES DAILY
Status: DISCONTINUED | OUTPATIENT
Start: 2022-03-07 | End: 2022-03-09 | Stop reason: HOSPADM

## 2022-03-07 RX ORDER — SODIUM CHLORIDE 9 MG/ML
25 INJECTION, SOLUTION INTRAVENOUS PRN
Status: DISCONTINUED | OUTPATIENT
Start: 2022-03-07 | End: 2022-03-09 | Stop reason: HOSPADM

## 2022-03-07 RX ORDER — INSULIN LISPRO 100 [IU]/ML
0-6 INJECTION, SOLUTION INTRAVENOUS; SUBCUTANEOUS
Status: DISCONTINUED | OUTPATIENT
Start: 2022-03-07 | End: 2022-03-09 | Stop reason: HOSPADM

## 2022-03-07 RX ORDER — AMLODIPINE BESYLATE 10 MG/1
10 TABLET ORAL DAILY
Status: DISCONTINUED | OUTPATIENT
Start: 2022-03-08 | End: 2022-03-09 | Stop reason: HOSPADM

## 2022-03-07 RX ORDER — INSULIN LISPRO 100 [IU]/ML
0-3 INJECTION, SOLUTION INTRAVENOUS; SUBCUTANEOUS NIGHTLY
Status: DISCONTINUED | OUTPATIENT
Start: 2022-03-07 | End: 2022-03-09 | Stop reason: HOSPADM

## 2022-03-07 RX ORDER — NORTRIPTYLINE HYDROCHLORIDE 25 MG/1
75 CAPSULE ORAL NIGHTLY
Status: DISCONTINUED | OUTPATIENT
Start: 2022-03-07 | End: 2022-03-09 | Stop reason: HOSPADM

## 2022-03-07 RX ORDER — POLYETHYLENE GLYCOL 3350 17 G/17G
17 POWDER, FOR SOLUTION ORAL DAILY PRN
Status: DISCONTINUED | OUTPATIENT
Start: 2022-03-07 | End: 2022-03-09 | Stop reason: HOSPADM

## 2022-03-07 RX ORDER — ACETAMINOPHEN 650 MG/1
650 SUPPOSITORY RECTAL EVERY 6 HOURS PRN
Status: DISCONTINUED | OUTPATIENT
Start: 2022-03-07 | End: 2022-03-09 | Stop reason: HOSPADM

## 2022-03-07 RX ORDER — LAMOTRIGINE 150 MG/1
600 TABLET ORAL 2 TIMES DAILY
Status: DISCONTINUED | OUTPATIENT
Start: 2022-03-07 | End: 2022-03-09 | Stop reason: HOSPADM

## 2022-03-07 RX ORDER — ALBUTEROL SULFATE 90 UG/1
2 AEROSOL, METERED RESPIRATORY (INHALATION) EVERY 6 HOURS PRN
Status: DISCONTINUED | OUTPATIENT
Start: 2022-03-07 | End: 2022-03-09 | Stop reason: HOSPADM

## 2022-03-07 RX ORDER — OXYCODONE HYDROCHLORIDE AND ACETAMINOPHEN 5; 325 MG/1; MG/1
1 TABLET ORAL EVERY 4 HOURS PRN
Status: DISCONTINUED | OUTPATIENT
Start: 2022-03-07 | End: 2022-03-09 | Stop reason: HOSPADM

## 2022-03-07 RX ORDER — DEXTROSE MONOHYDRATE 25 G/50ML
12.5 INJECTION, SOLUTION INTRAVENOUS PRN
Status: DISCONTINUED | OUTPATIENT
Start: 2022-03-07 | End: 2022-03-07

## 2022-03-07 RX ORDER — LANOLIN ALCOHOL/MO/W.PET/CERES
1000 CREAM (GRAM) TOPICAL DAILY
Status: DISCONTINUED | OUTPATIENT
Start: 2022-03-08 | End: 2022-03-09 | Stop reason: HOSPADM

## 2022-03-07 RX ORDER — MONTELUKAST SODIUM 10 MG/1
10 TABLET ORAL NIGHTLY
Status: DISCONTINUED | OUTPATIENT
Start: 2022-03-07 | End: 2022-03-09 | Stop reason: HOSPADM

## 2022-03-07 RX ORDER — CHLORTHALIDONE 25 MG/1
25 TABLET ORAL DAILY
Status: DISCONTINUED | OUTPATIENT
Start: 2022-03-08 | End: 2022-03-09 | Stop reason: HOSPADM

## 2022-03-07 RX ORDER — CARVEDILOL 12.5 MG/1
12.5 TABLET ORAL 2 TIMES DAILY WITH MEALS
Status: DISCONTINUED | OUTPATIENT
Start: 2022-03-07 | End: 2022-03-09 | Stop reason: HOSPADM

## 2022-03-07 RX ORDER — FAMOTIDINE 20 MG/1
40 TABLET, FILM COATED ORAL DAILY PRN
Status: DISCONTINUED | OUTPATIENT
Start: 2022-03-07 | End: 2022-03-09 | Stop reason: HOSPADM

## 2022-03-07 RX ORDER — ATORVASTATIN CALCIUM 40 MG/1
80 TABLET, FILM COATED ORAL NIGHTLY
Status: DISCONTINUED | OUTPATIENT
Start: 2022-03-07 | End: 2022-03-09 | Stop reason: HOSPADM

## 2022-03-07 RX ORDER — SODIUM CHLORIDE 0.9 % (FLUSH) 0.9 %
10 SYRINGE (ML) INJECTION PRN
Status: DISCONTINUED | OUTPATIENT
Start: 2022-03-07 | End: 2022-03-09 | Stop reason: HOSPADM

## 2022-03-07 RX ORDER — ASPIRIN 81 MG/1
81 TABLET ORAL DAILY
Status: DISCONTINUED | OUTPATIENT
Start: 2022-03-07 | End: 2022-03-09 | Stop reason: HOSPADM

## 2022-03-07 RX ORDER — NICOTINE POLACRILEX 4 MG
15 LOZENGE BUCCAL PRN
Status: DISCONTINUED | OUTPATIENT
Start: 2022-03-07 | End: 2022-03-07

## 2022-03-07 RX ORDER — ALLOPURINOL 300 MG/1
300 TABLET ORAL NIGHTLY
Status: DISCONTINUED | OUTPATIENT
Start: 2022-03-07 | End: 2022-03-09 | Stop reason: HOSPADM

## 2022-03-07 RX ORDER — ACETAMINOPHEN 325 MG/1
650 TABLET ORAL EVERY 6 HOURS PRN
Status: DISCONTINUED | OUTPATIENT
Start: 2022-03-07 | End: 2022-03-09 | Stop reason: HOSPADM

## 2022-03-07 RX ORDER — SODIUM CHLORIDE 0.9 % (FLUSH) 0.9 %
10 SYRINGE (ML) INJECTION EVERY 12 HOURS SCHEDULED
Status: DISCONTINUED | OUTPATIENT
Start: 2022-03-07 | End: 2022-03-09 | Stop reason: HOSPADM

## 2022-03-07 RX ORDER — DEXTROSE MONOHYDRATE 50 MG/ML
100 INJECTION, SOLUTION INTRAVENOUS PRN
Status: DISCONTINUED | OUTPATIENT
Start: 2022-03-07 | End: 2022-03-09 | Stop reason: HOSPADM

## 2022-03-07 RX ORDER — ONDANSETRON 2 MG/ML
4 INJECTION INTRAMUSCULAR; INTRAVENOUS EVERY 6 HOURS PRN
Status: DISCONTINUED | OUTPATIENT
Start: 2022-03-07 | End: 2022-03-09 | Stop reason: HOSPADM

## 2022-03-07 RX ORDER — PROMETHAZINE HYDROCHLORIDE 25 MG/1
12.5 TABLET ORAL EVERY 6 HOURS PRN
Status: DISCONTINUED | OUTPATIENT
Start: 2022-03-07 | End: 2022-03-09 | Stop reason: HOSPADM

## 2022-03-07 RX ADMIN — NORTRIPTYLINE HYDROCHLORIDE 75 MG: 25 CAPSULE ORAL at 20:39

## 2022-03-07 RX ADMIN — ATORVASTATIN CALCIUM 80 MG: 40 TABLET, FILM COATED ORAL at 20:39

## 2022-03-07 RX ADMIN — CARVEDILOL 12.5 MG: 12.5 TABLET, FILM COATED ORAL at 17:52

## 2022-03-07 RX ADMIN — SENNOSIDES AND DOCUSATE SODIUM 1 TABLET: 50; 8.6 TABLET ORAL at 20:39

## 2022-03-07 RX ADMIN — SACUBITRIL AND VALSARTAN 1 TABLET: 97; 103 TABLET, FILM COATED ORAL at 20:39

## 2022-03-07 RX ADMIN — OXYCODONE HYDROCHLORIDE AND ACETAMINOPHEN 1 TABLET: 5; 325 TABLET ORAL at 17:52

## 2022-03-07 RX ADMIN — LAMOTRIGINE 600 MG: 150 TABLET ORAL at 20:40

## 2022-03-07 RX ADMIN — INSULIN LISPRO 1 UNITS: 100 INJECTION, SOLUTION INTRAVENOUS; SUBCUTANEOUS at 21:21

## 2022-03-07 RX ADMIN — OXYCODONE HYDROCHLORIDE AND ACETAMINOPHEN 1 TABLET: 5; 325 TABLET ORAL at 22:26

## 2022-03-07 RX ADMIN — SODIUM CHLORIDE, PRESERVATIVE FREE 10 ML: 5 INJECTION INTRAVENOUS at 20:40

## 2022-03-07 RX ADMIN — MONTELUKAST 10 MG: 10 TABLET, FILM COATED ORAL at 20:39

## 2022-03-07 RX ADMIN — ALLOPURINOL 300 MG: 300 TABLET ORAL at 20:39

## 2022-03-07 RX ADMIN — ENOXAPARIN SODIUM 40 MG: 100 INJECTION SUBCUTANEOUS at 17:52

## 2022-03-07 ASSESSMENT — PAIN SCALES - GENERAL
PAINLEVEL_OUTOF10: 9
PAINLEVEL_OUTOF10: 10
PAINLEVEL_OUTOF10: 10

## 2022-03-07 ASSESSMENT — PAIN DESCRIPTION - PAIN TYPE
TYPE: ACUTE PAIN
TYPE: ACUTE PAIN

## 2022-03-07 ASSESSMENT — PAIN DESCRIPTION - PROGRESSION: CLINICAL_PROGRESSION: NOT CHANGED

## 2022-03-07 ASSESSMENT — PAIN DESCRIPTION - ONSET: ONSET: ON-GOING

## 2022-03-07 ASSESSMENT — PAIN DESCRIPTION - ORIENTATION
ORIENTATION: RIGHT;LEFT
ORIENTATION: RIGHT;LEFT

## 2022-03-07 ASSESSMENT — PAIN DESCRIPTION - DESCRIPTORS
DESCRIPTORS: DISCOMFORT;ACHING
DESCRIPTORS: ACHING

## 2022-03-07 ASSESSMENT — PAIN DESCRIPTION - LOCATION
LOCATION: KNEE
LOCATION: KNEE

## 2022-03-07 ASSESSMENT — PAIN DESCRIPTION - FREQUENCY: FREQUENCY: CONTINUOUS

## 2022-03-07 NOTE — ED NOTES
Pt is still unable to urinate at the moment. Patient is still trying to urinate.       Aj Cat, RN  03/07/22 4412

## 2022-03-07 NOTE — ED NOTES
Pt is more alert and quick to respond to questions versus when he first got here.  Family member at bedside believes that he possibly could have had a seizure that could have made him confused when he first got here at the [facility]  03/07/22 9892

## 2022-03-07 NOTE — PROGRESS NOTES
03/07/22 1800   RT Protocol   History Pulmonary Disease 0   Respiratory pattern 0   Breath sounds 2   Cough 0   Indications for Bronchodilator Therapy Decreased or absent breath sounds   Bronchodilator Assessment Score 2

## 2022-03-07 NOTE — ED PROVIDER NOTES
[facility]          ATTENDING PHYSICIAN NOTE       Date of evaluation: 3/7/2022    Chief Complaint     Knee Pain and Altered Mental Status      History of Present Illness     Yesika Hernandez is a 63 y.o. male with a history of NICM, hypertension, MRDD, seizure, diabetes mellitus type 2 who presents from his apartment where he apparently was found on the floor and could not get up off the floor. The patient states that he called his landlord who then called EMS. The patient reports that he gets around in a motorized wheelchair and fell out of his wheelchair. His only complaint of pain was of both knees. The patient is very slow to answer any questions and I cannot get any further history from him. The patient seems extremely confused but denies hitting his head or losing consciousness. Patient is extremely slow to respond to any question answered and then he does not give answers to most questions asked. The patient's friend eventually arrived at bedside and informed me that he sometimes has these periods of confusion and slowness to respond if he is working up to have a seizure. He was admitted February 13-16 after a similar episode. At that time he was seen by neurology and had an EEG which did not show any epileptiform discharges raising the possibility of PNES. He is supposed to be on Lamictal and did take his medicines appropriately. The patient's friend went to check on him last night and she thought he was getting ready to have another 1 of these episodes that might lead to a seizure and that he is still not at baseline mental status right now.     Review of Systems     Review of Systems   Unable to perform ROS: Mental status change       Past Medical, Surgical, Family, and Social History     He has a past medical history of Arthritis, Asthma, Bronchitis, CAD (coronary artery disease), CHF (congestive heart failure) ([facility] [address] Chronic back pain, Diabetes mellitus ([facility] [address] Generalized headaches, Gout, Hypertension, MI, old, Morbid obesity ([facility] Psychogenic nonepileptic seizure, and Seizures ([facility] He has a past surgical history that includes eye surgery. His family history includes Asthma in his mother; Diabetes in his father, mother, and sister; Heart Attack in his father; Heart Disease in his father; High Blood Pressure in his father, mother, and sister; Obesity in his mother. He reports that he has quit smoking. He has never used smokeless tobacco. He reports that he does not drink alcohol and does not use drugs. Medications     Previous Medications    ALBUTEROL SULFATE HFA (VENTOLIN HFA) 108 (90 BASE) MCG/ACT INHALER    Inhale 2 puffs into the lungs every 6 hours as needed for Wheezing    ALLOPURINOL (ZYLOPRIM) 300 MG TABLET    Take 300 mg by mouth nightly     AMLODIPINE (NORVASC) 10 MG TABLET    Take 10 mg by mouth daily     ASPIRIN 81 MG EC TABLET    Take 81 mg by mouth daily    ATORVASTATIN (LIPITOR) 80 MG TABLET    Take 80 mg by mouth nightly     CARVEDILOL (COREG) 12.5 MG TABLET    Take 1 tablet by mouth 2 times daily (with meals)    CHLORTHALIDONE (HYGROTON) 25 MG TABLET    Take 25 mg by mouth daily     CHOLECALCIFEROL (VITAMIN D3) 1.25 MG (65462 UT) CAPS    Take 50,000 Units by mouth once a week Takes on Thursdays    CLINDAMYCIN (CLEOCIN) 300 MG CAPSULE    Take 300 mg by mouth 4 times daily     CYANOCOBALAMIN 1000 MCG TABLET    Take 1,000 mcg by mouth daily    DIAZEPAM (VALIUM) 5 MG TABLET    Take 5 mg by mouth every 12 hours as needed for Anxiety.      FAMOTIDINE (PEPCID) 40 MG TABLET    Take 40 mg by mouth daily as needed (indigestion)     GLIPIZIDE (GLUCOTROL) 5 MG TABLET    Take 5 mg by mouth 2 times daily (before meals)    LAMOTRIGINE (LAMICTAL) 200 MG TABLET    Take 600 mg by mouth 2 times daily    LAMOTRIGINE (LAMICTAL) 200 MG TABLET    Take 200 mg by mouth daily as needed    LORATADINE (CLARITIN) 10 MG TABLET    Take 10 mg by mouth nightly     METFORMIN (GLUCOPHAGE) 1000 MG TABLET    Take 1,000 mg by mouth 2 times daily (with meals)     MONTELUKAST (SINGULAIR) 10 MG TABLET    Take 10 mg by mouth nightly    MULTIPLE VITAMIN (DAILY TITUS) TABS    Take 1 tablet by mouth daily    NORTRIPTYLINE (PAMELOR) 75 MG CAPSULE    Take 75 mg by mouth nightly    OXYCODONE-ACETAMINOPHEN (PERCOCET) 5-325 MG PER TABLET    Take 1 tablet by mouth every 4 hours as needed. SACUBITRIL-VALSARTAN (ENTRESTO)  MG PER TABLET    Take 1 tablet by mouth 2 times daily    SPIRONOLACTONE (ALDACTONE) 25 MG TABLET    Take 0.5 tablets by mouth daily       Allergies     He is allergic to amoxicillin, doxycycline, ibuprofen, penicillins, phenobarbital, tetracycline, and aspirin. Physical Exam     INITIAL VITALS: BP: (!) 141/82, Temp: 98.3 °F (36.8 °C), Pulse: 99, Resp: 16, SpO2: 98 %   Physical Exam  Vitals and nursing note reviewed. Constitutional:       General: He is not in acute distress. Appearance: He is obese. He is not diaphoretic. HENT:      Head: Normocephalic and atraumatic. Eyes:      Extraocular Movements: Extraocular movements intact. Pupils: Pupils are equal, round, and reactive to light. Cardiovascular:      Rate and Rhythm: Normal rate and regular rhythm. Pulmonary:      Effort: Pulmonary effort is normal.      Breath sounds: Normal breath sounds. Abdominal:      General: Bowel sounds are normal. There is no distension. Palpations: Abdomen is soft. Tenderness: There is no abdominal tenderness. Musculoskeletal:         General: Normal range of motion. Skin:     General: Skin is warm and dry. Neurological:      Mental Status: He is alert. He is confused.          Diagnostic Results     EKG   EKG Interpretation    Interpreted by me    Rhythm: normal sinus   Rate: normal  Axis: left  Ectopy: none  Conduction: left bundle branch block (complete)  ST Segments: Nonspecific  T Waves: elevation in v1 and v2  Q Waves: nonspecific    Clinical Impression: Sinus rhythm with left bundle branch block/nonspecific findings      RADIOLOGY:  XR KNEE LEFT (1-2 VIEWS)   Final Result   Impression: Extensive tricompartmental degenerative change. No acute osseous abnormality. XR KNEE RIGHT (1-2 VIEWS)   Final Result   Impression: No acute osseous abnormality. No significant change in the interval.      XR CHEST PORTABLE   Final Result   Impression: Stable cardiomegaly. CT Head WO Contrast   Final Result      No acute intracranial abnormality.           LABS:   Results for orders placed or performed during the hospital encounter of 03/07/22   CBC with Auto Differential   Result Value Ref Range    WBC 6.5 4.0 - 11.0 K/uL    RBC 4.52 4.20 - 5.90 M/uL    Hemoglobin 14.7 13.5 - 17.5 g/dL    Hematocrit 43.6 40.5 - 52.5 %    MCV 96.4 80.0 - 100.0 fL    MCH 32.5 26.0 - 34.0 pg    MCHC 33.8 31.0 - 36.0 g/dL    RDW 14.6 12.4 - 15.4 %    Platelets 724 757 - 486 K/uL    MPV 8.8 5.0 - 10.5 fL    Neutrophils % 61.1 %    Lymphocytes % 31.4 %    Monocytes % 5.8 %    Eosinophils % 1.2 %    Basophils % 0.5 %    Neutrophils Absolute 4.0 1.7 - 7.7 K/uL    Lymphocytes Absolute 2.0 1.0 - 5.1 K/uL    Monocytes Absolute 0.4 0.0 - 1.3 K/uL    Eosinophils Absolute 0.1 0.0 - 0.6 K/uL    Basophils Absolute 0.0 0.0 - 0.2 K/uL   Basic Metabolic Panel w/ Reflex to MG   Result Value Ref Range    Sodium 135 (L) 136 - 145 mmol/L    Potassium reflex Magnesium 4.1 3.5 - 5.1 mmol/L    Chloride 97 (L) 99 - 110 mmol/L    CO2 28 21 - 32 mmol/L    Anion Gap 10 3 - 16    Glucose 185 (H) 70 - 99 mg/dL    BUN 16 7 - 20 mg/dL    CREATININE 1.2 0.9 - 1.3 mg/dL    GFR Non-African American >60 >60    GFR African American >60 >60    Calcium 9.3 8.3 - 10.6 mg/dL   Hepatic Function Panel   Result Value Ref Range    Total Protein 6.6 6.4 - 8.2 g/dL    Albumin 3.9 3.4 - 5.0 g/dL    Alkaline Phosphatase 152 (H) 40 - 129 U/L    ALT 19 10 - 40 U/L    AST 14 (L) 15 - 37 U/L    Total Bilirubin 0.4 0.0 - 1.0 mg/dL Bilirubin, Direct <0.2 0.0 - 0.3 mg/dL    Bilirubin, Indirect see below 0.0 - 1.0 mg/dL   Lipase   Result Value Ref Range    Lipase 48.0 13.0 - 60.0 U/L   Troponin   Result Value Ref Range    Troponin <0.01 <0.01 ng/mL   Brain Natriuretic Peptide   Result Value Ref Range    Pro- (H) 0 - 124 pg/mL   Blood Gas, Venous   Result Value Ref Range    pH, Ramiro 7.366 7.350 - 7.450    pCO2, Ramiro 52.6 (H) 41.0 - 51.0 mmHg    pO2, Ramiro <30.0 25.0 - 40.0 mmHg    HCO3, Venous 30.1 (H) 24.0 - 28.0 mmol/L    Base Excess, Ramiro 3.2 (H) -2.0 - 3.0 mmol/L    O2 Sat, Ramiro 34 Not established %    Carboxyhemoglobin 1.2 0.0 - 1.5 %    MetHgb, Ramiro 0.3 0.0 - 1.5 %    TC02 (Calc), Ramiro 32 mmol/L    Hemoglobin, Ramiro, Reduced 64.60 %   Lactic Acid   Result Value Ref Range    Lactic Acid 2.0 0.4 - 2.0 mmol/L   Ethanol   Result Value Ref Range    Ethanol Lvl None Detected mg/dL   EKG 12 Lead   Result Value Ref Range    Ventricular Rate 95 BPM    Atrial Rate 95 BPM    P-R Interval 182 ms    QRS Duration 152 ms    Q-T Interval 398 ms    QTc Calculation (Bazett) 500 ms    P Axis 60 degrees    R Axis -20 degrees    T Axis 107 degrees    Diagnosis       Normal sinus rhythmNon-specific intra-ventricular conduction blockPossible Lateral infarct , age undeterminedAbnormal ECG       RECENT VITALS:  BP: 127/82,Temp: 98.3 °F (36.8 °C), Pulse: 97, Resp: 16, SpO2: 100 %       ED Course     Nursing Notes, Past Medical Hx, Past Surgical Hx, Social Hx,Allergies, and Family Hx were reviewed. patient was given the following medications:  No orders of the defined types were placed in this encounter. CONSULTS:  IP CONSULT TO HOSPITALIST    MEDICAL DECISIONMAKING / ASSESSMENT / Eh Rishi is a 63 y.o. male presenting from home with altered mental status. The patient cannot give me much in the way of history.   The patient's friend who checked on him last night says that he is not at baseline and seems to be acting the way he does before he is getting ready to have a generalized tonic-clonic seizure. Specifically she says that he does not finish sentences properly or at all when he gets this way. His work-up here including head CT and lab analysis are all unremarkable. A urinalysis and tox screen are pending at this time but given that the patient is nowhere near his baseline according to the friend, he will need to be brought into the hospital.  It appears that he had a similar presentation but with seizure-like activity the last time he came in and had EEG done at that time which did not show any epileptiform discharges. The patient's mental status cleared after about a day in the hospital and he was seen by neuro and subsequently discharged home. Clinical Impression     1.  Altered mental status, unspecified altered mental status type        Disposition     DISPOSITION Admitted 03/07/2022 03:15:00 PM       Lily Hodge MD  03/07/22 [id number]

## 2022-03-07 NOTE — ED TRIAGE NOTES
Patient called his landlord stating that he was on the floor and could not get up off the floor. Landlord called EMS/  Patient normally gets around in in a motorized wheelchair at home. Patient slid out of his wheelchair, and could not get up. Patient is only complaining of bilateral knee pain. Patient seams to be a little confused and slow to answer questions. When asked by this RN if he feels more confused he states yes. Patient denies LOC or hitting his head.

## 2022-03-07 NOTE — H&P
Hospital Medicine History & Physical      PCP: Jae Blue    Date of Admission: 3/7/2022    Date of Service: 3/7/2022    Pt seen/examined on 3/7/2022    Admitted to Inpatient with expected LOS greater than two midnights due to medical therapy. Chief Complaint:     Chief Complaint   Patient presents with    Knee Pain    Altered Mental Status       History Of Present Illness:      63 y.o. male who presented to [facility]. with knee pain after he slid out of his wheelchair. He was unable to get up from the floor. He is not a great historian and thus is unable to provide significant history. In the ED there is concerned that he was altered though we have no idea what his actual baseline is. He does have a history of seizure disorder    Past Medical History:      Reviewed and non-contributory except as noted below      Diagnosis Date    Arthritis     Asthma     Bronchitis     CAD (coronary artery disease)     CHF (congestive heart failure) (HCC)     Chronic back pain     Diabetes mellitus ([address]     Generalized headaches     Gout     Hypertension     MI, old 12/01/2013    [place]    Morbid obesity ([address]     Psychogenic nonepileptic seizure     DX at [facility]- pseudo-seizures    Seizures ([facility] [address]        Past Surgical History:      Reviewed and non-contributory except as noted below      Procedure Laterality Date    EYE SURGERY         Medications Prior to Admission:      Reviewed and non-contributory except as noted below  Prior to Admission medications    Medication Sig Start Date End Date Taking?  Authorizing Provider   cyanocobalamin 1000 MCG tablet Take 1,000 mcg by mouth daily    Historical Provider, MD   clindamycin (CLEOCIN) 300 MG capsule Take 300 mg by mouth 4 times daily     Historical Provider, MD   carvedilol (COREG) 12.5 MG tablet Take 1 tablet by mouth 2 times daily (with meals) 12/10/21   Graciela Aragon MD   spironolactone (ALDACTONE) 25 MG tablet Take 0.5 tablets by mouth daily  Patient not taking: Reported on 2/14/2022 8/11/21   Jovita Haddad,    albuterol sulfate HFA (VENTOLIN HFA) 108 (90 Base) MCG/ACT inhaler Inhale 2 puffs into the lungs every 6 hours as needed for Wheezing    Historical Provider, MD   lamoTRIgine (LAMICTAL) 200 MG tablet Take 200 mg by mouth daily as needed    Historical Provider, MD   glipiZIDE (GLUCOTROL) 5 MG tablet Take 5 mg by mouth 2 times daily (before meals)    Historical Provider, MD   Cholecalciferol (VITAMIN D3) 1.25 MG (38865 UT) CAPS Take 50,000 Units by mouth once a week Takes on Thursdays    Historical Provider, MD   sacubitril-valsartan (ENTRESTO)  MG per tablet Take 1 tablet by mouth 2 times daily    Historical Provider, MD   montelukast (SINGULAIR) 10 MG tablet Take 10 mg by mouth nightly    Historical Provider, MD   chlorthalidone (HYGROTON) 25 MG tablet Take 25 mg by mouth daily     Historical Provider, MD   nortriptyline (PAMELOR) 75 MG capsule Take 75 mg by mouth nightly    Historical Provider, MD   lamoTRIgine (LAMICTAL) 200 MG tablet Take 600 mg by mouth 2 times daily    Historical Provider, MD   atorvastatin (LIPITOR) 80 MG tablet Take 80 mg by mouth nightly     Historical Provider, MD   metFORMIN (GLUCOPHAGE) 1000 MG tablet Take 1,000 mg by mouth 2 times daily (with meals)     Historical Provider, MD   amLODIPine (NORVASC) 10 MG tablet Take 10 mg by mouth daily     Historical Provider, MD   aspirin 81 MG EC tablet Take 81 mg by mouth daily    Historical Provider, MD   famotidine (PEPCID) 40 MG tablet Take 40 mg by mouth daily as needed (indigestion)     Historical Provider, MD   loratadine (CLARITIN) 10 MG tablet Take 10 mg by mouth nightly     Historical Provider, MD   Multiple Vitamin (DAILY TITUS) TABS Take 1 tablet by mouth daily    Historical Provider, MD   diazepam (VALIUM) 5 MG tablet Take 5 mg by mouth every 12 hours as needed for Anxiety.      Historical Provider, MD   oxyCODONE-acetaminophen (PERCOCET) 5-325 MG per tablet Take 1 tablet by mouth every 4 hours as needed. Historical Provider, MD   allopurinol (ZYLOPRIM) 300 MG tablet Take 300 mg by mouth nightly     Historical Provider, MD       Allergies:     Reviewed and non-contributory except as noted below   Amoxicillin, Doxycycline, Ibuprofen, Penicillins, Phenobarbital, Tetracycline, and Aspirin    Social History:      Reviewed and non-contributory except as noted below    TOBACCO:   reports that he has quit smoking. He has never used smokeless tobacco.  ETOH:   reports no history of alcohol use. Family History:      Reviewed and non-contributory except as noted below        Problem Relation Age of Onset    Asthma Mother     Obesity Mother     High Blood Pressure Mother     Diabetes Mother     Diabetes Father     Heart Disease Father     Heart Attack Father     High Blood Pressure Father     Diabetes Sister     High Blood Pressure Sister        REVIEW OF SYSTEMS:   Pertinent positives and negatives as noted in the HPI. All other systems reviewed and negative.     PHYSICAL EXAM PERFORMED:    /82   Pulse 97   Temp 98.3 °F (36.8 °C) (Oral)   Resp 16   Wt (!) 382 lb (173.3 kg)   SpO2 100%   BMI 61.66 kg/m²     General appearance:  No acute distress, appears stated age  Eyes: Pupils equal, round, reactive to light, conjunctiva/corneas clear  Ears/Nose/Mouth/Throat: No external lesions or scars, hearing intact to voice  Neck: Trachea midline, no masses noted, no thyromegaly  Respiratory:  Non-labored breathing, clear to auscultation bilaterally  Cardiovascular: Regular rate and rhythm, no murmurs, gallops, or rubs  Abdomen: soft, non-tender, non-distended  Musculoskeletal: Warm, well perfused, no cyanosis or edema  Skin: normal color, no wounds noted  Psychiatric: A&Ox4, good insight and judgment    Labs:     Recent Labs     03/07/22  1139   WBC 6.5   HGB 14.7   HCT 43.6        Recent Labs     03/07/22  1139   *   K 4.1   CL 97*   CO2 28   BUN 16   CREATININE 1.2   CALCIUM 9.3     Recent Labs     03/07/22  1139   AST 14*   ALT 19   BILIDIR <0.2   BILITOT 0.4   ALKPHOS 152*     No results for input(s): INR in the last 72 hours. Recent Labs     03/07/22  1139   TROPONINI <0.01       Urinalysis:      Lab Results   Component Value Date    NITRU Negative 03/17/2020    WBCUA 0-2 03/17/2020    BACTERIA 2+ 12/10/2018    RBCUA 0-2 03/17/2020    BLOODU Negative 03/17/2020    SPECGRAV >=1.030 03/17/2020    GLUCOSEU 250 03/17/2020       Radiology:     XR KNEE LEFT (1-2 VIEWS)   Final Result   Impression: Extensive tricompartmental degenerative change. No acute osseous abnormality. XR KNEE RIGHT (1-2 VIEWS)   Final Result   Impression: No acute osseous abnormality. No significant change in the interval.      XR CHEST PORTABLE   Final Result   Impression: Stable cardiomegaly. CT Head WO Contrast   Final Result      No acute intracranial abnormality. ASSESSMENT:    Active Hospital Problems    Diagnosis Date Noted    Acute encephalopathy [G93.40] 03/07/2022       PLAN:    # Acute encephalopathy  -some concern for seizure by family  -neuro consulted  -check tsh, ammonia, B12  -UDS, UA pending  -neuro checks  -continue home AEDs  -NPO pending SLP eval    # Knee pain  -imaging negative  -PT/OT, CM    # Seizures  # HTN  # T2DM  # Asthma  # Gout  -home management except as above    # Morbid obesity  -Complicating assessment and treatment. Placing patient at risk for multiple co-morbidities as well as early death and contributing to the patient's presentation. Counseled on weight loss. DVT Prophylaxis: Lovenox  Diet: No diet orders on file  Code Status: Prior    PT/OT Eval Status: Ongoing    Dispo: Arliss Min pending clinical improvement    Alfreda Mckeon MD    Thank you Harrison Yo for the opportunity to be involved in this patient's care. If you have any questions or concerns please feel free to contact me at [phone].

## 2022-03-08 PROBLEM — G93.40 ENCEPHALOPATHY ACUTE: Status: ACTIVE | Noted: 2022-03-08

## 2022-03-08 PROBLEM — G40.209 NONINTRACTABLE EPILEPSY WITH COMPLEX PARTIAL SEIZURES (HCC): Status: ACTIVE | Noted: 2022-03-07

## 2022-03-08 LAB
AMPHETAMINE SCREEN, URINE: ABNORMAL
ANION GAP SERPL CALCULATED.3IONS-SCNC: 9 MMOL/L (ref 3–16)
BARBITURATE SCREEN URINE: ABNORMAL
BASOPHILS ABSOLUTE: 0.1 K/UL (ref 0–0.2)
BASOPHILS RELATIVE PERCENT: 0.9 %
BENZODIAZEPINE SCREEN, URINE: ABNORMAL
BILIRUBIN URINE: NEGATIVE
BLOOD, URINE: NEGATIVE
BUN BLDV-MCNC: 22 MG/DL (ref 7–20)
CALCIUM SERPL-MCNC: 9.2 MG/DL (ref 8.3–10.6)
CANNABINOID SCREEN URINE: ABNORMAL
CHLORIDE BLD-SCNC: 101 MMOL/L (ref 99–110)
CLARITY: CLEAR
CO2: 26 MMOL/L (ref 21–32)
COCAINE METABOLITE SCREEN URINE: ABNORMAL
COLOR: YELLOW
CREAT SERPL-MCNC: 1.3 MG/DL (ref 0.9–1.3)
EOSINOPHILS ABSOLUTE: 0.1 K/UL (ref 0–0.6)
EOSINOPHILS RELATIVE PERCENT: 1.6 %
ESTIMATED AVERAGE GLUCOSE: 174.3 MG/DL
GFR AFRICAN AMERICAN: >60
GFR NON-AFRICAN AMERICAN: 56
GLUCOSE BLD-MCNC: 150 MG/DL (ref 70–99)
GLUCOSE BLD-MCNC: 150 MG/DL (ref 70–99)
GLUCOSE BLD-MCNC: 180 MG/DL (ref 70–99)
GLUCOSE BLD-MCNC: 210 MG/DL (ref 70–99)
GLUCOSE BLD-MCNC: 243 MG/DL (ref 70–99)
GLUCOSE URINE: NEGATIVE MG/DL
HBA1C MFR BLD: 7.7 %
HCT VFR BLD CALC: 42.2 % (ref 40.5–52.5)
HEMOGLOBIN: 14.1 G/DL (ref 13.5–17.5)
HYALINE CASTS: NORMAL /LPF (ref 0–2)
KETONES, URINE: NEGATIVE MG/DL
LEUKOCYTE ESTERASE, URINE: NEGATIVE
LYMPHOCYTES ABSOLUTE: 2.9 K/UL (ref 1–5.1)
LYMPHOCYTES RELATIVE PERCENT: 45.5 %
Lab: ABNORMAL
MCH RBC QN AUTO: 32.2 PG (ref 26–34)
MCHC RBC AUTO-ENTMCNC: 33.4 G/DL (ref 31–36)
MCV RBC AUTO: 96.4 FL (ref 80–100)
METHADONE SCREEN, URINE: ABNORMAL
MICROSCOPIC EXAMINATION: YES
MONOCYTES ABSOLUTE: 0.4 K/UL (ref 0–1.3)
MONOCYTES RELATIVE PERCENT: 6 %
NEUTROPHILS ABSOLUTE: 2.9 K/UL (ref 1.7–7.7)
NEUTROPHILS RELATIVE PERCENT: 46 %
NITRITE, URINE: NEGATIVE
OPIATE SCREEN URINE: ABNORMAL
OXYCODONE URINE: POSITIVE
PDW BLD-RTO: 14.3 % (ref 12.4–15.4)
PERFORMED ON: ABNORMAL
PH UA: 6
PH UA: 6 (ref 5–8)
PHENCYCLIDINE SCREEN URINE: POSITIVE
PLATELET # BLD: 259 K/UL (ref 135–450)
PMV BLD AUTO: 8.5 FL (ref 5–10.5)
POTASSIUM REFLEX MAGNESIUM: 4.5 MMOL/L (ref 3.5–5.1)
PROPOXYPHENE SCREEN: ABNORMAL
PROTEIN UA: ABNORMAL MG/DL
RBC # BLD: 4.38 M/UL (ref 4.2–5.9)
RBC UA: NORMAL /HPF (ref 0–4)
SODIUM BLD-SCNC: 136 MMOL/L (ref 136–145)
SPECIFIC GRAVITY UA: >=1.03 (ref 1–1.03)
URINE REFLEX TO CULTURE: ABNORMAL
URINE TYPE: ABNORMAL
UROBILINOGEN, URINE: 0.2 E.U./DL
WBC # BLD: 6.4 K/UL (ref 4–11)
WBC UA: NORMAL /HPF (ref 0–5)

## 2022-03-08 PROCEDURE — 81001 URINALYSIS AUTO W/SCOPE: CPT

## 2022-03-08 PROCEDURE — 97166 OT EVAL MOD COMPLEX 45 MIN: CPT

## 2022-03-08 PROCEDURE — 97530 THERAPEUTIC ACTIVITIES: CPT

## 2022-03-08 PROCEDURE — APPNB30 APP NON BILLABLE TIME 0-30 MINS: Performed by: NURSE PRACTITIONER

## 2022-03-08 PROCEDURE — 51798 US URINE CAPACITY MEASURE: CPT

## 2022-03-08 PROCEDURE — 97535 SELF CARE MNGMENT TRAINING: CPT

## 2022-03-08 PROCEDURE — 6370000000 HC RX 637 (ALT 250 FOR IP): Performed by: INTERNAL MEDICINE

## 2022-03-08 PROCEDURE — 85025 COMPLETE CBC W/AUTO DIFF WBC: CPT

## 2022-03-08 PROCEDURE — 80048 BASIC METABOLIC PNL TOTAL CA: CPT

## 2022-03-08 PROCEDURE — 36415 COLL VENOUS BLD VENIPUNCTURE: CPT

## 2022-03-08 PROCEDURE — 6370000000 HC RX 637 (ALT 250 FOR IP): Performed by: NURSE PRACTITIONER

## 2022-03-08 PROCEDURE — 80307 DRUG TEST PRSMV CHEM ANLYZR: CPT

## 2022-03-08 PROCEDURE — 51701 INSERT BLADDER CATHETER: CPT

## 2022-03-08 PROCEDURE — 1200000000 HC SEMI PRIVATE

## 2022-03-08 PROCEDURE — 97162 PT EVAL MOD COMPLEX 30 MIN: CPT

## 2022-03-08 PROCEDURE — 6360000002 HC RX W HCPCS: Performed by: INTERNAL MEDICINE

## 2022-03-08 PROCEDURE — 2580000003 HC RX 258: Performed by: INTERNAL MEDICINE

## 2022-03-08 RX ORDER — LORAZEPAM 0.5 MG/1
0.5 TABLET ORAL 2 TIMES DAILY
Status: DISCONTINUED | OUTPATIENT
Start: 2022-03-08 | End: 2022-03-09 | Stop reason: HOSPADM

## 2022-03-08 RX ADMIN — CYANOCOBALAMIN TAB 1000 MCG 1000 MCG: 1000 TAB at 09:11

## 2022-03-08 RX ADMIN — CHLORTHALIDONE 25 MG: 25 TABLET ORAL at 09:12

## 2022-03-08 RX ADMIN — CARVEDILOL 12.5 MG: 12.5 TABLET, FILM COATED ORAL at 17:40

## 2022-03-08 RX ADMIN — ENOXAPARIN SODIUM 40 MG: 100 INJECTION SUBCUTANEOUS at 09:11

## 2022-03-08 RX ADMIN — ALLOPURINOL 300 MG: 300 TABLET ORAL at 22:55

## 2022-03-08 RX ADMIN — INSULIN LISPRO 1 UNITS: 100 INJECTION, SOLUTION INTRAVENOUS; SUBCUTANEOUS at 12:23

## 2022-03-08 RX ADMIN — ASPIRIN 81 MG: 81 TABLET, COATED ORAL at 09:12

## 2022-03-08 RX ADMIN — LORAZEPAM 0.5 MG: 0.5 TABLET ORAL at 22:57

## 2022-03-08 RX ADMIN — LORAZEPAM 0.5 MG: 0.5 TABLET ORAL at 11:40

## 2022-03-08 RX ADMIN — NORTRIPTYLINE HYDROCHLORIDE 75 MG: 25 CAPSULE ORAL at 22:53

## 2022-03-08 RX ADMIN — LAMOTRIGINE 600 MG: 150 TABLET ORAL at 09:12

## 2022-03-08 RX ADMIN — SODIUM CHLORIDE, PRESERVATIVE FREE 10 ML: 5 INJECTION INTRAVENOUS at 09:13

## 2022-03-08 RX ADMIN — AMLODIPINE BESYLATE 10 MG: 10 TABLET ORAL at 09:11

## 2022-03-08 RX ADMIN — MONTELUKAST 10 MG: 10 TABLET, FILM COATED ORAL at 22:56

## 2022-03-08 RX ADMIN — SACUBITRIL AND VALSARTAN 1 TABLET: 97; 103 TABLET, FILM COATED ORAL at 09:13

## 2022-03-08 RX ADMIN — INSULIN LISPRO 1 UNITS: 100 INJECTION, SOLUTION INTRAVENOUS; SUBCUTANEOUS at 23:00

## 2022-03-08 RX ADMIN — CARVEDILOL 12.5 MG: 12.5 TABLET, FILM COATED ORAL at 09:12

## 2022-03-08 RX ADMIN — SODIUM CHLORIDE, PRESERVATIVE FREE 10 ML: 5 INJECTION INTRAVENOUS at 23:06

## 2022-03-08 RX ADMIN — SACUBITRIL AND VALSARTAN 1 TABLET: 97; 103 TABLET, FILM COATED ORAL at 22:59

## 2022-03-08 RX ADMIN — LAMOTRIGINE 600 MG: 150 TABLET ORAL at 22:58

## 2022-03-08 RX ADMIN — SENNOSIDES AND DOCUSATE SODIUM 1 TABLET: 50; 8.6 TABLET ORAL at 09:11

## 2022-03-08 RX ADMIN — INSULIN LISPRO 2 UNITS: 100 INJECTION, SOLUTION INTRAVENOUS; SUBCUTANEOUS at 17:41

## 2022-03-08 RX ADMIN — INSULIN LISPRO 1 UNITS: 100 INJECTION, SOLUTION INTRAVENOUS; SUBCUTANEOUS at 09:15

## 2022-03-08 RX ADMIN — ATORVASTATIN CALCIUM 80 MG: 40 TABLET, FILM COATED ORAL at 22:55

## 2022-03-08 ASSESSMENT — PAIN SCALES - GENERAL
PAINLEVEL_OUTOF10: 0

## 2022-03-08 NOTE — PROGRESS NOTES
Speech Language Pathology    Received evaluation order. Reviewed chart, d/w Rn; pt currently tolerating regular texture diet & thin liquids w/o difficulty, and imaging negative for acute intracranial abnormality and recent XR chest showing clear lungs. Bedside, spoke with pt and sister; declined speech services at this time, stating back to baseline with no further issues re: speech or swallowing. Will sign-off. If new concerns arise, please re-refer.     Dionte Curiel M.A., Zein[address]  Speech-Language Pathologist

## 2022-03-08 NOTE — PROGRESS NOTES
Occupational Therapy   Occupational Therapy Initial Assessment/Treatment  Date: 3/8/2022   Patient Name: Jb Veloz  MRN: [id number]     : 1960    Date of Service: 3/8/2022    Discharge Recommendations:  Jb Veloz scored a 19/24 on the AM-PAC ADL Inpatient form. Current research shows that an AM-PAC score of 18 or greater is typically associated with a discharge to the patient's home setting. Based on the patient's AM-PAC score, and their current ADL deficits, it is recommended that the patient have 2-3 sessions per week of Occupational Therapy at d/c to increase the patient's independence. At this time, this patient demonstrates the endurance and safety to discharge home with home services and a follow up treatment frequency of 2-3x/wk. Please see assessment section for further patient specific details. If patient discharges prior to next session this note will serve as a discharge summary. Please see below for the latest assessment towards goals. OT Equipment Recommendations  Equipment Needed: No    Assessment   Performance deficits / Impairments: Decreased functional mobility ; Decreased ADL status; Decreased endurance  Assessment: Pt typically lives alone at home and uses electric w/c for all mobility 2/2 bilat chronic knee issues. Pt had recent admission for seizures and had d/c home alone, declined therapy at that time. Pt now slightly below his baseline related to gross deconditioning, required assist of 2 for pivot transfer and cga for brief standing balance, anticipate pt requires assist for LB ADLs. Pt would benefit from therapy and initial 24hr assist for safety at d/c. Pt currently declining rehab or home therapy.  States his girlfriend can assist at d/c if needed but she is currently out of town  Treatment Diagnosis: Impaired ADLs, mobility  Prognosis: Good  Decision Making: Medium Complexity  REQUIRES OT FOLLOW UP: Yes  Activity Tolerance  Activity Tolerance: Patient Tolerated treatment well  Safety Devices  Safety Devices in place: Yes  Type of devices: Nurse notified; Left in chair;Call light within reach; Chair alarm in place           Treatment Diagnosis: Impaired ADLs, mobility      Restrictions  Position Activity Restriction  Other position/activity restrictions: Up as tolerated    Subjective   General  Chart Reviewed: Yes  Additional Pertinent Hx: 62 y/o male AMS and B knee pain s/p fall. Pt was dx with acute encephalopathy. CT-head: (-) acute. XR-chest: (-) acute. XR-R knee: (-) acute. XR-L knee: Extensive tricompartmental degenerative change. PMH: Asthma, bronchitis, CAD, CHF, chronic back pain, DM, gout, HTN, morbid obesity, seizures. Family / Caregiver Present: No  Referring Practitioner: Alix Mead  Diagnosis: acute encephalopathy  Subjective  Subjective: Pt in bed upon entry. Agreeable to OT with education and encouragement. \"My sister calls me every day at 8 and 10.\" \"These seizures happen with no warning\"  Patient Currently in Pain: Denies    Social/Functional History  Social/Functional History  Lives With: Alone (girlfriend lives in the building)  Type of Home: Apartment (1st floor)  Home Layout: One level  Home Access: Level entry  Bathroom Shower/Tub: Walk-in shower  Bathroom Toilet: Standard  Bathroom Equipment: Grab bars around toilet,Grab bars in shower,Shower chair,Hand-held shower  Bathroom Accessibility: Wheelchair accessible  Home Equipment: [address] walker  ADL Assistance: [address]: Independent (non-ambulatory, uses electric w/c)  Transfer Assistance: Independent (stand pivot transfers)  Active : No  Patient's  Info: [employer] services- takes to grocery and appointments. Additional Comments: Reports 3 falls in past 6 months-- fell during transfer from bed to w/c. Pt sister checks in with pt every morning and evening.  Pt calls his sister every time he takes his seizure medication. Objective   Vision: Impaired  Vision Exceptions: Wears glasses at all times  Hearing: Exceptions to [facility] (has bilateral hearing aids at home)  Hearing Exceptions: Hard of hearing/hearing concerns      Orientation  Overall Orientation Status: Within Functional Limits        Balance  Sitting Balance: Supervision  Standing Balance: Minimal assistance (brief stance ~10 seconds)     ADL  Feeding: Independent  Grooming: Independent;Setup  LE Dressing: Dependent/Total (don socks- pt typically completes in long-sitting on bed but limited by baseboard of hospital bed)  Toileting:  (condom catheter in place for urine sample)       Tone RUE  RUE Tone: Normotonic  Tone LUE  LUE Tone: Normotonic  Coordination  Movements Are Fluid And Coordinated: Yes        Bed mobility  Supine to Sit: Minimal assistance (HOB elevated, requires R hand held assist to pull to upright sitting position, increased effort noted)  Scooting: Contact guard assistance     Transfers  Stand Pivot Transfers: Dependent/Total (min A of 2 bed>chair (simulated w/c transfer))  Sit to stand: Dependent/Total (min A of 2 from bed; then cga from chair with armrests)  Stand to sit: Contact guard assistance (to chair x2)        Cognition  Overall Cognitive Status: WFL  Cognition Comment: decreased recall of recent events, some decreased insight and problem solving (Baseline?)     Sensation  Overall Sensation Status: WFL        LUE AROM (degrees)  LUE AROM : WFL (shoulder ~120 (baseline); WFL distally)  RUE AROM (degrees)  RUE AROM : WFL (shoulder ~120 (baseline); WFL distally)  LUE Strength  Gross LUE Strength: WFL  RUE Strength  Gross RUE Strength: WFL               Treatment consisted of:  ADLs, transfer training, education on activity promotion and fall precautions.         Plan   Plan  Times per week: 2-5     AM-PAC Score   AM-[facility] Inpatient Daily Activity Raw Score: 19 (03/08/22 1146)  AM-PAC Inpatient ADL T-Scale Score : 40.22 (03/08/22 1146)  ADL Inpatient CMS 0-100% Score: 42.8 (03/08/22 1146)  ADL Inpatient CMS G-Code Modifier : CK (03/08/22 1146)    Goals  Short term goals  Time Frame for Short term goals: d/c  Short term goal 1: pivot transfer to BSC/chair with sba  Short term goal 2: sba LB dressing  Short term goal 3: sba toileting       Therapy Time   Individual Concurrent Group Co-treatment   Time In 0808         Time Out 0851         Minutes 43         Timed Code Treatment Minutes: 28 Minutes +15 min elver Jose, OT

## 2022-03-08 NOTE — PROGRESS NOTES
Pt A/Ox4. VSS. Neurology saw patient today and started ativan bridge. PT/OT worked with patient; stand and pivot x2 to chair. Pt refusing HHC or SNF. Pt unable to urinate. Bladder scan completed with result of 997ml. Straight cath performed per PRN order. UDS and UA collected and sent to lab. Redness and excoriation continues to ABD pannus. Inter-dry placed due to moisture. Currently resting in bed with CLWR and fall precautions in place. All needs met at this time.

## 2022-03-08 NOTE — PROGRESS NOTES
Pt being admitted to room 5308 from ED. Patient seems to be oriented however unable to answer all questions and becomes teraful. Sister (Mariana Field) at bedside stated this happens after patient has a seizure. VSS. All admission navigators completed with help of sister and patient resting comfortably. This RN asked sister to bring in [facility] paperwork. Unable to obtain ordered urine due to patient unable to urinate at this time. Pt refusing to be straight cathed. Condom cath placed to collect. Seizure precautions in place. Bariatric bed ordered. Neurology consulted. Oriented patient to call light and educated on fall risk. Pt had no further questions. All needs met. CLWR. Bed alarm on and locked at lowest position. No other needs at this time.

## 2022-03-08 NOTE — PROGRESS NOTES
Physical Therapy    Facility/Department: [facility]  Initial Assessment and Treatment    NAME: Melania Peacock  : 1960  MRN: [id number]    Date of Service: 3/8/2022    Discharge Recommendations: Melania Peacock scored a 13/24 on the AM-PAC short mobility form. Current research shows that an AM-PAC score of 17 or less is typically not associated with a discharge to the patient's home setting. Based on the patient's AM-PAC score and their current functional mobility deficits, it is recommended that the patient have 3-5 sessions per week of Physical Therapy at d/c to increase the patient's independence. Please see assessment section for further patient specific details. If patient discharges prior to next session this note will serve as a discharge summary. Please see below for the latest assessment towards goals. PT Equipment Recommendations  Equipment Needed: No    Assessment   Body structures, Functions, Activity limitations: Decreased functional mobility ; Decreased strength;Decreased safe awareness;Decreased endurance;Decreased balance;Decreased coordination  Assessment: Pt from home with AMS and B knee pain s/p fall off electric w/c. Pt recently admitted with seizure activity. Pt with decreased independent mobility from baseline currently requiring min assist x2 with stand pivot transfer. Pt is non-ambulatory at baseline using electric w/c at home and community however transfers without AD. Pt required min assist x1 with bed mobility and min assist x2 with transfers. Pt is currently a fall risk and safety concerns for pt returning home alone however pt refusing SNF placement and home therapy despite unavailable 24-hr assist. Pt would benefit from skilled PT to maximize functional mobility and independence.   Treatment Diagnosis: Decreased strength and mobility associated with inactivity  Prognosis: Fair  Decision Making: Medium Complexity  PT Education: Goals;PT Role;Plan of Care;General Safety; Functional Mobility Training  Patient Education: Pt educated on role of PT and d/c recommendations. Pt verbalized understanding however would benefit from reinforcement. REQUIRES PT FOLLOW UP: Yes  Activity Tolerance  Activity Tolerance: Patient limited by fatigue;Patient limited by endurance       Patient Diagnosis(es): The encounter diagnosis was Altered mental status, unspecified altered mental status type. has a past medical history of Arthritis, Asthma, Bronchitis, CAD (coronary artery disease), CHF (congestive heart failure) ([facility] [address] Chronic back pain, Diabetes mellitus ([facility] [address] Generalized headaches, Gout, Hypertension, MI, old, Morbid obesity ([facility] [address] Psychogenic nonepileptic seizure, and Seizures ([facility] [address] has a past surgical history that includes eye surgery. Restrictions  Position Activity Restriction  Other position/activity restrictions: Up as tolerated  Vision/Hearing  Vision: Impaired  Vision Exceptions: Wears glasses at all times  Hearing: Exceptions to [facility]  Hearing Exceptions: Hard of hearing/hearing concerns     Subjective  General  Chart Reviewed: Yes  Additional Pertinent Hx: Mr. Feliciano Mendoza is a 62 y/o male AMS and B knee pain s/p fall. Pt was dx with acute encephalopathy. CT-head: (-) acute. XR-chest: (-) acute. XR-R knee: (-) acute. XR-L knee: Extensive tricompartmental degenerative change. PMH: Asthma, bronchitis, CAD, CHF, chronic back pain, DM, gout, HTN, morbid obesity, seizures. Family / Caregiver Present: No  Referring Practitioner: Valentina Rodgers MD  Referral Date : 03/07/22  Diagnosis: Acute encephalopathy  Subjective  Subjective: Pt presents lying supine in bed. Pt agreeable to PT with encouragement. Pt states \"My sister calls me at 8 and 10\".   Pain Screening  Patient Currently in Pain: Denies  Vital Signs  Patient Currently in Pain: Denies       Orientation  Orientation  Overall Orientation Status: Within Functional Limits  Social/Functional History  Social/Functional History  Lives With: Alone (girlfriend lives in the building)  Type of Home: Apartment (1st floor)  Home Layout: One level  Home Access: Level entry  Bathroom Shower/Tub: Walk-in shower  Bathroom Toilet: Standard  Bathroom Equipment: Grab bars around toilet,Grab bars in shower,Shower chair,Hand-held shower  Bathroom Accessibility: Wheelchair accessible  Home Equipment: [address] walker  ADL Assistance: [address]: Independent  Ambulation Assistance: Independent (non-ambulatory, uses electric w/c)  Transfer Assistance: Independent (stand pivot transfers)  Active : No  Patient's  Info: [employer] services- takes to grocery and appointments. Additional Comments: Reports 3 falls in past 6 months-- fell during transfer from bed to w/c. Pt sister checks in with pt every morning and evening. Pt calls his sister every time he takes his seizure medication.   Cognition        Objective          AROM RLE (degrees)  RLE AROM: WFL  AROM LLE (degrees)  LLE AROM : WFL  Strength RLE  Strength RLE: WFL  Comment: Global weakness assessed to be at least 3/5 as observed with functional mobility  Strength LLE  Strength LLE: WFL  Comment: Global weakness assessed to be at least 3/5 as observed with functional mobility        Bed mobility  Supine to Sit: Minimal assistance (HOB elevated, requires R hand held assist to pull to upright sitting position, increased effort noted)  Scooting: Contact guard assistance  Transfers  Sit to Stand: 2 Person Assistance; min assist x2 from bed elevated,CGA x1 from bedside chair)  Stand to sit: Minimal Assistance;2 Person Assistance (Required VC's for hand placement and controlled descent)  Bed to Chair: Minimal assistance;2 Person Assistance (Stand pivot with quick descent into chair)  Ambulation  Ambulation?: Yes  Ambulation 1  Device: No Device  Assistance: Minimal assistance;2 Person assistance  Quality of Gait: Unsteady, large JERSON, quick and impulsive  Gait Deviations: Increased JERSON; Decreased step length;Decreased step height  Distance: 2-3 steps  Comments: VC's required for hand placement and quick/uncontrolled descent into bedside chair demonstrated. Balance  Posture: Good  Sitting - Static: Good (SBA)  Sitting - Dynamic: Good (SBA)  Standing - Static: Fair (Able to maintain static standing balance x 20 seconds with CGA and min sway)  Standing - Dynamic: Poor (Min assist x2 from bed to chair, non-ambulatory at baseline)        Plan   Plan  Times per week: 2-5  Current Treatment Recommendations: Strengthening,Balance Training,Functional Mobility Training,Transfer Jose Angel Muir Re-education,Safety Education & Training,Patient/Caregiver Education & Training  Safety Devices  Type of devices: Call light within reach,Chair alarm in place,Left in chair,Nurse notified    G-Code       OutComes Score                                                  AM-PAC Score            Goals  Short term goals  Time Frame for Short term goals: D/C  Short term goal 1: Pt to perform supine to sit transfer independently  Short term goal 2: Pt to perform STS transfer with SBA  Short term goal 3: Pt to perform bed to chair transfer with CGA x1  Patient Goals   Patient goals : To return home       Therapy Time   Individual Concurrent Group Co-treatment   Time In 0813         Time Out 0855         Minutes 42               Timed Code Treatment Minutes:   27    Total Treatment Minutes:  COLLEEN De La Rosa     Therapist was present, directed the patient's care, made skilled judgement, and was responsible for assessment and treatment of the patient.   Louis Hoyos, [address] [place] [address]

## 2022-03-08 NOTE — CONSULTS
Neurology / [facility] Note    Sharri Myers MD is requesting this consult. Reason for Consult: Seizure  Admission Chief Complaint: found on floor, altered    History of Present Illness     Gianfranco Dunlap is a 63 y.o. y/o male with PMH significant for asthma, CAD, CHF, DM, MI, PNES, and seizures who presented from home after being found on the floor. He is amnestic to the event. When he first arrived to [facility], he was aphasic and tearful. He had an admission in mid-January. At that time, he had a GTC at home and was initially aphasic. He was hooked up to cvEEG and he did not have electrographic seizures but he did have some poly spike wave activity. Prior AEDs: depakote, phenobarbital. He underwent EMU monitoring in the fall of 2020 which was non-diagnostic. 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 He does not drive. He has his own apartment but his girlfriend is in the same building and checks on him frequently. REVIEW OF SYSTEMS:   Constitutional- No weight loss or fevers   Eyes- No diplopia. No photophobia. Ears/nose/throat- No dysphagia. No Dysarthria   Cardiovascular- No palpitations. No chest pain   Respiratory- No dyspnea. No Cough   Gastrointestinal- No Abdominal pain. No Vomiting. Genitourinary- No incontinence. No urinary retention   Musculoskeletal- No myalgia. No arthralgia   Skin- No rash. No easy bruising. Psychiatric- No depression. No anxiety   Endocrine- No diabetes. No thyroid issues. Hematologic- No bleeding difficulty.  No fatigue   Neurologic- no headache, no weakness    Past Medical, Surgical, Family, and Social History   PAST MEDICAL HISTORY:  Past Medical History:   Diagnosis Date    Arthritis     Asthma     Bronchitis     CAD (coronary artery disease)     CHF (congestive heart failure) (HCC)     Chronic back pain     Diabetes mellitus (HCC)     Generalized headaches     Gout     Hypertension     MI, old 12/01/2013    Nstemi    Morbid obesity ([facility]     Psychogenic nonepileptic seizure     DX at  Neuro- pseudo-seizures    Seizures ([facility]      SURGICAL HISTORY:  Past Surgical History:   Procedure Laterality Date    EYE SURGERY       FAMILY HISTORY & SOCIAL HISTORY:  Family history non-contributory  Family History   Problem Relation Age of Onset    Asthma Mother     Obesity Mother     High Blood Pressure Mother     Diabetes Mother     Diabetes Father     Heart Disease Father     Heart Attack Father     High Blood Pressure Father     Diabetes Sister     High Blood Pressure Sister      Social History     Tobacco History     Smoking Status  Former Smoker    Smokeless Tobacco Use  Never Used          Alcohol History     Alcohol Use Status  No          Drug Use     Drug Use Status  No          Sexual Activity     Sexually Active  Not Currently                Allergies & Outpatient Medications   ALLERGIES:  Allergies   Allergen Reactions    Amoxicillin     Doxycycline     Ibuprofen     Penicillins Hives    Phenobarbital     Tetracycline     Aspirin Nausea And Vomiting     Sister says he can take the baby asprin     HOME MEDICATIONS:  Current Discharge Medication List      CONTINUE these medications which have NOT CHANGED    Details   cyanocobalamin 1000 MCG tablet Take 1,000 mcg by mouth daily      carvedilol (COREG) 12.5 MG tablet Take 1 tablet by mouth 2 times daily (with meals)  Qty: 60 tablet, Refills: 2    Associated Diagnoses: Chronic systolic heart failure ([facility]  Hypertension, unspecified type      albuterol sulfate HFA (VENTOLIN HFA) 108 (90 Base) MCG/ACT inhaler Inhale 2 puffs into the lungs every 6 hours as needed for Wheezing      !! lamoTRIgine (LAMICTAL) 200 MG tablet Take 200 mg by mouth daily as needed      glipiZIDE (GLUCOTROL) 5 MG tablet Take 5 mg by mouth 2 times daily (before meals)      sacubitril-valsartan (ENTRESTO)  MG per tablet Take 1 tablet by mouth 2 times daily montelukast (SINGULAIR) 10 MG tablet Take 10 mg by mouth nightly      chlorthalidone (HYGROTON) 25 MG tablet Take 25 mg by mouth daily       nortriptyline (PAMELOR) 75 MG capsule Take 75 mg by mouth nightly      !! lamoTRIgine (LAMICTAL) 200 MG tablet Take 600 mg by mouth 2 times daily      metFORMIN (GLUCOPHAGE) 1000 MG tablet Take 1,000 mg by mouth 2 times daily (with meals)       amLODIPine (NORVASC) 10 MG tablet Take 10 mg by mouth daily       aspirin 81 MG EC tablet Take 81 mg by mouth daily      famotidine (PEPCID) 40 MG tablet Take 40 mg by mouth daily as needed (indigestion)       loratadine (CLARITIN) 10 MG tablet Take 10 mg by mouth nightly       Multiple Vitamin (DAILY TITUS) TABS Take 1 tablet by mouth daily      diazepam (VALIUM) 5 MG tablet Take 5 mg by mouth every 12 hours as needed for Anxiety. oxyCODONE-acetaminophen (PERCOCET) 5-325 MG per tablet Take 1 tablet by mouth every 4 hours as needed. allopurinol (ZYLOPRIM) 300 MG tablet Take 300 mg by mouth nightly       clindamycin (CLEOCIN) 300 MG capsule Take 300 mg by mouth 4 times daily       spironolactone (ALDACTONE) 25 MG tablet Take 0.5 tablets by mouth daily  Qty: 45 tablet, Refills: 1      Cholecalciferol (VITAMIN D3) 1.25 MG (04227 UT) CAPS Take 50,000 Units by mouth once a week Takes on Thursdays      atorvastatin (LIPITOR) 80 MG tablet Take 80 mg by mouth nightly        !! - Potential duplicate medications found. Please discuss with provider.             Physical Exam   PHYSICAL EXAM:  Vitals:    03/07/22 2229 03/08/22 0240 03/08/22 0752 03/08/22 1139   BP: (!) 148/84 132/69 125/78 100/70   Pulse: 77 84 76 88   Resp: 18 18 18 18   Temp: 98.1 °F (36.7 °C) 98.1 °F (36.7 °C) 97.5 °F (36.4 °C) 97.7 °F (36.5 °C)   TempSrc: Oral Oral Oral Oral   SpO2: 93% 93% 95% 94%   Weight:       Height:             General: Alert, no distress, well-nourished  Neurologic  Mental status:   orientation to person, place, time, situation   Attention intact as able to attend well to the exam     Language fluent in conversation   Comprehension intact; follows simple commands    Cranial nerves:   CN2: Visual fields full w/o extinction on confrontational testing   CN 3,4,6: Pupils equal and reactive to light, extraocular muscles intact  CN5: Facial sensation symmetric   CN7: Face symmetric bilaterally   CN8: Hearing symmetric to spoken voice  CN9: Palate elevated symmetrically  CN11: Traps full strength on shoulder shrug  CN12: Tongue midline with protrusion    Motor Exam:  Strong and symmetric in bilateral upper and lower extremities   Sensory: light touch intact and symmetric in all 4 extremities. OTHER SYSTEMS:  Cardiovascular: Warm, appears well perfused   Respiratory: Easy, non-labored respiratory pattern   Abdominal: Abdomen is without distention   Extremities: Upper and lower extremities are atraumatic in appearance without deformity. No swelling or erythema. Diagnostic Testing Results   IMAGES:  Images personally reviewed and agree w/ radiology interpretation. Head CT w/o Contrast:  No acute intracranial abnormality. LABS:  All results below personally reviewed. Pertinent positives & negatives are addressed in Impression & Recommendations below.      LABS   Metabolic Panel Recent Labs     03/07/22  1139 03/07/22 1619 03/08/22  0533   *  --  136   K 4.1  --  4.5   CL 97*  --  101   CO2 28  --  26   BUN 16  --  22*   CREATININE 1.2  --  1.3   GLUCOSE 185*  --  150*   CALCIUM 9.3  --  9.2   LABALBU 3.9  --   --    ALKPHOS 152*  --   --    ALT 19  --   --    AST 14*  --   --    AMMONIA  --  18  --       CBC / Coags Recent Labs     03/07/22  1139 03/08/22  0533   WBC 6.5 6.4   RBC 4.52 4.38   HGB 14.7 14.1   HCT 43.6 42.2    259      Other Recent Labs     03/07/22  1619 03/07/22  1620   LABA1C 7.7  --    [id number]  --  1164*     Recent Labs     03/07/22  1139   LACTA 2.0          CURRENT SCHEDULED MEDICATIONS   Inpatient Medications     LORazepam, 0.5 mg, Oral, BID    allopurinol, 300 mg, Oral, Nightly    amLODIPine, 10 mg, Oral, Daily    aspirin, 81 mg, Oral, Daily    atorvastatin, 80 mg, Oral, Nightly    carvedilol, 12.5 mg, Oral, BID WC    chlorthalidone, 25 mg, Oral, Daily    cyanocobalamin, 1,000 mcg, Oral, Daily    lamoTRIgine, 600 mg, Oral, BID    montelukast, 10 mg, Oral, Nightly    nortriptyline, 75 mg, Oral, Nightly    sacubitril-valsartan, 1 tablet, Oral, BID    sodium chloride flush, 10 mL, IntraVENous, 2 times per day    sennosides-docusate sodium, 1 tablet, Oral, BID    enoxaparin, 40 mg, SubCUTAneous, Daily    insulin lispro, 0-6 Units, SubCUTAneous, TID WC    insulin lispro, 0-3 Units, SubCUTAneous, Nightly   Infusions    dextrose      sodium chloride        Antibiotics   Recent Abx Admin      No antibiotic orders with administrations found. IMPRESSION & RECOMMENDATIONS     IMPRESSION:  Spell semiology 1: staring with repeated licking of upper lip. 15 seconds. Non -epileptic. Spell semiology 2: generalized shaking. Never captured on cvEEG  Spell Semiology 3: Aphasia and tearfulness. Had on admission 2/22. EEG showed Near continuous fluctuating 1-2 Hz generalized periodic spike and polyspike discharges (GPDs) that do not clear build up or evolve. May still be on ictal spectrum or represent post-ictal state          RECOMMENDATIONS:  -Continue home Lamictal at 600mg PO BID  -Ativan bridge 0.5mg PO BID for 5 days  -Send urine studies to look for UTI  -He would prefer to defer adding new AEDs to his primary neurologist, Dr. Roge Andrade, at [facility]. This is more than reasonable so we will put him on an ativan bridge until he can contact her office.     [address] [facility] [place], [place] - CNP   Neurology & Neurocritical Care   Neurology Line: [phone]  [employer]: [facility] Neurology & Neuro Critical Care NPs  3/8/2022 11:54 AM

## 2022-03-08 NOTE — PROGRESS NOTES
Pt alert and oriented with intermittent aphasia. Pt tearful during these episodes. Pt took evening medications without issue. External cath on pt for urine sample. Pt reporting knee pain, PRN Percocet given, see MAR. Pt resting comfortably in bed. Pt has call light within reach, bed in lowest position with wheels locked, 2/4 side rails up, AVASYs in place for seizure precautions, and bed alarm on. Will continue to monitor.

## 2022-03-08 NOTE — CARE COORDINATION
CM following, spoke with pt about PT OT evals and recs for [facility] vs SNF. Pt states he is WC bound at home and will NOT go to SNF and did not want HHC as he has [facility]AY and they check on him. Spoke about extra care provided by [facility] and extra eyes on him for his safety, he cont to decline HHC. Pt plans to DC home with family support and [employer] services.    Electronically signed by Maribel Hill RN on 3/8/2022 at 5:03 PM   [phone]

## 2022-03-08 NOTE — PLAN OF CARE
Problem: Falls - Risk of:  Goal: Will remain free from falls  Description: Will remain free from falls  Outcome: Ongoing  Goal: Absence of physical injury  Description: Absence of physical injury  Outcome: Ongoing     Problem: Pain:  Goal: Pain level will decrease  Description: Pain level will decrease  Outcome: Ongoing  Goal: Control of acute pain  Description: Control of acute pain  Outcome: Ongoing  Goal: Control of chronic pain  Description: Control of chronic pain  Outcome: Ongoing     Problem: Skin Integrity:  Goal: Will show no infection signs and symptoms  Description: Will show no infection signs and symptoms  Outcome: Ongoing  Goal: Absence of new skin breakdown  Description: Absence of new skin breakdown  Outcome: Ongoing     Problem: OXYGENATION/RESPIRATORY FUNCTION  Goal: Patient will maintain patent airway  Outcome: Ongoing  Goal: Patient will achieve/maintain normal respiratory rate/effort  Description: Respiratory rate and effort will be within normal limits for the patient  Outcome: Ongoing     Problem: HEMODYNAMIC STATUS  Goal: Patient has stable vital signs and fluid balance  Outcome: Ongoing     Problem: FLUID AND ELECTROLYTE IMBALANCE  Goal: Fluid and electrolyte balance are achieved/maintained  Outcome: Ongoing     Problem: ACTIVITY INTOLERANCE/IMPAIRED MOBILITY  Goal: Mobility/activity is maintained at optimum level for patient  Outcome: Ongoing

## 2022-03-08 NOTE — PROGRESS NOTES
Hospitalist Progress Note      PCP: Harrison Yo    Date of Admission: 3/7/2022    Chief Complaint: Knee pain and altered mental status    Hospital Course: Admitted for acute encephalopathy sec to seizures. Started on Ativan bridge per neurology. Continuing antiepileptic    Subjective: Patient sitting in chair. Denies any complaints. Thinks that he is at his baseline. Medications:  Reviewed    Infusion Medications    dextrose      sodium chloride       Scheduled Medications    allopurinol  300 mg Oral Nightly    amLODIPine  10 mg Oral Daily    aspirin  81 mg Oral Daily    atorvastatin  80 mg Oral Nightly    carvedilol  12.5 mg Oral BID WC    chlorthalidone  25 mg Oral Daily    cyanocobalamin  1,000 mcg Oral Daily    lamoTRIgine  600 mg Oral BID    montelukast  10 mg Oral Nightly    nortriptyline  75 mg Oral Nightly    sacubitril-valsartan  1 tablet Oral BID    sodium chloride flush  10 mL IntraVENous 2 times per day    sennosides-docusate sodium  1 tablet Oral BID    enoxaparin  40 mg SubCUTAneous Daily    insulin lispro  0-6 Units SubCUTAneous TID WC    insulin lispro  0-3 Units SubCUTAneous Nightly     PRN Meds: famotidine, oxyCODONE-acetaminophen, glucagon (rDNA), dextrose, sodium chloride flush, sodium chloride, promethazine **OR** ondansetron, polyethylene glycol, acetaminophen **OR** acetaminophen, glucose, dextrose bolus (hypoglycemia) **OR** dextrose bolus (hypoglycemia), albuterol sulfate HFA      Intake/Output Summary (Last 24 hours) at 3/8/2022 0902  Last data filed at 3/8/2022 0753  Gross per 24 hour   Intake 120 ml   Output 0 ml   Net 120 ml       Physical Exam Performed:    /78   Pulse 76   Temp 97.5 °F (36.4 °C) (Oral)   Resp 18   Ht 5' 10\" (1.778 m)   Wt (!) 374 lb 1.9 oz (169.7 kg)   SpO2 95%   BMI 53.68 kg/m²     General appearance: No apparent distress, appears stated age and cooperative.   Morbidly obese  HEENT: Pupils equal, round, and reactive to light. Conjunctivae/corneas clear. Neck: Supple, with full range of motion. No jugular venous distention. Trachea midline. Respiratory:  Normal respiratory effort. Clear to auscultation, bilaterally without Rales/Wheezes/Rhonchi. Cardiovascular: Regular rate and rhythm with normal S1/S2 without murmurs, rubs or gallops. Abdomen: Soft, non-tender, non-distended with normal bowel sounds. Musculoskeletal: No clubbing, cyanosis or edema bilaterally. Full range of motion without deformity. Skin: Skin color, texture, turgor normal.  No rashes or lesions. Neurologic:  Grossly non-focal.  Psychiatric: Alert and oriented X3  Capillary Refill: Brisk,< 3 seconds   Peripheral Pulses: +2 palpable, equal bilaterally       Labs:   Recent Labs     03/07/22  1139 03/08/22  0533   WBC 6.5 6.4   HGB 14.7 14.1   HCT 43.6 42.2    259     Recent Labs     03/07/22  1139 03/08/22  0533   * 136   K 4.1 4.5   CL 97* 101   CO2 28 26   BUN 16 22*   CREATININE 1.2 1.3   CALCIUM 9.3 9.2     Recent Labs     03/07/22  1139   AST 14*   ALT 19   BILIDIR <0.2   BILITOT 0.4   ALKPHOS 152*     No results for input(s): INR in the last 72 hours. Recent Labs     03/07/22  1139   TROPONINI <0.01       Urinalysis:      Lab Results   Component Value Date    NITRU Negative 03/17/2020    WBCUA 0-2 03/17/2020    BACTERIA 2+ 12/10/2018    RBCUA 0-2 03/17/2020    BLOODU Negative 03/17/2020    SPECGRAV >=1.030 03/17/2020    GLUCOSEU 250 03/17/2020       Radiology:  XR KNEE LEFT (1-2 VIEWS)   Final Result   Impression: Extensive tricompartmental degenerative change. No acute osseous abnormality. XR KNEE RIGHT (1-2 VIEWS)   Final Result   Impression: No acute osseous abnormality. No significant change in the interval.      XR CHEST PORTABLE   Final Result   Impression: Stable cardiomegaly. CT Head WO Contrast   Final Result      No acute intracranial abnormality.               Assessment/Plan:    Acute metabolic encephalopathy secondary to seizures  TSH and ammonia within normal limits. B12 level elevated  Urine analysis with reflex to culture pending  Continue home medication  Started on Ativan bridge per neurology  Neurology following, appreciate recommendation    Seizures  Continue home medication  Patient to follow-up with primary neurologist on discharge    Knee pain  Imaging negative for fracture. Left knee with extensive tricompartmental degenerative change  Continue as needed Tylenol  Continue PT OT    Hypertension/chronic systolic heart failure  Monitor blood pressure  Continue home medication    Diabetes mellitus type 2:  Holding home medication  Monitor blood glucose  Continue carb controlled diet with sliding scale insulin    Asthma:  Continue Singulair  Continue inhalers and breathing treatment    Gout:  Continue allopurinol    Morbid obesity  Morbid obesity due to excess calories Body mass index is 53.68 kg/m². - Complicating assessment and treatment. Placing patient at risk for multiple co-morbidities as well as early death and contributing to the patient's presentation.  on weight loss when appropriate.       DVT Prophylaxis: Lovenox  Diet: ADULT DIET; Regular; 4 carb choices (60 gm/meal)  Code Status: Full Code    PT/OT Eval Status:  Following    Dispo -pending clinical course    Meli Frausto MD

## 2022-03-08 NOTE — PROGRESS NOTES
4 Eyes Admission Assessment     I agree as the admission nurse that 2 RN's have performed a thorough Head to Toe Skin Assessment on the patient. ALL assessment sites listed below have been assessed on admission. Areas assessed by both nurses:   [x]   Head, Face, and Ears   [x]   Shoulders, Back, and Chest  [x]   Arms, Elbows, and Hands   [x]   Coccyx, Sacrum, and Ischium  [x]   Legs, Feet, and Heels        Does the Patient have Skin Breakdown?   Yes, unstageable underneath 2nd L toe, stage 2 on outside of L foot, excoriation/redness under pannus, scrapes to BLE         James Prevention initiated:  Yes   Wound Care Orders initiated:  NA      [facility] nurse consulted for Pressure Injury (Stage 3,4, Unstageable, DTI, NWPT, and Complex wounds) or James score 18 or lower:  NA      Nurse 1 eSignature: Electronically signed by Anjelica Pierce RN on 3/7/22 at 7:24 PM EST    **SHARE this note so that the co-signing nurse is able to place an eSignature**    Nurse 2 eSignature: Electronically signed by Austin Shea RN on 3/7/22 at 7:25 PM EST

## 2022-03-09 VITALS
DIASTOLIC BLOOD PRESSURE: 63 MMHG | WEIGHT: 315 LBS | HEIGHT: 70 IN | SYSTOLIC BLOOD PRESSURE: 97 MMHG | HEART RATE: 75 BPM | OXYGEN SATURATION: 92 % | RESPIRATION RATE: 18 BRPM | TEMPERATURE: 98 F | BODY MASS INDEX: 45.1 KG/M2

## 2022-03-09 LAB
GLUCOSE BLD-MCNC: 143 MG/DL (ref 70–99)
GLUCOSE BLD-MCNC: 231 MG/DL (ref 70–99)
PERFORMED ON: ABNORMAL
PERFORMED ON: ABNORMAL

## 2022-03-09 PROCEDURE — 94660 CPAP INITIATION&MGMT: CPT

## 2022-03-09 PROCEDURE — 6370000000 HC RX 637 (ALT 250 FOR IP): Performed by: INTERNAL MEDICINE

## 2022-03-09 PROCEDURE — 2580000003 HC RX 258: Performed by: INTERNAL MEDICINE

## 2022-03-09 PROCEDURE — 6360000002 HC RX W HCPCS: Performed by: INTERNAL MEDICINE

## 2022-03-09 PROCEDURE — 6370000000 HC RX 637 (ALT 250 FOR IP): Performed by: NURSE PRACTITIONER

## 2022-03-09 RX ORDER — LORAZEPAM 0.5 MG/1
0.5 TABLET ORAL 2 TIMES DAILY
Qty: 7 TABLET | Refills: 0 | Status: SHIPPED | OUTPATIENT
Start: 2022-03-09 | End: 2022-03-13

## 2022-03-09 RX ADMIN — LORAZEPAM 0.5 MG: 0.5 TABLET ORAL at 08:28

## 2022-03-09 RX ADMIN — OXYCODONE HYDROCHLORIDE AND ACETAMINOPHEN 1 TABLET: 5; 325 TABLET ORAL at 06:16

## 2022-03-09 RX ADMIN — ENOXAPARIN SODIUM 40 MG: 100 INJECTION SUBCUTANEOUS at 08:29

## 2022-03-09 RX ADMIN — ASPIRIN 81 MG: 81 TABLET, COATED ORAL at 08:28

## 2022-03-09 RX ADMIN — INSULIN LISPRO 2 UNITS: 100 INJECTION, SOLUTION INTRAVENOUS; SUBCUTANEOUS at 12:10

## 2022-03-09 RX ADMIN — SODIUM CHLORIDE, PRESERVATIVE FREE 10 ML: 5 INJECTION INTRAVENOUS at 08:29

## 2022-03-09 RX ADMIN — SENNOSIDES AND DOCUSATE SODIUM 1 TABLET: 50; 8.6 TABLET ORAL at 08:28

## 2022-03-09 RX ADMIN — LAMOTRIGINE 600 MG: 150 TABLET ORAL at 08:29

## 2022-03-09 RX ADMIN — SACUBITRIL AND VALSARTAN 1 TABLET: 97; 103 TABLET, FILM COATED ORAL at 08:29

## 2022-03-09 RX ADMIN — AMLODIPINE BESYLATE 10 MG: 10 TABLET ORAL at 08:28

## 2022-03-09 RX ADMIN — CYANOCOBALAMIN TAB 1000 MCG 1000 MCG: 1000 TAB at 08:28

## 2022-03-09 RX ADMIN — CARVEDILOL 12.5 MG: 12.5 TABLET, FILM COATED ORAL at 16:09

## 2022-03-09 RX ADMIN — CHLORTHALIDONE 25 MG: 25 TABLET ORAL at 08:28

## 2022-03-09 RX ADMIN — INSULIN LISPRO 1 UNITS: 100 INJECTION, SOLUTION INTRAVENOUS; SUBCUTANEOUS at 09:00

## 2022-03-09 RX ADMIN — CARVEDILOL 12.5 MG: 12.5 TABLET, FILM COATED ORAL at 08:28

## 2022-03-09 ASSESSMENT — PAIN SCALES - GENERAL: PAINLEVEL_OUTOF10: 9

## 2022-03-09 NOTE — DISCHARGE SUMMARY
Hospital Medicine Discharge Summary    Patient ID: [employer]      Patient's PCP: Ly Smith    Admit Date: 3/7/2022     Discharge Date:   03/09/22    Admitting Physician: Marcille Krabbe, MD     Discharge Physician: Deyvi Camacho MD     Discharge Diagnoses: Active Hospital Problems    Diagnosis Date Noted    Encephalopathy acute [G93.40] 03/08/2022    Altered mental status [R41.82]     Nonintractable epilepsy with complex partial seizures ([facility] [G40.209] 03/07/2022    Breakthrough seizure ([facility] Yuliana Sa 08/09/2021    Morbid obesity with BMI of 50.0-59.9, adult ([facility] [E66.01, Z68.43] 05/16/2014       The patient was seen and examined on day of discharge and this discharge summary is in conjunction with any daily progress note from day of discharge. Hospital Course:   Patient was admitted and treated for following:    Acute metabolic encephalopathy secondary to seizures  TSH, ammonia, B12 was ordered. Patient was continued on home antiepileptics and neurology was consulted. TSH and ammonia was within normal limits. B12 level elevated. Urine analysis was negative. Neurology started patient on Ativan bridge. Patient mental status improved and wanted to follow-up with the primary neurologist.  Patient is being discharged in stable condition with recommendation to continue antiepileptics and complete Ativan bridge. Do not take Valium for anxiety while completing Ativan course and follow-up with primary neurologist    Seizures  Patient was continued on home medication. Patient to follow-up with primary neurologist on discharge      Knee pain  Imaging was negative for fracture. Left knee with extensive tricompartmental degenerative change. As needed Tylenol was given     Hypertension/chronic systolic heart failure  Blood pressure was monitored and home medication were continued.     Diabetes mellitus type 2:  Home medication were held. Blood glucose well monitored.   Patient was continued on carb controlled diet with sliding scale insulin. Home medication were resumed on discharge.     Asthma:  Singulair, inhalers and breathing treatment were continued     Gout:  Allopurinol was continued      Physical Exam Performed:     /63   Pulse 81   Temp 97.8 °F (36.6 °C) (Oral)   Resp 17   Ht 5' 10\" (1.778 m)   Wt (!) 375 lb 14.2 oz (170.5 kg)   SpO2 98%   BMI 53.93 kg/m²       General appearance:  No apparent distress, appears stated age and cooperative. Morbidly obese  HEENT:  Normal cephalic, atraumatic without obvious deformity. Pupils equal, round, and reactive to light. Extra ocular muscles intact. Conjunctivae/corneas clear. Neck: Supple, with full range of motion. No jugular venous distention. Trachea midline. Respiratory:  Normal respiratory effort. Clear to auscultation, bilaterally without Rales/Wheezes/Rhonchi. Cardiovascular:  Regular rate and rhythm with normal S1/S2 without murmurs, rubs or gallops. Abdomen: Soft, non-tender, non-distended with normal bowel sounds. Musculoskeletal:  No clubbing, cyanosis or edema bilaterally. Full range of motion without deformity. Skin: Skin color, texture, turgor normal.  No rashes or lesions. Neurologic:  Neurovascularly intact without any focal sensory/motor deficits. Cranial nerves: II-XII intact, grossly non-focal.  Psychiatric:  Alert and oriented, thought content appropriate, normal insight  Capillary Refill: Brisk,< 3 seconds   Peripheral Pulses: +2 palpable, equal bilaterally       Labs:  For convenience and continuity at follow-up the following most recent labs are provided:      CBC:    Lab Results   Component Value Date    WBC 6.4 03/08/2022    HGB 14.1 03/08/2022    HCT 42.2 03/08/2022     03/08/2022       Renal:    Lab Results   Component Value Date     03/08/2022    K 4.5 03/08/2022     03/08/2022    CO2 26 03/08/2022    BUN 22 03/08/2022    CREATININE 1.3 03/08/2022    CALCIUM 9.2 03/08/2022 PHOS 2.8 04/10/2018         Significant Diagnostic Studies    Radiology:   XR KNEE LEFT (1-2 VIEWS)   Final Result   Impression: Extensive tricompartmental degenerative change. No acute osseous abnormality. XR KNEE RIGHT (1-2 VIEWS)   Final Result   Impression: No acute osseous abnormality. No significant change in the interval.      XR CHEST PORTABLE   Final Result   Impression: Stable cardiomegaly. CT Head WO Contrast   Final Result      No acute intracranial abnormality. Consults:     IP CONSULT TO HOSPITALIST  IP CONSULT TO NEUROLOGY  IP CONSULT TO CASE MANAGEMENT    Disposition:  Home    Condition at Discharge: Stable    Discharge Instructions/Follow-up:  Complete ativan (lorazepam course). Last day 03/12/22. Do not take valium (diazepam) till 03/12/22. May resume on 03/13/22  Follow up with neurology as soon as possible   Follow up with PCP     Code Status:  Full Code    Activity: activity as tolerated    Diet: diabetic diet      Discharge Medications:     Current Discharge Medication List           Details   LORazepam (ATIVAN) 0.5 MG tablet Take 1 tablet by mouth 2 times daily for 7 doses. Qty: 7 tablet, Refills: 0    Associated Diagnoses: Seizure ([address] Breakthrough seizure ([address]              Details   cyanocobalamin 1000 MCG tablet Take 1,000 mcg by mouth daily      carvedilol (COREG) 12.5 MG tablet Take 1 tablet by mouth 2 times daily (with meals)  Qty: 60 tablet, Refills: 2    Associated Diagnoses: Chronic systolic heart failure ([address]  Hypertension, unspecified type      albuterol sulfate HFA (VENTOLIN HFA) 108 (90 Base) MCG/ACT inhaler Inhale 2 puffs into the lungs every 6 hours as needed for Wheezing      !! lamoTRIgine (LAMICTAL) 200 MG tablet Take 200 mg by mouth daily as needed      glipiZIDE (GLUCOTROL) 5 MG tablet Take 5 mg by mouth 2 times daily (before meals)      sacubitril-valsartan (ENTRESTO)  MG per tablet Take 1 tablet by mouth 2 times daily      montelukast (SINGULAIR) 10 MG tablet Take 10 mg by mouth nightly      chlorthalidone (HYGROTON) 25 MG tablet Take 25 mg by mouth daily       nortriptyline (PAMELOR) 75 MG capsule Take 75 mg by mouth nightly      !! lamoTRIgine (LAMICTAL) 200 MG tablet Take 600 mg by mouth 2 times daily      metFORMIN (GLUCOPHAGE) 1000 MG tablet Take 1,000 mg by mouth 2 times daily (with meals)       amLODIPine (NORVASC) 10 MG tablet Take 10 mg by mouth daily       aspirin 81 MG EC tablet Take 81 mg by mouth daily      famotidine (PEPCID) 40 MG tablet Take 40 mg by mouth daily as needed (indigestion)       loratadine (CLARITIN) 10 MG tablet Take 10 mg by mouth nightly       Multiple Vitamin (DAILY TITUS) TABS Take 1 tablet by mouth daily      diazepam (VALIUM) 5 MG tablet Take 5 mg by mouth every 12 hours as needed for Anxiety. oxyCODONE-acetaminophen (PERCOCET) 5-325 MG per tablet Take 1 tablet by mouth every 4 hours as needed. allopurinol (ZYLOPRIM) 300 MG tablet Take 300 mg by mouth nightly       spironolactone (ALDACTONE) 25 MG tablet Take 0.5 tablets by mouth daily  Qty: 45 tablet, Refills: 1      Cholecalciferol (VITAMIN D3) 1.25 MG (83671 UT) CAPS Take 50,000 Units by mouth once a week Takes on Thursdays      atorvastatin (LIPITOR) 80 MG tablet Take 80 mg by mouth nightly        !! - Potential duplicate medications found. Please discuss with provider. Time Spent on discharge is more than 30 minutes in the examination, evaluation, counseling and review of medications and discharge plan. Signed:    Joaquín Rosen MD   3/9/2022      Thank you Sadie Morales for the opportunity to be involved in this patient's care. If you have any questions or concerns please feel free to contact me at [phone].

## 2022-03-09 NOTE — PLAN OF CARE
Problem: Pain:  Goal: Pain level will decrease  Description: Pain level will decrease  3/9/2022 0226 by Alex Rodriguez RN  Outcome: Met This Shift  Note: Pt denies pain. 3/8/2022 1714 by Emilio Davenport RN  Outcome: Ongoing     Problem: Falls - Risk of:  Goal: Will remain free from falls  Description: Will remain free from falls  3/9/2022 0226 by Alex Rodriguez RN  Outcome: Ongoing  Note: Pt continues to remain free from falls. Pt OOB and stands at bedside, voiding via urinal. Pt returned to bed. Bed alarm on, wheels locked, and bed in lowest position. Non-skid socks on. Camera monitor in room for safety. Call light and bedside table within reach.    3/8/2022 1714 by Emilio Davenport RN  Outcome: Ongoing     Problem: Falls - Risk of:  Goal: Absence of physical injury  Description: Absence of physical injury  3/9/2022 0226 by Alex Rodriguez RN  Outcome: Ongoing  3/8/2022 1714 by Emilio Davenport RN  Outcome: Ongoing     Problem: Skin Integrity:  Goal: Will show no infection signs and symptoms  Description: Will show no infection signs and symptoms  3/9/2022 0226 by Alex Rodriguez RN  Outcome: Ongoing  3/8/2022 1714 by Emilio Davenport RN  Outcome: Ongoing     Problem: Skin Integrity:  Goal: Absence of new skin breakdown  Description: Absence of new skin breakdown  3/9/2022 0226 by Alex Rodriguez RN  Outcome: Ongoing  3/8/2022 1714 by Emilio Davenport RN  Outcome: Ongoing

## 2022-03-09 NOTE — PROGRESS NOTES
Pt A&O x4, VSS, Pt denying pain. Pt OOB to stand at bedside and able to void via urinal (see flowsheets for output), post void bladder scan 20ml. Pt reports burning w/ urination. Redness and excoriation on abd pannus continues, inner-dry in place. Pt currently resting in bed w/ CPAP on. Pt has no needs at this time. Bed alarm on, wheels locked, and bed in lowest position. Call light and bedside table within reach. Seizure precautions in place, camera in room for safety.

## 2022-03-09 NOTE — CARE COORDINATION
Case Management Assessment            Discharge Note                    Date / Time of Note: 3/9/2022 11:45 AM                  Discharge Note Completed by: Adolfo Malik RN    Patient Name: Denilson Nicole   YOB: 1960  Diagnosis: Acute encephalopathy [G93.40]  Altered mental status, unspecified altered mental status type [R41.82]   Date / Time: 3/7/2022 11:16 AM    Current PCP: Mallika Schuler patient: No    Hospitalization in the last 30 days: No    Advance Directives:  Code Status: Full Code  [place] DNR form completed and on chart: Not Indicated    Financial:  Payor: MEDICARE / Plan: MEDICARE PART A AND B / Product Type: *No Product type* /      Pharmacy:    [facility]/pharmacy #[id number]- [address] - P [phone] - F [phone]  [address] [place] [address]  Phone: [phone] Fax: [phone]      Assistance purchasing medications?: Potential Assistance Purchasing Medications: No  Assistance provided by Case Management: None at this time    Does patient want to participate in local refill/ meds to beds program?:      Meds To Beds General Rules:  1. Can ONLY be done Monday- Friday between 8:30am-5pm  2. Prescription(s) must be in pharmacy by 3pm to be filled same day  3. Copy of patient's insurance/ prescription drug card and patient face sheet must be sent along with the prescription(s)  4. Cost of Rx cannot be added to hospital bill. If financial assistance is needed, please contact unit  or ;  or  CANNOT provide pharmacy voucher for patients co-pays  5.  Patients can then  the prescription on their way out of the hospital at discharge, or pharmacy can deliver to the bedside if staff is available. (payment due at time of pick-up or delivery - cash, check, or card accepted)     Able to afford home medications/ co-pay costs: Yes    ADLS:  Current PT AM-PAC Score: 13 /24  Current OT AM-PAC Score: 19 /24      DISCHARGE Disposition: Home- No Services Needed pt refuse SNF and HHC x 3 offers     LOC at discharge: Not Applicable  NISH Completed: Not Indicated    Notification completed in HENS/PAS?:  Not Applicable    IMM Completed:   Not Indicated    Transportation:  Transportation PLAN for discharge: EMS transportation   Mode of Transport: Ambulance stretcher - BLS  Reason for medical transport: Other: siezures, hx falls, high fall risk, wheelchair bound at baseline  Name of [address] [place] [address],[facility]: [employer] [facility]    Phone: [phone]  Time of Transport: 5pm    Transport form completed: Yes    Home Care:  [address] ordered at discharge: Not [address]: Not Applicable  Orders faxed: No    Durable Medical Equipment:  DME Provider: none  Equipment obtained during hospitalization:     Home Oxygen and Respiratory Equipment:  Oxygen needed at discharge?: Not [address]: Not Applicable  Portable tank available for discharge?: Not Indicated    Dialysis:  Dialysis patient: No    Dialysis Center:  Not Applicable      Additional CM Notes: pt from home refuses SNF and HHC at [facility] states he has ASY and will resume care with them and his sister check on him often. Pt will DC via stretcher transport at 5pm to home     The Plan for Transition of Care is related to the following treatment goals of Acute encephalopathy [G93.40]  Altered mental status, unspecified altered mental status type [R41.82]    The Patient and/or patient representative Art Barnett and his family were provided with a choice of provider and agrees with the discharge plan Yes    Freedom of choice list was provided with basic dialogue that supports the patient's individualized plan of care/goals and shares the quality data associated with the providers.  Not Indicated    Care Transitions patient: No    Oma Sams RN  [facility]  Case Management Department  Ph: [phone]  Fax: [phone]

## 2022-03-09 NOTE — PROGRESS NOTES
Patient was transported to home from hospital via [facility]. IV access was removed. All belongings were sent home with patient.      Electronically signed by Charli Vieira RN on 3/9/2022 at 5:09 PM

## 2022-08-21 ENCOUNTER — HOSPITAL ENCOUNTER (INPATIENT)
Age: 62
LOS: 1 days | Discharge: HOME | DRG: 101 | End: 2022-08-22
Attending: STUDENT IN AN ORGANIZED HEALTH CARE EDUCATION/TRAINING PROGRAM | Admitting: INTERNAL MEDICINE
Payer: MEDICARE

## 2022-08-21 ENCOUNTER — APPOINTMENT (OUTPATIENT)
Dept: CT IMAGING | Age: 62
DRG: 101 | End: 2022-08-21
Payer: MEDICARE

## 2022-08-21 DIAGNOSIS — R56.9 SEIZURE (HCC): Primary | ICD-10-CM

## 2022-08-21 LAB
ALBUMIN SERPL-MCNC: 4.1 G/DL (ref 3.4–5)
ALP BLD-CCNC: 155 U/L (ref 40–129)
ALT SERPL-CCNC: 12 U/L (ref 10–40)
ANION GAP SERPL CALCULATED.3IONS-SCNC: 12 MMOL/L (ref 3–16)
AST SERPL-CCNC: 14 U/L (ref 15–37)
BASOPHILS ABSOLUTE: 0 K/UL (ref 0–0.2)
BASOPHILS RELATIVE PERCENT: 0.8 %
BILIRUB SERPL-MCNC: 0.5 MG/DL (ref 0–1)
BILIRUBIN DIRECT: <0.2 MG/DL (ref 0–0.3)
BILIRUBIN, INDIRECT: ABNORMAL MG/DL (ref 0–1)
BUN BLDV-MCNC: 11 MG/DL (ref 7–20)
CALCIUM SERPL-MCNC: 9.1 MG/DL (ref 8.3–10.6)
CHLORIDE BLD-SCNC: 102 MMOL/L (ref 99–110)
CO2: 26 MMOL/L (ref 21–32)
CREAT SERPL-MCNC: 1 MG/DL (ref 0.9–1.3)
EOSINOPHILS ABSOLUTE: 0.1 K/UL (ref 0–0.6)
EOSINOPHILS RELATIVE PERCENT: 2.4 %
GFR AFRICAN AMERICAN: >60
GFR NON-AFRICAN AMERICAN: >60
GLUCOSE BLD-MCNC: 87 MG/DL (ref 70–99)
GLUCOSE BLD-MCNC: 92 MG/DL (ref 70–99)
GLUCOSE BLD-MCNC: 92 MG/DL (ref 70–99)
HCT VFR BLD CALC: 42.1 % (ref 40.5–52.5)
HEMOGLOBIN: 13.7 G/DL (ref 13.5–17.5)
LYMPHOCYTES ABSOLUTE: 1.7 K/UL (ref 1–5.1)
LYMPHOCYTES RELATIVE PERCENT: 28.6 %
MCH RBC QN AUTO: 31.2 PG (ref 26–34)
MCHC RBC AUTO-ENTMCNC: 32.5 G/DL (ref 31–36)
MCV RBC AUTO: 96 FL (ref 80–100)
MONOCYTES ABSOLUTE: 0.5 K/UL (ref 0–1.3)
MONOCYTES RELATIVE PERCENT: 7.6 %
NEUTROPHILS ABSOLUTE: 3.7 K/UL (ref 1.7–7.7)
NEUTROPHILS RELATIVE PERCENT: 60.6 %
PDW BLD-RTO: 14.3 % (ref 12.4–15.4)
PERFORMED ON: NORMAL
PERFORMED ON: NORMAL
PLATELET # BLD: 219 K/UL (ref 135–450)
PMV BLD AUTO: 8.7 FL (ref 5–10.5)
POTASSIUM SERPL-SCNC: 4.3 MMOL/L (ref 3.5–5.1)
RBC # BLD: 4.38 M/UL (ref 4.2–5.9)
SODIUM BLD-SCNC: 140 MMOL/L (ref 136–145)
TOTAL PROTEIN: 6.5 G/DL (ref 6.4–8.2)
WBC # BLD: 6.1 K/UL (ref 4–11)

## 2022-08-21 PROCEDURE — 80177 DRUG SCRN QUAN LEVETIRACETAM: CPT

## 2022-08-21 PROCEDURE — 80048 BASIC METABOLIC PNL TOTAL CA: CPT

## 2022-08-21 PROCEDURE — 2580000003 HC RX 258: Performed by: INTERNAL MEDICINE

## 2022-08-21 PROCEDURE — 2060000000 HC ICU INTERMEDIATE R&B

## 2022-08-21 PROCEDURE — 99285 EMERGENCY DEPT VISIT HI MDM: CPT

## 2022-08-21 PROCEDURE — 2580000003 HC RX 258: Performed by: STUDENT IN AN ORGANIZED HEALTH CARE EDUCATION/TRAINING PROGRAM

## 2022-08-21 PROCEDURE — 70450 CT HEAD/BRAIN W/O DYE: CPT

## 2022-08-21 PROCEDURE — 83036 HEMOGLOBIN GLYCOSYLATED A1C: CPT

## 2022-08-21 PROCEDURE — 80076 HEPATIC FUNCTION PANEL: CPT

## 2022-08-21 PROCEDURE — 85025 COMPLETE CBC W/AUTO DIFF WBC: CPT

## 2022-08-21 PROCEDURE — 36415 COLL VENOUS BLD VENIPUNCTURE: CPT

## 2022-08-21 PROCEDURE — 6360000002 HC RX W HCPCS: Performed by: INTERNAL MEDICINE

## 2022-08-21 PROCEDURE — 2700000000 HC OXYGEN THERAPY PER DAY

## 2022-08-21 PROCEDURE — 96365 THER/PROPH/DIAG IV INF INIT: CPT

## 2022-08-21 PROCEDURE — 6360000002 HC RX W HCPCS: Performed by: STUDENT IN AN ORGANIZED HEALTH CARE EDUCATION/TRAINING PROGRAM

## 2022-08-21 PROCEDURE — 94761 N-INVAS EAR/PLS OXIMETRY MLT: CPT

## 2022-08-21 PROCEDURE — 1200000000 HC SEMI PRIVATE

## 2022-08-21 RX ORDER — DEXTROSE MONOHYDRATE 100 MG/ML
INJECTION, SOLUTION INTRAVENOUS CONTINUOUS PRN
Status: DISCONTINUED | OUTPATIENT
Start: 2022-08-21 | End: 2022-08-22 | Stop reason: HOSPADM

## 2022-08-21 RX ORDER — FAMOTIDINE 20 MG/1
40 TABLET, FILM COATED ORAL DAILY PRN
Status: DISCONTINUED | OUTPATIENT
Start: 2022-08-21 | End: 2022-08-22 | Stop reason: HOSPADM

## 2022-08-21 RX ORDER — SODIUM CHLORIDE 0.9 % (FLUSH) 0.9 %
10 SYRINGE (ML) INJECTION PRN
Status: DISCONTINUED | OUTPATIENT
Start: 2022-08-21 | End: 2022-08-22 | Stop reason: HOSPADM

## 2022-08-21 RX ORDER — MAGNESIUM SULFATE IN WATER 40 MG/ML
2000 INJECTION, SOLUTION INTRAVENOUS PRN
Status: DISCONTINUED | OUTPATIENT
Start: 2022-08-21 | End: 2022-08-22 | Stop reason: HOSPADM

## 2022-08-21 RX ORDER — AMLODIPINE BESYLATE 10 MG/1
10 TABLET ORAL DAILY
Status: DISCONTINUED | OUTPATIENT
Start: 2022-08-22 | End: 2022-08-22 | Stop reason: HOSPADM

## 2022-08-21 RX ORDER — POTASSIUM CHLORIDE 7.45 MG/ML
10 INJECTION INTRAVENOUS PRN
Status: DISCONTINUED | OUTPATIENT
Start: 2022-08-21 | End: 2022-08-22 | Stop reason: HOSPADM

## 2022-08-21 RX ORDER — ALBUTEROL SULFATE 2.5 MG/3ML
2.5 SOLUTION RESPIRATORY (INHALATION) EVERY 6 HOURS PRN
Status: DISCONTINUED | OUTPATIENT
Start: 2022-08-21 | End: 2022-08-22 | Stop reason: HOSPADM

## 2022-08-21 RX ORDER — LAMOTRIGINE 100 MG/1
200 TABLET ORAL DAILY PRN
Status: DISCONTINUED | OUTPATIENT
Start: 2022-08-22 | End: 2022-08-22 | Stop reason: HOSPADM

## 2022-08-21 RX ORDER — SPIRONOLACTONE 25 MG/1
12.5 TABLET ORAL DAILY
Status: DISCONTINUED | OUTPATIENT
Start: 2022-08-22 | End: 2022-08-22

## 2022-08-21 RX ORDER — SODIUM CHLORIDE 9 MG/ML
INJECTION, SOLUTION INTRAVENOUS PRN
Status: DISCONTINUED | OUTPATIENT
Start: 2022-08-21 | End: 2022-08-22 | Stop reason: HOSPADM

## 2022-08-21 RX ORDER — ASPIRIN 81 MG/1
81 TABLET ORAL DAILY
Status: DISCONTINUED | OUTPATIENT
Start: 2022-08-22 | End: 2022-08-22 | Stop reason: HOSPADM

## 2022-08-21 RX ORDER — MONTELUKAST SODIUM 10 MG/1
10 TABLET ORAL NIGHTLY
Status: DISCONTINUED | OUTPATIENT
Start: 2022-08-21 | End: 2022-08-22 | Stop reason: HOSPADM

## 2022-08-21 RX ORDER — ACETAMINOPHEN 650 MG/1
650 SUPPOSITORY RECTAL EVERY 6 HOURS PRN
Status: DISCONTINUED | OUTPATIENT
Start: 2022-08-21 | End: 2022-08-22 | Stop reason: HOSPADM

## 2022-08-21 RX ORDER — ALLOPURINOL 300 MG/1
300 TABLET ORAL NIGHTLY
Status: DISCONTINUED | OUTPATIENT
Start: 2022-08-21 | End: 2022-08-22 | Stop reason: HOSPADM

## 2022-08-21 RX ORDER — NORTRIPTYLINE HYDROCHLORIDE 25 MG/1
75 CAPSULE ORAL NIGHTLY
Status: DISCONTINUED | OUTPATIENT
Start: 2022-08-21 | End: 2022-08-22 | Stop reason: HOSPADM

## 2022-08-21 RX ORDER — POTASSIUM CHLORIDE 20 MEQ/1
40 TABLET, EXTENDED RELEASE ORAL PRN
Status: DISCONTINUED | OUTPATIENT
Start: 2022-08-21 | End: 2022-08-22 | Stop reason: HOSPADM

## 2022-08-21 RX ORDER — OXYCODONE HYDROCHLORIDE AND ACETAMINOPHEN 5; 325 MG/1; MG/1
1 TABLET ORAL EVERY 4 HOURS PRN
Status: DISCONTINUED | OUTPATIENT
Start: 2022-08-21 | End: 2022-08-22 | Stop reason: HOSPADM

## 2022-08-21 RX ORDER — CARVEDILOL 12.5 MG/1
12.5 TABLET ORAL 2 TIMES DAILY WITH MEALS
Status: DISCONTINUED | OUTPATIENT
Start: 2022-08-22 | End: 2022-08-22

## 2022-08-21 RX ORDER — ATORVASTATIN CALCIUM 80 MG/1
80 TABLET, FILM COATED ORAL NIGHTLY
Status: DISCONTINUED | OUTPATIENT
Start: 2022-08-21 | End: 2022-08-22 | Stop reason: HOSPADM

## 2022-08-21 RX ORDER — LAMOTRIGINE 150 MG/1
600 TABLET ORAL 2 TIMES DAILY
Status: DISCONTINUED | OUTPATIENT
Start: 2022-08-21 | End: 2022-08-22 | Stop reason: HOSPADM

## 2022-08-21 RX ORDER — PROMETHAZINE HYDROCHLORIDE 12.5 MG/1
12.5 TABLET ORAL EVERY 6 HOURS PRN
Status: DISCONTINUED | OUTPATIENT
Start: 2022-08-21 | End: 2022-08-22 | Stop reason: HOSPADM

## 2022-08-21 RX ORDER — DIAZEPAM 5 MG/1
5 TABLET ORAL EVERY 12 HOURS PRN
Status: DISCONTINUED | OUTPATIENT
Start: 2022-08-21 | End: 2022-08-22 | Stop reason: HOSPADM

## 2022-08-21 RX ORDER — INSULIN LISPRO 100 [IU]/ML
0-8 INJECTION, SOLUTION INTRAVENOUS; SUBCUTANEOUS
Status: DISCONTINUED | OUTPATIENT
Start: 2022-08-22 | End: 2022-08-22 | Stop reason: HOSPADM

## 2022-08-21 RX ORDER — SODIUM CHLORIDE 0.9 % (FLUSH) 0.9 %
10 SYRINGE (ML) INJECTION EVERY 12 HOURS SCHEDULED
Status: DISCONTINUED | OUTPATIENT
Start: 2022-08-21 | End: 2022-08-22 | Stop reason: HOSPADM

## 2022-08-21 RX ORDER — ENOXAPARIN SODIUM 100 MG/ML
40 INJECTION SUBCUTANEOUS 2 TIMES DAILY
Status: DISCONTINUED | OUTPATIENT
Start: 2022-08-21 | End: 2022-08-22 | Stop reason: HOSPADM

## 2022-08-21 RX ORDER — ACETAMINOPHEN 325 MG/1
650 TABLET ORAL EVERY 6 HOURS PRN
Status: DISCONTINUED | OUTPATIENT
Start: 2022-08-21 | End: 2022-08-22 | Stop reason: HOSPADM

## 2022-08-21 RX ORDER — ONDANSETRON 2 MG/ML
4 INJECTION INTRAMUSCULAR; INTRAVENOUS EVERY 6 HOURS PRN
Status: DISCONTINUED | OUTPATIENT
Start: 2022-08-21 | End: 2022-08-22 | Stop reason: HOSPADM

## 2022-08-21 RX ORDER — INSULIN LISPRO 100 [IU]/ML
0-4 INJECTION, SOLUTION INTRAVENOUS; SUBCUTANEOUS NIGHTLY
Status: DISCONTINUED | OUTPATIENT
Start: 2022-08-21 | End: 2022-08-22 | Stop reason: HOSPADM

## 2022-08-21 RX ORDER — CHLORTHALIDONE 25 MG/1
25 TABLET ORAL DAILY
Status: DISCONTINUED | OUTPATIENT
Start: 2022-08-22 | End: 2022-08-22 | Stop reason: HOSPADM

## 2022-08-21 RX ADMIN — SODIUM CHLORIDE, PRESERVATIVE FREE 10 ML: 5 INJECTION INTRAVENOUS at 22:00

## 2022-08-21 RX ADMIN — ENOXAPARIN SODIUM 40 MG: 100 INJECTION SUBCUTANEOUS at 21:46

## 2022-08-21 RX ADMIN — SODIUM CHLORIDE 1000 MG: 9 INJECTION, SOLUTION INTRAVENOUS at 18:23

## 2022-08-21 ASSESSMENT — PAIN - FUNCTIONAL ASSESSMENT: PAIN_FUNCTIONAL_ASSESSMENT: NONE - DENIES PAIN

## 2022-08-21 ASSESSMENT — PAIN DESCRIPTION - ORIENTATION: ORIENTATION: RIGHT;LEFT;ANTERIOR

## 2022-08-21 ASSESSMENT — PAIN DESCRIPTION - DESCRIPTORS: DESCRIPTORS: ACHING

## 2022-08-21 ASSESSMENT — PAIN SCALES - GENERAL: PAINLEVEL_OUTOF10: 3

## 2022-08-21 ASSESSMENT — PAIN DESCRIPTION - LOCATION: LOCATION: HEAD

## 2022-08-21 NOTE — ED NOTES
Attempted to call report. Mandy Moss is unavailable at this time.      Amanda Canseco RN  08/21/22 0924

## 2022-08-21 NOTE — ED PROVIDER NOTES
ED Attending Attestation Note     Date of evaluation: 8/21/2022    This patient was seen by the resident. I have seen and examined the patient, agree with the workup, evaluation, management and diagnosis. The care plan has been discussed. My assessment reveals a 77-year-old male with history of seizure disorder, follows with Dr. Cathryn Hoffmann, presents with suspected seizure-like activity followed by difficulty speaking in a postictal state. Patient is alert, follows commands in all 4 extremities, has no signs of CNS infection or recent trauma. On chart review, has a history of both epileptic and nonepileptic seizures.   Will observe for return to baseline and obtain collateral.     Marii Jeong MD  08/21/22 [id number]

## 2022-08-21 NOTE — ED PROVIDER NOTES
[address] [facility] RESIDENT NOTE       Date of evaluation: 8/21/2022    Chief Complaint     Seizures (Pt arrived via EMS after having a seizure while at home per family. Further details unknown at this time as pt is postictal.)      of Present Illness     Piotr Danielson is a 63 y.o. male who presents for having a seizure. Patient has known seizure disorder with concerns for both epilepsy and nonepileptic form episodes in the past.  Patient today presents with his usual seizure-like episode. He took his medications today as he face times with his sister every morning when he does this. Then at 12:00 he called his sister on the phone and was unable to speak which is typical of his postictal state. EMS got him and said that he was nonverbal in route. Here he is able to follow commands move all 4 extremities but is unable to give any further history. Sister states that this seems like his typical episode. He has not missed any other medications. He has follow-up with Dr. Alanna Orellana his neurologist already planned. He has had no recent sick symptoms. He has not had an increase in his seizure burden. Review of Systems     Review of Systems   Unable to perform ROS: Mental status change     Past Medical, Surgical, Family, and Social History     He has a past medical history of Arthritis, Asthma, Bronchitis, CAD (coronary artery disease), CHF (congestive heart failure) ([address] Chronic back pain, Diabetes mellitus ([address] Generalized headaches, Gout, Hypertension, MI, old, Morbid obesity ([address] Psychogenic nonepileptic seizure, and Seizures ([address] He has a past surgical history that includes eye surgery. His family history includes Asthma in his mother; Diabetes in his father, mother, and sister; Heart Attack in his father; Heart Disease in his father; High Blood Pressure in his father, mother, and sister; Obesity in his mother. He reports that he has quit smoking.  He has never used smokeless tobacco. He reports that he does not drink alcohol and does not use drugs.     Medications     Discharge Medication List as of 8/22/2022  5:57 PM        CONTINUE these medications which have NOT CHANGED    Details   sacubitril-valsartan (ENTRESTO) 49-51 MG per tablet Take 1 tablet by mouth 2 times dailyHistorical Med      carvedilol (COREG) 6.25 MG tablet Take 6.25 mg by mouth 2 times daily (with meals)Historical Med      cyanocobalamin 1000 MCG tablet Take 1,000 mcg by mouth dailyHistorical Med      spironolactone (ALDACTONE) 25 MG tablet Take 0.5 tablets by mouth daily, Disp-45 tablet, R-1Normal      albuterol sulfate HFA (PROVENTIL;VENTOLIN;PROAIR) 108 (90 Base) MCG/ACT inhaler Inhale 2 puffs into the lungs every 6 hours as needed for WheezingHistorical Med      !! lamoTRIgine (LAMICTAL) 200 MG tablet Take 200 mg by mouth daily as neededHistorical Med      glipiZIDE (GLUCOTROL) 5 MG tablet Take 5 mg by mouth 2 times daily (before meals)Historical Med      montelukast (SINGULAIR) 10 MG tablet Take 10 mg by mouth nightlyHistorical Med      chlorthalidone (HYGROTON) 25 MG tablet Take 25 mg by mouth daily Historical Med      nortriptyline (PAMELOR) 75 MG capsule Take 75 mg by mouth nightlyHistorical Med      !! lamoTRIgine (LAMICTAL) 200 MG tablet Take 600 mg by mouth 2 times dailyHistorical Med      atorvastatin (LIPITOR) 80 MG tablet Take 80 mg by mouth nightly Historical Med      metFORMIN (GLUCOPHAGE) 1000 MG tablet Take 1,000 mg by mouth 2 times daily (with meals) Historical Med      amLODIPine (NORVASC) 10 MG tablet Take 10 mg by mouth daily Historical Med      aspirin 81 MG EC tablet Take 81 mg by mouth dailyHistorical Med      famotidine (PEPCID) 40 MG tablet Take 40 mg by mouth daily as needed (indigestion) Historical Med      loratadine (CLARITIN) 10 MG tablet Take 10 mg by mouth nightly Historical Med      Multiple Vitamin (DAILY TITUS) TABS Take 1 tablet by mouth dailyHistorical Med diazepam (VALIUM) 5 MG tablet Take 5 mg by mouth every 12 hours as needed for Anxiety. Historical Med      oxyCODONE-acetaminophen (PERCOCET) 5-325 MG per tablet Take 1 tablet by mouth every 4 hours as needed. Historical Med      allopurinol (ZYLOPRIM) 300 MG tablet Take 300 mg by mouth nightly Historical Med       !! - Potential duplicate medications found. Please discuss with provider. Allergies     He is allergic to amoxicillin, doxycycline, ibuprofen, penicillins, phenobarbital, tetracycline, and aspirin. Physical Exam     INITIAL VITALS: BP: (!) 149/83, Temp: 98.2 °F (36.8 °C), Heart Rate: 73, Resp: 16, SpO2: 98 %   Physical Exam  Vitals and nursing note reviewed. Constitutional:       General: He is not in acute distress. Appearance: He is obese. He is not ill-appearing. HENT:      Head: Normocephalic and atraumatic. Nose: Nose normal.      Mouth/Throat:      Mouth: Mucous membranes are moist.      Pharynx: No oropharyngeal exudate. Eyes:      General: No scleral icterus. Extraocular Movements: Extraocular movements intact. Pupils: Pupils are equal, round, and reactive to light. Cardiovascular:      Rate and Rhythm: Normal rate and regular rhythm. Pulses: Normal pulses. Pulmonary:      Effort: Pulmonary effort is normal. No respiratory distress. Breath sounds: Normal breath sounds. Chest:      Chest wall: No tenderness. Abdominal:      General: There is no distension. Palpations: Abdomen is soft. Tenderness: There is no abdominal tenderness. There is no guarding. Comments: dWell-healing wound in his right lower quadrant. Musculoskeletal:         General: No tenderness or signs of injury. Cervical back: Normal range of motion. Right lower leg: No edema. Left lower leg: No edema. Skin:     General: Skin is warm. Capillary Refill: Capillary refill takes less than 2 seconds. Coloration: Skin is not jaundiced. Neurological:      General: No focal deficit present. Mental Status: He is alert and oriented to person, place, and time. Mental status is at baseline. Psychiatric:         Mood and Affect: Mood normal.       DiagnosticResults       RADIOLOGY:  CT HEAD WO CONTRAST   Final Result      No acute intracranial abnormality.           LABS:   Results for orders placed or performed during the hospital encounter of 08/21/22   BMP   Result Value Ref Range    Sodium 140 136 - 145 mmol/L    Potassium 4.3 3.5 - 5.1 mmol/L    Chloride 102 99 - 110 mmol/L    CO2 26 21 - 32 mmol/L    Anion Gap 12 3 - 16    Glucose 92 70 - 99 mg/dL    BUN 11 7 - 20 mg/dL    Creatinine 1.0 0.9 - 1.3 mg/dL    GFR Non-African American >60 >60    GFR African American >60 >60    Calcium 9.1 8.3 - 10.6 mg/dL   CBC with Auto Differential   Result Value Ref Range    WBC 6.1 4.0 - 11.0 K/uL    RBC 4.38 4.20 - 5.90 M/uL    Hemoglobin 13.7 13.5 - 17.5 g/dL    Hematocrit 42.1 40.5 - 52.5 %    MCV 96.0 80.0 - 100.0 fL    MCH 31.2 26.0 - 34.0 pg    MCHC 32.5 31.0 - 36.0 g/dL    RDW 14.3 12.4 - 15.4 %    Platelets 928 413 - 068 K/uL    MPV 8.7 5.0 - 10.5 fL    Neutrophils % 60.6 %    Lymphocytes % 28.6 %    Monocytes % 7.6 %    Eosinophils % 2.4 %    Basophils % 0.8 %    Neutrophils Absolute 3.7 1.7 - 7.7 K/uL    Lymphocytes Absolute 1.7 1.0 - 5.1 K/uL    Monocytes Absolute 0.5 0.0 - 1.3 K/uL    Eosinophils Absolute 0.1 0.0 - 0.6 K/uL    Basophils Absolute 0.0 0.0 - 0.2 K/uL   Hepatic Function Panel   Result Value Ref Range    Total Protein 6.5 6.4 - 8.2 g/dL    Albumin 4.1 3.4 - 5.0 g/dL    Alkaline Phosphatase 155 (H) 40 - 129 U/L    ALT 12 10 - 40 U/L    AST 14 (L) 15 - 37 U/L    Total Bilirubin 0.5 0.0 - 1.0 mg/dL    Bilirubin, Direct <0.2 0.0 - 0.3 mg/dL    Bilirubin, Indirect see below 0.0 - 1.0 mg/dL   Urinalysis   Result Value Ref Range    Color, UA Yellow Straw/Yellow    Clarity, UA Clear Clear    Glucose, Ur Negative Negative mg/dL    Bilirubin Urine Negative Negative    Ketones, Urine Negative Negative mg/dL    Specific Gravity, UA 1.025 1.005 - 1.030    Blood, Urine Negative Negative    pH, UA 6.0 5.0 - 8.0    Protein, UA Negative Negative mg/dL    Urobilinogen, Urine 0.2 <2.0 E.U./dL    Nitrite, Urine Negative Negative    Leukocyte Esterase, Urine Negative Negative    Microscopic Examination Not Indicated     Urine Type NotGiven    Levetiracetam Level   Result Value Ref Range    Levetiracetam Lvl 11.4 6.0 - 46.0 ug/mL    KEPPRA Dose Amt Unknown    Basic Metabolic Panel w/ Reflex to MG   Result Value Ref Range    Sodium 140 136 - 145 mmol/L    Potassium reflex Magnesium 3.8 3.5 - 5.1 mmol/L    Chloride 103 99 - 110 mmol/L    CO2 28 21 - 32 mmol/L    Anion Gap 9 3 - 16    Glucose 112 (H) 70 - 99 mg/dL    BUN 12 7 - 20 mg/dL    Creatinine 1.0 0.9 - 1.3 mg/dL    GFR Non-African American >60 >60    GFR African American >60 >60    Calcium 9.0 8.3 - 10.6 mg/dL   CBC auto differential   Result Value Ref Range    WBC 6.1 4.0 - 11.0 K/uL    RBC 4.01 (L) 4.20 - 5.90 M/uL    Hemoglobin 12.8 (L) 13.5 - 17.5 g/dL    Hematocrit 38.5 (L) 40.5 - 52.5 %    MCV 95.9 80.0 - 100.0 fL    MCH 31.9 26.0 - 34.0 pg    MCHC 33.3 31.0 - 36.0 g/dL    RDW 14.4 12.4 - 15.4 %    Platelets 115 632 - 834 K/uL    MPV 8.6 5.0 - 10.5 fL    Neutrophils % 53.6 %    Lymphocytes % 35.8 %    Monocytes % 7.2 %    Eosinophils % 2.5 %    Basophils % 0.9 %    Neutrophils Absolute 3.3 1.7 - 7.7 K/uL    Lymphocytes Absolute 2.2 1.0 - 5.1 K/uL    Monocytes Absolute 0.4 0.0 - 1.3 K/uL    Eosinophils Absolute 0.2 0.0 - 0.6 K/uL    Basophils Absolute 0.1 0.0 - 0.2 K/uL   Hemoglobin A1c   Result Value Ref Range    Hemoglobin A1C 6.1 See comment %    eAG 128.4 mg/dL   POCT Glucose   Result Value Ref Range    POC Glucose 92 70 - 99 mg/dl    Performed on ACCU-CHEK    POCT Glucose   Result Value Ref Range    POC Glucose 87 70 - 99 mg/dl    Performed on ACCU-CHEK    POCT Glucose   Result Value Ref Range    POC Glucose 114 (H) 70 - 99 mg/dl    Performed on ACCU-CHEK    POCT Glucose   Result Value Ref Range    POC Glucose 103 (H) 70 - 99 mg/dl    Performed on ACCU-CHEK    POCT Glucose   Result Value Ref Range    POC Glucose 188 (H) 70 - 99 mg/dl    Performed on ACCU-CHEK    POCT Glucose   Result Value Ref Range    POC Glucose 137 (H) 70 - 99 mg/dl    Performed on ACCU-CHEK        ED BEDSIDE ULTRASOUND:  No results found. RECENT VITALS:  BP: 127/74, Temp: 97.8 °F (36.6 °C), Heart Rate: 66,Resp: 16, SpO2: 95 %     Procedures         ED Course     Nursing Notes, Past Medical Hx, Past Surgical Hx, Social Hx, Allergies, and Family Hx were reviewed.         The patient was given the followingmedications:  Orders Placed This Encounter   Medications    DISCONTD: levETIRAcetam (KEPPRA) 1,000 mg in sodium chloride 0.9 % 100 mL IVPB    DISCONTD: sodium chloride flush 0.9 % injection 10 mL    DISCONTD: sodium chloride flush 0.9 % injection 10 mL    DISCONTD: 0.9 % sodium chloride infusion    DISCONTD: potassium chloride (KLOR-CON M) extended release tablet 40 mEq    DISCONTD: potassium bicarb-citric acid (EFFER-K) effervescent tablet 40 mEq    DISCONTD: potassium chloride 10 mEq/100 mL IVPB (Peripheral Line)    DISCONTD: potassium chloride 10 mEq/100 mL IVPB (Peripheral Line)    DISCONTD: magnesium sulfate 2000 mg in 50 mL IVPB premix    DISCONTD: enoxaparin (LOVENOX) injection 40 mg     Order Specific Question:   Indication of Use     Answer:   Prophylaxis-DVT/PE    DISCONTD: promethazine (PHENERGAN) tablet 12.5 mg    DISCONTD: ondansetron (ZOFRAN) injection 4 mg    DISCONTD: magnesium hydroxide (MILK OF MAGNESIA) 400 MG/5ML suspension 30 mL    DISCONTD: acetaminophen (TYLENOL) tablet 650 mg    DISCONTD: acetaminophen (TYLENOL) suppository 650 mg    DISCONTD: albuterol (PROVENTIL) nebulizer solution 2.5 mg     Order Specific Question:   Initiate RT Bronchodilator Protocol     Answer:   Yes - Inpatient Protocol    DISCONTD: allopurinol (ZYLOPRIM) tablet 300 mg    DISCONTD: amLODIPine (NORVASC) tablet 10 mg    DISCONTD: aspirin EC tablet 81 mg    DISCONTD: atorvastatin (LIPITOR) tablet 80 mg    DISCONTD: carvedilol (COREG) tablet 12.5 mg    DISCONTD: chlorthalidone (HYGROTON) tablet 25 mg    DISCONTD: diazePAM (VALIUM) tablet 5 mg    DISCONTD: famotidine (PEPCID) tablet 40 mg    DISCONTD: lamoTRIgine (LAMICTAL) tablet 600 mg    DISCONTD: lamoTRIgine (LAMICTAL) tablet 200 mg    DISCONTD: montelukast (SINGULAIR) tablet 10 mg    DISCONTD: nortriptyline (PAMELOR) capsule 75 mg    DISCONTD: oxyCODONE-acetaminophen (PERCOCET) 5-325 MG per tablet 1 tablet    DISCONTD: sacubitril-valsartan (ENTRESTO)  MG per tablet 1 tablet    DISCONTD: spironolactone (ALDACTONE) tablet 12.5 mg    DISCONTD: glucose chewable tablet 16 g    DISCONTD: dextrose bolus 10% 125 mL    DISCONTD: dextrose bolus 10% 250 mL    DISCONTD: glucagon (rDNA) injection 1 mg    DISCONTD: dextrose 10 % infusion    DISCONTD: insulin lispro (1 Unit Dial) 0-8 Units    DISCONTD: insulin lispro (1 Unit Dial) 0-4 Units    DISCONTD: carvedilol (COREG) tablet 12.5 mg    DISCONTD: sacubitril-valsartan (ENTRESTO)  MG per tablet 1 tablet    DISCONTD: carvedilol (COREG) tablet 6.25 mg    DISCONTD: sacubitril-valsartan (ENTRESTO) 49-51 MG per tablet 1 tablet    DISCONTD: levETIRAcetam (KEPPRA) tablet 500 mg    DISCONTD: miconazole (MICOTIN) 2 % powder    levETIRAcetam (KEPPRA) 500 MG tablet     Sig: Take 1 tablet by mouth 2 times daily     Dispense:  60 tablet     Refill:  3       CONSULTS:  IP CONSULT TO NEUROLOGY  IP CONSULT TO HOSPITALIST  IP CONSULT TO Ul. Tari Whitfield / Jaspreet Novak / Erum Ohs is a 63 y.o. male   ED Course as of 08/23/22 0303   Sun Aug 21, 2022   [address]   [DYSON]   5334 81-year-old gentleman presenting for concerns of a seizure today with his history of seizures. Patient last seizure approximately a year ago.   Patient presenting with typical postictal phase of being aphasic. Patient otherwise able to follow commands in all 4 extremities will regard you and respond to what you are saying. [CZ]   [id number] Sister corroborated that this is typical for him when he has a seizure. But given the patient's prolonged postictal phase CT head and labs will be obtained. [CZ]   1500 CT HEAD WO CONTRAST  CT of the head generally unremarkable with no signs of acute bleeding or trauma [CZ]      ED Course User Index  [CZ] Claudell Belfast, MD     Patient still not returning back to baseline and sister now at bedside saying that this is significantly different than usual.  Because of that labs were obtained. Patient's labs generally unremarkable. Since the patient has not returned back to baseline though patient will be given a dose of Keppra. Neurology was contacted out of concerns of possible intermittent nonconvulsive status and they agreed that the patient can be put on EEG and have an MRI in the morning. No concern for ischemic etiologies as the patient is moving all 4 extremities has had this previously and is similarly interactive his sister said he has been previously though never for this prolonged period of time. She also states that his seizures have been having a increasingly prolonged postictal phase. Neurology agreed with the plan for admission and EEG and the patient will be admitted to the hospitalist for further care. This patient was also evaluated by the attending physician. All care plans werediscussed and agreed upon. Clinical Impression     1.  Seizure [facility])        Disposition     PATIENT REFERRED TO:  Naman Bandar, [address]  Neurology  [address]  [phone]    Schedule an appointment as soon as possible for a visit  Follow up on anti-seizure medicaion dosing    DISCHARGE MEDICATIONS:  Discharge Medication List as of 8/22/2022  5:57 PM        START taking these medications    Details levETIRAcetam (KEPPRA) 500 MG tablet Take 1 tablet by mouth 2 times daily, Disp-60 tablet, R-3Normal             DISPOSITION Admitted 08/21/2022 06:51:42 PM       Hailey Langston MD  Resident  08/23/22 [id number]

## 2022-08-22 VITALS
OXYGEN SATURATION: 95 % | DIASTOLIC BLOOD PRESSURE: 74 MMHG | HEIGHT: 72 IN | SYSTOLIC BLOOD PRESSURE: 127 MMHG | RESPIRATION RATE: 16 BRPM | BODY MASS INDEX: 42.66 KG/M2 | HEART RATE: 66 BPM | TEMPERATURE: 97.8 F | WEIGHT: 315 LBS

## 2022-08-22 LAB
ANION GAP SERPL CALCULATED.3IONS-SCNC: 9 MMOL/L (ref 3–16)
BASOPHILS ABSOLUTE: 0.1 K/UL (ref 0–0.2)
BASOPHILS RELATIVE PERCENT: 0.9 %
BILIRUBIN URINE: NEGATIVE
BLOOD, URINE: NEGATIVE
BUN BLDV-MCNC: 12 MG/DL (ref 7–20)
CALCIUM SERPL-MCNC: 9 MG/DL (ref 8.3–10.6)
CHLORIDE BLD-SCNC: 103 MMOL/L (ref 99–110)
CLARITY: CLEAR
CO2: 28 MMOL/L (ref 21–32)
COLOR: YELLOW
CREAT SERPL-MCNC: 1 MG/DL (ref 0.9–1.3)
EOSINOPHILS ABSOLUTE: 0.2 K/UL (ref 0–0.6)
EOSINOPHILS RELATIVE PERCENT: 2.5 %
ESTIMATED AVERAGE GLUCOSE: 128.4 MG/DL
GFR AFRICAN AMERICAN: >60
GFR NON-AFRICAN AMERICAN: >60
GLUCOSE BLD-MCNC: 103 MG/DL (ref 70–99)
GLUCOSE BLD-MCNC: 112 MG/DL (ref 70–99)
GLUCOSE BLD-MCNC: 114 MG/DL (ref 70–99)
GLUCOSE BLD-MCNC: 137 MG/DL (ref 70–99)
GLUCOSE BLD-MCNC: 188 MG/DL (ref 70–99)
GLUCOSE URINE: NEGATIVE MG/DL
HBA1C MFR BLD: 6.1 %
HCT VFR BLD CALC: 38.5 % (ref 40.5–52.5)
HEMOGLOBIN: 12.8 G/DL (ref 13.5–17.5)
KEPPRA DOSE AMT: NORMAL
KEPPRA: 11.4 UG/ML (ref 6–46)
KETONES, URINE: NEGATIVE MG/DL
LEUKOCYTE ESTERASE, URINE: NEGATIVE
LYMPHOCYTES ABSOLUTE: 2.2 K/UL (ref 1–5.1)
LYMPHOCYTES RELATIVE PERCENT: 35.8 %
MCH RBC QN AUTO: 31.9 PG (ref 26–34)
MCHC RBC AUTO-ENTMCNC: 33.3 G/DL (ref 31–36)
MCV RBC AUTO: 95.9 FL (ref 80–100)
MICROSCOPIC EXAMINATION: NORMAL
MONOCYTES ABSOLUTE: 0.4 K/UL (ref 0–1.3)
MONOCYTES RELATIVE PERCENT: 7.2 %
NEUTROPHILS ABSOLUTE: 3.3 K/UL (ref 1.7–7.7)
NEUTROPHILS RELATIVE PERCENT: 53.6 %
NITRITE, URINE: NEGATIVE
PDW BLD-RTO: 14.4 % (ref 12.4–15.4)
PERFORMED ON: ABNORMAL
PH UA: 6 (ref 5–8)
PLATELET # BLD: 199 K/UL (ref 135–450)
PMV BLD AUTO: 8.6 FL (ref 5–10.5)
POTASSIUM REFLEX MAGNESIUM: 3.8 MMOL/L (ref 3.5–5.1)
PROTEIN UA: NEGATIVE MG/DL
RBC # BLD: 4.01 M/UL (ref 4.2–5.9)
SODIUM BLD-SCNC: 140 MMOL/L (ref 136–145)
SPECIFIC GRAVITY UA: 1.02 (ref 1–1.03)
URINE TYPE: NORMAL
UROBILINOGEN, URINE: 0.2 E.U./DL
WBC # BLD: 6.1 K/UL (ref 4–11)

## 2022-08-22 PROCEDURE — 36415 COLL VENOUS BLD VENIPUNCTURE: CPT

## 2022-08-22 PROCEDURE — 97535 SELF CARE MNGMENT TRAINING: CPT

## 2022-08-22 PROCEDURE — 2500000003 HC RX 250 WO HCPCS: Performed by: INTERNAL MEDICINE

## 2022-08-22 PROCEDURE — 94761 N-INVAS EAR/PLS OXIMETRY MLT: CPT

## 2022-08-22 PROCEDURE — 6370000000 HC RX 637 (ALT 250 FOR IP): Performed by: INTERNAL MEDICINE

## 2022-08-22 PROCEDURE — 81003 URINALYSIS AUTO W/O SCOPE: CPT

## 2022-08-22 PROCEDURE — 97162 PT EVAL MOD COMPLEX 30 MIN: CPT

## 2022-08-22 PROCEDURE — 97530 THERAPEUTIC ACTIVITIES: CPT

## 2022-08-22 PROCEDURE — 2700000000 HC OXYGEN THERAPY PER DAY

## 2022-08-22 PROCEDURE — 97166 OT EVAL MOD COMPLEX 45 MIN: CPT

## 2022-08-22 PROCEDURE — 2580000003 HC RX 258: Performed by: INTERNAL MEDICINE

## 2022-08-22 PROCEDURE — 94660 CPAP INITIATION&MGMT: CPT

## 2022-08-22 PROCEDURE — APPNB60 APP NON BILLABLE TIME 46-60 MINS

## 2022-08-22 PROCEDURE — 80048 BASIC METABOLIC PNL TOTAL CA: CPT

## 2022-08-22 PROCEDURE — 94664 DEMO&/EVAL PT USE INHALER: CPT

## 2022-08-22 PROCEDURE — 6370000000 HC RX 637 (ALT 250 FOR IP)

## 2022-08-22 PROCEDURE — 80175 DRUG SCREEN QUAN LAMOTRIGINE: CPT

## 2022-08-22 PROCEDURE — 85025 COMPLETE CBC W/AUTO DIFF WBC: CPT

## 2022-08-22 PROCEDURE — 6360000002 HC RX W HCPCS: Performed by: INTERNAL MEDICINE

## 2022-08-22 PROCEDURE — 99223 1ST HOSP IP/OBS HIGH 75: CPT | Performed by: PSYCHIATRY & NEUROLOGY

## 2022-08-22 RX ORDER — CARVEDILOL 6.25 MG/1
6.25 TABLET ORAL 2 TIMES DAILY WITH MEALS
Status: DISCONTINUED | OUTPATIENT
Start: 2022-08-22 | End: 2022-08-22 | Stop reason: HOSPADM

## 2022-08-22 RX ORDER — CARVEDILOL 6.25 MG/1
6.25 TABLET ORAL 2 TIMES DAILY WITH MEALS
COMMUNITY

## 2022-08-22 RX ORDER — LEVETIRACETAM 500 MG/1
500 TABLET ORAL 2 TIMES DAILY
Status: DISCONTINUED | OUTPATIENT
Start: 2022-08-22 | End: 2022-08-22 | Stop reason: HOSPADM

## 2022-08-22 RX ORDER — SACUBITRIL AND VALSARTAN 49; 51 MG/1; MG/1
1 TABLET, FILM COATED ORAL 2 TIMES DAILY
COMMUNITY

## 2022-08-22 RX ORDER — LEVETIRACETAM 500 MG/1
500 TABLET ORAL 2 TIMES DAILY
Qty: 60 TABLET | Refills: 3 | Status: SHIPPED | OUTPATIENT
Start: 2022-08-22

## 2022-08-22 RX ORDER — CARVEDILOL 12.5 MG/1
12.5 TABLET ORAL 2 TIMES DAILY WITH MEALS
Status: DISCONTINUED | OUTPATIENT
Start: 2022-08-22 | End: 2022-08-22

## 2022-08-22 RX ADMIN — LAMOTRIGINE 600 MG: 150 TABLET ORAL at 07:57

## 2022-08-22 RX ADMIN — CARVEDILOL 6.25 MG: 6.25 TABLET, FILM COATED ORAL at 17:00

## 2022-08-22 RX ADMIN — CHLORTHALIDONE 25 MG: 25 TABLET ORAL at 07:57

## 2022-08-22 RX ADMIN — ASPIRIN 81 MG: 81 TABLET, COATED ORAL at 07:58

## 2022-08-22 RX ADMIN — LEVETIRACETAM 500 MG: 500 TABLET, FILM COATED ORAL at 15:13

## 2022-08-22 RX ADMIN — SACUBITRIL AND VALSARTAN 1 TABLET: 49; 51 TABLET, FILM COATED ORAL at 11:03

## 2022-08-22 RX ADMIN — AMLODIPINE BESYLATE 10 MG: 10 TABLET ORAL at 07:58

## 2022-08-22 RX ADMIN — CARVEDILOL 6.25 MG: 6.25 TABLET, FILM COATED ORAL at 11:02

## 2022-08-22 RX ADMIN — ENOXAPARIN SODIUM 40 MG: 100 INJECTION SUBCUTANEOUS at 07:57

## 2022-08-22 RX ADMIN — SODIUM CHLORIDE: 9 INJECTION, SOLUTION INTRAVENOUS at 06:37

## 2022-08-22 RX ADMIN — MICONAZOLE NITRATE: 2 POWDER TOPICAL at 15:16

## 2022-08-22 RX ADMIN — SODIUM CHLORIDE, PRESERVATIVE FREE 10 ML: 5 INJECTION INTRAVENOUS at 07:58

## 2022-08-22 RX ADMIN — OXYCODONE AND ACETAMINOPHEN 1 TABLET: 5; 325 TABLET ORAL at 07:57

## 2022-08-22 ASSESSMENT — PAIN DESCRIPTION - ORIENTATION
ORIENTATION: LOWER
ORIENTATION: LOWER

## 2022-08-22 ASSESSMENT — PAIN SCALES - GENERAL
PAINLEVEL_OUTOF10: 9
PAINLEVEL_OUTOF10: 8
PAINLEVEL_OUTOF10: 0

## 2022-08-22 ASSESSMENT — PAIN DESCRIPTION - LOCATION
LOCATION: BACK
LOCATION: BACK

## 2022-08-22 ASSESSMENT — PAIN DESCRIPTION - DESCRIPTORS
DESCRIPTORS: PATIENT UNABLE TO DESCRIBE
DESCRIPTORS: ACHING

## 2022-08-22 NOTE — PROGRESS NOTES
4 Eyes Admission Assessment     I agree as the admission nurse that 2 RN's have performed a thorough Head to Toe Skin Assessment on the patient. ALL assessment sites listed below have been assessed on admission. Areas assessed by both nurses:   [x]   Head, Face, and Ears   [x]   Shoulders, Back, and Chest  [x]   Arms, Elbows, and Hands   [x]   Coccyx, Sacrum, and Ischium  [x]   Legs, Feet, and Heels        Does the Patient have Skin Breakdown? No       Redness to groin, david area, ABD folds, healing burn to ABD, chronic scab to right foot.    James Prevention initiated:  Yes  Wound Care Orders initiated:  No      WOC nurse consulted for Pressure Injury (Stage 3,4, Unstageable, DTI, NWPT, and Complex wounds) or James score 18 or lower:  No      Nurse 1 eSignature: Electronically signed by Earle Tam RN on 8/21/22 at 8:39 PM EDT    **SHARE this note so that the co-signing nurse is able to place an eSignature**    Nurse 2 eSignature: Leonarda Quiñonez RN on 8/21/22 at 2040 PM EDT

## 2022-08-22 NOTE — PLAN OF CARE
Problem: Discharge Planning  Goal: Discharge to home or other facility with appropriate resources  Outcome: Progressing     Problem: Pain  Goal: Verbalizes/displays adequate comfort level or baseline comfort level  Outcome: Progressing     Problem: Neurosensory - Adult  Goal: Achieves stable or improved neurological status  Outcome: Progressing  Goal: Absence of seizures  Outcome: Progressing  Goal: Remains free of injury related to seizures activity  Outcome: Progressing  Goal: Achieves maximal functionality and self care  Outcome: Progressing     Problem: Respiratory - Adult  Goal: Achieves optimal ventilation and oxygenation  Outcome: Progressing     Problem: Skin/Tissue Integrity - Adult  Goal: Skin integrity remains intact  Outcome: Progressing     Problem: Musculoskeletal - Adult  Goal: Return mobility to safest level of function  Outcome: Progressing     Problem: Gastrointestinal - Adult  Goal: Minimal or absence of nausea and vomiting  Outcome: Progressing     Problem: Genitourinary - Adult  Goal: Absence of urinary retention  Outcome: Progressing     Problem: Infection - Adult  Goal: Absence of infection at discharge  Outcome: Progressing     Problem: Metabolic/Fluid and Electrolytes - Adult  Goal: Electrolytes maintained within normal limits  Outcome: Progressing  Goal: Hemodynamic stability and optimal renal function maintained  Outcome: Progressing  Goal: Glucose maintained within prescribed range  Outcome: Progressing     Problem: Hematologic - Adult  Goal: Maintains hematologic stability  Outcome: Progressing     Problem: Chronic Conditions and Co-morbidities  Goal: Patient's chronic conditions and co-morbidity symptoms are monitored and maintained or improved  Outcome: Progressing     Problem: Safety - Adult  Goal: Free from fall injury  Outcome: Progressing     Problem: ABCDS Injury Assessment  Goal: Absence of physical injury  Outcome: Progressing

## 2022-08-22 NOTE — PROGRESS NOTES
Pt up to chair x2 with walker and GB. Tolerated transfers well despite saying he has used a wheelchair at home. Miconazole ordered for patients skin folds which are excoriated.

## 2022-08-22 NOTE — CONSULTS
Neurology / Neurocritical Care Consult Note    Tori Fontaine MD is requesting this consult. Reason for Consult: concern for non-epileptic status   Admission Chief Complaint: \"I must have had a seizure\"    History of Present Illness     Lord Mcleod is a 63 y.o. y/o male with PMH significant for asthma, CAD, CHF, DM, MI, mental retardation, morbid obesity, PNES, and seizures who presented from his independent living facility with suspected seizures. He is amnestic to the event. His sister, Nubia Zhao, video calls him at [facility] and 10PM everyday to remind him to take his medication. Yesterday morning when she called him, he took his medication but he was wasn't acting right and and was stuttering his words and having difficulty talking. Per his sister, this is one of his common seizure prodromes. She told him to lay down and rest and she would check on him later. However, a few hours later the patient called his sister on the phone which he apparently only does when something is wrong or he had a seizure. When she answered, he was unable to talk. So, the sister called the manager of the facility he lives in to go check on him and he was found altered and nonverbal. EMS was called. His sister arrived when EMS was in his room placing the patient on the stretcher and she reports he was awake but not able to talk and appeared confused. He was nonverbal in route to the ED. Upon arrival to the ED he was able to follow commands and move all 4 extremities but unable to provide any history. The patient and sister deny any missed AED doses and denies any consitutional symptoms, changes in sleep or diet habits, new medications, infections or painful urination. She feels that his spells have been progressively lasting longer and taking him longer to recover from but not becoming more frequent.      He has a long standing history of seizures and follows with Dr. Connie Rosas at [facility] and takes Lamictal 600mg BID with 200mg PRN. He was recently seen here at [facility] in March of 2022 with a similar presentation and was discharged on an ativan taper and instructed to follow up with Dr. Demetria Hewitt. Prior to that, he was admitted in February of 2022 for generalized tonic-clonic movements and was placed on cvEEG for two days that did not catch any epileptiform activity and was instructed to follow up with Dr. Demetria Hewitt. Prior AEDs: depakote (d/c due to causing exacerbation of tremors) & phenobarbital. He underwent EMU monitoring in the fall of 2020 which was non-diagnostic. He has insight that he has both epileptic and non-epileptic seizures. He does not drive. He takes his his PRN dose of Lamictal maybe once or twice a month. Per my interview with the patient he feels almost completely back to normal.    REVIEW OF SYSTEMS:   Constitutional- No weight loss or fevers   Eyes- No diplopia. No photophobia. Ears/nose/throat- No dysphagia. No Dysarthria   Cardiovascular- No palpitations. No chest pain   Respiratory- No dyspnea. No Cough   Gastrointestinal- No Abdominal pain. No Vomiting. Genitourinary- No incontinence. No urinary retention   Musculoskeletal- No myalgia. + arthralgia left shoulder   Skin- No rash. No easy bruising. Psychiatric- No depression. No anxiety   Endocrine- + diabetes. No thyroid issues. Hematologic- No bleeding difficulty. No fatigue   Neurologic- +seizures. No numbness or tingling. No speech difficulty.      Past Medical, Surgical, Family, and Social History   PAST MEDICAL HISTORY:  Past Medical History:   Diagnosis Date    Arthritis     Asthma     Bronchitis     CAD (coronary artery disease)     CHF (congestive heart failure) (HCC)     Chronic back pain     Diabetes mellitus ([address]     Generalized headaches     Gout     Hypertension     MI, old 12/01/2013    Nstemi    Morbid obesity ([address]     Psychogenic nonepileptic seizure     DX at [facility]- pseudo-seizures    Seizures ([address]      SURGICAL HISTORY:  Past Surgical History:   Procedure Laterality Date    EYE SURGERY       FAMILY HISTORY & SOCIAL HISTORY:  Family history non-contributory  Family History   Problem Relation Age of Onset    Asthma Mother     Obesity Mother     High Blood Pressure Mother     Diabetes Mother     Diabetes Father     Heart Disease Father     Heart Attack Father     High Blood Pressure Father     Diabetes Sister     High Blood Pressure Sister      Social History     Tobacco Use    Smoking status: Former    Smokeless tobacco: Never   Substance Use Topics    Alcohol use: No    Drug use: No         Allergies & Outpatient Medications   ALLERGIES:  Allergies   Allergen Reactions    Amoxicillin     Doxycycline     Ibuprofen     Penicillins Hives    Phenobarbital     Tetracycline     Aspirin Nausea And Vomiting     Sister says he can take the baby asprin     HOME MEDICATIONS:  Current Discharge Medication List        CONTINUE these medications which have NOT CHANGED    Details   cyanocobalamin 1000 MCG tablet Take 1,000 mcg by mouth daily      carvedilol (COREG) 12.5 MG tablet Take 1 tablet by mouth 2 times daily (with meals)  Qty: 60 tablet, Refills: 2    Associated Diagnoses: Chronic systolic heart failure ([facility]  Hypertension, unspecified type      spironolactone (ALDACTONE) 25 MG tablet Take 0.5 tablets by mouth daily  Qty: 45 tablet, Refills: 1      albuterol sulfate HFA (PROVENTIL;VENTOLIN;PROAIR) 108 (90 Base) MCG/ACT inhaler Inhale 2 puffs into the lungs every 6 hours as needed for Wheezing      !! lamoTRIgine (LAMICTAL) 200 MG tablet Take 200 mg by mouth daily as needed      glipiZIDE (GLUCOTROL) 5 MG tablet Take 5 mg by mouth 2 times daily (before meals)      Cholecalciferol (VITAMIN D3) 1.25 MG (70025 UT) CAPS Take 50,000 Units by mouth once a week Takes on Thursdays      sacubitril-valsartan (ENTRESTO)  MG per tablet Take 1 tablet by mouth 2 times daily      montelukast (SINGULAIR) 10 MG tablet Take 10 mg by mouth nightly chlorthalidone (HYGROTON) 25 MG tablet Take 25 mg by mouth daily       nortriptyline (PAMELOR) 75 MG capsule Take 75 mg by mouth nightly      !! lamoTRIgine (LAMICTAL) 200 MG tablet Take 600 mg by mouth 2 times daily      atorvastatin (LIPITOR) 80 MG tablet Take 80 mg by mouth nightly       metFORMIN (GLUCOPHAGE) 1000 MG tablet Take 1,000 mg by mouth 2 times daily (with meals)       amLODIPine (NORVASC) 10 MG tablet Take 10 mg by mouth daily       aspirin 81 MG EC tablet Take 81 mg by mouth daily      famotidine (PEPCID) 40 MG tablet Take 40 mg by mouth daily as needed (indigestion)       loratadine (CLARITIN) 10 MG tablet Take 10 mg by mouth nightly       Multiple Vitamin (DAILY TITUS) TABS Take 1 tablet by mouth daily      diazepam (VALIUM) 5 MG tablet Take 5 mg by mouth every 12 hours as needed for Anxiety. oxyCODONE-acetaminophen (PERCOCET) 5-325 MG per tablet Take 1 tablet by mouth every 4 hours as needed. allopurinol (ZYLOPRIM) 300 MG tablet Take 300 mg by mouth nightly        !! - Potential duplicate medications found. Please discuss with provider.           Physical Exam   PHYSICAL EXAM:  Vitals:    08/22/22 0010 08/22/22 0137 08/22/22 0300 08/22/22 0645   BP:   112/69 137/80   Pulse:   66 69   Resp:  16 17 17   Temp:   97.3 °F (36.3 °C) 97.6 °F (36.4 °C)   TempSrc:   Oral Oral   SpO2: 98%  95% 94%   Weight:    (!) 375 lb 7.1 oz (170.3 kg)   Height:             General: Alert, no distress, well-nourished  Neurologic  Mental status:   orientation to person, place, time, situation   Attention intact as able to attend well to the exam     Language fluent in conversation   Comprehension intact; follows simple commands    Cranial nerves:   CN2: Visual fields full w/o extinction on confrontational testing  CN 3,4,6: Pupils equal and reactive to light, extraocular muscles intact  CN5: Facial sensation symmetric   CN7: Face symmetric  CN8: Hearing symmetric to spoken voice  CN9: Palate elevated symmetrically  CN11: Traps full strength on shoulder shrug  CN12: Tongue midline with protrusion    Motor Exam:   R  L    Deltoid 4 3   Biceps 4 2   Triceps 4 2   Wrist extension  4 3   Interossei 4 3      R  L    Hip flexion  4  4   Hip extension  4 4   Knee flexion  4 4   Knee extension  4 4   Ankle dorsiflexion  4 4   Ankle plantar flexion  4 4     Deep tendon reflexes:    R  L    Biceps  1  1        Brachioradialis  1 1   Patellar  1 1               Sensory: light touch intact and symmetric in all 4 extremities. No sensory extinction on bilateral simultaneous stimulation  Cerebellar/coordination: finger nose finger normal without ataxia, has mild intention tremor   Tone: normal in all 4 extremities  Gait: non-ambulatory, uses motorized scooter/wheelchair     OTHER SYSTEMS:  Cardiovascular: Warm, dusky appearance   Respiratory: Easy, non-labored respiratory pattern   Abdominal: Abdomen is without distention   Extremities: Upper and lower extremities are atraumatic in appearance without deformity. No swelling or erythema. Scattered ecchymosis. Diagnostic Testing Results   IMAGES:  Images personally reviewed and agree w/ radiology interpretation. Head CT w/o Contrast: 8/21/22; 1420  Impression       No acute intracranial abnormality. LABS:  All results below personally reviewed. Pertinent positives & negatives are addressed in Impression & Recommendations below.      LABS   Metabolic Panel Recent Labs     08/21/22  1255 08/22/22  0359    140   K 4.3 3.8    103   CO2 26 28   BUN 11 12   CREATININE 1.0 1.0   GLUCOSE 92 112*   CALCIUM 9.1 9.0   LABALBU 4.1  --    ALKPHOS 155*  --    ALT 12  --    AST 14*  --       CBC / Coags Recent Labs     08/21/22  1255 08/22/22  0359   WBC 6.1 6.1   RBC 4.38 4.01*   HGB 13.7 12.8*   HCT 42.1 38.5*    199      Other Recent Labs     08/21/22  1255   LABA1C 6.1     No results for input(s): PHENYTOIN, KEPPRA, LACOSA, LAMO, VALPROATE, LACTSEPSIS, LACTA in the last 72 hours. CURRENT SCHEDULED MEDICATIONS   Inpatient Medications     carvedilol, 6.25 mg, Oral, BID     sacubitril-valsartan, 1 tablet, Oral, BID    levETIRAcetam, 500 mg, Oral, BID    miconazole, , Topical, BID    sodium chloride flush, 10 mL, IntraVENous, 2 times per day    enoxaparin, 40 mg, SubCUTAneous, BID    allopurinol, 300 mg, Oral, Nightly    amLODIPine, 10 mg, Oral, Daily    aspirin, 81 mg, Oral, Daily    atorvastatin, 80 mg, Oral, Nightly    chlorthalidone, 25 mg, Oral, Daily    lamoTRIgine, 600 mg, Oral, BID    montelukast, 10 mg, Oral, Nightly    nortriptyline, 75 mg, Oral, Nightly    insulin lispro, 0-8 Units, SubCUTAneous, TID WC    insulin lispro, 0-4 Units, SubCUTAneous, Nightly   Infusions    sodium chloride 50 mL/hr at 08/22/22 0637    dextrose        Antibiotics   Recent Abx Admin        No antibiotic orders with administrations found. IMPRESSION & RECOMMENDATIONS     IMPRESSION:  63year old male with long-standing history of seizures and psychogenic non-epileptic spells who presented with unwitnessed, breakthrough seizure activity and was found altered and aphasic which is consistent with typical post-ictal state per sister. No metabolic or pharmacologic etiology found. It is reasonable to start patient on low dose of Keppra BID in addition to Lamictal in the interim before following up with Neurologist as this spell was un-witnessed and difficult to differentiate seizure vs. non-epileptic spell. Once patient follows up with Dr. Zeferino Euceda, further decisions can be made in regard to AED regimen. Spell semiology 1: staring with repeated licking of upper lip. 15 seconds. Non -epileptic. Spell semiology 2: generalized shaking. Never captured on cvEEG  Spell Semiology 3: Aphasia and tearfulness. Had on admission 2/22.  EEG showed Near continuous fluctuating 1-2 Hz generalized periodic spike and polyspike discharges (GPDs) that do not clear build up or evolve. May still be on ictal spectrum or represent post-ictal state       RECOMMENDATIONS:  -Ordered Lamotrigine level  -He is agreeable to start Keppra 500mg BID in interim to provide seizure coverage before following up with his Neurologist who can then decide on further AED management. (Dr. Serjio Yepez at [facility])  -Continue Lamictal 600mg BID and 200mg PRN   -Follow up with Neurologist (Dr. Serjio Yepez at [facility]) as soon as possible   -No need for further neurologic work-up. Okay to discharge from Neurology standpoint.   -Seizure Driving Risk education: Having a Seizure while driving puts you at risk for injuring yourself or others. If you drive while having uncontrolled seizures, you may be held liable for injuries to others. Do not drive until you have been seizure free for at least 3 months, state laws vary so please check laws in your state. Injury Risk: Please avoid working from [employer], swimming alone or taking baths in a bathtub, use showers only. Patient verbalized understanding and states he does not drive. EDUARDA Wahl - [facility]   Neurology & Neurocritical Care   Neurology Line: [phone]  [employer]: [facility] Neurology & Neuro Critical Care NPs  8/22/2022 8:04 AM    I spent 60 minutes in the care of this patient. Over 50% of that time was in face-to-face counseling regarding disease process, diagnostic testing, preventative measures, and answering patient and family questions.

## 2022-08-22 NOTE — PROGRESS NOTES
Occupational Therapy  [facility]  Occupational Therapy Initial Assessment/Treatment    Name: Ramya Portillo  : 1960  MRN: [id number]  Date of Service: 2022    Discharge Recommendations: Ramya Portillo scored a 17/ on the AM-PAC ADL Inpatient form. Current research shows that an AM-PAC score of 18 or greater is typically associated with a discharge to the patient's home setting. Based on the patient's AM-PAC score, and their current ADL deficits, it is recommended that the patient have 2-3 sessions per week of Occupational Therapy at d/c to increase the patient's independence. At this time, this patient demonstrates the endurance and safety to discharge home with home services and a follow up treatment frequency of 2-3x/wk. Please see assessment section for further patient specific details. If patient discharges prior to next session this note will serve as a discharge summary. Please see below for the latest assessment towards goals. OT Equipment Recommendations  Equipment Needed: No       Patient Diagnosis(es): The encounter diagnosis was Seizure ([address] Past Medical History:  has a past medical history of Arthritis, Asthma, Bronchitis, CAD (coronary artery disease), CHF (congestive heart failure) ([address] Chronic back pain, Diabetes mellitus ([address] Generalized headaches, Gout, Hypertension, MI, old, Morbid obesity ([address] Psychogenic nonepileptic seizure, and Seizures ([address] Past Surgical History:  has a past surgical history that includes eye surgery. Treatment Diagnosis: decreased independence with ADLs and fx mobility      Assessment   Performance deficits / Impairments: Decreased functional mobility ; Decreased ADL status; Decreased strength  Assessment: Pt is 64 yo male that presents from home to hospital for seizure.  Pt is typically independent at baseline with ADLs and perform fx mobiltiy via wheelchair level w/ short bouts of ambulation using Accessibility: Accessible  Home Equipment: Maura Found (pull cord in pts apartment)  Has the patient had two or more falls in the past year or any fall with injury in the past year?: Yes (pt slid on floor and pt was able to self recover)  Receives Help From:  (staff is in patients building)  ADL Assistance: [address]: Independent  Homemaking Responsibilities: Yes  Meal Prep Responsibility: Primary  Laundry Responsibility: Secondary  Cleaning Responsibility: Secondary  Bill Paying/Finance Responsibility: Secondary  Ambulation Assistance: Non-ambulatory (uses wheelchair)  Transfer Assistance: Independent  Active : No  Patient's  Info: [employer] services- takes to grocery and appointments. Mode of Transportation: Latisha French (facility has a [employer]; pt has Access as well)  Occupation: On disability  Leisure & Hobbies: art work- pt does [employer] in [facility] ; pt likes bowling  Additional Comments: pts girlfriend lives in the same apartment building ; pts sister facetimes and does grocery shopping for patietn       Objective   Heart Rate: 71  Heart Rate Source: Monitor  BP: 130/75  BP Location: Left upper arm  BP Method: Automatic  Patient Position: Semi fowlers  MAP (Calculated): 93.33  Resp: 16  SpO2: 95 %  O2 Device: None (Room air)          Observation/Palpation  Posture: Good  Safety Devices  Type of Devices: Call light within reach; Chair alarm in place;Nurse notified; Left in chair  Balance  Sitting: Intact  Standing: With support (use of 2WW for BUE support)  Gait  Overall Level of Assistance: Minimum assistance (min A with use of 2WW to walk ~4 ft foward/back by chair)  Toilet Transfers  Toilet Transfers Comments: use of hand held urinal this date from standing level per RN request  AROM: Within functional limits  Strength: Generally decreased, functional  Coordination: Within functional limits  Tone: Normal  Sensation: Intact  ADL  Feeding: Setup  Feeding Skilled Clinical Factors: lunch tray at end of session; therapist assisted pt with setting up sweet tea and ranch dressing on salad  Grooming: Setup  Grooming Skilled Clinical Factors: setup to brush teeth and wipe off face seated upright in chair  UE Bathing: Minimal assistance  UE Bathing Skilled Clinical Factors: Pt requiring assistance to wipe off patients back with warm bath wipes from seated level ; pt able to wash front of self  LE Dressing: Moderate assistance  LE Dressing Skilled Clinical Factors: Mod A to pull down shorts and underwear in standing and to pull up shorts and underwear at end of toileting task; CGA to perform donning/doffing socks from seated at edge of bed level- pt elevates leg on bed to perform task  Toileting: Moderate assistance  Toileting Skilled Clinical Factors: mod A required to place urinal and for pant mngmt        Bed mobility  Supine to Sit: Stand by assistance  Sit to Supine: Stand by assistance  Scooting: Stand by assistance  Bed Mobility Comments: HOB slightly reclined ; effortful  Transfers  Sit to stand: Contact guard assistance  Stand to sit: Contact guard assistance  Transfer Comments: 2 sit<>stand transfers without use of 2WW; 1 sit<>stand transfer with use of 2WW for BUE support in which pt was able to maintain standing balance for longer duration  Vision  Vision: Within Functional Limits  Vision Exceptions: Wears glasses for reading;Wears glasses for distance  Hearing  Hearing: Exceptions to [facility]  Hearing Exceptions: Bilateral hearing aid  Cognition  Overall Cognitive Status: Exceptions  Arousal/Alertness: Appropriate responses to stimuli  Following Commands:  Follows one step commands consistently  Attention Span: Appears intact  Memory: Appears intact  Safety Judgement: Decreased awareness of need for assistance;Decreased awareness of need for safety  Problem Solving: Assistance required to correct errors made  Insights: Decreased awareness of deficits  Initiation: Requires cues for some  Sequencing: Requires cues for some  Orientation  Overall Orientation Status: Within Functional Limits  Orientation Level: Oriented X4                  Education Given To: Patient  Education Provided: Role of Therapy;Plan of Care;Transfer Training  Education Method: Demonstration;Verbal  Barriers to Learning: None  Education Outcome: Verbalized understanding                        G-Code     OutComes Score                                                  AM-PAC Score        AM-PAC Inpatient Daily Activity Raw Score: 17 (08/22/22 1232)  AM-PAC Inpatient ADL T-Scale Score : 37.26 (08/22/22 1232)  ADL Inpatient CMS 0-100% Score: 50.11 (08/22/22 1232)  ADL Inpatient CMS G-Code Modifier : CK (08/22/22 1232)    Tinneti Score       Goals  Short Term Goals  Time Frame for Short term goals: by discharge  Short Term Goal 1: Pt will perform toilet transfer with SBA  Short Term Goal 2: Pt will perform LB dressing routine with set-up  Short Term Goal 3: Pt will perform grooming task in standing with SBA  Patient Goals   Patient goals : not stated       Therapy Time   Individual Concurrent Group Co-treatment   Time In 1132         Time Out 1212         Minutes 40         Timed Code Treatment Minutes: 25 Minutes (+ 15 min OT eval)       Edilma Castañeda, OT

## 2022-08-22 NOTE — H&P
Hospital Medicine History & Physical      PCP: Cely Elizondo    Date of Admission: 8/21/2022    Chief Complaint:  seizure    History Of Present Illness:    Patient is a 72-year-old male with past medical history of asthma, CAD, CHF, diabetes mellitus who presents to the hospital for seizures. Reportedly patient has longstanding history of seizures, usually he comes out of seizure faster however this time it has been long enough he is confused so family brought the patient to the hospital.  According to the patient's family he has been taking his medications regularly. Patient is unable to provide history given patient's clinical state of confusion. Past Medical History:          Diagnosis Date    Arthritis     Asthma     Bronchitis     CAD (coronary artery disease)     CHF (congestive heart failure) (HCC)     Chronic back pain     Diabetes mellitus ([facility]     Generalized headaches     Gout     Hypertension     MI, old 12/01/2013    Nstemi    Morbid obesity ([facility]     Psychogenic nonepileptic seizure     DX at [facility]- pseudo-seizures    Seizures ([facility]        Past Surgical History:          Procedure Laterality Date    EYE SURGERY         Medications Prior to Admission:      Prior to Admission medications    Medication Sig Start Date End Date Taking?  Authorizing Provider   cyanocobalamin 1000 MCG tablet Take 1,000 mcg by mouth daily    Historical Provider, MD   carvedilol (COREG) 12.5 MG tablet Take 1 tablet by mouth 2 times daily (with meals) 12/10/21   Alen Sharma MD   spironolactone (ALDACTONE) 25 MG tablet Take 0.5 tablets by mouth daily  Patient not taking: Reported on 2/14/2022 8/11/21   Markie Palomino,    albuterol sulfate HFA (VENTOLIN HFA) 108 (90 Base) MCG/ACT inhaler Inhale 2 puffs into the lungs every 6 hours as needed for Wheezing    Historical Provider, MD   lamoTRIgine (LAMICTAL) 200 MG tablet Take 200 mg by mouth daily as needed    Historical Provider, MD   glipiZIDE (GLUCOTROL) 5 MG tablet Take 5 mg by mouth 2 times daily (before meals)    Historical Provider, MD   Cholecalciferol (VITAMIN D3) 1.25 MG (23018 UT) CAPS Take 50,000 Units by mouth once a week Takes on Thursdays    Historical Provider, MD   sacubitril-valsartan (ENTRESTO)  MG per tablet Take 1 tablet by mouth 2 times daily    Historical Provider, MD   montelukast (SINGULAIR) 10 MG tablet Take 10 mg by mouth nightly    Historical Provider, MD   chlorthalidone (HYGROTON) 25 MG tablet Take 25 mg by mouth daily     Historical Provider, MD   nortriptyline (PAMELOR) 75 MG capsule Take 75 mg by mouth nightly    Historical Provider, MD   lamoTRIgine (LAMICTAL) 200 MG tablet Take 600 mg by mouth 2 times daily    Historical Provider, MD   atorvastatin (LIPITOR) 80 MG tablet Take 80 mg by mouth nightly     Historical Provider, MD   metFORMIN (GLUCOPHAGE) 1000 MG tablet Take 1,000 mg by mouth 2 times daily (with meals)     Historical Provider, MD   amLODIPine (NORVASC) 10 MG tablet Take 10 mg by mouth daily     Historical Provider, MD   aspirin 81 MG EC tablet Take 81 mg by mouth daily    Historical Provider, MD   famotidine (PEPCID) 40 MG tablet Take 40 mg by mouth daily as needed (indigestion)     Historical Provider, MD   loratadine (CLARITIN) 10 MG tablet Take 10 mg by mouth nightly     Historical Provider, MD   Multiple Vitamin (DAILY TITUS) TABS Take 1 tablet by mouth daily    Historical Provider, MD   diazepam (VALIUM) 5 MG tablet Take 5 mg by mouth every 12 hours as needed for Anxiety. Historical Provider, MD   oxyCODONE-acetaminophen (PERCOCET) 5-325 MG per tablet Take 1 tablet by mouth every 4 hours as needed. Historical Provider, MD   allopurinol (ZYLOPRIM) 300 MG tablet Take 300 mg by mouth nightly     Historical Provider, MD       Allergies:  Amoxicillin, Doxycycline, Ibuprofen, Penicillins, Phenobarbital, Tetracycline, and Aspirin    Social History:      TOBACCO:   reports that he has quit smoking.  He 03/08/2022 03:40 PM    WBCUA 0-2 03/08/2022 03:40 PM    BACTERIA 2+ 12/10/2018 05:10 PM    RBCUA None seen 03/08/2022 03:40 PM    BLOODU Negative 03/08/2022 03:40 PM    SPECGRAV >=1.030 03/08/2022 03:40 PM    GLUCOSEU Negative 03/08/2022 03:40 PM       Radiology:       CT HEAD WO CONTRAST   Final Result      No acute intracranial abnormality. Active Hospital Problems    Diagnosis Date Noted    Seizure [facility]) [R56.9] 02/14/2022       Patient is a 79-year-old male with past medical history of asthma, CAD, CHF, diabetes mellitus who presents to the hospital for seizures. Reportedly patient has longstanding history of seizures, usually he comes out of seizure faster however this time it has been long enough he is confused so family brought the patient to the hospital.  According to the patient's family he has been taking his medications regularly. Patient is unable to provide history given patient's clinical state of confusion. Assessment  Acute encephalopathy likely secondary to postictal state  Breakthrough seizure  History of asthma  CAD  Diabetes mellitus    Plan  Started on Keppra 1 g twice daily  Neurology was consulted from ED, recommended EEG while in-house  Check UA  Check CK level  Monitor and replace electrolytes  Insulin sliding scale  DVT prophylaxis-Lovenox  Diet: Diet NPO Exceptions are: Ice Chips, Sips of Water with Meds  Code Status: Full Code    PT/OT Eval Status: ordered    Dispo - pending clinical improvement       Juan Baker MD    The note was completed using EMR and Dragon dictation system. Every effort was made to ensure accuracy; however, inadvertent computerized transcription errors may be present. Thank you Haylee Bello for the opportunity to be involved in this patient's care. If you have any questions or concerns please feel free to contact me at [phone].     Juan Baker MD

## 2022-08-22 NOTE — RT PROTOCOL NOTE
RT Inhaler-Nebulizer Bronchodilator Protocol Note    There is a bronchodilator order in the chart from a provider indicating to follow the RT Bronchodilator Protocol and there is an Initiate RT Inhaler-Nebulizer Bronchodilator Protocol order as well (see protocol at bottom of note). CXR Findings:  No results found. The findings from the last RT Protocol Assessment were as follows:   History Pulmonary Disease: Smoker 15 pack years or more  Respiratory Pattern: Regular pattern and RR 12-20 bpm  Breath Sounds: Slightly diminished and/or crackles  Cough: Strong, spontaneous, non-productive  Indication for Bronchodilator Therapy: On home bronchodilators  Bronchodilator Assessment Score: 3    Aerosolized bronchodilator medication orders have been revised according to the RT Inhaler-Nebulizer Bronchodilator Protocol below. Respiratory Therapist to perform RT Therapy Protocol Assessment initially then follow the protocol. Repeat RT Therapy Protocol Assessment PRN for score 0-3 or on second treatment, BID, and PRN for scores above 3. No Indications - adjust the frequency to every 6 hours PRN wheezing or bronchospasm, if no treatments needed after 48 hours then discontinue using Per Protocol order mode. If indication present, adjust the RT bronchodilator orders based on the Bronchodilator Assessment Score as indicated below. Use Inhaler orders unless patient has one or more of the following: on home nebulizer, not able to hold breath for 10 seconds, is not alert and oriented, cannot activate and use MDI correctly, or respiratory rate 25 breaths per minute or more, then use the equivalent nebulizer order(s) with same Frequency and PRN reasons based on the score. If a patient is on this medication at home then do not decrease Frequency below that used at home.     0-3 - enter or revise RT bronchodilator order(s) to equivalent RT Bronchodilator order with Frequency of every 4 hours PRN for wheezing or

## 2022-08-22 NOTE — CARE COORDINATION
Case Management Assessment           Initial Evaluation                Date / Time of Evaluation: 8/22/2022 11:20 AM                 Assessment Completed by: KATHARINA Medina    Patient Name: Ritesh Gracia     YOB: 1960  Diagnosis: Seizure [facility]) [R56.9]     Date / Time: 8/21/2022 12:38 PM    Patient Admission Status: Inpatient    If patient is discharged prior to next notation, then this note serves as note for discharge by case management. Current PCP: Mallika Schuler Patient: No    Chart Reviewed: Yes  Patient/ Family Interviewed: Yes    Initial assessment completed at bedside with: Patient and chart reveiw    Hospitalization in the last 30 days: No    Emergency Contacts:  Extended Emergency Contact Information  Primary Emergency Contact: Lisa Albright  Address: [place], [address] Phone: [phone]  Relation: Brother/Sister  Secondary Emergency Contact: Whit Yeung  Address: [place], [address] Phone: [phone]  Relation: Brother/Sister    Advance Directives:   Code Status: Full Code    Healthcare Power of : Yes  Agent: sister Cannon  Contact Number: [phone]    Copy present: No     In paper Chart: No    Scanned into EMR Yes    Financial  Payor: MEDICARE / Plan: MEDICARE PART A AND B / Product Type: *No Product type* /     Pre-cert required for SNF: No    Pharmacy    [id number] - P [phone] - F [phone]  [address] [place] [address]  Phone: [phone] Fax: [phone]      Potential assistance Purchasing Medications:    Does Patient want to participate in local refill/ meds to beds program?:      Meds To Beds General Rules:  1. Can ONLY be done Monday- Friday between 8:30am-5pm  2. Prescription(s) must be in pharmacy by 3pm to be filled same day  3. Copy of patient's insurance/ prescription drug card and patient face sheet must be sent along with the prescription(s)  4. Cost of Rx cannot be added to hospital bill. If financial assistance is needed, please contact unit  or ;  or  CANNOT provide pharmacy voucher for patients co-pays  5. Patients can then  the prescription on their way out of the hospital at discharge, or pharmacy can deliver to the bedside if staff is available. (payment due at time of pick-up or delivery - cash, check, or card accepted)     Able to afford home medications/ co-pay costs: Yes    ADLS  Support Systems:      PT AM-PAC:   /24  OT AM-PAC:   /24    [place]: Apartment first floor  Steps: n/a    Plans to RETURN to current housing: Yes  Barriers to RETURNING to current housing: none identified    [address]  Currently ACTIVE with [address]: No  Home Care Agency: Not Applicable    Currently ACTIVE with [facility]: No  Passport/ Waiver: No  Passport/ Waiver Services: Not Applicable    : n/a Phone: [address]  [facility] Provider: n/a  Equipment: wheelchair    Home Oxygen and [address] [place] prior to admission: No  [address]: Not Applicable  Other Respiratory Equipment: n/a    DISCHARGE PLAN:  Disposition: Home- No Services Needed - TBD    Transportation PLAN for discharge: EMS transportation vs family    Factors facilitating achievement of predicted outcomes: Family support, Caregiver support, and Pleasant    Barriers to discharge: Medical complications    Additional Case Management Notes: SW met with pt at bedside. Pt reports he lives alone in an \"independent living unit that is based on income\". Pt reports his sister Laura Cronin ([phone]) is his POA and assists him as needed. Pt states there is staff at his residence that assists him (SAY services). Pt is wheelchair bound at baseline.  Anticipated discharge plan is home. The Plan for Transition of Care is related to the following treatment goals of Seizure [facility]) [R56.9]    The Patient and/or patient representative Billy Baird and his family were provided with a choice of provider and agrees with the discharge plan Yes    Freedom of choice list was provided with basic dialogue that supports the patient's individualized plan of care/goals and shares the quality data associated with the providers.  Not Indicated    Care Transition patient: No    KATHARINA Story  [facility]  Case Management Department  Ph: [phone]   Fax: [phone]

## 2022-08-22 NOTE — PROGRESS NOTES
Physical Therapy  Facility/Department: [place] [address]  Physical Therapy Initial Assessment    Name: Becka Esparza  : 1960  MRN: [id number]  Date of Service: 2022    Discharge Recommendations:Cheng Mendez scored a 17/24 on the AM-PAC short mobility form. Current research shows that an AM-PAC score of 18 or greater is typically associated with a discharge to the patient's home setting. Based on the patient's AM-PAC score and their current functional mobility deficits, it is recommended that the patient have 2-3 sessions per week of Physical Therapy at d/c to increase the patient's independence. At this time, this patient demonstrates the endurance and safety to discharge home. Please see assessment section for further patient specific details. If patient discharges prior to next session this note will serve as a discharge summary. Please see below for the latest assessment towards goals. PT Equipment Recommendations  Equipment Needed: No      Patient Diagnosis(es): The encounter diagnosis was Seizure ([address] Past Medical History:  has a past medical history of Arthritis, Asthma, Bronchitis, CAD (coronary artery disease), CHF (congestive heart failure) ([address] Chronic back pain, Diabetes mellitus ([address] Generalized headaches, Gout, Hypertension, MI, old, Morbid obesity ([address] Psychogenic nonepileptic seizure, and Seizures ([address] Past Surgical History:  has a past surgical history that includes eye surgery. Assessment   Assessment: Pt reports he is wheelchair bound for the past 12-15 years due to broken toes and does not ambulate much. Pt states he is ind with transfers and able to get around in his wheelchair in the apartment. The patient states that he is able to ambulate short distances to get into the chair and transfers with AD at home. Pt was SBA for all bed mobility, CGA for transfers and 3 steps with the rollling walker in the room.  Pt complains of knee pain after standing and taking steps. Despite lower AMPAC score, PT believes pt is very close to baseline during the eval, but would benefit from rtn home with 24 hr A and HHPT in order to improve strength, endurance and ambulatory status. Will continue to follow. Treatment Diagnosis: decreased ambulatory status  Therapy Prognosis: Fair  Decision Making: Medium Complexity  Requires PT Follow-Up: Yes  Activity Tolerance  Activity Tolerance: Patient tolerated treatment well     Plan   Plan  Plan:  (2-5)  Current Treatment Recommendations: Balance training, Gait training, Stair training, Functional mobility training, Transfer training, Endurance training, Patient/Caregiver education & training, Strengthening  Safety Devices  Type of Devices: Call light within reach, Chair alarm in place, Nurse notified, Left in chair, All fall risk precautions in place     Restrictions  Position Activity Restriction  Other position/activity restrictions: Up as Tolerated, Ambulate the patient     Subjective   Pain: no complaints of pain  General  Chart Reviewed: Yes  Patient assessed for rehabilitation services?: Yes  Additional Pertinent Hx: Pt is a 62 yo male that presents to the ED post seizure 8/21. Pt has a pmh of seizures, CAD, MI and obesity. Head CT: No acute intracranial abnormality. Referring Practitioner: Roderick Tony MD  Diagnosis: Seizure  Subjective  Subjective: Pt found supine in bed. Agreeable to therapy.          Social/Functional History  Social/Functional History  Lives With: Alone  Type of Home: Apartment  Home Layout: One level  Home Access: Level entry  Bathroom Shower/Tub: Walk-in shower  Bathroom Toilet: Standard  Bathroom Equipment: Shower chair, Grab bars in shower (pt has bars in front of toilet)  Bathroom Accessibility: Accessible  Home Equipment: [address] (pull cord in pts apartment)  Has the patient had two or more falls in the past year or any fall with injury in the past year?: Yes (pt slid on floor and pt was able to self recover)  Receives Help From:  (staff is in patients building)  ADL Assistance: [address]: Independent  Homemaking Responsibilities: Yes  Meal Prep Responsibility: Primary  Laundry Responsibility: Secondary  Cleaning Responsibility: Secondary  Bill Paying/Finance Responsibility: Secondary  Ambulation Assistance: Non-ambulatory (uses wheelchair)  Transfer Assistance: Independent  Active : No  Patient's  Info: [employer] services- takes to grocery and appointments.   Mode of Transportation: [place] [employer] (facility has a [employer]; pt has Access as well)  Occupation: On disability  Leisure & Hobbies: art work- pt does [employer] in [place] ; pt likes bowling  Additional Comments: pts girlfriend lives in the same apartment building ; pts sister patito and does grocery shopping for patietn  Vision/Hearing  Vision  Vision: Within Functional Limits  Vision Exceptions: Wears glasses for reading;Wears glasses for distance  Hearing  Hearing: Exceptions to [facility]   Orientation  Overall Orientation Status: Within Functional Limits  Orientation Level: Oriented X4     Objective   Heart Rate: 71  Heart Rate Source: Monitor  BP: 130/75  BP Location: Left upper arm  BP Method: Automatic  Patient Position: Semi fowlers  MAP (Calculated): 93.33  Resp: 16  SpO2: 95 %  O2 Device: None (Room air)        Gross Assessment  AROM: Generally decreased, functional  Strength: Generally decreased, functional                 Balance  Sitting: Intact  Standing: With support (use of 2WW for BUE support)  Gait  Overall Level of Assistance: Minimum assistance (min A with use of 2WW to walk ~4 ft foward/back by chair)  Bed mobility  Supine to Sit: Stand by assistance  Scooting: Stand by assistance  Bed Mobility Comments: HOB slightly reclined ; effortful  Transfers  Sit to Stand: Contact guard assistance  Stand to sit: Contact guard assistance  Bed to Chair: Minimal assistance  Stand Pivot Transfers: Minimal Assistance  Ambulation  Surface: level tile  Device: Rolling Walker  Assistance: Contact guard assistance  Quality of Gait: Slightly unsteady, decreased endurance, increased knee pain  Distance: 3 steps forward, 3 steps backwards     Balance  Sitting - Static: Good  Sitting - Dynamic: Good  Standing - Static: Fair  Standing - Dynamic: Fair;-           OutComes Score      AM-PAC Score  AM-PAC Inpatient Mobility Raw Score : 17 (08/22/22 1324)  AM-PAC Inpatient T-Scale Score : 42.13 (08/22/22 1324)  Mobility Inpatient CMS 0-100% Score: 50.57 (08/22/22 1324)  Mobility Inpatient CMS G-Code Modifier : CK (08/22/22 1324)          Tinneti Score       Goals  Short Term Goals  Time Frame for Short term goals: Discharge  Short term goal 1: Sit<>Stand SBA with no device  Short term goal 2: Ambulate to bathroom with RW SBA  Patient Goals   Patient goals : To Return Home       Education  Patient Education  Education Given To: Patient  Education Provided: Role of Therapy; Energy Conservation;Transfer Training  Education Method: Demonstration  Barriers to Learning: None  Education Outcome: Verbalized understanding      Therapy Time   Individual Concurrent Group Co-treatment   Time In 1132         Time Out 1212         Minutes 40          Timed Code Treatment Minutes:   25    Total Treatment Minutes:  40         Nga Stuart PT

## 2022-08-22 NOTE — DISCHARGE SUMMARY
Hospital Medicine Discharge Summary    Patient ID: Maryellen Schmitt      Patient's PCP: Princess Lucero    Admit Date: 8/21/2022     Discharge Date:     Admitting Physician: Karen Lepe MD     Discharge Physician: Kraen Lepe MD     Discharge Diagnoses: Active Hospital Problems    Diagnosis Date Noted    Encephalopathy acute [G93.40] 03/08/2022    Nonintractable generalized idiopathic epilepsy without status epilepticus ([facility] [G40.309] 08/10/2021    Breakthrough seizure ([facility] Artemio Taylorssing 08/09/2021    Morbid obesity with BMI of 50.0-59.9, adult ([facility] [E66.01, Z68.43] 05/16/2014       The patient was seen and examined on day of discharge and this discharge summary is in conjunction with any daily progress note from day of discharge. Condition at discharge - stable    Hospital Course: patient seen and evaluated on the day of discharge. Patient informed about following up with appointments. Patient verbalized understanding for follow-up appointments. The patient and / or the family were informed of the results of tests, a time was given to answer questions, a plan was proposed and they agreed with plan. Medical reconciliation performed. Patient discharged stable condition. On the date of discharge, the patient reported feeling stable. The patient was found to not be in any acute distress, with vital signs within normal limits, and no new abnormalities on physical examination. Further, the patient expressed appropriate understanding of, and agreement with, the discharge recommendations, medications, and plan. Patient is a 70-year-old male with past medical history of asthma, CAD, CHF, diabetes mellitus who presents to the hospital for seizures.   Reportedly patient has longstanding history of seizures, usually he comes out of seizure faster however this time it has been long enough he is confused so family brought the patient to the hospital.  According to the patient's family he has Discharge Medications:     Current Discharge Medication List             Details   levETIRAcetam (KEPPRA) 500 MG tablet Take 1 tablet by mouth 2 times daily  Qty: 60 tablet, Refills: 3                Details   sacubitril-valsartan (ENTRESTO) 49-51 MG per tablet Take 1 tablet by mouth 2 times daily      carvedilol (COREG) 6.25 MG tablet Take 6.25 mg by mouth 2 times daily (with meals)      cyanocobalamin 1000 MCG tablet Take 1,000 mcg by mouth daily      spironolactone (ALDACTONE) 25 MG tablet Take 0.5 tablets by mouth daily  Qty: 45 tablet, Refills: 1      albuterol sulfate HFA (PROVENTIL;VENTOLIN;PROAIR) 108 (90 Base) MCG/ACT inhaler Inhale 2 puffs into the lungs every 6 hours as needed for Wheezing      !! lamoTRIgine (LAMICTAL) 200 MG tablet Take 200 mg by mouth daily as needed      glipiZIDE (GLUCOTROL) 5 MG tablet Take 5 mg by mouth 2 times daily (before meals)      montelukast (SINGULAIR) 10 MG tablet Take 10 mg by mouth nightly      chlorthalidone (HYGROTON) 25 MG tablet Take 25 mg by mouth daily       nortriptyline (PAMELOR) 75 MG capsule Take 75 mg by mouth nightly      !! lamoTRIgine (LAMICTAL) 200 MG tablet Take 600 mg by mouth 2 times daily      atorvastatin (LIPITOR) 80 MG tablet Take 80 mg by mouth nightly       metFORMIN (GLUCOPHAGE) 1000 MG tablet Take 1,000 mg by mouth 2 times daily (with meals)       amLODIPine (NORVASC) 10 MG tablet Take 10 mg by mouth daily       aspirin 81 MG EC tablet Take 81 mg by mouth daily      famotidine (PEPCID) 40 MG tablet Take 40 mg by mouth daily as needed (indigestion)       loratadine (CLARITIN) 10 MG tablet Take 10 mg by mouth nightly       Multiple Vitamin (DAILY TITUS) TABS Take 1 tablet by mouth daily      diazepam (VALIUM) 5 MG tablet Take 5 mg by mouth every 12 hours as needed for Anxiety. oxyCODONE-acetaminophen (PERCOCET) 5-325 MG per tablet Take 1 tablet by mouth every 4 hours as needed.        allopurinol (ZYLOPRIM) 300 MG tablet Take 300 mg by mouth nightly        !! - Potential duplicate medications found. Please discuss with provider. Time Spent on discharge is more than 30 mints in the examination, evaluation, counseling and review of medications and discharge plan. Signed:    Jerald Granger MD   8/22/2022      Thank you Jodi Willams for the opportunity to be involved in this patient's care. If you have any questions or concerns please feel free to contact me at [phone].

## 2022-08-22 NOTE — PLAN OF CARE
Problem: Discharge Planning  Goal: Discharge to home or other facility with appropriate resources  8/22/2022 1758 by Ravin Najera RN  Outcome: Completed  8/22/2022 1631 by Keyur Andrew RN  Outcome: Progressing     Problem: Pain  Goal: Verbalizes/displays adequate comfort level or baseline comfort level  8/22/2022 1758 by Ravin Najera RN  Outcome: Completed  8/22/2022 1631 by Keyur Andrew RN  Outcome: Progressing     Problem: Neurosensory - Adult  Goal: Achieves stable or improved neurological status  8/22/2022 1758 by Ravin Najera RN  Outcome: Completed  8/22/2022 1631 by Keyur Andrew RN  Outcome: Progressing  Goal: Absence of seizures  8/22/2022 1758 by Ravin Najera RN  Outcome: Completed  8/22/2022 1631 by Keyur Andrew RN  Outcome: Progressing  Goal: Remains free of injury related to seizures activity  8/22/2022 1758 by Ravin Najera RN  Outcome: Completed  8/22/2022 1631 by Keyur Andrew RN  Outcome: Progressing  Goal: Achieves maximal functionality and self care  8/22/2022 1758 by Ravin Najera RN  Outcome: Completed  8/22/2022 1631 by Keyur Andrew RN  Outcome: Progressing     Problem: Respiratory - Adult  Goal: Achieves optimal ventilation and oxygenation  8/22/2022 1758 by Ravin Najera RN  Outcome: Completed  8/22/2022 1631 by Keyur Andrew RN  Outcome: Progressing     Problem: Skin/Tissue Integrity - Adult  Goal: Skin integrity remains intact  8/22/2022 1758 by Ravin Najera RN  Outcome: Completed  8/22/2022 1631 by Keyur Andrew RN  Outcome: Progressing     Problem: Musculoskeletal - Adult  Goal: Return mobility to safest level of function  8/22/2022 1758 by Ravin Najera RN  Outcome: Completed  8/22/2022 1631 by Keyur Andrew RN  Outcome: Progressing     Problem: Gastrointestinal - Adult  Goal: Minimal or absence of nausea and vomiting  8/22/2022 1758 by Ravin Najera RN  Outcome: Completed  8/22/2022 1631 by Keyur Andrew RN  Outcome: Progressing     Problem: Genitourinary - Adult  Goal: Absence of urinary retention  8/22/2022 1758 by George Champion RN  Outcome: Completed  8/22/2022 1631 by Mark Cowan RN  Outcome: Progressing     Problem: Infection - Adult  Goal: Absence of infection at discharge  8/22/2022 1758 by George Champion RN  Outcome: Completed  8/22/2022 1631 by Mark Cowan RN  Outcome: Progressing     Problem: Metabolic/Fluid and Electrolytes - Adult  Goal: Electrolytes maintained within normal limits  8/22/2022 1758 by George Champion RN  Outcome: Completed  8/22/2022 1631 by Mark Cowan RN  Outcome: Progressing  Goal: Hemodynamic stability and optimal renal function maintained  8/22/2022 1758 by George Champion RN  Outcome: Completed  8/22/2022 1631 by Mark Cowan RN  Outcome: Progressing  Goal: Glucose maintained within prescribed range  8/22/2022 1758 by George Champion RN  Outcome: Completed  8/22/2022 1631 by Mark Cowan RN  Outcome: Progressing     Problem: Hematologic - Adult  Goal: Maintains hematologic stability  8/22/2022 1758 by George Champion RN  Outcome: Completed  8/22/2022 1631 by Mark Cowan RN  Outcome: Progressing     Problem: Chronic Conditions and Co-morbidities  Goal: Patient's chronic conditions and co-morbidity symptoms are monitored and maintained or improved  8/22/2022 1758 by George Champion RN  Outcome: Completed  8/22/2022 1631 by Mark Cowan RN  Outcome: Progressing     Problem: Safety - Adult  Goal: Free from fall injury  8/22/2022 1758 by George Champion RN  Outcome: Completed  8/22/2022 1631 by Mark Cowan RN  Outcome: Progressing     Problem: ABCDS Injury Assessment  Goal: Absence of physical injury  8/22/2022 1758 by George Champion RN  Outcome: Completed  8/22/2022 1631 by Mark Cowan RN  Outcome: Progressing

## 2022-08-22 NOTE — DISCHARGE INSTRUCTIONS
-Take Levetiracetam (Keppra) 500mg once in the morning and once at night.  -Schedule an appointment as soon as possible with Dr. Connie Rosas to follow up on the management of your anti-seizure medications after you are discharged.     -Seizure Driving Risk: Having a Seizure while driving puts you at risk for injuring yourself or others. If you drive while having uncontrolled seizures, you may be held liable for injuries to others. Do not drive until you have been seizure free for at least 3 months, state laws vary so please check laws in your state. Injury Risk: Please avoid working from [employer], swimming alone or taking baths in a bathtub, use showers only. Patient verbalized understanding and states he does drive.

## 2022-08-23 LAB — LAMOTRIGINE LEVEL: 16.6 UG/ML (ref 3–15)

## 2022-11-23 ENCOUNTER — HOSPITAL ENCOUNTER (EMERGENCY)
Age: 62
Discharge: HOME | End: 2022-11-23
Attending: EMERGENCY MEDICINE
Payer: MEDICARE

## 2022-11-23 ENCOUNTER — APPOINTMENT (OUTPATIENT)
Dept: CT IMAGING | Age: 62
End: 2022-11-23
Payer: MEDICARE

## 2022-11-23 ENCOUNTER — APPOINTMENT (OUTPATIENT)
Dept: GENERAL RADIOLOGY | Age: 62
End: 2022-11-23
Payer: MEDICARE

## 2022-11-23 VITALS
SYSTOLIC BLOOD PRESSURE: 136 MMHG | OXYGEN SATURATION: 95 % | BODY MASS INDEX: 42.66 KG/M2 | DIASTOLIC BLOOD PRESSURE: 69 MMHG | RESPIRATION RATE: 14 BRPM | TEMPERATURE: 98.7 F | HEART RATE: 71 BPM | WEIGHT: 315 LBS | HEIGHT: 72 IN

## 2022-11-23 DIAGNOSIS — G40.919 BREAKTHROUGH SEIZURE (HCC): Primary | ICD-10-CM

## 2022-11-23 LAB
ANION GAP SERPL CALCULATED.3IONS-SCNC: 13 MMOL/L (ref 3–16)
BASOPHILS ABSOLUTE: 0 K/UL (ref 0–0.2)
BASOPHILS RELATIVE PERCENT: 0.7 %
BILIRUBIN URINE: NEGATIVE
BLOOD, URINE: NEGATIVE
BUN BLDV-MCNC: 14 MG/DL (ref 7–20)
CALCIUM SERPL-MCNC: 9.1 MG/DL (ref 8.3–10.6)
CHLORIDE BLD-SCNC: 99 MMOL/L (ref 99–110)
CLARITY: CLEAR
CO2: 25 MMOL/L (ref 21–32)
COLOR: YELLOW
CREAT SERPL-MCNC: 1.1 MG/DL (ref 0.9–1.3)
EKG ATRIAL RATE: 65 BPM
EKG DIAGNOSIS: NORMAL
EKG P AXIS: 64 DEGREES
EKG P-R INTERVAL: 196 MS
EKG Q-T INTERVAL: 472 MS
EKG QRS DURATION: 188 MS
EKG QTC CALCULATION (BAZETT): 490 MS
EKG R AXIS: -52 DEGREES
EKG T AXIS: 113 DEGREES
EKG VENTRICULAR RATE: 65 BPM
EOSINOPHILS ABSOLUTE: 0.1 K/UL (ref 0–0.6)
EOSINOPHILS RELATIVE PERCENT: 2.6 %
GFR SERPL CREATININE-BSD FRML MDRD: >60 ML/MIN/{1.73_M2}
GLUCOSE BLD-MCNC: 131 MG/DL (ref 70–99)
GLUCOSE URINE: NEGATIVE MG/DL
HCT VFR BLD CALC: 41.8 % (ref 40.5–52.5)
HEMOGLOBIN: 13.7 G/DL (ref 13.5–17.5)
KEPPRA DOSE AMT: NORMAL
KEPPRA: 20.7 UG/ML (ref 6–46)
KETONES, URINE: NEGATIVE MG/DL
LEUKOCYTE ESTERASE, URINE: NEGATIVE
LYMPHOCYTES ABSOLUTE: 1.7 K/UL (ref 1–5.1)
LYMPHOCYTES RELATIVE PERCENT: 32.1 %
MAGNESIUM: 1.7 MG/DL (ref 1.8–2.4)
MCH RBC QN AUTO: 31.1 PG (ref 26–34)
MCHC RBC AUTO-ENTMCNC: 32.6 G/DL (ref 31–36)
MCV RBC AUTO: 95.3 FL (ref 80–100)
MICROSCOPIC EXAMINATION: YES
MONOCYTES ABSOLUTE: 0.3 K/UL (ref 0–1.3)
MONOCYTES RELATIVE PERCENT: 6.6 %
NEUTROPHILS ABSOLUTE: 3 K/UL (ref 1.7–7.7)
NEUTROPHILS RELATIVE PERCENT: 58 %
NITRITE, URINE: NEGATIVE
PDW BLD-RTO: 15.2 % (ref 12.4–15.4)
PH UA: 6 (ref 5–8)
PLATELET # BLD: 171 K/UL (ref 135–450)
PMV BLD AUTO: 8.6 FL (ref 5–10.5)
POTASSIUM REFLEX MAGNESIUM: 3.4 MMOL/L (ref 3.5–5.1)
PROTEIN UA: ABNORMAL MG/DL
RBC # BLD: 4.39 M/UL (ref 4.2–5.9)
RBC UA: NORMAL /HPF (ref 0–4)
SODIUM BLD-SCNC: 137 MMOL/L (ref 136–145)
SPECIFIC GRAVITY UA: >=1.03 (ref 1–1.03)
TROPONIN: <0.01 NG/ML
URINE REFLEX TO CULTURE: ABNORMAL
URINE TYPE: ABNORMAL
UROBILINOGEN, URINE: 0.2 E.U./DL
WBC # BLD: 5.2 K/UL (ref 4–11)
WBC UA: NORMAL /HPF (ref 0–5)

## 2022-11-23 PROCEDURE — 99285 EMERGENCY DEPT VISIT HI MDM: CPT

## 2022-11-23 PROCEDURE — 71045 X-RAY EXAM CHEST 1 VIEW: CPT

## 2022-11-23 PROCEDURE — 84484 ASSAY OF TROPONIN QUANT: CPT

## 2022-11-23 PROCEDURE — 6360000002 HC RX W HCPCS: Performed by: EMERGENCY MEDICINE

## 2022-11-23 PROCEDURE — 2580000003 HC RX 258: Performed by: EMERGENCY MEDICINE

## 2022-11-23 PROCEDURE — 80175 DRUG SCREEN QUAN LAMOTRIGINE: CPT

## 2022-11-23 PROCEDURE — 93005 ELECTROCARDIOGRAM TRACING: CPT | Performed by: EMERGENCY MEDICINE

## 2022-11-23 PROCEDURE — 80177 DRUG SCRN QUAN LEVETIRACETAM: CPT

## 2022-11-23 PROCEDURE — 96374 THER/PROPH/DIAG INJ IV PUSH: CPT

## 2022-11-23 PROCEDURE — 80048 BASIC METABOLIC PNL TOTAL CA: CPT

## 2022-11-23 PROCEDURE — 81001 URINALYSIS AUTO W/SCOPE: CPT

## 2022-11-23 PROCEDURE — 85025 COMPLETE CBC W/AUTO DIFF WBC: CPT

## 2022-11-23 PROCEDURE — 81003 URINALYSIS AUTO W/O SCOPE: CPT

## 2022-11-23 PROCEDURE — 70450 CT HEAD/BRAIN W/O DYE: CPT

## 2022-11-23 PROCEDURE — 83735 ASSAY OF MAGNESIUM: CPT

## 2022-11-23 RX ADMIN — SODIUM CHLORIDE 1500 MG: 9 INJECTION, SOLUTION INTRAVENOUS at 12:18

## 2022-11-23 ASSESSMENT — PAIN DESCRIPTION - DESCRIPTORS: DESCRIPTORS: ACHING

## 2022-11-23 ASSESSMENT — PAIN - FUNCTIONAL ASSESSMENT: PAIN_FUNCTIONAL_ASSESSMENT: 0-10

## 2022-11-23 ASSESSMENT — PAIN SCALES - GENERAL: PAINLEVEL_OUTOF10: 6

## 2022-11-23 ASSESSMENT — PAIN DESCRIPTION - LOCATION: LOCATION: HEAD

## 2022-11-23 NOTE — CONSULTS
Neurology / [facility] Note    Hilaria Eden MD is requesting this consult. Reason for Consult: seizure      History of Present Illness   HPI obtained per my interview with patient and chart review  Genevieve Hernández is a 63 y.o. y/o male with PMH significant for asthma, CHF, cognitive communication deficit, CAD, DM, dilated cardiomyopathy, epilepsy, PNES, HTN, LBBB, developmental delay, OA, and DUANE, patient resides in an independent senior living facility. He presented to the ED today with a presumed unwitnessed seizure. He was found at his home with altered mental status. He does not remember what happened today or what brought him to the hospital.       REVIEW OF SYSTEMS:   Constitutional- No weight loss or fevers   Eyes- No diplopia. No photophobia. Ears/nose/throat- No dysphagia. No Dysarthria   Cardiovascular- No palpitations, does report chest pain hx of CAD and CHF  Respiratory- No dyspnea. No Cough   Gastrointestinal- No Abdominal pain. No Vomiting. Musculoskeletal- No myalgia. No arthralgia, reports left shoulder pain  Skin- No rash. No easy bruising. Psychiatric- No depression. No anxiety   Endocrine- hx diabetes. No thyroid issues.     Neurologic- hx sz and PNES,     Past Medical, Surgical, Family, and Social History   PAST MEDICAL HISTORY:  Past Medical History:   Diagnosis Date    Arthritis     Asthma     Bronchitis     CAD (coronary artery disease)     CHF (congestive heart failure) ([address]     Chronic back pain     Diabetes mellitus ([address]     Generalized headaches     Gout     Hypertension     MI, old 12/01/2013    Nstemi    Morbid obesity ([address]     Psychogenic nonepileptic seizure     DX at [facility]- pseudo-seizures    Seizures ([address]      SURGICAL HISTORY:  Past Surgical History:   Procedure Laterality Date    EYE SURGERY       FAMILY HISTORY & SOCIAL HISTORY:  Family history non-contributory  Family History   Problem Relation Age of Onset    Asthma Mother     Obesity Mother High Blood Pressure Mother     Diabetes Mother     Diabetes Father     Heart Disease Father     Heart Attack Father     High Blood Pressure Father     Diabetes Sister     High Blood Pressure Sister      Social History     Tobacco Use    Smoking status: Former    Smokeless tobacco: Never   Substance Use Topics    Alcohol use: No    Drug use: No         Allergies & Outpatient Medications   ALLERGIES:  Allergies   Allergen Reactions    Amoxicillin     Doxycycline     Ibuprofen     Penicillins Hives    Phenobarbital     Tetracycline     Aspirin Nausea And Vomiting     Sister says he can take the baby asprin     HOME MEDICATIONS:  Patient's Medications   New Prescriptions    No medications on file   Previous Medications    ALBUTEROL SULFATE HFA (PROVENTIL;VENTOLIN;PROAIR) 108 (90 BASE) MCG/ACT INHALER    Inhale 2 puffs into the lungs every 6 hours as needed for Wheezing    ALLOPURINOL (ZYLOPRIM) 300 MG TABLET    Take 300 mg by mouth nightly     AMLODIPINE (NORVASC) 10 MG TABLET    Take 10 mg by mouth daily     ASPIRIN 81 MG EC TABLET    Take 81 mg by mouth daily    ATORVASTATIN (LIPITOR) 80 MG TABLET    Take 80 mg by mouth nightly     CARVEDILOL (COREG) 6.25 MG TABLET    Take 6.25 mg by mouth 2 times daily (with meals)    CHLORTHALIDONE (HYGROTON) 25 MG TABLET    Take 25 mg by mouth daily     CYANOCOBALAMIN 1000 MCG TABLET    Take 1,000 mcg by mouth daily    DIAZEPAM (VALIUM) 5 MG TABLET    Take 5 mg by mouth every 12 hours as needed for Anxiety.      FAMOTIDINE (PEPCID) 40 MG TABLET    Take 40 mg by mouth daily as needed (indigestion)     GLIPIZIDE (GLUCOTROL) 5 MG TABLET    Take 5 mg by mouth 2 times daily (before meals)    LAMOTRIGINE (LAMICTAL) 200 MG TABLET    Take 600 mg by mouth 2 times daily    LAMOTRIGINE (LAMICTAL) 200 MG TABLET    Take 200 mg by mouth daily as needed    LEVETIRACETAM (KEPPRA) 500 MG TABLET    Take 1 tablet by mouth 2 times daily    LORATADINE (CLARITIN) 10 MG TABLET    Take 10 mg by mouth nightly     METFORMIN (GLUCOPHAGE) 1000 MG TABLET    Take 1,000 mg by mouth 2 times daily (with meals)     MONTELUKAST (SINGULAIR) 10 MG TABLET    Take 10 mg by mouth nightly    MULTIPLE VITAMIN (DAILY TITUS) TABS    Take 1 tablet by mouth daily    NORTRIPTYLINE (PAMELOR) 75 MG CAPSULE    Take 75 mg by mouth nightly    OXYCODONE-ACETAMINOPHEN (PERCOCET) 5-325 MG PER TABLET    Take 1 tablet by mouth every 4 hours as needed. SACUBITRIL-VALSARTAN (ENTRESTO) 49-51 MG PER TABLET    Take 1 tablet by mouth 2 times daily    SPIRONOLACTONE (ALDACTONE) 25 MG TABLET    Take 0.5 tablets by mouth daily   Modified Medications    No medications on file   Discontinued Medications    No medications on file         Physical Exam   PHYSICAL EXAM:  Vitals:    11/23/22 1134   BP: 136/69   Pulse: 71   Resp: 14   Temp: 98.7 °F (37.1 °C)   TempSrc: Axillary   SpO2: 95%   Weight: (!) 347 lb 12.8 oz (157.8 kg)   Height: 6' (1.829 m)         General: Alert, no distress, well-nourished  Neurologic  Mental status:   orientation to person, place, time, is not oriented to situation   Attention intact as able to attend well to the exam     Language fluent in conversation   Comprehension intact; follows simple commands    Cranial nerves:   CN2: Visual fields full w/o extinction on confrontational testing   CN 3,4,6: Pupils equal and reactive to light, extraocular muscles intact  CN5: Facial sensation symmetric   CN7: Face symmetric  CN8: Hearing symmetric to spoken voice  CN9: Palate elevated symmetrically  CN11: Traps full strength on shoulder shrug  CN12: Tongue midline with protrusion    Motor Exam:  RUE: 4/5  LUE: 3/5; reports left shoulder pain, states he had cortisone injection a week ago  RLE: 4/5  LLE: 3/5    Deep tendon reflexes:    R  L    Biceps  1  1   Brachioradialis  1 1   Patellar  1 1     Sensory: light touch intact and symmetric in all 4 extremities.   No sensory extinction on bilateral simultaneous stimulation  Cerebellar/coordination: finger nose finger normal without ataxia  Tone: normal in all 4 extremities  Gait: not tested      OTHER SYSTEMS:  Cardiovascular: Warm, appears well perfused, he is reporting chest pain and left arm pain  Respiratory: Easy, non-labored respiratory pattern breath sounds clear and equal  Abdominal: Abdomen is obese without distention   Extremities: Upper and lower extremities are atraumatic in appearance without deformity. No swelling or erythema. He does report left shoulder and left lower extremity pain. Psychiatric: Cooperative with exam    Diagnostic Testing Results   IMAGES:  Images personally reviewed and agree w/ radiology interpretation. Head CT w/o Contrast:  FINDINGS:       The diagnostic quality of the examination is adequate. Brain parenchyma: Gray-white differentiation is maintained. No evidence of intracranial hemorrhage. Ventricles and extraaxial spaces: Normal ventricular system. No extra-axial fluid collection. Orbits, paranasal sinuses, mastoids: No acute orbital abnormality. Partial opacification of the right ethmoid air cells. Clear mastoid air cells. Extracranial soft tissues: Normal.       Calvarium and skull base: No acute calvarial abnormality. Other: Atherosclerotic vascular calcifications. Impression       1. No evidence of acute intracranial hemorrhage or mass effect. LABS:  All results below personally reviewed. Pertinent positives & negatives are addressed in Impression & Recommendations below. LABS   Metabolic Panel Recent Labs     11/23/22  1200      K 3.4*   CL 99   CO2 25   BUN 14   CREATININE 1.1   GLUCOSE 131*   CALCIUM 9.1   MG 1.70*      CBC / Coags Recent Labs     11/23/22  1200   WBC 5.2   RBC 4.39   HGB 13.7   HCT 41.8         Other No results for input(s): LABA1C, LDLCALC, TRIG, TSH, NDIXFUCA21, FOLATE, LABSALI, COVID19 in the last 72 hours.   No results for input(s): PHENYTOIN, KEPPRA, LACOSA, LAMO, VALPROATE, LACTSEPSIS, LACTA in the last 72 hours. CURRENT SCHEDULED MEDICATIONS   Inpatient Medications      Infusions      Antibiotics   Recent Abx Admin        No antibiotic orders with administrations found. IMPRESSION & RECOMMENDATIONS     IMPRESSION:  Dane Childers is a 63year old male patient that presents to the ED from the nursing home for concerns for a witnessed sz. On exam in the ED the patient is awake able to answer questions, he is following commands. He does not remember why he is here. He states he thinks he fell out of his wheelchair last night. CT head without contrast show no intracranial abnormality. RECOMMENDATIONS:  Discussed plan of care with Dr. Samuel Michael  Patient has a known seizure disorder as well as non-epileptic sz history (PNES)  He is followed for his seizure history at [facility] by Dr. Samara Dominguez and is on Lamictal and keppra  On exam he is back to baseline answering questions except he does not know what brought him to the hospital, CT shows no acute abnormality. He is complaining of chest pain and left arm pain. Continue medical work up in ED. If medical work up does not show other indication for need for admission, he is okay to be discharged from neurology standpoint with follow up with  - Dr. Samara Dominguez for seizure management. No need at this point to adjust seizure medication and no need for EEG.  Please call with any changes in neurological exam.       EDUARDA Martin - CNP   Neurology & Neurocritical Care   11/23/2022 3:48 PM      ICU Patients:   Neurocritical Care Line: [phone]  [facility]: [facility] Neurocritical Care    Floor / PCU Patients:  Neurology Line: [phone]  [facility]: [facility] Neurology    Time independently spent by Nurse Practitioner reviewing chart and prior testing, obtaining history from patient, examining patient, ordering appropriate medications / diagnostics, reviewing results of diagnostics, counseling and educating patient / family, communicating plan with other providers and documenting clinical information in the EMR was approximately 30 minutes.

## 2022-11-23 NOTE — ED PROVIDER NOTES
[address] [facility]          ATTENDING PHYSICIAN NOTE       Date of evaluation: 11/23/2022    Chief Complaint     Seizures (Unwitnessed seizure x1)      History of Present Illness     Vanessa Rodgers is a 63 y.o. male with history of seizure disorder, morbid obesity, hypertension, diabetes, CHF presenting to the emergency department today with presumed unwitnessed seizure. Patient lives at a independent senior facility and was found altered. He has a history of seizures and is reportedly on Lamictal and Keppra. He has been unable to provide any additional history to EMS or to myself. Review of Systems     Unable to obtain given altered mental status. Physical Exam     INITIAL VITALS: BP: 136/69, Temp: 98.7 °F (37.1 °C), Heart Rate: 71, Resp: 14, SpO2: 95 %     Nursing note and vitals reviewed. General:  Adult male, somnolent, briefly awakens to sternal rub. In no distress. HENT: Normocephalic and atraumatic. External ears normal. Nose appears normal externally. Eyes: Conjunctivae normal. No scleral icterus. PERRL  Neck: Neck supple. No tracheal deviation present. CV: Normal rate. Regular rhythm. S1/S2 auscultated. No murmurs, gallops or rubs. Pulm: Effort normal. Breath sounds clear to auscultation bilaterally. No wheezes. No rales or rhonchi. GI: Soft. No distension. No tenderness. No rebound or guarding. No masses. No peritoneal signs. Musculoskeletal: No edema. No gross deformities. Neurological: Somnolent, briefly awakens to sternal rub. Moves all extremities and localizes to noxious stimulus. Nonverbal.  Does not follow simple commands   Skin: Warm, dry. No rash. No diaphoresis or erythema. Procedures   Procedures    MEDICAL DECISION MAKING     MDM: Vanessa Rodgers is a 63 y.o. male with history as above presenting to the emergency department with altered mental status and presumed unwitnessed seizure.   On arrival, patient is in no distress but is quite somnolent and findings would be consistent with postictal state. He has no localizing neurologic findings. No evidence of trauma. Initial labs are reassuring. He did have a very prolonged postictal phase for which a head CT was obtained and neurology was consulted. Gradually over the course of 4 to 5 hours he had return to baseline interaction. Neurology did not feel that any additional work-up or changes to his medications were necessary at this time. He was complaining of some left-sided chest pain that began while neurology was interviewing him. Troponin has been obtained and is negative and his EKG is reassuring with an old left bundle branch block and negative Sgarbossa criteria. Urinalysis without signs of infectious etiology that may trigger breakthrough seizure. He had received IV Keppra early in his course here and was encouraged to continue his Lamictal and Keppra dosing and follow-up with his outpatient neurologist closely for further recommendations. Discharged in stable condition. Clinical Impression     1. Breakthrough seizure [facility])        Disposition     DISPOSITION Decision To Discharge 11/23/2022 08:40:07 PM        Soraida Gardner MD  11:08 PM                     Past Medical, Surgical, Family, and Social History     He has a past medical history of Arthritis, Asthma, Bronchitis, CAD (coronary artery disease), CHF (congestive heart failure) ([address] Chronic back pain, Diabetes mellitus ([address] Generalized headaches, Gout, Hypertension, MI, old, Morbid obesity ([address] Psychogenic nonepileptic seizure, and Seizures ([address] He has a past surgical history that includes eye surgery. His family history includes Asthma in his mother; Diabetes in his father, mother, and sister; Heart Attack in his father; Heart Disease in his father; High Blood Pressure in his father, mother, and sister; Obesity in his mother. He reports that he has quit smoking.  He has never used smokeless tobacco. He reports that he does not drink alcohol and does not use drugs.     Medications     Discharge Medication List as of 11/23/2022  8:19 PM        CONTINUE these medications which have NOT CHANGED    Details   sacubitril-valsartan (ENTRESTO) 49-51 MG per tablet Take 1 tablet by mouth 2 times dailyHistorical Med      carvedilol (COREG) 6.25 MG tablet Take 6.25 mg by mouth 2 times daily (with meals)Historical Med      levETIRAcetam (KEPPRA) 500 MG tablet Take 1 tablet by mouth 2 times daily, Disp-60 tablet, R-3Normal      cyanocobalamin 1000 MCG tablet Take 1,000 mcg by mouth dailyHistorical Med      spironolactone (ALDACTONE) 25 MG tablet Take 0.5 tablets by mouth daily, Disp-45 tablet, R-1Normal      albuterol sulfate HFA (PROVENTIL;VENTOLIN;PROAIR) 108 (90 Base) MCG/ACT inhaler Inhale 2 puffs into the lungs every 6 hours as needed for WheezingHistorical Med      !! lamoTRIgine (LAMICTAL) 200 MG tablet Take 200 mg by mouth daily as neededHistorical Med      glipiZIDE (GLUCOTROL) 5 MG tablet Take 5 mg by mouth 2 times daily (before meals)Historical Med      montelukast (SINGULAIR) 10 MG tablet Take 10 mg by mouth nightlyHistorical Med      chlorthalidone (HYGROTON) 25 MG tablet Take 25 mg by mouth daily Historical Med      nortriptyline (PAMELOR) 75 MG capsule Take 75 mg by mouth nightlyHistorical Med      !! lamoTRIgine (LAMICTAL) 200 MG tablet Take 600 mg by mouth 2 times dailyHistorical Med      atorvastatin (LIPITOR) 80 MG tablet Take 80 mg by mouth nightly Historical Med      metFORMIN (GLUCOPHAGE) 1000 MG tablet Take 1,000 mg by mouth 2 times daily (with meals) Historical Med      amLODIPine (NORVASC) 10 MG tablet Take 10 mg by mouth daily Historical Med      aspirin 81 MG EC tablet Take 81 mg by mouth dailyHistorical Med      famotidine (PEPCID) 40 MG tablet Take 40 mg by mouth daily as needed (indigestion) Historical Med      loratadine (CLARITIN) 10 MG tablet Take 10 mg by mouth nightly Historical Med Multiple Vitamin (DAILY TITUS) TABS Take 1 tablet by mouth dailyHistorical Med      diazepam (VALIUM) 5 MG tablet Take 5 mg by mouth every 12 hours as needed for Anxiety. Historical Med      oxyCODONE-acetaminophen (PERCOCET) 5-325 MG per tablet Take 1 tablet by mouth every 4 hours as needed. Historical Med      allopurinol (ZYLOPRIM) 300 MG tablet Take 300 mg by mouth nightly Historical Med       !! - Potential duplicate medications found. Please discuss with provider. Allergies     He is allergic to amoxicillin, doxycycline, ibuprofen, penicillins, phenobarbital, tetracycline, and aspirin. ED Course     Nursing Notes, Past Medical Hx, Past Surgical Hx, Social Hx,Allergies, and Family Hx were reviewed. Patient was given the following medications:  Orders Placed This Encounter   Medications    levETIRAcetam (KEPPRA) 1,500 mg in sodium chloride 0.9 % 100 mL IVPB       Diagnostic Results     EKG   Sinus rhythm, ventricular rate 65. Left bundle branch block with left axis deviation. Negative Sgarbossa criteria, no evidence of ischemia. Compared to previous, not significantly changed. RECENT VITALS:  BP: 136/69,Temp: 98.7 °F (37.1 °C), Heart Rate: 71, Resp: 14, SpO2: 95 %     RADIOLOGY:  CT Head W/O Contrast   Final Result      1. No evidence of acute intracranial hemorrhage or mass effect. XR CHEST PORTABLE   Final Result   1. No acute cardiopulmonary findings.           LABS:   Results for orders placed or performed during the hospital encounter of 11/23/22   BMP w/ Reflex to MG   Result Value Ref Range    Sodium 137 136 - 145 mmol/L    Potassium reflex Magnesium 3.4 (L) 3.5 - 5.1 mmol/L    Chloride 99 99 - 110 mmol/L    CO2 25 21 - 32 mmol/L    Anion Gap 13 3 - 16    Glucose 131 (H) 70 - 99 mg/dL    BUN 14 7 - 20 mg/dL    Creatinine 1.1 0.9 - 1.3 mg/dL    Est, Glom Filt Rate >60 >60    Calcium 9.1 8.3 - 10.6 mg/dL   CBC with Auto Differential   Result Value Ref Range    WBC 5.2 4.0 - 11.0 K/uL    RBC 4.39 4.20 - 5.90 M/uL    Hemoglobin 13.7 13.5 - 17.5 g/dL    Hematocrit 41.8 40.5 - 52.5 %    MCV 95.3 80.0 - 100.0 fL    MCH 31.1 26.0 - 34.0 pg    MCHC 32.6 31.0 - 36.0 g/dL    RDW 15.2 12.4 - 15.4 %    Platelets 080 098 - 968 K/uL    MPV 8.6 5.0 - 10.5 fL    Neutrophils % 58.0 %    Lymphocytes % 32.1 %    Monocytes % 6.6 %    Eosinophils % 2.6 %    Basophils % 0.7 %    Neutrophils Absolute 3.0 1.7 - 7.7 K/uL    Lymphocytes Absolute 1.7 1.0 - 5.1 K/uL    Monocytes Absolute 0.3 0.0 - 1.3 K/uL    Eosinophils Absolute 0.1 0.0 - 0.6 K/uL    Basophils Absolute 0.0 0.0 - 0.2 K/uL   Urinalysis with Reflex to Culture    Specimen: Urine   Result Value Ref Range    Color, UA Yellow Straw/Yellow    Clarity, UA Clear Clear    Glucose, Ur Negative Negative mg/dL    Bilirubin Urine Negative Negative    Ketones, Urine Negative Negative mg/dL    Specific Gravity, UA >=1.030 1.005 - 1.030    Blood, Urine Negative Negative    pH, UA 6.0 5.0 - 8.0    Protein, UA TRACE (A) Negative mg/dL    Urobilinogen, Urine 0.2 <2.0 E.U./dL    Nitrite, Urine Negative Negative    Leukocyte Esterase, Urine Negative Negative    Microscopic Examination YES     Urine Type NotGiven     Urine Reflex to Culture Not Indicated    Levetiracetam Level   Result Value Ref Range    Levetiracetam Lvl 20.7 6.0 - 46.0 ug/mL    KEPPRA Dose Amt Unknown    Magnesium   Result Value Ref Range    Magnesium 1.70 (L) 1.80 - 2.40 mg/dL   Troponin   Result Value Ref Range    Troponin <0.01 <0.01 ng/mL   Urinalysis with Microscopic   Result Value Ref Range    WBC, UA 0-2 0 - 5 /HPF    RBC, UA 0-2 0 - 4 /HPF   EKG 12 Lead   Result Value Ref Range    Ventricular Rate 65 BPM    Atrial Rate 65 BPM    P-R Interval 196 ms    QRS Duration 188 ms    Q-T Interval 472 ms    QTc Calculation (Bazett) 490 ms    P Axis 64 degrees    R Axis -52 degrees    T Axis 113 degrees    Diagnosis       EKG performed in ER and to be interpreted by ER physician. Confirmed by MD, ER ([id number]),  Harsha Bautista ([id number]) on 11/23/2022 2:28:26 PM       CONSULTS:  [address] TO NEUROLOGY    PATIENT REFERRED TO:  [facility] Emergency Department  [address] [place] [address]  [phone]    If symptoms worsen    Giacomo Calixtoo, [facility]  Neurology  [address]  [phone]    Schedule an appointment as soon as possible for a visit in 1 week  For reexamination    DISCHARGE MEDICATIONS:  Discharge Medication List as of 11/23/2022  8:19 PM             Hilaria Eden MD  11/23/22 8043

## 2022-11-24 NOTE — DISCHARGE INSTRUCTIONS
You were seen in the emergency department for confusion and likely seizure. Your exam and labs were most consistent with seizure, we did not find any alternative cause of your symptoms today. On neurology team saw you and would like to keep your medications the same currently. Please be sure you are taking them regularly and on time. Follow-up with your neurologist, Dr. Emmanuel Escudero, within 1 week to discuss your ongoing seizures. Call 911 or go to the nearest Emergency Department immediately for worsening symptoms, difficulty breathing, chest pain, lightheadedness, difficulty moving or talking, sensory loss, difficulty controlling your bowel or bladder function, altered level of consciousness, neck stiffness, uncontrollable nausea or vomiting, uncontrollable pain, inability to tolerate oral intake, fever, chills, severe bleeding, signs of infection (spreading redness, area feels very warm, swelling, pain, foul-smelling drainage), suicidal or homicidal ideation, or any other concerns. If we have prescribed you any new medications, please take them as prescribed and read the drug package insert carefully. Do not drink alcohol, drive, or operate machinery if you are taking narcotic pain medications.

## 2022-11-25 ENCOUNTER — HOSPITAL ENCOUNTER (EMERGENCY)
Age: 62
Discharge: HOME | End: 2022-11-25
Attending: EMERGENCY MEDICINE
Payer: MEDICARE

## 2022-11-25 ENCOUNTER — APPOINTMENT (OUTPATIENT)
Dept: GENERAL RADIOLOGY | Age: 62
End: 2022-11-25
Payer: MEDICARE

## 2022-11-25 VITALS
WEIGHT: 315 LBS | DIASTOLIC BLOOD PRESSURE: 90 MMHG | HEART RATE: 74 BPM | HEIGHT: 66 IN | TEMPERATURE: 98.2 F | SYSTOLIC BLOOD PRESSURE: 169 MMHG | RESPIRATION RATE: 16 BRPM | OXYGEN SATURATION: 99 % | BODY MASS INDEX: 50.62 KG/M2

## 2022-11-25 DIAGNOSIS — G40.909 SEIZURE DISORDER (HCC): ICD-10-CM

## 2022-11-25 DIAGNOSIS — R56.9 SEIZURE (HCC): Primary | ICD-10-CM

## 2022-11-25 LAB
ANION GAP SERPL CALCULATED.3IONS-SCNC: 10 MMOL/L (ref 3–16)
BASOPHILS ABSOLUTE: 0 K/UL (ref 0–0.2)
BASOPHILS RELATIVE PERCENT: 0.7 %
BILIRUBIN URINE: NEGATIVE
BLOOD, URINE: NEGATIVE
BUN BLDV-MCNC: 14 MG/DL (ref 7–20)
CALCIUM SERPL-MCNC: 9.4 MG/DL (ref 8.3–10.6)
CHLORIDE BLD-SCNC: 96 MMOL/L (ref 99–110)
CLARITY: CLEAR
CO2: 30 MMOL/L (ref 21–32)
COLOR: YELLOW
CREAT SERPL-MCNC: 1.3 MG/DL (ref 0.9–1.3)
EKG ATRIAL RATE: 69 BPM
EKG DIAGNOSIS: NORMAL
EKG P AXIS: 33 DEGREES
EKG P-R INTERVAL: 196 MS
EKG Q-T INTERVAL: 456 MS
EKG QRS DURATION: 190 MS
EKG QTC CALCULATION (BAZETT): 488 MS
EKG R AXIS: -56 DEGREES
EKG T AXIS: 112 DEGREES
EKG VENTRICULAR RATE: 69 BPM
EOSINOPHILS ABSOLUTE: 0.1 K/UL (ref 0–0.6)
EOSINOPHILS RELATIVE PERCENT: 2.2 %
GFR SERPL CREATININE-BSD FRML MDRD: >60 ML/MIN/{1.73_M2}
GLUCOSE BLD-MCNC: 146 MG/DL (ref 70–99)
GLUCOSE URINE: NEGATIVE MG/DL
HCT VFR BLD CALC: 43.1 % (ref 40.5–52.5)
HEMOGLOBIN: 14.2 G/DL (ref 13.5–17.5)
KEPPRA DOSE AMT: NORMAL
KEPPRA: 20.4 UG/ML (ref 6–46)
KETONES, URINE: ABNORMAL MG/DL
LEUKOCYTE ESTERASE, URINE: NEGATIVE
LYMPHOCYTES ABSOLUTE: 1.8 K/UL (ref 1–5.1)
LYMPHOCYTES RELATIVE PERCENT: 28.7 %
MCH RBC QN AUTO: 31.4 PG (ref 26–34)
MCHC RBC AUTO-ENTMCNC: 33 G/DL (ref 31–36)
MCV RBC AUTO: 95.1 FL (ref 80–100)
MICROSCOPIC EXAMINATION: YES
MONOCYTES ABSOLUTE: 0.3 K/UL (ref 0–1.3)
MONOCYTES RELATIVE PERCENT: 5.6 %
NEUTROPHILS ABSOLUTE: 3.9 K/UL (ref 1.7–7.7)
NEUTROPHILS RELATIVE PERCENT: 62.8 %
NITRITE, URINE: NEGATIVE
PDW BLD-RTO: 15.1 % (ref 12.4–15.4)
PH UA: 6 (ref 5–8)
PLATELET # BLD: 204 K/UL (ref 135–450)
PMV BLD AUTO: 8.8 FL (ref 5–10.5)
POTASSIUM SERPL-SCNC: 3.7 MMOL/L (ref 3.5–5.1)
PROTEIN UA: 30 MG/DL
RBC # BLD: 4.53 M/UL (ref 4.2–5.9)
RBC UA: NORMAL /HPF (ref 0–4)
SODIUM BLD-SCNC: 136 MMOL/L (ref 136–145)
SPECIFIC GRAVITY UA: >=1.03 (ref 1–1.03)
TROPONIN: <0.01 NG/ML
URINE TYPE: ABNORMAL
UROBILINOGEN, URINE: 0.2 E.U./DL
WBC # BLD: 6.2 K/UL (ref 4–11)
WBC UA: NORMAL /HPF (ref 0–5)

## 2022-11-25 PROCEDURE — 85025 COMPLETE CBC W/AUTO DIFF WBC: CPT

## 2022-11-25 PROCEDURE — 80177 DRUG SCRN QUAN LEVETIRACETAM: CPT

## 2022-11-25 PROCEDURE — 71045 X-RAY EXAM CHEST 1 VIEW: CPT

## 2022-11-25 PROCEDURE — 99285 EMERGENCY DEPT VISIT HI MDM: CPT

## 2022-11-25 PROCEDURE — 36415 COLL VENOUS BLD VENIPUNCTURE: CPT

## 2022-11-25 PROCEDURE — 93005 ELECTROCARDIOGRAM TRACING: CPT | Performed by: NURSE PRACTITIONER

## 2022-11-25 PROCEDURE — 80175 DRUG SCREEN QUAN LAMOTRIGINE: CPT

## 2022-11-25 PROCEDURE — 81001 URINALYSIS AUTO W/SCOPE: CPT

## 2022-11-25 PROCEDURE — 84484 ASSAY OF TROPONIN QUANT: CPT

## 2022-11-25 PROCEDURE — 6370000000 HC RX 637 (ALT 250 FOR IP): Performed by: NURSE PRACTITIONER

## 2022-11-25 PROCEDURE — 80048 BASIC METABOLIC PNL TOTAL CA: CPT

## 2022-11-25 RX ORDER — LORAZEPAM 2 MG/ML
1 CONCENTRATE ORAL ONCE
Status: COMPLETED | OUTPATIENT
Start: 2022-11-25 | End: 2022-11-25

## 2022-11-25 RX ADMIN — Medication 1 MG: at 12:45

## 2022-11-25 ASSESSMENT — PAIN - FUNCTIONAL ASSESSMENT
PAIN_FUNCTIONAL_ASSESSMENT: NONE - DENIES PAIN

## 2022-11-25 NOTE — ED PROVIDER NOTES
[address] [facility]  EMERGENCY DEPARTMENT ENCOUNTER          NURSE PRACTITIONER NOTE       Date of evaluation: 11/25/2022    Chief Complaint     Seizures      History of Present Illness     Lauren Banegas is a 63 y.o. male with a past medical history of CAD, CHF, diabetes, hypertension, MI, morbid obesity, seizure disorder; who presents from his assisted living facility with a complaint of a witnessed seizure. Patient brought via EMS, postictal upon arrival.  Per EMS report he was in his wheelchair talking to someone at his facility, when he had a witnessed seizure. Patient unable to add to story, nonverbal at this time. Patient seen on 11/23/2022 with complaints of a breakthrough seizure at that time as well. Noted to be postictal and nonverbal initially, with a prolonged postictal state with subsequent CT and neurology evaluation. No changes in medications at that time. Reportedly is on Keppra and Lamictal.  Laboratory evaluation at that visit was also unremarkable. He was treated with IV Keppra, and no changes to medications were made. Upon caregiver from patient's assisted living facility arrival she states that she was called by other residents who were concerned that the patient was not quite acting right. When she went to check on him he was sitting in his wheelchair, and had intermittent shaking of his lower and upper extremities. He is not acting right, was not answering her questions, and seemed to be lolling his head back and forth.      Review of Systems     Review of Systems  Unable to obtain due to patient's postictal/nonverbal state    Past Medical, Surgical, Family, and Social History     He has a past medical history of Arthritis, Asthma, Bronchitis, CAD (coronary artery disease), CHF (congestive heart failure) ([address] Chronic back pain, Diabetes mellitus ([address] Generalized headaches, Gout, Hypertension, MI, old, Morbid obesity ([address] Psychogenic nonepileptic seizure, and Seizures ([facility] He has a past surgical history that includes eye surgery. His family history includes Asthma in his mother; Diabetes in his father, mother, and sister; Heart Attack in his father; Heart Disease in his father; High Blood Pressure in his father, mother, and sister; Obesity in his mother. He reports that he has quit smoking. He has never used smokeless tobacco. He reports that he does not drink alcohol and does not use drugs. Medications     Previous Medications    ALBUTEROL SULFATE HFA (PROVENTIL;VENTOLIN;PROAIR) 108 (90 BASE) MCG/ACT INHALER    Inhale 2 puffs into the lungs every 6 hours as needed for Wheezing    ALLOPURINOL (ZYLOPRIM) 300 MG TABLET    Take 300 mg by mouth nightly     AMLODIPINE (NORVASC) 10 MG TABLET    Take 10 mg by mouth daily     ASPIRIN 81 MG EC TABLET    Take 81 mg by mouth daily    ATORVASTATIN (LIPITOR) 80 MG TABLET    Take 80 mg by mouth nightly     CARVEDILOL (COREG) 6.25 MG TABLET    Take 6.25 mg by mouth 2 times daily (with meals)    CHLORTHALIDONE (HYGROTON) 25 MG TABLET    Take 25 mg by mouth daily     CYANOCOBALAMIN 1000 MCG TABLET    Take 1,000 mcg by mouth daily    DIAZEPAM (VALIUM) 5 MG TABLET    Take 5 mg by mouth every 12 hours as needed for Anxiety. FAMOTIDINE (PEPCID) 40 MG TABLET    Take 40 mg by mouth daily as needed (indigestion)     GLIPIZIDE (GLUCOTROL) 5 MG TABLET    Take 5 mg by mouth 2 times daily (before meals)    LAMOTRIGINE (LAMICTAL) 200 MG TABLET    Take 3 tablets (600 mg total) by mouth 2 times a day and 1 additional tablet as needed up to one time daily.     LAMOTRIGINE (LAMICTAL) 200 MG TABLET    Take 200 mg by mouth daily as needed    LEVETIRACETAM (KEPPRA) 500 MG TABLET    Take 1 tablet by mouth 2 times daily    LORATADINE (CLARITIN) 10 MG TABLET    Take 10 mg by mouth nightly     METFORMIN (GLUCOPHAGE) 1000 MG TABLET    Take 1,000 mg by mouth 2 times daily (with meals)     MONTELUKAST (SINGULAIR) 10 MG TABLET    Take 10 mg by mouth nightly MULTIPLE VITAMIN (DAILY TITUS) TABS    Take 1 tablet by mouth daily    NORTRIPTYLINE (PAMELOR) 75 MG CAPSULE    Take 75 mg by mouth nightly    OXYCODONE-ACETAMINOPHEN (PERCOCET) 5-325 MG PER TABLET    Take 1 tablet by mouth every 6 hours as needed for Pain. SACUBITRIL-VALSARTAN (ENTRESTO)  MG PER TABLET    Take 1 tablet by mouth 2 times daily       Allergies     He is allergic to doxycycline, ibuprofen, penicillins, phenobarbital, tetracycline, and aspirin. Physical Exam     INITIAL VITALS: BP: (!) 154/81, Temp: 98.1 °F (36.7 °C), Heart Rate: 71, Resp: 25, SpO2: 98 %   Physical Exam  Vitals and nursing note reviewed. Constitutional:       General: He is not in acute distress. Appearance: He is obese. He is not toxic-appearing. HENT:      Head: Normocephalic and atraumatic. Eyes:      Extraocular Movements: Extraocular movements intact. Pupils: Pupils are equal, round, and reactive to light. Cardiovascular:      Rate and Rhythm: Normal rate and regular rhythm. Pulses: Normal pulses. Heart sounds: Normal heart sounds. Pulmonary:      Effort: Pulmonary effort is normal. No respiratory distress. Breath sounds: Normal breath sounds. Abdominal:      Palpations: Abdomen is soft. Skin:     General: Skin is warm and dry. Neurological:      Mental Status: He is alert. Comments: Somnolent, arouses to loud voice, follows minimal direction. He did follow my finger with his eyes, would not smile or interact with exam otherwise. Nonverbal currently.   Appears postictal.          Diagnostic Results     EKG   Interpreted in conjunction with emergency department physician No att. providers found  Rhythm: normal sinus   Rate: normal  Axis: normal  Ectopy: none  Conduction: left bundle branch block (complete)  ST Segments: no acute change  T Waves: no acute change  Clinical Impression: no acute changes  Comparison:  unchanged when compared to EKG from 11/23/22    RADIOLOGY:  XR CHEST PORTABLE   Final Result      No acute pulmonary pathology or change from the prior study                      LABS:   Results for orders placed or performed during the hospital encounter of 11/25/22   CBC with Auto Differential   Result Value Ref Range    WBC 6.2 4.0 - 11.0 K/uL    RBC 4.53 4.20 - 5.90 M/uL    Hemoglobin 14.2 13.5 - 17.5 g/dL    Hematocrit 43.1 40.5 - 52.5 %    MCV 95.1 80.0 - 100.0 fL    MCH 31.4 26.0 - 34.0 pg    MCHC 33.0 31.0 - 36.0 g/dL    RDW 15.1 12.4 - 15.4 %    Platelets 209 250 - 719 K/uL    MPV 8.8 5.0 - 10.5 fL    Neutrophils % 62.8 %    Lymphocytes % 28.7 %    Monocytes % 5.6 %    Eosinophils % 2.2 %    Basophils % 0.7 %    Neutrophils Absolute 3.9 1.7 - 7.7 K/uL    Lymphocytes Absolute 1.8 1.0 - 5.1 K/uL    Monocytes Absolute 0.3 0.0 - 1.3 K/uL    Eosinophils Absolute 0.1 0.0 - 0.6 K/uL    Basophils Absolute 0.0 0.0 - 0.2 K/uL   Basic Metabolic Panel   Result Value Ref Range    Sodium 136 136 - 145 mmol/L    Potassium 3.7 3.5 - 5.1 mmol/L    Chloride 96 (L) 99 - 110 mmol/L    CO2 30 21 - 32 mmol/L    Anion Gap 10 3 - 16    Glucose 146 (H) 70 - 99 mg/dL    BUN 14 7 - 20 mg/dL    Creatinine 1.3 0.9 - 1.3 mg/dL    Est, Glom Filt Rate >60 >60    Calcium 9.4 8.3 - 10.6 mg/dL   Levetiracetam Level   Result Value Ref Range    KEPPRA Dose Amt Unknown    Troponin   Result Value Ref Range    Troponin <0.01 <0.01 ng/mL         RECENT VITALS:  BP: (!) 155/83, Temp: 98.1 °F (36.7 °C), Heart Rate: 69, Resp: 17, SpO2: 96 %       ED Course     Nursing Notes, Past Medical Hx, Past Surgical Hx, Social Hx, Allergies, and Family Hx were reviewed. The patient was given the following medications:  Orders Placed This Encounter   Medications    LORazepam (ATIVAN) 2 MG/ML concentrated solution 1 mg            CONSULTS:  None    MEDICAL DECISION MAKING / ASSESSMENT / Faith Lisandro is a 63 y.o. male who presents with complaints as noted in HPI.       Presents to the emergency department with a complaint of possible seizure. On arrival patient appears postictal and confused, he arouses to loud noise and calling his name, is able to track my finger with his eyes otherwise does not follow direction. Very somnolent. He was given 1 mg of sublingual Ativan which he tolerated, has had no recurrent seizure activity here. Screening labs including CBC, BMP, troponin, mental and Keppra levels, urinalysis were ordered. Chest x-ray ordered. CBC without leukocytosis or anemia, BMP with a glucose of 146 otherwise no concerning electrolyte abnormalities, troponin unremarkable. Chest x-ray unremarkable. 1505 On reevaluation patient is becoming more alert, is able to tell me that his chest aches, denies shortness of breath or recent illnesses. Denies belly pain, nausea vomiting diarrhea. Caregiver at bedside states that he is still not back to his baseline on my reevaluation. Pending urinalysis. Patient will continue to be monitored. 1635 patient able to carry on a conversation    Urinalysis currently pending. Patient will be turned over to my colleague for follow up on urinalysis and disposition. Most likely d/c to assisted living facility if continuing to improve and not evidence of acute infection. This patient was also evaluated by the attending physician. All care plans were discussed and agreed upon. Clinical Impression     1. Seizure ([address]        Disposition     PATIENT REFERRED TO:  No follow-up provider specified.     DISCHARGE MEDICATIONS:  New Prescriptions    No medications on file       DISPOSITION  pending       EDUARDA Chino - CNP  11/25/22 5389

## 2022-11-25 NOTE — ED PROVIDER NOTES
ADDENDUM:      Care of this patient was assumed from [facility]. The patient was seen for Seizures  . The patient's initial evaluation and plan have been discussed with the prior provider who initially evaluated the patient. Nursing Notes, Past Medical Hx, Past Surgical Hx, Social Hx, Allergies, and Family Hx were all reviewed. Diagnostic Results     EKG   See primary provider's note. RADIOLOGY:  XR CHEST PORTABLE   Final Result      No acute pulmonary pathology or change from the prior study                        LABS:   Labs Reviewed   BASIC METABOLIC PANEL - Abnormal; Notable for the following components:       Result Value    Chloride 96 (*)     Glucose 146 (*)     All other components within normal limits   URINALYSIS - Abnormal; Notable for the following components:    Ketones, Urine TRACE (*)     Protein, UA 30 (*)     All other components within normal limits   CBC WITH AUTO DIFFERENTIAL   LEVETIRACETAM LEVEL   TROPONIN   MICROSCOPIC URINALYSIS   LAMOTRIGINE LEVEL       RECENT VITALS:  BP: (!) 155/73, Temp: 98.2 °F (36.8 °C), Heart Rate: 77, Resp: 17     Procedures     none    ED Course     The patient was given the following medications:  Orders Placed This Encounter   Medications    LORazepam (ATIVAN) 2 MG/ML concentrated solution 1 mg         CONSULTS:  None    MEDICAL DECISION MAKING     Betsy Gonzalez is a 63 y.o. 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 I assumed care of this patient pending UA results. It was unremarkable for infection, and it was determined by the primary care team, that if this is negative he could be discharged home, as he was just evaluated by Neurology as an inpatient 2 days ago and decided it was best not to make any changes to his home regimen. Patient will f/u with his Neurologist Dr. Kristi Coronado at [facility]. This patient was also evaluated by the attending physician. All care plans were discussed and agreed upon. Clinical Impression     1.  Seizure ([address]    2. Seizure disorder [facility])        Disposition/Plan     PATIENT REFERRED TO:  Giacomo Singh, [facility]  Neurology  [address]  [phone]    Schedule an appointment as soon as possible for a visit   For ED follow up appointment      DISCHARGE MEDICATIONS:  Current Discharge Medication List          DISPOSITION Decision To Discharge 11/25/2022 06:16:09 PM      (Please note that portions of this note were completed with voice recognition software.   Efforts were made to edit the dictations but occasionally words are mis-transcribed.)         Rogelio Dumont  11/25/22 4146

## 2022-11-25 NOTE — ED TRIAGE NOTES
Patient presents to ED from his assisted living facility, allegedly had a witnessed seizure by staff.  Patient currently presents postictal, VSS

## 2022-11-26 ENCOUNTER — HOSPITAL ENCOUNTER (EMERGENCY)
Age: 62
Discharge: HOME | End: 2022-11-27
Attending: EMERGENCY MEDICINE
Payer: MEDICARE

## 2022-11-26 DIAGNOSIS — R56.9 SEIZURE-LIKE ACTIVITY (HCC): Primary | ICD-10-CM

## 2022-11-26 PROCEDURE — 99283 EMERGENCY DEPT VISIT LOW MDM: CPT

## 2022-11-27 VITALS
RESPIRATION RATE: 15 BRPM | OXYGEN SATURATION: 97 % | WEIGHT: 315 LBS | HEART RATE: 86 BPM | TEMPERATURE: 98.4 F | DIASTOLIC BLOOD PRESSURE: 81 MMHG | SYSTOLIC BLOOD PRESSURE: 144 MMHG | HEIGHT: 66 IN | BODY MASS INDEX: 50.62 KG/M2

## 2022-11-27 LAB
CHP ED QC CHECK: YES
GLUCOSE BLD-MCNC: 136 MG/DL
GLUCOSE BLD-MCNC: 136 MG/DL (ref 70–99)
LAMOTRIGINE LEVEL: 22.3 UG/ML (ref 3–15)
PERFORMED ON: ABNORMAL

## 2022-11-27 PROCEDURE — 6370000000 HC RX 637 (ALT 250 FOR IP): Performed by: EMERGENCY MEDICINE

## 2022-11-27 RX ORDER — LEVETIRACETAM 250 MG/1
500 TABLET ORAL 2 TIMES DAILY
Status: DISCONTINUED | OUTPATIENT
Start: 2022-11-27 | End: 2022-11-27 | Stop reason: HOSPADM

## 2022-11-27 RX ORDER — CARVEDILOL 6.25 MG/1
6.25 TABLET ORAL 2 TIMES DAILY WITH MEALS
Status: DISCONTINUED | OUTPATIENT
Start: 2022-11-27 | End: 2022-11-27 | Stop reason: HOSPADM

## 2022-11-27 RX ORDER — AMLODIPINE BESYLATE 10 MG/1
10 TABLET ORAL ONCE
Status: COMPLETED | OUTPATIENT
Start: 2022-11-27 | End: 2022-11-27

## 2022-11-27 RX ADMIN — AMLODIPINE BESYLATE 10 MG: 10 TABLET ORAL at 07:49

## 2022-11-27 RX ADMIN — CARVEDILOL 6.25 MG: 6.25 TABLET, FILM COATED ORAL at 07:49

## 2022-11-27 RX ADMIN — LEVETIRACETAM 500 MG: 250 TABLET, FILM COATED ORAL at 07:49

## 2022-11-27 RX ADMIN — METFORMIN HYDROCHLORIDE 1000 MG: 1000 TABLET ORAL at 07:49

## 2022-11-27 ASSESSMENT — ENCOUNTER SYMPTOMS
DIARRHEA: 0
COUGH: 0
SORE THROAT: 0
ABDOMINAL PAIN: 0
RHINORRHEA: 0
CONSTIPATION: 0
NAUSEA: 0
VOMITING: 0
SHORTNESS OF BREATH: 0

## 2022-11-27 ASSESSMENT — PAIN - FUNCTIONAL ASSESSMENT: PAIN_FUNCTIONAL_ASSESSMENT: NONE - DENIES PAIN

## 2022-11-27 NOTE — ED NOTES
Dc home with neuro f/u. Will call to see if previously scheduled appt can be moved up d/t frequency of episodes. Dc instructions reviewed. Stable at discharge.      Niharika Magaña RN  11/27/22 7955

## 2022-11-27 NOTE — ED NOTES
Unable to find transportation for bariatric patient. Will continue to make calls for transport.      Sheryle Netter, RN  11/27/22 7799

## 2022-11-27 NOTE — ED PROVIDER NOTES
[facility]       Date of evaluation: 11/26/2022    Chief Complaint     Seizures      of Present Illness     Genevieve Hernández is a 63 y.o. male with a history of asthma, CAD, CHF, diabetes, hypertension, PNES and seizures on Keppra and lamotrigine who presents to the emergency department for concern for possible seizures. History is somewhat limited as patient is a poor historian. Patient states that he was in his usual state of health today when he felt somewhat weak compared to baseline. Patient states that he believes he then had multiple seizures. Patient is unable to describe this further. Patient states that he was able to call his friend who called facility staff. Per report from facility staff and EMS, there has been no witnessed seizure-like activity. There was no postictal state, confusion, or other abnormalities. Patient states that he is otherwise felt well. No fever or chills. No chest pain or shortness of breath. No abdominal pain, nausea, vomiting. No urinary symptoms, diarrhea, or constipation. Patient has been seen in this emergency department 2 separate times over the last several days for similar presentations. During these times, patient had extensive work-up including laboratory studies, CT head, and chest x-ray which have all been unremarkable. Patient has been sent home with instructions to follow-up with primary care provider. Review of Systems     Review of Systems   Constitutional:  Negative for chills and fever. HENT:  Negative for congestion, rhinorrhea and sore throat. Eyes:  Negative for visual disturbance. Respiratory:  Negative for cough and shortness of breath. Cardiovascular:  Negative for chest pain and leg swelling. Gastrointestinal:  Negative for abdominal pain, constipation, diarrhea, nausea and vomiting. Genitourinary:  Negative for dysuria and hematuria.    Musculoskeletal: Negative for arthralgias. Skin:  Negative for rash. Neurological:  Positive for seizures (Reported, unwittnessed). Negative for syncope, weakness, light-headedness and headaches. Psychiatric/Behavioral:  Negative for agitation. All other systems reviewed and are negative. Past Medical, Surgical, Family, and Social History     He has a past medical history of Arthritis, Asthma, Bronchitis, CAD (coronary artery disease), CHF (congestive heart failure) ([facility] [address] Chronic back pain, Diabetes mellitus ([facility] Generalized headaches, Gout, Hypertension, MI, old, Morbid obesity ([facility] [address] Psychogenic nonepileptic seizure, and Seizures ([facility] [address] He has a past surgical history that includes eye surgery. His family history includes Asthma in his mother; Diabetes in his father, mother, and sister; Heart Attack in his father; Heart Disease in his father; High Blood Pressure in his father, mother, and sister; Obesity in his mother. He reports that he has quit smoking. He has never used smokeless tobacco. He reports that he does not drink alcohol and does not use drugs. Medications     Previous Medications    ALBUTEROL SULFATE HFA (PROVENTIL;VENTOLIN;PROAIR) 108 (90 BASE) MCG/ACT INHALER    Inhale 2 puffs into the lungs every 6 hours as needed for Wheezing    ALLOPURINOL (ZYLOPRIM) 300 MG TABLET    Take 300 mg by mouth nightly     AMLODIPINE (NORVASC) 10 MG TABLET    Take 10 mg by mouth daily     ASPIRIN 81 MG EC TABLET    Take 81 mg by mouth daily    ATORVASTATIN (LIPITOR) 80 MG TABLET    Take 80 mg by mouth nightly     CARVEDILOL (COREG) 6.25 MG TABLET    Take 6.25 mg by mouth 2 times daily (with meals)    CHLORTHALIDONE (HYGROTON) 25 MG TABLET    Take 25 mg by mouth daily     CYANOCOBALAMIN 1000 MCG TABLET    Take 1,000 mcg by mouth daily    DIAZEPAM (VALIUM) 5 MG TABLET    Take 5 mg by mouth every 12 hours as needed for Anxiety.      FAMOTIDINE (PEPCID) 40 MG TABLET    Take 40 mg by mouth daily as needed (indigestion) GLIPIZIDE (GLUCOTROL) 5 MG TABLET    Take 5 mg by mouth 2 times daily (before meals)    LAMOTRIGINE (LAMICTAL) 200 MG TABLET    Take 3 tablets (600 mg total) by mouth 2 times a day and 1 additional tablet as needed up to one time daily. LAMOTRIGINE (LAMICTAL) 200 MG TABLET    Take 200 mg by mouth daily as needed    LEVETIRACETAM (KEPPRA) 500 MG TABLET    Take 1 tablet by mouth 2 times daily    LORATADINE (CLARITIN) 10 MG TABLET    Take 10 mg by mouth nightly     METFORMIN (GLUCOPHAGE) 1000 MG TABLET    Take 1,000 mg by mouth 2 times daily (with meals)     MONTELUKAST (SINGULAIR) 10 MG TABLET    Take 10 mg by mouth nightly    MULTIPLE VITAMIN (DAILY TITUS) TABS    Take 1 tablet by mouth daily    NORTRIPTYLINE (PAMELOR) 75 MG CAPSULE    Take 75 mg by mouth nightly    OXYCODONE-ACETAMINOPHEN (PERCOCET) 5-325 MG PER TABLET    Take 1 tablet by mouth every 6 hours as needed for Pain. SACUBITRIL-VALSARTAN (ENTRESTO)  MG PER TABLET    Take 1 tablet by mouth 2 times daily       Allergies     He is allergic to doxycycline, ibuprofen, penicillins, phenobarbital, tetracycline, and aspirin. Physical Exam     INITIAL VITALS: BP: (!) 151/76, Temp: 98.4 °F (36.9 °C), Heart Rate: 73, Resp: 20, SpO2: 94 %   Physical Exam  Vitals reviewed. Constitutional:       General: He is not in acute distress. Appearance: He is normal weight. He is not toxic-appearing. HENT:      Head: Normocephalic and atraumatic. Nose: Nose normal.      Mouth/Throat:      Mouth: Mucous membranes are moist.      Pharynx: Oropharynx is clear. Eyes:      Extraocular Movements: Extraocular movements intact. Conjunctiva/sclera: Conjunctivae normal.      Pupils: Pupils are equal, round, and reactive to light. Cardiovascular:      Rate and Rhythm: Normal rate and regular rhythm. Heart sounds: No murmur heard. Pulmonary:      Effort: Pulmonary effort is normal. No respiratory distress. Breath sounds: No wheezing or rales. Abdominal:      General: Abdomen is flat. Palpations: Abdomen is soft. Tenderness: There is no abdominal tenderness. There is no guarding or rebound. Comments: Obese abdomen. Musculoskeletal:         General: No swelling. Normal range of motion. Cervical back: Normal range of motion. Skin:     General: Skin is warm and dry. Capillary Refill: Capillary refill takes less than 2 seconds. Neurological:      General: No focal deficit present. Mental Status: He is alert and oriented to person, place, and time. Cranial Nerves: No cranial nerve deficit. Sensory: No sensory deficit. Motor: No weakness. Coordination: Coordination normal.      Gait: Gait normal.   Psychiatric:         Mood and Affect: Mood normal.       DiagnosticResults     EKG   No EKG was obtained. RADIOLOGY:  No orders to display       LABS:   Results for orders placed or performed during the hospital encounter of 11/26/22   POCT Glucose   Result Value Ref Range    Glucose 136 mg/dL    QC OK? yes    POCT Glucose   Result Value Ref Range    POC Glucose 136 (H) 70 - 99 mg/dl    Performed on ACCU-CHEK        ED BEDSIDE ULTRASOUND:  No results found. RECENT VITALS:  BP: (!) 156/78, Temp: 98.4 °F (36.9 °C), Heart Rate: 74,Resp: 19, SpO2: 94 %     Procedures     None. ED Course     Nursing Notes, Past Medical Hx, Past Surgical Hx, Social Hx, Allergies, and Family Hx were reviewed. The patient was given the followingmedications:  No orders of the defined types were placed in this encounter. CONSULTS:  None    MEDICAL DECISION MAKING / ASSESSMENT / PLAN     Vanessa Rodgers is a 63 y.o. male who presents to the emergency department for reported seizures.   Patient has been seen multiple times in this emergency department for similar symptoms over the last several days and has had very extensive work-up which has been overall reassuring including CBC, BMP, urinalysis, troponin, Keppra and lamotrigine levels, chest x-ray, and CT head which have all been negative for acute abnormalities. Patient has not had any witnessed seizure activity and does have a history of PNES. Symptoms most consistent with PNES at this time given reassuring work-ups in the past.  Point-of-care blood glucose is within normal limits. No further work-up is indicated at this time. Patient is prescribed Keppra and Lamictal which she is reportedly taking as prescribed. Patient was seen by neurology during his visit on 11/23 who did not recommend any medication changes. Plan made to discharge patient home with close outpatient follow-up with primary care provider and neurologist.  Patient discharged home in stable condition. This patient was also evaluated by the attending physician. All care plans werediscussed and agreed upon. Clinical Impression     1.  Seizure-like activity ([address]        Disposition     PATIENT REFERRED TO:  [address] [place] [address]  [phone]    Schedule an appointment as soon as possible for a visit in 1 week      DISCHARGE MEDICATIONS:  New Prescriptions    No medications on file       DISPOSITION Discharge - Pending Orders Complete 11/27/2022 01:46:05 AM       Selam Marley MD  11/27/22 3955

## 2022-11-27 NOTE — ED TRIAGE NOTES
Patient BIBA from nursing facility for patient reported seizures. Per staff, patient got out of bed, sat in his chair and called staff saying \"I'm having seizures\"  Patient was able to communicated and answer questions appropriately when EMS arrived.

## 2022-11-27 NOTE — DISCHARGE INSTRUCTIONS
You were seen in the emergency department for seizure-like activity. You should continue your seizure medications as prescribed. You should follow-up with your primary care provider and your neurologist.  It is very important that you continue to take your medications and that you do your best to follow-up as an outpatient as they are the best people to control your seizures and other symptoms.
02-Feb-2021 10:22

## 2022-11-28 ENCOUNTER — HOSPITAL ENCOUNTER (EMERGENCY)
Age: 62
Discharge: HOME | End: 2022-11-28
Attending: EMERGENCY MEDICINE
Payer: MEDICARE

## 2022-11-28 ENCOUNTER — APPOINTMENT (OUTPATIENT)
Dept: CT IMAGING | Age: 62
End: 2022-11-28
Payer: MEDICARE

## 2022-11-28 VITALS
BODY MASS INDEX: 55.25 KG/M2 | OXYGEN SATURATION: 95 % | HEART RATE: 88 BPM | TEMPERATURE: 98.6 F | DIASTOLIC BLOOD PRESSURE: 79 MMHG | SYSTOLIC BLOOD PRESSURE: 105 MMHG | WEIGHT: 315 LBS | RESPIRATION RATE: 18 BRPM

## 2022-11-28 DIAGNOSIS — S39.012A BACK STRAIN, INITIAL ENCOUNTER: Primary | ICD-10-CM

## 2022-11-28 LAB
ANION GAP SERPL CALCULATED.3IONS-SCNC: 13 MMOL/L (ref 3–16)
BASOPHILS ABSOLUTE: 0 K/UL (ref 0–0.2)
BASOPHILS RELATIVE PERCENT: 0.5 %
BUN BLDV-MCNC: 13 MG/DL (ref 7–20)
CALCIUM SERPL-MCNC: 9.3 MG/DL (ref 8.3–10.6)
CHLORIDE BLD-SCNC: 97 MMOL/L (ref 99–110)
CO2: 27 MMOL/L (ref 21–32)
CREAT SERPL-MCNC: 1 MG/DL (ref 0.9–1.3)
EOSINOPHILS ABSOLUTE: 0.1 K/UL (ref 0–0.6)
EOSINOPHILS RELATIVE PERCENT: 0.6 %
GFR SERPL CREATININE-BSD FRML MDRD: >60 ML/MIN/{1.73_M2}
GLUCOSE BLD-MCNC: 138 MG/DL (ref 70–99)
HCT VFR BLD CALC: 42.1 % (ref 40.5–52.5)
HEMOGLOBIN: 14.3 G/DL (ref 13.5–17.5)
LAMOTRIGINE LEVEL: 23.4 UG/ML (ref 3–15)
LYMPHOCYTES ABSOLUTE: 0.9 K/UL (ref 1–5.1)
LYMPHOCYTES RELATIVE PERCENT: 11 %
MCH RBC QN AUTO: 32.2 PG (ref 26–34)
MCHC RBC AUTO-ENTMCNC: 34 G/DL (ref 31–36)
MCV RBC AUTO: 94.8 FL (ref 80–100)
MONOCYTES ABSOLUTE: 0.5 K/UL (ref 0–1.3)
MONOCYTES RELATIVE PERCENT: 5.7 %
NEUTROPHILS ABSOLUTE: 7 K/UL (ref 1.7–7.7)
NEUTROPHILS RELATIVE PERCENT: 82.2 %
PDW BLD-RTO: 15 % (ref 12.4–15.4)
PLATELET # BLD: 204 K/UL (ref 135–450)
PMV BLD AUTO: 8.8 FL (ref 5–10.5)
POTASSIUM REFLEX MAGNESIUM: 3.7 MMOL/L (ref 3.5–5.1)
RBC # BLD: 4.44 M/UL (ref 4.2–5.9)
SODIUM BLD-SCNC: 137 MMOL/L (ref 136–145)
TROPONIN: <0.01 NG/ML
WBC # BLD: 8.5 K/UL (ref 4–11)

## 2022-11-28 PROCEDURE — 99284 EMERGENCY DEPT VISIT MOD MDM: CPT

## 2022-11-28 PROCEDURE — 80048 BASIC METABOLIC PNL TOTAL CA: CPT

## 2022-11-28 PROCEDURE — 36415 COLL VENOUS BLD VENIPUNCTURE: CPT

## 2022-11-28 PROCEDURE — 85025 COMPLETE CBC W/AUTO DIFF WBC: CPT

## 2022-11-28 PROCEDURE — 6360000002 HC RX W HCPCS: Performed by: EMERGENCY MEDICINE

## 2022-11-28 PROCEDURE — 6370000000 HC RX 637 (ALT 250 FOR IP): Performed by: EMERGENCY MEDICINE

## 2022-11-28 PROCEDURE — 84484 ASSAY OF TROPONIN QUANT: CPT

## 2022-11-28 PROCEDURE — 96374 THER/PROPH/DIAG INJ IV PUSH: CPT

## 2022-11-28 PROCEDURE — 72128 CT CHEST SPINE W/O DYE: CPT

## 2022-11-28 RX ORDER — LIDOCAINE 4 G/G
1 PATCH TOPICAL DAILY
Status: DISCONTINUED | OUTPATIENT
Start: 2022-11-28 | End: 2022-11-28 | Stop reason: HOSPADM

## 2022-11-28 RX ORDER — KETOROLAC TROMETHAMINE 30 MG/ML
15 INJECTION, SOLUTION INTRAMUSCULAR; INTRAVENOUS ONCE
Status: COMPLETED | OUTPATIENT
Start: 2022-11-28 | End: 2022-11-28

## 2022-11-28 RX ADMIN — KETOROLAC TROMETHAMINE 15 MG: 30 INJECTION, SOLUTION INTRAMUSCULAR; INTRAVENOUS at 05:17

## 2022-11-28 ASSESSMENT — PAIN DESCRIPTION - PAIN TYPE: TYPE: ACUTE PAIN

## 2022-11-28 ASSESSMENT — PAIN - FUNCTIONAL ASSESSMENT: PAIN_FUNCTIONAL_ASSESSMENT: 0-10

## 2022-11-28 ASSESSMENT — PAIN SCALES - GENERAL: PAINLEVEL_OUTOF10: 6

## 2022-11-28 ASSESSMENT — PAIN DESCRIPTION - LOCATION: LOCATION: BACK

## 2022-11-28 ASSESSMENT — PAIN DESCRIPTION - DESCRIPTORS: DESCRIPTORS: ACHING

## 2022-11-28 NOTE — ED NOTES
Educated pt on discharge paperwork as well as follow-up appointment. Pt verbalizes understanding of all instructions and denies questions. IV discontinued, catheter intact, minimal bleeding at IV site, 2x2 and tape applied, manual pressure held. Pt left in a wheelchair with caregiver with all personal belongings, and discharge paperwork to private residence. Pt in no distress at this time.        Sara Guerrero RN  11/28/22 3904

## 2022-11-28 NOTE — ED PROVIDER NOTES
[facility]          ATTENDING PHYSICIAN NOTE       Date of evaluation: 11/28/2022    Chief Complaint     Back Pain (Presents to ED with mid lower back pain/ left side. States was here yesterday and denies any injury at that time. Denies any injury this evening. ) and Emesis (Two episodes this evening denies any abdominal pain and states last regular BM earlier today)      History of Present Illness     Lily Hull is a 63 y.o. male who presents with complaint of pain in the thoracic back. He says that he felt nauseous and threw up earlier today, is not feeling nauseous now, but when he did he felt like he moved over in the wrong way and pulled something in his back. He says that the last time he had any trauma was yesterday when he slid out of his chair, was not bothered by any back pain at that point, has not any fall since then. Denies any weakness in arms or legs. Denies abdominal pain or chest pain. Says that he had the absolute episodes of nausea and not nauseous now. He says he took a Percocet at home for it and it did not help. Review of Systems     Review of Systems  Pertinent positives and negatives are listed in the HPI; otherwise all systems are reviewed and were negative. Past Medical, Surgical, Family, and Social History     He has a past medical history of Arthritis, Asthma, Bronchitis, CAD (coronary artery disease), CHF (congestive heart failure) ([address] Chronic back pain, Diabetes mellitus ([address] Generalized headaches, Gout, Hypertension, MI, old, Morbid obesity ([address] Psychogenic nonepileptic seizure, and Seizures ([address] He has a past surgical history that includes eye surgery. His family history includes Asthma in his mother; Diabetes in his father, mother, and sister; Heart Attack in his father; Heart Disease in his father; High Blood Pressure in his father, mother, and sister; Obesity in his mother. He reports that he has quit smoking. He has never used smokeless tobacco. He reports that he does not drink alcohol and does not use drugs. Medications     Previous Medications    ALBUTEROL SULFATE HFA (PROVENTIL;VENTOLIN;PROAIR) 108 (90 BASE) MCG/ACT INHALER    Inhale 2 puffs into the lungs every 6 hours as needed for Wheezing    ALLOPURINOL (ZYLOPRIM) 300 MG TABLET    Take 300 mg by mouth nightly     AMLODIPINE (NORVASC) 10 MG TABLET    Take 10 mg by mouth daily     ASPIRIN 81 MG EC TABLET    Take 81 mg by mouth daily    ATORVASTATIN (LIPITOR) 80 MG TABLET    Take 80 mg by mouth nightly     CARVEDILOL (COREG) 6.25 MG TABLET    Take 6.25 mg by mouth 2 times daily (with meals)    CHLORTHALIDONE (HYGROTON) 25 MG TABLET    Take 25 mg by mouth daily     CYANOCOBALAMIN 1000 MCG TABLET    Take 1,000 mcg by mouth daily    DIAZEPAM (VALIUM) 5 MG TABLET    Take 5 mg by mouth every 12 hours as needed for Anxiety. FAMOTIDINE (PEPCID) 40 MG TABLET    Take 40 mg by mouth daily as needed (indigestion)     GLIPIZIDE (GLUCOTROL) 5 MG TABLET    Take 5 mg by mouth 2 times daily (before meals)    LAMOTRIGINE (LAMICTAL) 200 MG TABLET    Take 3 tablets (600 mg total) by mouth 2 times a day and 1 additional tablet as needed up to one time daily. LAMOTRIGINE (LAMICTAL) 200 MG TABLET    Take 200 mg by mouth daily as needed    LEVETIRACETAM (KEPPRA) 500 MG TABLET    Take 1 tablet by mouth 2 times daily    LORATADINE (CLARITIN) 10 MG TABLET    Take 10 mg by mouth nightly     METFORMIN (GLUCOPHAGE) 1000 MG TABLET    Take 1,000 mg by mouth 2 times daily (with meals)     MONTELUKAST (SINGULAIR) 10 MG TABLET    Take 10 mg by mouth nightly    MULTIPLE VITAMIN (DAILY TITUS) TABS    Take 1 tablet by mouth daily    NORTRIPTYLINE (PAMELOR) 75 MG CAPSULE    Take 75 mg by mouth nightly    OXYCODONE-ACETAMINOPHEN (PERCOCET) 5-325 MG PER TABLET    Take 1 tablet by mouth every 6 hours as needed for Pain.     SACUBITRIL-VALSARTAN (ENTRESTO)  MG PER TABLET    Take 1 tablet by mouth 2 times daily       Allergies     He is allergic to doxycycline, ibuprofen, penicillins, phenobarbital, tetracycline, and aspirin. Physical Exam     INITIAL VITALS: BP: 125/69, Temp: 98.6 °F (37 °C), Heart Rate: 86, Resp: 18, SpO2: 94 %   Physical Exam  Constitutional:       Appearance: Normal appearance. HENT:      Head: Normocephalic and atraumatic. Cardiovascular:      Rate and Rhythm: Normal rate. Pulmonary:      Effort: Pulmonary effort is normal. No respiratory distress. Breath sounds: No wheezing. Chest:      Chest wall: No tenderness. Abdominal:      Palpations: Abdomen is soft. Tenderness: There is no abdominal tenderness. There is no guarding. Musculoskeletal:      Cervical back: Normal range of motion and neck supple. No rigidity or tenderness. Comments: Tenderness palpation mid to lower thoracic back in the paraspinal regions primarily, no lumbar tenderness, no cervical spine tenderness   Neurological:      General: No focal deficit present. Mental Status: He is alert and oriented to person, place, and time. Mental status is at baseline. Motor: No weakness. Diagnostic Results     EKG       RADIOLOGY:  CT THORACIC SPINE WO CONTRAST   Final Result   Impression: No acute osseous abnormality.           LABS:   Results for orders placed or performed during the hospital encounter of 11/28/22   Troponin   Result Value Ref Range    Troponin <0.01 <0.01 ng/mL   CBC with Auto Differential   Result Value Ref Range    WBC 8.5 4.0 - 11.0 K/uL    RBC 4.44 4.20 - 5.90 M/uL    Hemoglobin 14.3 13.5 - 17.5 g/dL    Hematocrit 42.1 40.5 - 52.5 %    MCV 94.8 80.0 - 100.0 fL    MCH 32.2 26.0 - 34.0 pg    MCHC 34.0 31.0 - 36.0 g/dL    RDW 15.0 12.4 - 15.4 %    Platelets 692 539 - 423 K/uL    MPV 8.8 5.0 - 10.5 fL    Neutrophils % 82.2 %    Lymphocytes % 11.0 %    Monocytes % 5.7 %    Eosinophils % 0.6 %    Basophils % 0.5 %    Neutrophils Absolute 7.0 1.7 - 7.7 K/uL Lymphocytes Absolute 0.9 (L) 1.0 - 5.1 K/uL    Monocytes Absolute 0.5 0.0 - 1.3 K/uL    Eosinophils Absolute 0.1 0.0 - 0.6 K/uL    Basophils Absolute 0.0 0.0 - 0.2 K/uL   BMP w/ Reflex to MG   Result Value Ref Range    Sodium 137 136 - 145 mmol/L    Potassium reflex Magnesium 3.7 3.5 - 5.1 mmol/L    Chloride 97 (L) 99 - 110 mmol/L    CO2 27 21 - 32 mmol/L    Anion Gap 13 3 - 16    Glucose 138 (H) 70 - 99 mg/dL    BUN 13 7 - 20 mg/dL    Creatinine 1.0 0.9 - 1.3 mg/dL    Est, Glom Filt Rate >60 >60    Calcium 9.3 8.3 - 10.6 mg/dL       ED BEDSIDE ULTRASOUND:  No results found. RECENT VITALS:  BP: 105/79,Temp: 98.6 °F (37 °C), Heart Rate: 88, Resp: 18, SpO2: 95 %     Procedures         ED Course     Nursing Notes, Past Medical Hx, Past Surgical Hx, Social Hx,Allergies, and Family Hx were reviewed. patient was given the following medications:  Orders Placed This Encounter   Medications    lidocaine 4 % external patch 1 patch    ketorolac (TORADOL) injection 15 mg       CONSULTS:  None    MEDICAL DECISIONMAKING / ASSESSMENT / PLAN     Manish Palmer is a 63 y.o. male who presents with some midthoracic back pain along the spine itself, though not point tender. Given the tenderness in the area though it does appear likely musculoskeletal.  We did obtain basic labs including a single troponin given his report of nausea and vomiting earlier in the day, this does not appear to be an anginal equivalent has a negative troponin and a very inconsistent story with anything I would think to be ACS otherwise. Doubt dissection as well given the clinical picture and lack of any chest pain per se. We did obtain a CT of the thoracic spine given his reported sliding out of the chair yesterday though he was not having pain at that point, and shows no acute abnormality. Remainder his lab work is unremarkable. He is able to move, flex his spine without difficulty, and appears relatively comfortable.   He safe for discharge and follow-up with his PCP for any worsening symptoms or other concerns. .      Clinical Impression     1.  Back strain, initial encounter        Disposition     PATIENT REFERRED TO:  Bebe Ratliff  [address] [place] [address]  [phone]          DISCHARGE MEDICATIONS:  New Prescriptions    No medications on file       DISPOSITION Discharge - Pending Orders Complete 11/28/2022 06:41:51 AM         Tessa Murray MD  11/28/22 5978

## 2022-11-28 NOTE — ED NOTES
Pt reports that his living facility is sending transportation with a wheelchair van. Pt unsure of pickup at this time, pt informed that he is ready for discharge at this time and reports he will reach back out. Pt offered coffee or juice.       Kavitha Salvador RN  11/28/22 0616

## 2023-01-07 ENCOUNTER — HOSPITAL ENCOUNTER (INPATIENT)
Age: 63
LOS: 2 days | Discharge: HOME | DRG: 101 | End: 2023-01-09
Attending: EMERGENCY MEDICINE | Admitting: INTERNAL MEDICINE
Payer: MEDICARE

## 2023-01-07 ENCOUNTER — APPOINTMENT (OUTPATIENT)
Dept: GENERAL RADIOLOGY | Age: 63
DRG: 101 | End: 2023-01-07
Payer: MEDICARE

## 2023-01-07 ENCOUNTER — APPOINTMENT (OUTPATIENT)
Dept: CT IMAGING | Age: 63
DRG: 101 | End: 2023-01-07
Payer: MEDICARE

## 2023-01-07 DIAGNOSIS — R56.9 SEIZURE (HCC): Primary | ICD-10-CM

## 2023-01-07 DIAGNOSIS — R41.0 DISORIENTATION: ICD-10-CM

## 2023-01-07 DIAGNOSIS — R07.9 CHEST PAIN, UNSPECIFIED TYPE: ICD-10-CM

## 2023-01-07 LAB
ALBUMIN SERPL-MCNC: 2.9 G/DL (ref 3.4–5)
ALP BLD-CCNC: 133 U/L (ref 40–129)
ALT SERPL-CCNC: <5 U/L (ref 10–40)
AMMONIA: 27 UMOL/L (ref 16–60)
ANION GAP SERPL CALCULATED.3IONS-SCNC: 11 MMOL/L (ref 3–16)
AST SERPL-CCNC: 27 U/L (ref 15–37)
BASE EXCESS VENOUS: 2.5 MMOL/L (ref -2–3)
BASOPHILS ABSOLUTE: 0 K/UL (ref 0–0.2)
BASOPHILS RELATIVE PERCENT: 0.4 %
BILIRUB SERPL-MCNC: 0.4 MG/DL (ref 0–1)
BILIRUBIN DIRECT: <0.2 MG/DL (ref 0–0.3)
BILIRUBIN URINE: NEGATIVE
BILIRUBIN, INDIRECT: ABNORMAL MG/DL (ref 0–1)
BLOOD, URINE: NEGATIVE
BUN BLDV-MCNC: 12 MG/DL (ref 7–20)
CALCIUM SERPL-MCNC: 9.5 MG/DL (ref 8.3–10.6)
CARBOXYHEMOGLOBIN: 1.2 % (ref 0–1.5)
CHLORIDE BLD-SCNC: 101 MMOL/L (ref 99–110)
CLARITY: CLEAR
CO2: 26 MMOL/L (ref 21–32)
COLOR: YELLOW
CREAT SERPL-MCNC: 0.9 MG/DL (ref 0.8–1.3)
EKG ATRIAL RATE: 98 BPM
EKG DIAGNOSIS: NORMAL
EKG P AXIS: 70 DEGREES
EKG P-R INTERVAL: 174 MS
EKG Q-T INTERVAL: 414 MS
EKG QRS DURATION: 172 MS
EKG QTC CALCULATION (BAZETT): 528 MS
EKG R AXIS: -27 DEGREES
EKG T AXIS: 109 DEGREES
EKG VENTRICULAR RATE: 98 BPM
EOSINOPHILS ABSOLUTE: 0.1 K/UL (ref 0–0.6)
EOSINOPHILS RELATIVE PERCENT: 2.1 %
GFR SERPL CREATININE-BSD FRML MDRD: >60 ML/MIN/{1.73_M2}
GLUCOSE BLD-MCNC: 131 MG/DL
GLUCOSE BLD-MCNC: 133 MG/DL (ref 70–99)
GLUCOSE BLD-MCNC: 162 MG/DL (ref 70–99)
GLUCOSE URINE: >=1000 MG/DL
HCO3 VENOUS: 28.4 MMOL/L (ref 24–28)
HCT VFR BLD CALC: 37.3 % (ref 40.5–52.5)
HEMOGLOBIN, VEN, REDUCED: 28.3 %
HEMOGLOBIN: 12.5 G/DL (ref 13.5–17.5)
INFLUENZA A: NOT DETECTED
INFLUENZA B: NOT DETECTED
KEPPRA DOSE AMT: NORMAL
KEPPRA: 14.1 UG/ML (ref 6–46)
KETONES, URINE: NEGATIVE MG/DL
LACTIC ACID: 0.8 MMOL/L (ref 0.4–2)
LACTIC ACID: 1.8 MMOL/L (ref 0.4–2)
LEUKOCYTE ESTERASE, URINE: NEGATIVE
LYMPHOCYTES ABSOLUTE: 1.5 K/UL (ref 1–5.1)
LYMPHOCYTES RELATIVE PERCENT: 31.5 %
MAGNESIUM: 1.2 MG/DL (ref 1.8–2.4)
MCH RBC QN AUTO: 32 PG (ref 26–34)
MCHC RBC AUTO-ENTMCNC: 33.5 G/DL (ref 31–36)
MCV RBC AUTO: 95.4 FL (ref 80–100)
METHEMOGLOBIN VENOUS: 0.4 % (ref 0–1.5)
MICROSCOPIC EXAMINATION: YES
MONOCYTES ABSOLUTE: 0.3 K/UL (ref 0–1.3)
MONOCYTES RELATIVE PERCENT: 6.8 %
NEUTROPHILS ABSOLUTE: 2.8 K/UL (ref 1.7–7.7)
NEUTROPHILS RELATIVE PERCENT: 59.2 %
NITRITE, URINE: NEGATIVE
O2 SAT, VEN: 71 %
PCO2, VEN: 48.4 MMHG (ref 41–51)
PDW BLD-RTO: 17.1 % (ref 12.4–15.4)
PERFORMED ON: ABNORMAL
PH UA: 6 (ref 5–8)
PH VENOUS: 7.38 (ref 7.35–7.45)
PLATELET # BLD: 171 K/UL (ref 135–450)
PMV BLD AUTO: 9.3 FL (ref 5–10.5)
PO2, VEN: 40.3 MMHG (ref 25–40)
POTASSIUM REFLEX MAGNESIUM: 3.8 MMOL/L (ref 3.5–5.1)
PROTEIN UA: ABNORMAL MG/DL
RBC # BLD: 3.9 M/UL (ref 4.2–5.9)
RBC UA: NORMAL /HPF (ref 0–4)
SARS-COV-2 RNA, RT PCR: NOT DETECTED
SODIUM BLD-SCNC: 138 MMOL/L (ref 136–145)
SPECIFIC GRAVITY UA: 1.02 (ref 1–1.03)
TCO2 CALC VENOUS: 30 MMOL/L
TOTAL CK: 72 U/L (ref 39–308)
TOTAL PROTEIN: 6 G/DL (ref 6.4–8.2)
URINE REFLEX TO CULTURE: ABNORMAL
URINE TYPE: ABNORMAL
UROBILINOGEN, URINE: 0.2 E.U./DL
WBC # BLD: 4.8 K/UL (ref 4–11)
WBC UA: NORMAL /HPF (ref 0–5)

## 2023-01-07 PROCEDURE — 82550 ASSAY OF CK (CPK): CPT

## 2023-01-07 PROCEDURE — 81001 URINALYSIS AUTO W/SCOPE: CPT

## 2023-01-07 PROCEDURE — 70450 CT HEAD/BRAIN W/O DYE: CPT

## 2023-01-07 PROCEDURE — 80076 HEPATIC FUNCTION PANEL: CPT

## 2023-01-07 PROCEDURE — 96374 THER/PROPH/DIAG INJ IV PUSH: CPT

## 2023-01-07 PROCEDURE — 71045 X-RAY EXAM CHEST 1 VIEW: CPT

## 2023-01-07 PROCEDURE — 6360000002 HC RX W HCPCS: Performed by: EMERGENCY MEDICINE

## 2023-01-07 PROCEDURE — 80048 BASIC METABOLIC PNL TOTAL CA: CPT

## 2023-01-07 PROCEDURE — 2580000003 HC RX 258: Performed by: INTERNAL MEDICINE

## 2023-01-07 PROCEDURE — 99285 EMERGENCY DEPT VISIT HI MDM: CPT

## 2023-01-07 PROCEDURE — 83735 ASSAY OF MAGNESIUM: CPT

## 2023-01-07 PROCEDURE — 1200000000 HC SEMI PRIVATE

## 2023-01-07 PROCEDURE — 87636 SARSCOV2 & INF A&B AMP PRB: CPT

## 2023-01-07 PROCEDURE — 85025 COMPLETE CBC W/AUTO DIFF WBC: CPT

## 2023-01-07 PROCEDURE — 36415 COLL VENOUS BLD VENIPUNCTURE: CPT

## 2023-01-07 PROCEDURE — 80177 DRUG SCRN QUAN LEVETIRACETAM: CPT

## 2023-01-07 PROCEDURE — 82803 BLOOD GASES ANY COMBINATION: CPT

## 2023-01-07 PROCEDURE — 82140 ASSAY OF AMMONIA: CPT

## 2023-01-07 PROCEDURE — 83605 ASSAY OF LACTIC ACID: CPT

## 2023-01-07 PROCEDURE — 80175 DRUG SCREEN QUAN LAMOTRIGINE: CPT

## 2023-01-07 PROCEDURE — 6360000002 HC RX W HCPCS: Performed by: INTERNAL MEDICINE

## 2023-01-07 PROCEDURE — 93005 ELECTROCARDIOGRAM TRACING: CPT | Performed by: EMERGENCY MEDICINE

## 2023-01-07 RX ORDER — CEFAZOLIN 2 G/1
2000 INJECTION, POWDER, FOR SOLUTION INTRAMUSCULAR; INTRAVENOUS EVERY 8 HOURS
Status: ON HOLD | COMMUNITY
End: 2023-01-08

## 2023-01-07 RX ORDER — ACETAMINOPHEN 325 MG/1
650 TABLET ORAL EVERY 6 HOURS PRN
Status: DISCONTINUED | OUTPATIENT
Start: 2023-01-07 | End: 2023-01-09 | Stop reason: HOSPADM

## 2023-01-07 RX ORDER — ERGOCALCIFEROL 1.25 MG/1
50000 CAPSULE ORAL WEEKLY
COMMUNITY

## 2023-01-07 RX ORDER — MAGNESIUM SULFATE IN WATER 40 MG/ML
4000 INJECTION, SOLUTION INTRAVENOUS ONCE
Status: COMPLETED | OUTPATIENT
Start: 2023-01-07 | End: 2023-01-08

## 2023-01-07 RX ORDER — MECLIZINE HYDROCHLORIDE 25 MG/1
25 TABLET ORAL 3 TIMES DAILY PRN
COMMUNITY

## 2023-01-07 RX ORDER — SODIUM CHLORIDE 9 MG/ML
INJECTION, SOLUTION INTRAVENOUS PRN
Status: DISCONTINUED | OUTPATIENT
Start: 2023-01-07 | End: 2023-01-09 | Stop reason: HOSPADM

## 2023-01-07 RX ORDER — NITROGLYCERIN 0.4 MG/1
0.4 TABLET SUBLINGUAL EVERY 5 MIN PRN
COMMUNITY

## 2023-01-07 RX ORDER — ENOXAPARIN SODIUM 100 MG/ML
40 INJECTION SUBCUTANEOUS 2 TIMES DAILY
Status: DISCONTINUED | OUTPATIENT
Start: 2023-01-07 | End: 2023-01-09 | Stop reason: HOSPADM

## 2023-01-07 RX ORDER — ONDANSETRON 4 MG/1
4 TABLET, ORALLY DISINTEGRATING ORAL EVERY 8 HOURS PRN
Status: DISCONTINUED | OUTPATIENT
Start: 2023-01-07 | End: 2023-01-07

## 2023-01-07 RX ORDER — SODIUM CHLORIDE 0.9 % (FLUSH) 0.9 %
10 SYRINGE (ML) INJECTION EVERY 8 HOURS
COMMUNITY

## 2023-01-07 RX ORDER — LORAZEPAM 2 MG/ML
2 INJECTION INTRAMUSCULAR EVERY 4 HOURS PRN
Status: DISCONTINUED | OUTPATIENT
Start: 2023-01-07 | End: 2023-01-09 | Stop reason: HOSPADM

## 2023-01-07 RX ORDER — PROMETHAZINE HYDROCHLORIDE 25 MG/ML
12.5 INJECTION, SOLUTION INTRAMUSCULAR; INTRAVENOUS EVERY 6 HOURS PRN
Status: DISCONTINUED | OUTPATIENT
Start: 2023-01-07 | End: 2023-01-09 | Stop reason: HOSPADM

## 2023-01-07 RX ORDER — ALBUTEROL SULFATE 2.5 MG/3ML
2.5 SOLUTION RESPIRATORY (INHALATION) 4 TIMES DAILY
Status: DISCONTINUED | OUTPATIENT
Start: 2023-01-07 | End: 2023-01-07

## 2023-01-07 RX ORDER — SODIUM CHLORIDE 0.9 % (FLUSH) 0.9 %
5-40 SYRINGE (ML) INJECTION PRN
Status: DISCONTINUED | OUTPATIENT
Start: 2023-01-07 | End: 2023-01-09 | Stop reason: HOSPADM

## 2023-01-07 RX ORDER — POLYETHYLENE GLYCOL 3350 17 G/17G
17 POWDER, FOR SOLUTION ORAL DAILY PRN
Status: DISCONTINUED | OUTPATIENT
Start: 2023-01-07 | End: 2023-01-09 | Stop reason: HOSPADM

## 2023-01-07 RX ORDER — ALBUTEROL SULFATE 2.5 MG/3ML
2.5 SOLUTION RESPIRATORY (INHALATION) EVERY 4 HOURS PRN
Status: DISCONTINUED | OUTPATIENT
Start: 2023-01-07 | End: 2023-01-09 | Stop reason: HOSPADM

## 2023-01-07 RX ORDER — ACETAMINOPHEN 650 MG/1
650 SUPPOSITORY RECTAL EVERY 6 HOURS PRN
Status: DISCONTINUED | OUTPATIENT
Start: 2023-01-07 | End: 2023-01-09 | Stop reason: HOSPADM

## 2023-01-07 RX ORDER — SODIUM CHLORIDE, SODIUM LACTATE, POTASSIUM CHLORIDE, CALCIUM CHLORIDE 600; 310; 30; 20 MG/100ML; MG/100ML; MG/100ML; MG/100ML
INJECTION, SOLUTION INTRAVENOUS CONTINUOUS
Status: DISCONTINUED | OUTPATIENT
Start: 2023-01-07 | End: 2023-01-08

## 2023-01-07 RX ORDER — SODIUM CHLORIDE 0.9 % (FLUSH) 0.9 %
5-40 SYRINGE (ML) INJECTION EVERY 12 HOURS SCHEDULED
Status: DISCONTINUED | OUTPATIENT
Start: 2023-01-07 | End: 2023-01-09 | Stop reason: HOSPADM

## 2023-01-07 RX ORDER — NYSTATIN 100000 [USP'U]/G
POWDER TOPICAL 2 TIMES DAILY
COMMUNITY

## 2023-01-07 RX ORDER — NALOXONE HYDROCHLORIDE 4 MG/.1ML
1 SPRAY NASAL PRN
COMMUNITY

## 2023-01-07 RX ORDER — ALBUTEROL SULFATE 2.5 MG/3ML
2.5 SOLUTION RESPIRATORY (INHALATION) EVERY 12 HOURS PRN
COMMUNITY

## 2023-01-07 RX ORDER — ONDANSETRON 2 MG/ML
4 INJECTION INTRAMUSCULAR; INTRAVENOUS EVERY 6 HOURS PRN
Status: DISCONTINUED | OUTPATIENT
Start: 2023-01-07 | End: 2023-01-07

## 2023-01-07 RX ORDER — LORAZEPAM 2 MG/ML
1 INJECTION INTRAMUSCULAR ONCE
Status: COMPLETED | OUTPATIENT
Start: 2023-01-07 | End: 2023-01-07

## 2023-01-07 RX ORDER — FLUTICASONE PROPIONATE 110 UG/1
1 AEROSOL, METERED RESPIRATORY (INHALATION) 2 TIMES DAILY
COMMUNITY

## 2023-01-07 RX ADMIN — ENOXAPARIN SODIUM 40 MG: 100 INJECTION SUBCUTANEOUS at 20:27

## 2023-01-07 RX ADMIN — LORAZEPAM 1 MG: 2 INJECTION INTRAMUSCULAR; INTRAVENOUS at 06:08

## 2023-01-07 RX ADMIN — SODIUM CHLORIDE, POTASSIUM CHLORIDE, SODIUM LACTATE AND CALCIUM CHLORIDE: 600; 310; 30; 20 INJECTION, SOLUTION INTRAVENOUS at 18:40

## 2023-01-07 RX ADMIN — LEVETIRACETAM 1000 MG: 100 INJECTION INTRAVENOUS at 19:29

## 2023-01-07 RX ADMIN — CEFAZOLIN 2000 MG: 2 INJECTION, POWDER, FOR SOLUTION INTRAMUSCULAR; INTRAVENOUS at 19:56

## 2023-01-07 RX ADMIN — MAGNESIUM SULFATE HEPTAHYDRATE 4000 MG: 40 INJECTION, SOLUTION INTRAVENOUS at 20:32

## 2023-01-07 RX ADMIN — SODIUM CHLORIDE, PRESERVATIVE FREE 10 ML: 5 INJECTION INTRAVENOUS at 20:27

## 2023-01-07 ASSESSMENT — PAIN SCALES - GENERAL: PAINLEVEL_OUTOF10: 0

## 2023-01-07 NOTE — PROGRESS NOTES
4 Eyes Admission Assessment     I agree as the admission nurse that 2 RN's have performed a thorough Head to Toe Skin Assessment on the patient. ALL assessment sites listed below have been assessed on admission. Areas assessed by both nurses:   [x]   Head, Face, and Ears   [x]   Shoulders, Back, and Chest  [x]   Arms, Elbows, and Hands   [x]   Coccyx, Sacrum, and Ischium  [x]   Legs, Feet, and Heels    Scattered redness in skin folds         Does the Patient have Skin Breakdown?   No         James Prevention initiated:  No   Wound Care Orders initiated:  No      [facility] nurse consulted for Pressure Injury (Stage 3,4, Unstageable, DTI, NWPT, and Complex wounds) or James score 18 or lower:  No      Nurse 1 eSignature: Electronically signed by Karuna Dejesus RN on 1/7/23 at 6:46 PM EST    **SHARE this note so that the co-signing nurse is able to place an eSignature**    Nurse 2 eSignature: {Esignature:[id number]} Melba HODGES

## 2023-01-07 NOTE — ED NOTES
Seizure precautions in place with bed in low position, side rails padded.       Saul Tiwari RN  01/07/23 8213

## 2023-01-07 NOTE — PROGRESS NOTES
4 Eyes Admission Assessment     I agree as the admission nurse that 2 RN's have performed a thorough Head to Toe Skin Assessment on the patient. ALL assessment sites listed below have been assessed on admission. Areas assessed by both nurses:   [x]   Head, Face, and Ears   [x]   Shoulders, Back, and Chest  [x]   Arms, Elbows, and Hands   [x]   Coccyx, Sacrum, and Ischium  [x]   Legs, Feet, and Heels        Does the Patient have Skin Breakdown?   Yes a wound was noted on the Admission Assessment and an LDA was Initiated documentation include the Ary-wound, Wound Assessment, Measurements, Dressing Treatment, Drainage, and Color\",    Bilat abrasions to knees  Scab to R second toe and L second toe    James Prevention initiated:  Yes   Wound Care Orders initiated:  Yes      [address]  nurse consulted for Pressure Injury (Stage 3,4, Unstageable, DTI, NWPT, and Complex wounds) or James score 18 or lower:  Yes      Nurse 1 eSignature: Electronically signed by Antonino Alexandra RN on 1/7/23 at 6:46 PM EST    **SHARE this note so that the co-signing nurse is able to place an eSignature**    Nurse 2 eSignature: Electronically signed by Ramiro Dejesus RN on 1/7/23 at 7:12 PM EST

## 2023-01-07 NOTE — ED NOTES
Patient alert and able to answer questions. He asked, \"did I have a seizure, it's been a long time since that has happened. I am tired. \" Patient denies pain. Bedding changed for comfort.       Negrita Anderson RN  01/07/23 1670

## 2023-01-07 NOTE — ED NOTES
Pt to CT, as he was getting pulled off the table pt has a 5 to 6 second eposide of  seizure activity. Pt has both upper ans lower extremity rhythmic shaking. Pt desating to the low 80s  On room air. Pt quickly recovered after being repositioned and being placed on O2 for a brief moment. MD Dr Lopes Numbers aware of event.       Marni Perez, CARROLL  01/07/23 4713

## 2023-01-07 NOTE — H&P
Hospital Medicine History & Physical      PCP: Abigail Cheyanne    Date of Admission: 1/7/2023    Date of Service: Pt seen/examined on 1/7/2023 and Admitted to Inpatient with expected LOS greater than two midnights due to medical therapy. Chief Complaint:  seizures, altered mental status      History Of Present Illness: The patient is a 63 y.o. 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 Discussed with ER physician who reports when patient arrived to ER he was not able to communicate verbally but was moving his extremities. His baseline was described by facility as awake and alert, with normal speech and ambulatory. While in the CT scanner patient had witnessed seizure like event with extremity shaking. He was given ativan. Levels for keppra and lamotrigin were drawn. CT head was non acute. Patient is being admitted for neurology evaluation. He is talking a bit more now, does not remember events. Not a good historian. He has PICC line in right arm. Past Medical History:        Diagnosis Date    Arthritis     Asthma     Bronchitis     CAD (coronary artery disease)     CHF (congestive heart failure) (HCC)     Chronic back pain     Diabetes mellitus ([address]     Generalized headaches     Gout     Hypertension     MI, old 12/01/2013    Nstemi    Morbid obesity ([address]     Psychogenic nonepileptic seizure     DX at [facility]- pseudo-seizures    Seizures [address] [address]        Past Surgical History:        Procedure Laterality Date    EYE SURGERY         Medications Prior to Admission:    Prior to Admission medications    Medication Sig Start Date End Date Taking?  Authorizing Provider   sacubitril-valsartan (ENTRESTO)  MG per tablet Take 1 tablet by mouth 2 times daily    Historical Provider, MD   carvedilol (COREG) 6.25 MG tablet Take 6.25 mg by mouth 2 times daily (with meals)    Historical Provider, MD   levETIRAcetam (KEPPRA) 500 MG tablet Take 1 tablet by mouth 2 times daily 8/22/22   Lamine Paul MD   cyanocobalamin 1000 MCG tablet Take 1,000 mcg by mouth daily    Historical Provider, MD   albuterol sulfate HFA (PROVENTIL;VENTOLIN;PROAIR) 108 (90 Base) MCG/ACT inhaler Inhale 2 puffs into the lungs every 6 hours as needed for Wheezing    Historical Provider, MD   lamoTRIgine (LAMICTAL) 200 MG tablet Take 200 mg by mouth daily as needed    Historical Provider, MD   glipiZIDE (GLUCOTROL) 5 MG tablet Take 5 mg by mouth 2 times daily (before meals)    Historical Provider, MD   montelukast (SINGULAIR) 10 MG tablet Take 10 mg by mouth nightly    Historical Provider, MD   chlorthalidone (HYGROTON) 25 MG tablet Take 25 mg by mouth daily     Historical Provider, MD   nortriptyline (PAMELOR) 75 MG capsule Take 75 mg by mouth nightly    Historical Provider, MD   lamoTRIgine (LAMICTAL) 200 MG tablet Take 3 tablets (600 mg total) by mouth 2 times a day and 1 additional tablet as needed up to one time daily. Historical Provider, MD   atorvastatin (LIPITOR) 80 MG tablet Take 80 mg by mouth nightly     Historical Provider, MD   metFORMIN (GLUCOPHAGE) 1000 MG tablet Take 1,000 mg by mouth 2 times daily (with meals)     Historical Provider, MD   amLODIPine (NORVASC) 10 MG tablet Take 10 mg by mouth daily     Historical Provider, MD   aspirin 81 MG EC tablet Take 81 mg by mouth daily    Historical Provider, MD   famotidine (PEPCID) 40 MG tablet Take 40 mg by mouth daily as needed (indigestion)     Historical Provider, MD   loratadine (CLARITIN) 10 MG tablet Take 10 mg by mouth nightly     Historical Provider, MD   Multiple Vitamin (DAILY TITUS) TABS Take 1 tablet by mouth daily    Historical Provider, MD   diazepam (VALIUM) 5 MG tablet Take 5 mg by mouth every 12 hours as needed for Anxiety.      Historical Provider, MD   oxyCODONE-acetaminophen (PERCOCET) 5-325 MG per tablet Take 1 tablet by mouth every 6 hours as needed for Pain. Historical Provider, MD   allopurinol (ZYLOPRIM) 300 MG tablet Take 300 mg by mouth nightly     Historical Provider, MD       Allergies:  Doxycycline, Ibuprofen, Penicillins, Phenobarbital, Tetracycline, and Aspirin    Social History:  The patient currently lives at nursing facility     TOBACCO:   reports that he has quit smoking. He has never used smokeless tobacco.  ETOH:   reports no history of alcohol use. Family History:  Reviewed in detail and Positive as follows:        Problem Relation Age of Onset    Asthma Mother     Obesity Mother     High Blood Pressure Mother     Diabetes Mother     Diabetes Father     Heart Disease Father     Heart Attack Father     High Blood Pressure Father     Diabetes Sister     High Blood Pressure Sister        REVIEW OF SYSTEMS:   Positive and negative as noted in the HPI. All other systems reviewed and negative. PHYSICAL EXAM:    /76   Pulse 87   Temp 97.7 °F (36.5 °C) (Axillary)   Resp 19   Ht 5' 6\" (1.676 m)   Wt (!) 337 lb 6.4 oz (153 kg)   SpO2 99%   BMI 54.46 kg/m²     General appearance: No apparent distress appears stated age and cooperative. HEENT Normal cephalic, atraumatic without obvious deformity. Pupils equal, round, and reactive to light. Extra ocular muscles intact. Conjunctivae/corneas clear. Neck: Supple, No jugular venous distention/bruits. Trachea midline without thyromegaly or adenopathy with full range of motion. Lungs: Clear to auscultation, bilaterally without Rales/Wheezes/Rhonchi with good respiratory effort. Heart: Regular rate and rhythm with Normal S1/S2 without murmurs, rubs or gallops, point of maximum impulse non-displaced  Abdomen: Soft, non-tender or non-distended without rigidity or guarding and positive bowel sounds all four quadrants. Extremities: No clubbing, cyanosis, or edema bilaterally. Skin: Skin color, texture, turgor normal.  No rashes or lesions.   Neurologic: Alert and oriented X 3,grossly non-focal. Gerlach not tested  Mental status: Alert, oriented, thought content appropriate. Capillary refill is brisk  Peripheral pulses 2+    CXR:  I have reviewed the CXR with the following interpretation: no consolidation, PICC in place  EKG:  I have reviewed the EKG with the following interpretation: LBBB present on prior EKGs    The following labs reviewed personally:   CBC   Recent Labs     01/07/23 0523   WBC 4.8   HGB 12.5*   HCT 37.3*         RENAL  Recent Labs     01/07/23 0523      K 3.8      CO2 26   BUN 12   CREATININE 0.9     LFT'S  No results for input(s): AST, ALT, ALB, BILIDIR, BILITOT, ALKPHOS in the last 72 hours. COAG  No results for input(s): INR in the last 72 hours. CARDIAC ENZYMES  No results for input(s): CKTOTAL, CKMB, CKMBINDEX, TROPONINI in the last 72 hours. U/A:    Lab Results   Component Value Date/Time    COLORU Yellow 11/25/2022 05:21 PM    WBCUA 0-2 11/25/2022 05:21 PM    RBCUA 0-2 11/25/2022 05:21 PM    MUCUS 2+ 12/10/2018 05:10 PM    BACTERIA 2+ 12/10/2018 05:10 PM    CLARITYU Clear 11/25/2022 05:21 PM    SPECGRAV >=1.030 11/25/2022 05:21 PM    LEUKOCYTESUR Negative 11/25/2022 05:21 PM    BLOODU Negative 11/25/2022 05:21 PM    GLUCOSEU Negative 11/25/2022 05:21 PM       ABG  No results found for: PDR2VYA, BEART, T0YRJZVN, PHART, THGBART, RIS1QWA, PO2ART, TGF6JDY        Active Hospital Problems    Diagnosis Date Noted    Breakthrough seizure [facility]) [G40.919] 08/09/2021         ASSESSMENT/PLAN:    Breakthrough seizure noted by ER staff:  Possible another event at facility  H/o PNES and seizures  Follow up on levels of lamotrigine and keppra  Resume home meds if able to swallow, cont IV keppra until passes swallow eval  Consult neurology  Seizure precautions    Check CK, LA   Gentle IVF    Consult pharmacy to reconcile home medications. H/o MSSA bacteremia, unclea if still taking IV antibiotics but PICC is in place.        Diet: No diet orders on file  Code Status: Prior  PT/OT Eval Status: ordered    Santos Javed MD    Thank you Patience Gentleman for the opportunity to be involved in this patient's care. If you have any questions or concerns please feel free to contact me at [phone].

## 2023-01-07 NOTE — ED PROVIDER NOTES
[address] [facility]          ATTENDING PHYSICIAN NOTE       Date of evaluation: 1/7/2023    Chief Complaint     Seizures (Patient presents via [facility] from [address] for a possible seizure \"earlier in the night\" Ems reports nursing home said they saw him fall, but he did not fall on the floor. Patient responds to voice. He does not give an account of what happened at the [facility]. )      History of Present Illness     Edson Lacy is a 63 y.o. male who presents by EMS. Report from his nursing facility indicates that the patient may have had a seizure earlier in the night and that he had a fall that was witnessed although no one can seem to describe it. He is not reported to have missed any doses of medications. There is no report of fever, vomiting or diarrhea. On patient's initial arrival he will shake his head yes or no but is not otherwise answering questions. He will follow commands in all 4 extremities. ASSESSMENT / PLAN  (MEDICAL DECISION MAKING)     INITIAL VITALS: BP: 121/72, Temp: 97.7 °F (36.5 °C), Heart Rate: 100, Resp: 18, SpO2: 99 %      Edson Lacy is a 63 y.o. male who presents with altered mental status. There is some question of a fall earlier, although his skilled nursing facility was not able to provide much information about this. The patient has no external clinical evidence of injury and CT imaging of the head is negative for acute finding. EKG is stable. Electrolytes are stable and urinalysis shows no evidence of infection. Keppra and Lamictal levels are sent but will not be back for at least several hours. The patient has never regained his baseline level of responsiveness and had 1 brief seizure-like episode in the emergency department, therefore I think the patient needs to be admitted for further evaluation and management.   I do not think that he is actively seizing currently, he just seems to be in a prolonged postictal phase.  He was given a dose of IV Ativan. He will be admitted to the hospitalist in stable condition. I do not find any evidence of acute infectious process. He did have a brief episode of hypoxia after his seizure-like activity and CT, however this quickly resolved and he has had no other episodes of hypoxia. Medical Decision Making  See below. Amount and/or Complexity of Data Reviewed  Labs: ordered. Radiology: ordered. ECG/medicine tests: ordered. Risk  Prescription drug management. Decision regarding hospitalization. Tests ordered:  [] 1 test  [] 2 tests  [x] 3+ tests  [] N/A  (See Epic for tests ordered.)    Tests reviewed excluding labs:  [] 1 test  [x] 2 tests  [] 3+ tests  [] N/A  (See below for tests independently interpreted.)    Prior external notes reviewed:  [] 1 note  [] 2 notes  [x] 3+ notes  [] N/A  Examples of notes reviewed: EEG report reviewed from February 2022, reviewed neurology notes from an office visit in September 2022 and follow-up neurology notes at the end of November of last year indicating that he has an appointment coming up in January. Assessment requiring an independent historian? [x] Yes   [] Spouse   [] Child   [] Other relative   [x] EMS/Police or other Public Safety   [] Neighbor   [] Bystander   [x] Caregiver   [] Other witness/historian (please indicate relationship) - NA  [] No    Independent interpretations of tests? [x] Yes -noncontrast CT scan of the head was reviewed by myself and shows no evidence of space-occupying lesion, subarachnoid hemorrhage, epidural hematoma, subdural hematoma, intracranial hemorrhage. This was confirmed by the radiologist.  Chest x-ray is reviewed and shows some atelectasis at the bases, no other acute airspace disease, no pneumothorax. This was confirmed by the radiologist.  [] No    Discussed interpretation of tests/management with external professions?   [x] Yes - hospitalist  [] No    Risk of morbidity, mortality or complications:  [] Minimal  [] Low  [x] Moderate:  [x] Prescription drug management -- this includes administering or prescribing medications during the ED visit or reviewing current medications and deciding to hold or continue them based on the problem  []Decision regarding minor surgery with identified patient or procedure risk factors -- minor procedures might be things like lac repairs, I&D, etc.  []Decision regarding elective major surgery without identified patient or procedure risk factors -- major procedures could be fracture management, significant joint reductions, etc.  []Diagnosis or treatment significantly limited by social determinants of health -- like food or housing insecurity, poverty, medical or English literacy, insurance status, unemployment, substance use  [] High:  []Parenteral controlled substances  []Drug therapy requiring intensive monitoring for toxicity  []Decision regarding elective major surgery with identified patient or procedure risk factors - major procedures could be fracture management, significant joint reductions, etc.  []Decision regarding emergency major surgery - major procedures could be fracture management, significant joint reductions, etc.  []Decision regarding hospitalization or escalation of hospital-level care  []Decision not to resuscitate or to deescalate care because of poor prognosis    Number and complexity of problems:  []Minimal    []1 self-limited/minor problem   []Low  []2+ self-limited/ minor problems  []1 stable, chronic illness  []5+ acute, uncomplicated illness or injury  []1 stable, acute illness  []3+ acute, uncomplicated illness or injury requiring inpatient or obs care  []Moderate  []1+ chronic illnesses with exacerbation, progression, or side effects of treatment  []2 stable, chronic illnesses  []1 undiagnosed new problem with uncertain prognosis  []1 acute illness with systemic symptoms  []1 acute, complicated injury  []High  []1+ chronic illnesses with severe exacerbation, progression, or side effects of treatment  []1 acute or chronic illness or injury that poses a threat to life or bodily function      Clinical Impression     1. Seizure ([address]    2. Disorientation        Disposition     PATIENT REFERRED TO:  No follow-up provider specified. DISCHARGE MEDICATIONS:  New Prescriptions    No medications on file       DISPOSITION          Diagnostic Results and Other Data       RADIOLOGY:  CT Head W/O Contrast    (Results Pending)       LABS:   No results found for this visit on 01/07/23. EKG   EKG indicated for altered mental status shows a rate of 90 bpm, left axis deviation, prolonged QRS interval of 172 ms, left bundle branch block, no significant ST segment changes to indicate cardiac ischemia. ED BEDSIDE ULTRASOUND:  No results found. MOST RECENT VITALS:  BP: 121/72,Temp: 97.7 °F (36.5 °C), Heart Rate: 100, Resp: 18, SpO2: 99 %     Procedures     NA    ED Course     Nursing Notes, Past Medical Hx, Past Surgical Hx, Social Hx,Allergies, and Family Hx were reviewed. 0600: Called to the patient's bedside for a brief episode of seizure-like activity that occurred in the CT scanner. When I arrived back in the room the patient remains nonverbal and seems confused, but will follow commands in all 4 extremities. He has a new oxygen requirement and is placed on nasal cannula. He is written to receive a dose of Ativan and a chest x-ray, Keppra and Lamictal levels are added to his evaluation. The patient was given the following medications:  No orders of the defined types were placed in this encounter.       CONSULTS:  None    Review of Systems     Review of Systems   Unable to perform ROS: Mental status change     Past Medical, Surgical, Family, and Social History     He has a past medical history of Arthritis, Asthma, Bronchitis, CAD (coronary artery disease), CHF (congestive heart failure) ([address] Chronic back pain, Diabetes mellitus ([address] Generalized headaches, Gout, Hypertension, MI, old, Morbid obesity ([address] Psychogenic nonepileptic seizure, and Seizures ([facility] [address] He has a past surgical history that includes eye surgery. His family history includes Asthma in his mother; Diabetes in his father, mother, and sister; Heart Attack in his father; Heart Disease in his father; High Blood Pressure in his father, mother, and sister; Obesity in his mother. He reports that he has quit smoking. He has never used smokeless tobacco. He reports that he does not drink alcohol and does not use drugs. Medications     Previous Medications    ALBUTEROL SULFATE HFA (PROVENTIL;VENTOLIN;PROAIR) 108 (90 BASE) MCG/ACT INHALER    Inhale 2 puffs into the lungs every 6 hours as needed for Wheezing    ALLOPURINOL (ZYLOPRIM) 300 MG TABLET    Take 300 mg by mouth nightly     AMLODIPINE (NORVASC) 10 MG TABLET    Take 10 mg by mouth daily     ASPIRIN 81 MG EC TABLET    Take 81 mg by mouth daily    ATORVASTATIN (LIPITOR) 80 MG TABLET    Take 80 mg by mouth nightly     CARVEDILOL (COREG) 6.25 MG TABLET    Take 6.25 mg by mouth 2 times daily (with meals)    CHLORTHALIDONE (HYGROTON) 25 MG TABLET    Take 25 mg by mouth daily     CYANOCOBALAMIN 1000 MCG TABLET    Take 1,000 mcg by mouth daily    DIAZEPAM (VALIUM) 5 MG TABLET    Take 5 mg by mouth every 12 hours as needed for Anxiety. FAMOTIDINE (PEPCID) 40 MG TABLET    Take 40 mg by mouth daily as needed (indigestion)     GLIPIZIDE (GLUCOTROL) 5 MG TABLET    Take 5 mg by mouth 2 times daily (before meals)    LAMOTRIGINE (LAMICTAL) 200 MG TABLET    Take 3 tablets (600 mg total) by mouth 2 times a day and 1 additional tablet as needed up to one time daily.     LAMOTRIGINE (LAMICTAL) 200 MG TABLET    Take 200 mg by mouth daily as needed    LEVETIRACETAM (KEPPRA) 500 MG TABLET    Take 1 tablet by mouth 2 times daily    LORATADINE (CLARITIN) 10 MG TABLET    Take 10 mg by mouth nightly     METFORMIN (GLUCOPHAGE) 1000 MG TABLET    Take 1,000 mg by mouth 2 times daily (with meals)     MONTELUKAST (SINGULAIR) 10 MG TABLET    Take 10 mg by mouth nightly    MULTIPLE VITAMIN (DAILY TITUS) TABS    Take 1 tablet by mouth daily    NORTRIPTYLINE (PAMELOR) 75 MG CAPSULE    Take 75 mg by mouth nightly    OXYCODONE-ACETAMINOPHEN (PERCOCET) 5-325 MG PER TABLET    Take 1 tablet by mouth every 6 hours as needed for Pain. SACUBITRIL-VALSARTAN (ENTRESTO)  MG PER TABLET    Take 1 tablet by mouth 2 times daily       Allergies     He is allergic to doxycycline, ibuprofen, penicillins, phenobarbital, tetracycline, and aspirin. Physical Exam     INITIAL VITALS: BP: 121/72, Temp: 97.7 °F (36.5 °C), Heart Rate: 100, Resp: 18, SpO2: 99 %   Physical Exam  Vitals and nursing note reviewed. Constitutional:       General: He is not in acute distress. Appearance: He is ill-appearing (chronically). HENT:      Head: Normocephalic and atraumatic. Right Ear: External ear normal.      Left Ear: External ear normal.      Nose: Nose normal.      Mouth/Throat:      Mouth: Mucous membranes are moist.      Pharynx: Oropharynx is clear. No oropharyngeal exudate. Eyes:      Extraocular Movements: Extraocular movements intact. Conjunctiva/sclera: Conjunctivae normal.      Pupils: Pupils are equal, round, and reactive to light. Cardiovascular:      Rate and Rhythm: Normal rate and regular rhythm. Pulses: Normal pulses. Pulmonary:      Effort: Pulmonary effort is normal.      Breath sounds: Normal breath sounds. Abdominal:      General: Abdomen is flat. Palpations: Abdomen is soft. Tenderness: There is no abdominal tenderness. Musculoskeletal:         General: No deformity. Normal range of motion. Cervical back: Normal range of motion. No tenderness. Skin:     General: Skin is warm and dry. Capillary Refill: Capillary refill takes less than 2 seconds. Neurological:      General: No focal deficit present.       Mental Status: He is alert. Cranial Nerves: No cranial nerve deficit. Motor: No weakness. Comments: Unable to assess orientation initially as the patient will only shake his head yes or no and is not verbally answering questions.                  Mortimer Kinds, MD  01/07/23 [id number]

## 2023-01-08 LAB
ANION GAP SERPL CALCULATED.3IONS-SCNC: 10 MMOL/L (ref 3–16)
BASOPHILS ABSOLUTE: 0.1 K/UL (ref 0–0.2)
BASOPHILS RELATIVE PERCENT: 1 %
BUN BLDV-MCNC: 9 MG/DL (ref 7–20)
CALCIUM SERPL-MCNC: 9.4 MG/DL (ref 8.3–10.6)
CHLORIDE BLD-SCNC: 102 MMOL/L (ref 99–110)
CO2: 28 MMOL/L (ref 21–32)
CREAT SERPL-MCNC: 0.8 MG/DL (ref 0.8–1.3)
EOSINOPHILS ABSOLUTE: 0.1 K/UL (ref 0–0.6)
EOSINOPHILS RELATIVE PERCENT: 1.7 %
GFR SERPL CREATININE-BSD FRML MDRD: >60 ML/MIN/{1.73_M2}
GLUCOSE BLD-MCNC: 146 MG/DL (ref 70–99)
HCT VFR BLD CALC: 39.4 % (ref 40.5–52.5)
HEMOGLOBIN: 13 G/DL (ref 13.5–17.5)
LYMPHOCYTES ABSOLUTE: 2.1 K/UL (ref 1–5.1)
LYMPHOCYTES RELATIVE PERCENT: 40.8 %
MAGNESIUM: 1.9 MG/DL (ref 1.8–2.4)
MCH RBC QN AUTO: 31.7 PG (ref 26–34)
MCHC RBC AUTO-ENTMCNC: 32.9 G/DL (ref 31–36)
MCV RBC AUTO: 96.4 FL (ref 80–100)
MONOCYTES ABSOLUTE: 0.3 K/UL (ref 0–1.3)
MONOCYTES RELATIVE PERCENT: 5 %
NEUTROPHILS ABSOLUTE: 2.6 K/UL (ref 1.7–7.7)
NEUTROPHILS RELATIVE PERCENT: 51.5 %
PDW BLD-RTO: 17.2 % (ref 12.4–15.4)
PLATELET # BLD: 213 K/UL (ref 135–450)
PMV BLD AUTO: 8.4 FL (ref 5–10.5)
POTASSIUM SERPL-SCNC: 3.9 MMOL/L (ref 3.5–5.1)
RBC # BLD: 4.09 M/UL (ref 4.2–5.9)
SODIUM BLD-SCNC: 140 MMOL/L (ref 136–145)
WBC # BLD: 5.1 K/UL (ref 4–11)

## 2023-01-08 PROCEDURE — 97116 GAIT TRAINING THERAPY: CPT

## 2023-01-08 PROCEDURE — 97535 SELF CARE MNGMENT TRAINING: CPT

## 2023-01-08 PROCEDURE — 94640 AIRWAY INHALATION TREATMENT: CPT

## 2023-01-08 PROCEDURE — 85025 COMPLETE CBC W/AUTO DIFF WBC: CPT

## 2023-01-08 PROCEDURE — 2500000003 HC RX 250 WO HCPCS: Performed by: INTERNAL MEDICINE

## 2023-01-08 PROCEDURE — 2580000003 HC RX 258: Performed by: INTERNAL MEDICINE

## 2023-01-08 PROCEDURE — 6360000002 HC RX W HCPCS: Performed by: INTERNAL MEDICINE

## 2023-01-08 PROCEDURE — 94761 N-INVAS EAR/PLS OXIMETRY MLT: CPT

## 2023-01-08 PROCEDURE — 93005 ELECTROCARDIOGRAM TRACING: CPT | Performed by: INTERNAL MEDICINE

## 2023-01-08 PROCEDURE — 36415 COLL VENOUS BLD VENIPUNCTURE: CPT

## 2023-01-08 PROCEDURE — 92610 EVALUATE SWALLOWING FUNCTION: CPT

## 2023-01-08 PROCEDURE — 83735 ASSAY OF MAGNESIUM: CPT

## 2023-01-08 PROCEDURE — 94664 DEMO&/EVAL PT USE INHALER: CPT

## 2023-01-08 PROCEDURE — 97166 OT EVAL MOD COMPLEX 45 MIN: CPT

## 2023-01-08 PROCEDURE — 80048 BASIC METABOLIC PNL TOTAL CA: CPT

## 2023-01-08 PROCEDURE — 97530 THERAPEUTIC ACTIVITIES: CPT

## 2023-01-08 PROCEDURE — 2060000000 HC ICU INTERMEDIATE R&B

## 2023-01-08 PROCEDURE — 6370000000 HC RX 637 (ALT 250 FOR IP): Performed by: INTERNAL MEDICINE

## 2023-01-08 RX ORDER — ATORVASTATIN CALCIUM 80 MG/1
80 TABLET, FILM COATED ORAL NIGHTLY
Status: DISCONTINUED | OUTPATIENT
Start: 2023-01-08 | End: 2023-01-09 | Stop reason: HOSPADM

## 2023-01-08 RX ORDER — DIAZEPAM 5 MG/1
5 TABLET ORAL EVERY 12 HOURS PRN
Status: DISCONTINUED | OUTPATIENT
Start: 2023-01-08 | End: 2023-01-09 | Stop reason: HOSPADM

## 2023-01-08 RX ORDER — FAMOTIDINE 20 MG/1
40 TABLET, FILM COATED ORAL DAILY
Status: DISCONTINUED | OUTPATIENT
Start: 2023-01-08 | End: 2023-01-09 | Stop reason: HOSPADM

## 2023-01-08 RX ORDER — ASPIRIN 81 MG/1
81 TABLET ORAL DAILY
Status: DISCONTINUED | OUTPATIENT
Start: 2023-01-08 | End: 2023-01-09 | Stop reason: HOSPADM

## 2023-01-08 RX ORDER — BUDESONIDE 0.5 MG/2ML
0.5 INHALANT ORAL 2 TIMES DAILY
Status: DISCONTINUED | OUTPATIENT
Start: 2023-01-08 | End: 2023-01-09 | Stop reason: HOSPADM

## 2023-01-08 RX ORDER — ALLOPURINOL 300 MG/1
300 TABLET ORAL NIGHTLY
Status: DISCONTINUED | OUTPATIENT
Start: 2023-01-08 | End: 2023-01-09 | Stop reason: HOSPADM

## 2023-01-08 RX ORDER — CARVEDILOL 6.25 MG/1
6.25 TABLET ORAL 2 TIMES DAILY WITH MEALS
Status: DISCONTINUED | OUTPATIENT
Start: 2023-01-08 | End: 2023-01-09 | Stop reason: HOSPADM

## 2023-01-08 RX ORDER — LAMOTRIGINE 100 MG/1
600 TABLET ORAL 2 TIMES DAILY
Status: DISCONTINUED | OUTPATIENT
Start: 2023-01-08 | End: 2023-01-09 | Stop reason: HOSPADM

## 2023-01-08 RX ORDER — MONTELUKAST SODIUM 10 MG/1
10 TABLET ORAL NIGHTLY
Status: DISCONTINUED | OUTPATIENT
Start: 2023-01-08 | End: 2023-01-09 | Stop reason: HOSPADM

## 2023-01-08 RX ORDER — LEVETIRACETAM 750 MG/1
750 TABLET ORAL 2 TIMES DAILY
Status: ON HOLD | COMMUNITY
End: 2023-01-09 | Stop reason: SDUPTHER

## 2023-01-08 RX ORDER — HEPARIN SODIUM,PORCINE 10 UNIT/ML
5 VIAL (ML) INTRAVENOUS EVERY 8 HOURS
COMMUNITY
Start: 2023-01-05 | End: 2023-01-17

## 2023-01-08 RX ORDER — NORTRIPTYLINE HYDROCHLORIDE 25 MG/1
75 CAPSULE ORAL NIGHTLY
Status: DISCONTINUED | OUTPATIENT
Start: 2023-01-08 | End: 2023-01-09 | Stop reason: HOSPADM

## 2023-01-08 RX ORDER — LANOLIN ALCOHOL/MO/W.PET/CERES
1000 CREAM (GRAM) TOPICAL DAILY
Status: DISCONTINUED | OUTPATIENT
Start: 2023-01-08 | End: 2023-01-09 | Stop reason: HOSPADM

## 2023-01-08 RX ORDER — ERGOCALCIFEROL 1.25 MG/1
50000 CAPSULE ORAL WEEKLY
Status: DISCONTINUED | OUTPATIENT
Start: 2023-01-09 | End: 2023-01-09 | Stop reason: HOSPADM

## 2023-01-08 RX ORDER — OXYCODONE HYDROCHLORIDE AND ACETAMINOPHEN 5; 325 MG/1; MG/1
1 TABLET ORAL EVERY 6 HOURS PRN
Status: DISCONTINUED | OUTPATIENT
Start: 2023-01-08 | End: 2023-01-09 | Stop reason: HOSPADM

## 2023-01-08 RX ORDER — CETIRIZINE HYDROCHLORIDE 10 MG/1
5 TABLET ORAL NIGHTLY
Status: DISCONTINUED | OUTPATIENT
Start: 2023-01-08 | End: 2023-01-09 | Stop reason: HOSPADM

## 2023-01-08 RX ORDER — FLUTICASONE PROPIONATE 110 UG/1
1 AEROSOL, METERED RESPIRATORY (INHALATION) 2 TIMES DAILY
Status: DISCONTINUED | OUTPATIENT
Start: 2023-01-08 | End: 2023-01-08 | Stop reason: CLARIF

## 2023-01-08 RX ORDER — LAMOTRIGINE 100 MG/1
200 TABLET ORAL DAILY PRN
Status: DISCONTINUED | OUTPATIENT
Start: 2023-01-08 | End: 2023-01-09 | Stop reason: HOSPADM

## 2023-01-08 RX ADMIN — LEVETIRACETAM 1000 MG: 100 INJECTION INTRAVENOUS at 17:53

## 2023-01-08 RX ADMIN — LAMOTRIGINE 600 MG: 150 TABLET ORAL at 22:14

## 2023-01-08 RX ADMIN — FAMOTIDINE 40 MG: 20 TABLET ORAL at 09:33

## 2023-01-08 RX ADMIN — LEVETIRACETAM 1000 MG: 100 INJECTION INTRAVENOUS at 06:25

## 2023-01-08 RX ADMIN — MICONAZOLE NITRATE: 20 POWDER TOPICAL at 22:26

## 2023-01-08 RX ADMIN — SODIUM CHLORIDE, PRESERVATIVE FREE 10 ML: 5 INJECTION INTRAVENOUS at 09:35

## 2023-01-08 RX ADMIN — ENOXAPARIN SODIUM 40 MG: 100 INJECTION SUBCUTANEOUS at 22:14

## 2023-01-08 RX ADMIN — BUDESONIDE 500 MCG: 0.5 SUSPENSION RESPIRATORY (INHALATION) at 20:27

## 2023-01-08 RX ADMIN — CETIRIZINE HYDROCHLORIDE 5 MG: 10 TABLET, FILM COATED ORAL at 22:12

## 2023-01-08 RX ADMIN — ATORVASTATIN CALCIUM 80 MG: 80 TABLET, FILM COATED ORAL at 22:12

## 2023-01-08 RX ADMIN — SACUBITRIL AND VALSARTAN 1 TABLET: 97; 103 TABLET, FILM COATED ORAL at 22:11

## 2023-01-08 RX ADMIN — CARVEDILOL 6.25 MG: 6.25 TABLET, FILM COATED ORAL at 10:01

## 2023-01-08 RX ADMIN — CYANOCOBALAMIN TAB 1000 MCG 1000 MCG: 1000 TAB at 09:33

## 2023-01-08 RX ADMIN — ENOXAPARIN SODIUM 40 MG: 100 INJECTION SUBCUTANEOUS at 09:33

## 2023-01-08 RX ADMIN — BUDESONIDE 500 MCG: 0.5 SUSPENSION RESPIRATORY (INHALATION) at 11:12

## 2023-01-08 RX ADMIN — OXYCODONE AND ACETAMINOPHEN 1 TABLET: 5; 325 TABLET ORAL at 09:33

## 2023-01-08 RX ADMIN — CEFAZOLIN 2000 MG: 2 INJECTION, POWDER, FOR SOLUTION INTRAMUSCULAR; INTRAVENOUS at 10:04

## 2023-01-08 RX ADMIN — MONTELUKAST 10 MG: 10 TABLET, FILM COATED ORAL at 22:11

## 2023-01-08 RX ADMIN — ASPIRIN 81 MG: 81 TABLET, COATED ORAL at 09:33

## 2023-01-08 RX ADMIN — ALLOPURINOL 300 MG: 300 TABLET ORAL at 22:12

## 2023-01-08 RX ADMIN — CARVEDILOL 6.25 MG: 6.25 TABLET, FILM COATED ORAL at 16:45

## 2023-01-08 RX ADMIN — SACUBITRIL AND VALSARTAN 1 TABLET: 97; 103 TABLET, FILM COATED ORAL at 10:01

## 2023-01-08 RX ADMIN — CEFAZOLIN 2000 MG: 2 INJECTION, POWDER, FOR SOLUTION INTRAMUSCULAR; INTRAVENOUS at 03:18

## 2023-01-08 RX ADMIN — MICONAZOLE NITRATE: 20 POWDER TOPICAL at 10:02

## 2023-01-08 RX ADMIN — LAMOTRIGINE 600 MG: 150 TABLET ORAL at 10:01

## 2023-01-08 RX ADMIN — NORTRIPTYLINE HYDROCHLORIDE 75 MG: 25 CAPSULE ORAL at 22:12

## 2023-01-08 RX ADMIN — CEFAZOLIN 2000 MG: 2 INJECTION, POWDER, FOR SOLUTION INTRAMUSCULAR; INTRAVENOUS at 18:37

## 2023-01-08 ASSESSMENT — PAIN SCALES - GENERAL
PAINLEVEL_OUTOF10: 0
PAINLEVEL_OUTOF10: 0

## 2023-01-08 NOTE — PROGRESS NOTES
Hospitalist Progress Note      PCP: Marla Zavala    Date of Admission: 2023    Chief Complaint on Admission: Possible seizure at the facility, fall, possible seizure in ER    Pt Seen/Examined and Chart Reviewed. Admitting dx recurrent seizures, altered mental status    SUBJECTIVE/OBJECTIVE:   82-year-old male with history of PNES and seizures, resident of nursing facility currently who presents with altered mental status and possible seizure at the facility. In the ER he had witnessed seizure-like activity and CAT scan and was given Ativan. Patient is awake and alert, working with therapy, he does not remember details from yesterday but feels back to his normal today. Denies any pain or trouble breathing. Discussed with his sister over the phone. Allergies  Doxycycline, Ibuprofen, Penicillins, Phenobarbital, Tetracycline, and Aspirin    Medications      Scheduled Meds:   allopurinol  300 mg Oral Nightly    aspirin  81 mg Oral Daily    atorvastatin  80 mg Oral Nightly    carvedilol  6.25 mg Oral BID WC    cyanocobalamin  1,000 mcg Oral Daily    [START ON 2023] vitamin D  50,000 Units Oral Weekly    famotidine  40 mg Oral Daily    lamoTRIgine  600 mg Oral BID    cetirizine  5 mg Oral Nightly    montelukast  10 mg Oral Nightly    nortriptyline  75 mg Oral Nightly    miconazole   Topical BID    sacubitril-valsartan  1 tablet Oral BID    budesonide  0.5 mg Nebulization BID    levETIRAcetam  1,000 mg IntraVENous Q12H    sodium chloride flush  5-40 mL IntraVENous 2 times per day    enoxaparin  40 mg SubCUTAneous BID    ceFAZolin  2,000 mg IntraVENous Q8H       Infusions:   sodium chloride         PRN Meds:  diazePAM, lamoTRIgine, oxyCODONE-acetaminophen, sodium chloride flush, sodium chloride, polyethylene glycol, acetaminophen **OR** acetaminophen, LORazepam, promethazine, albuterol    Vitals    TEMPERATURE:  Current - Temp: 97.6 °F (36.4 °C);  Max - Temp  Av.9 °F (36.6 °C)  Min: 97.6 °F (36.4 °C)  Max: 98.3 °F (36.8 °C)  RESPIRATIONS RANGE: Resp  Av.1  Min: 17  Max: 20  PULSE RANGE: Pulse  Av.9  Min: 76  Max: 86  BLOOD PRESSURE RANGE:  Systolic (27WOV), PQC:704 , Min:106 , CE   ; Diastolic (54SMQ), AOB:70, Min:68, Max:91    PULSE OXIMETRY RANGE: SpO2  Av.3 %  Min: 92 %  Max: 98 %  24HR INTAKE/OUTPUT:    Intake/Output Summary (Last 24 hours) at 2023 1241  Last data filed at 2023 0401  Gross per 24 hour   Intake 360 ml   Output 600 ml   Net -240 ml       Exam:      General appearance: No apparent distress, appears stated age and cooperative. Lungs: Clear to ascultation, bilaterally without Rales/Wheezes/Rhonchi with good respiratory effort. Heart: Regular rate and rhythm with Normal S1/S2 without  murmurs, rubs or gallops, point of maximum impulse non-displaced  Abdomen: Soft, non-tender or non-distended without rigidity or guarding and positive bowel sounds all four quadrants. Extremities: No clubbing, cyanosis, or edema bilaterally. Skin: Skin color, texture, turgor normal.   Neurologic: Alert and oriented X 3,  grossly non-focal.  Mental status: Alert, oriented, thought content appropriate. The following labs were reviewed:   Recent Labs     23  0523 23  1119   WBC 4.8 5.1   HGB 12.5* 13.0*   HCT 37.3* 39.4*    213      Recent Labs     23  0523 23  1119    140   K 3.8 3.9    102   CO2 26 28   BUN 12 9   CREATININE 0.9 0.8     Recent Labs     23  2144   AST 27   ALT <5*   BILIDIR <0.2   BILITOT 0.4   ALKPHOS 133*     No results for input(s): INR in the last 72 hours. Recent Labs     23  2144   CKTOTAL 67       Consults:     IP CONSULT TO HOSPITALIST  IP CONSULT TO PHARMACY  IP CONSULT TO NEUROLOGY    Active Hospital Problems    Diagnosis Date Noted    Breakthrough seizure ([facility] [G40.919] 2021         ASSESSMENT AND PLAN      Possible breakthrough seizure:   In a patient with history of PNES and seizures  Levels of lamotrigine and Keppra were drawn in ER, will follow the levels  Continue home dose of lamotrigine and Keppra was increased to 1000 twice daily until seen by neurology  Continue with seizure precautions  Head CT nonacute      Prior MSSA bacteremia, cervical paraspinal phlegmon 11/29:  Treated at , on Ancef through 1/16/2023, PICC is in place    Chronic CHF with reduced EF 40 to 45%  Volume status is stable  Continue with Coreg and Entresto    DVT Prophylaxis: Lovenox  Diet: ADULT DIET;  Regular  Code Status: Full Code    PT/OT Eval Status: Ongoing    Dispo -inpatient until neurology evaluation, discharge when cleared by neurology back to facility    Luis Elizondo MD

## 2023-01-08 NOTE — CONSULTS
Clinical Pharmacy Progress Note  Medication History     Admit Date: 1/7/2023    Pharmacy consulted to verify home medication list by Dr. King Handy. List of of current medications patient is taking is complete. Home Medication list in EPIC updated to reflect changes noted below. Source of information: medication list from [facility]; list dated 1/7/23.     Changes made to medication list:   Medications removed: (include reason, ex: therapy completed, patient no longer taking, etc.)  Amlodipine, chlorthalidone, glipizide - not on med list from facility  Medication doses adjusted:   Cefazolin-->2g IV q8h through 1/17/23  Heparin lock 10 units/mL --> 5 mL q8h after IV usage; use to flush port/lumen before/after IV usage  Lamotrigine-->600mg BID  Also has order for Lamotrigine 200mg daily PRN \"health maintenance\"  Levetiracetam--> 750mg BID      Current Outpatient Medications   Medication Instructions    albuterol (PROVENTIL) 2.5 mg, Nebulization, EVERY 12 HOURS PRN    albuterol sulfate HFA (PROVENTIL;VENTOLIN;PROAIR) 108 (90 Base) MCG/ACT inhaler 2 puffs, Inhalation, EVERY 4 HOURS PRN    allopurinol (ZYLOPRIM) 300 mg, Oral, NIGHTLY    aspirin 81 mg, Oral, DAILY    atorvastatin (LIPITOR) 80 mg, Oral, NIGHTLY    carvedilol (COREG) 6.25 mg, Oral, 2 TIMES DAILY WITH MEALS    ceFAZolin (ANCEF) IVPB 2,000 mg, IntraVENous, EVERY 8 HOURS    cyanocobalamin 1,000 mcg, Oral, DAILY    diazePAM (VALIUM) 5 mg, Oral, EVERY 12 HOURS PRN    empagliflozin (JARDIANCE) 10 mg, Oral, DAILY    ergocalciferol (ERGOCALCIFEROL) 50,000 Units, Oral, WEEKLY, Takes on Mondays    famotidine (PEPCID) 40 mg, Oral, DAILY    fluticasone (FLOVENT HFA) 110 MCG/ACT inhaler 1 puff, Inhalation, 2 TIMES DAILY    heparin flush, PF, 10 UNIT/ML injection 5 mLs, IntraVENous, Every 8 hours, use to flush port/lumen before/after IV usage    lamoTRIgine (LAMICTAL) 600 mg, Oral, 2 TIMES DAILY    lamoTRIgine (LAMICTAL) 200 mg, Oral, DAILY PRN levETIRAcetam (KEPPRA) 750 mg, Oral, 2 TIMES DAILY    loratadine (CLARITIN) 10 mg, Oral, NIGHTLY    meclizine (ANTIVERT) 25 mg, Oral, 3 TIMES DAILY PRN    metFORMIN (GLUCOPHAGE) 1,000 mg, Oral, 2 TIMES DAILY WITH MEALS    montelukast (SINGULAIR) 10 mg, Oral, NIGHTLY    Multiple Vitamin (DAILY TITUS) TABS 1 tablet, Oral, DAILY    naloxone 4 MG/0.1ML LIQD nasal spray 1 spray, Nasal, PRN    nitroGLYCERIN (NITROSTAT) 0.4 mg, SubLINGual, EVERY 5 MIN PRN, up to max of 3 total doses. If no relief after 1 dose, call 911.    nortriptyline (PAMELOR) 75 mg, Oral, NIGHTLY    nystatin (MYCOSTATIN) 968328 UNIT/GM powder Topical, 2 TIMES DAILY, Apply to abd folds and groin    oxyCODONE-acetaminophen (PERCOCET) 5-325 MG per tablet 1 tablet, Oral, EVERY 6 HOURS PRN    sacubitril-valsartan (ENTRESTO)  MG per tablet 1 tablet, Oral, 2 TIMES DAILY    sodium chloride flush 0.9 % injection 10 mLs, IntraVENous, EVERY 8 HOURS, For PICC maintenance       Please call with any questions.   Edilia Moreau PharmD, BCPS  Wireless: [id number]   1/8/2023 9:30 AM

## 2023-01-08 NOTE — PLAN OF CARE
Problem: Skin/Tissue Integrity  Goal: Absence of new skin breakdown  Description: 1. Monitor for areas of redness and/or skin breakdown  2. Assess vascular access sites hourly  3. Every 4-6 hours minimum:  Change oxygen saturation probe site  4. Every 4-6 hours:  If on nasal continuous positive airway pressure, respiratory therapy assess nares and determine need for appliance change or resting period.   Outcome: Progressing     Problem: ABCDS Injury Assessment  Goal: Absence of physical injury  Outcome: Progressing     Problem: Neurosensory - Adult  Goal: Achieves stable or improved neurological status  Outcome: Progressing  Goal: Absence of seizures  Outcome: Progressing  Goal: Remains free of injury related to seizures activity  Outcome: Progressing     Problem: Safety - Adult  Goal: Free from fall injury  Outcome: Progressing     Problem: Discharge Planning  Goal: Discharge to home or other facility with appropriate resources  Outcome: Progressing

## 2023-01-08 NOTE — PROGRESS NOTES
Occupational Therapy  Facility/Department: [facility] 5T ORTHO/NEURO  Occupational Therapy Initial Assessment    Name: Azar Shields  : 1960  MRN: [id number]  Date of Service: 2023    Discharge Recommendations: Azar Shields scored a 16/24 on the AM-PAC ADL Inpatient form. Current research shows that an AM-PAC score of 17 or less is typically not associated with a discharge to the patient's home setting. Based on the patient's AM-PAC score and their current ADL deficits, it is recommended that the patient have 3-5 sessions per week of Occupational Therapy at d/c to increase the patient's independence. Please see assessment section for further patient specific details. If patient discharges prior to next session this note will serve as a discharge summary. Please see below for the latest assessment towards goals. Patient Diagnosis(es): The primary encounter diagnosis was Seizure [facility]). A diagnosis of Disorientation was also pertinent to this visit. Past Medical History:  has a past medical history of Arthritis, Asthma, Bronchitis, CAD (coronary artery disease), CHF (congestive heart failure) ([address] Chronic back pain, Developmental delay, Diabetes mellitus ([address] Generalized headaches, Gout, History of tremor, Hypertension, MI, old, Morbid obesity ([address] Psychogenic nonepileptic seizure, and Seizures ([address] Past Surgical History:  has a past surgical history that includes eye surgery. Treatment Diagnosis: impaiored ADL and fxl mobiltiy      Assessment   Performance deficits / Impairments: Decreased functional mobility ; Decreased ADL status; Decreased endurance  Assessment: Pt is 61y M from [facility], where he was for rehab stay. Pt reports baseline prior to initial hospitalization appx 2mos ago was IND at w/c level - using manual w/c in apt - IND w/ SPT. Pt is presently Paul of 1-2 w/ ADL and fxl mobilty tasks, so is functioning below baseline.  Pt may benefit from skilled OT while inpt, and after acute d/c prior to retn to home setting. Will follow as inpt per POC  Treatment Diagnosis: impaiored ADL and fxl mobiltiy  Prognosis: Fair  Decision Making: Medium Complexity  REQUIRES OT FOLLOW-UP: Yes  Activity Tolerance  Activity Tolerance: Patient Tolerated treatment well        Plan   Occupational Therapy Plan  Times Per Week: 2-5  Current Treatment Recommendations: Strengthening, Balance training, Functional mobility training, Endurance training, Safety education & training, Patient/Caregiver education & training, Self-Care / ADL     Restrictions  Position Activity Restriction  Other position/activity restrictions: up as tolerated    Subjective   General  Chart Reviewed: Yes, Orders, Progress Notes  Additional Pertinent Hx: From H&P 1/7: The patient is a 63 y.o. male with medical history as below notable for PNES and possible seizures, who presents to [facility] from his facility with possible seizure and fall. Discussed with ER physician who reports when patient arrived to ER he was not able to communicate verbally but was moving his extremities. His baseline was described by facility as awake and alert, with normal speech and ambulatory. While in the CT scanner patient had witnessed seizure like event with extremity shaking. He was given ativan. Levels for keppra and lamotrigin were drawn. CT head was non acute. Patient is being admitted for neurology evaluation. Family / Caregiver Present: No  Referring Practitioner: Anne Silva MD  Diagnosis: seizure  Subjective  Subjective: Pt semi-supine on OT/PT appraoch and agreed to eval     Social/Functional History  Social/Functional History  Additional Comments: Pt admitted through ED from SNF. pt reporting at baseline he uses manual w/c in/around apt, transfers to/from w/c IND.        Objective   Heart Rate: 86  Heart Rate Source: Monitor  BP: 106/70  BP Location: Left upper arm  BP Method: Automatic  Patient Position: Sitting  MAP (Calculated): 82  Resp: 19  SpO2: 98 %  O2 Device: None (Room air)             Safety Devices  Type of Devices: Left in chair;Chair alarm in place;Nurse notified;Gait belt;Call light within reach  Restraints  Restraints Initially in Place: No  Bed Mobility Training  Bed Mobility Training: Yes  Overall Level of Assistance: Contact-guard assistance  Supine to Sit: Contact-guard assistance  Scooting: Stand-by assistance  Balance  Sitting: Intact  Standing: With support (RW)  Transfer Training  Transfer Training: Yes  Overall Level of Assistance: Assist X2;Minimum assistance  Interventions: Verbal cues; Visual cues; Safety awareness training  Sit to Stand: Minimum assistance;Assist X2  Stand to Sit: Minimum assistance;Assist X2  Gait  Overall Level of Assistance: Minimum assistance;Assist X2  Distance (ft): 17 Feet (2 15)        ADL  LE Dressing: Minimal assistance  LE Dressing Skilled Clinical Factors: Pt threaded BLE seated EOB, Paul in managing pants to hips/waist  Toileting:  (declined need at time of eval)     Activity Tolerance  Activity Tolerance: Patient tolerated treatment well;Patient limited by endurance; Patient limited by fatigue        Vision  Vision Exceptions: Wears glasses at all times  Hearing  Hearing: Within functional limits  Cognition  Overall Cognitive Status: [facility]  Orientation  Overall Orientation Status: Within Normal Limits                  Education Given To: Patient  Education Provided: Role of Therapy;Transfer Training;Plan of Care  Education Method: Verbal;Demonstration  Education Outcome: Verbalized understanding  LUE AROM (degrees)  LUE AROM : WFL  RUE AROM (degrees)  RUE AROM : [facility]      AM[facility] Score        AM-[facility] Inpatient Daily Activity Raw Score: 16 (01/08/23 1304)  AM-PAC Inpatient ADL T-Scale Score : 35.96 (01/08/23 1304)  ADL Inpatient CMS 0-100% Score: 53.32 (01/08/23 1304)  ADL Inpatient CMS G-Code Modifier : CK (01/08/23 1304)      Goals  Short Term Goals  Time Frame for Short Term Goals: by d/c  Short Term Goal 1: Pt will complete 3min stance at sink for grooming w/ CGA  Short Term Goal 2: Pt will complete LB dressing SUPV  Short Term Goal 3: Pt will complete fxl transfer to toilet or BSC w/ SUPV  Patient Goals   Patient goals : none stated       Therapy Time   Individual Concurrent Group Co-treatment   Time In 1020         Time Out 1102         Minutes 42         Timed Code Treatment Minutes: 27 Minutes+15min eval=42tot     David Nichols, MOT-OTR/L [id number]    Carolina Caballero OT

## 2023-01-08 NOTE — PROGRESS NOTES
01/08/23 1118   RT Protocol   History Pulmonary Disease 2   Respiratory pattern 0   Breath sounds 0   Cough 0   Bronchodilator Assessment Score 2

## 2023-01-08 NOTE — RT PROTOCOL NOTE
RT Nebulizer Bronchodilator Protocol Note    There is a bronchodilator order in the chart from a provider indicating to follow the RT Bronchodilator Protocol and there is an Initiate RT Bronchodilator Protocol order as well (see protocol at bottom of note). CXR Findings:  XR CHEST PORTABLE    Result Date: 1/7/2023  No acute disease       The findings from the last RT Protocol Assessment were as follows:  Smoking: Chronic pulmonary disease  Respiratory Pattern: Regular pattern and RR 12-20 bpm  Breath Sounds: Clear breath sounds  Cough: Strong, spontaneous, non-productive  Indication for Bronchodilator Therapy: On home bronchodilators  Bronchodilator Assessment Score: 2    Aerosolized bronchodilator medication orders have been revised according to the RT Nebulizer Bronchodilator Protocol below. Respiratory Therapist to perform RT Therapy Protocol Assessment initially then follow the protocol. Repeat RT Therapy Protocol Assessment PRN for score 0-3 or on second treatment, BID, and PRN for scores above 3. No Indications - adjust the frequency to every 6 hours PRN wheezing or bronchospasm, if no treatments needed after 48 hours then discontinue using Per Protocol order mode. If indication present, adjust the RT bronchodilator orders based on the Bronchodilator Assessment Score as indicated below. If a patient is on this medication at home then do not decrease Frequency below that used at home. 0-3 - enter or revise RT bronchodilator order(s) to equivalent RT Bronchodilator order with Frequency of every 4 hours PRN for wheezing or increased work of breathing using Per Protocol order mode. 4-6 - enter or revise RT Bronchodilator order(s) to two equivalent RT bronchodilator orders with one order with BID Frequency and one order with Frequency of every 4 hours PRN wheezing or increased work of breathing using Per Protocol order mode.          7-10 - enter or revise RT Bronchodilator order(s) to two equivalent RT bronchodilator orders with one order with TID Frequency and one order with Frequency of every 4 hours PRN wheezing or increased work of breathing using Per Protocol order mode. 11-13 - enter or revise RT Bronchodilator order(s) to one equivalent RT bronchodilator order with QID Frequency and an Albuterol order with Frequency of every 4 hours PRN wheezing or increased work of breathing using Per Protocol order mode. Greater than 13 - enter or revise RT Bronchodilator order(s) to one equivalent RT bronchodilator order with every 4 hours Frequency and an Albuterol order with Frequency of every 2 hours PRN wheezing or increased work of breathing using Per Protocol order mode. RT to enter RT Home Evaluation for COPD & MDI Assessment order using Per Protocol order mode.     Electronically signed by Bobby Maxwell RCP on 1/8/2023 at 11:18 AM

## 2023-01-08 NOTE — RT PROTOCOL NOTE
RT Inhaler-Nebulizer Bronchodilator Protocol Note    There is a bronchodilator order in the chart from a provider indicating to follow the RT Bronchodilator Protocol and there is an Initiate RT Inhaler-Nebulizer Bronchodilator Protocol order as well (see protocol at bottom of note). CXR Findings:  XR CHEST PORTABLE    Result Date: 1/7/2023  No acute disease       The findings from the last RT Protocol Assessment were as follows:   History Pulmonary Disease: Chronic pulmonary disease  Respiratory Pattern: Regular pattern and RR 12-20 bpm  Breath Sounds: Clear breath sounds  Cough: Strong, spontaneous, non-productive  Indication for Bronchodilator Therapy: On home bronchodilators  Bronchodilator Assessment Score: 2    Aerosolized bronchodilator medication orders have been revised according to the RT Inhaler-Nebulizer Bronchodilator Protocol below. Respiratory Therapist to perform RT Therapy Protocol Assessment initially then follow the protocol. Repeat RT Therapy Protocol Assessment PRN for score 0-3 or on second treatment, BID, and PRN for scores above 3. No Indications - adjust the frequency to every 6 hours PRN wheezing or bronchospasm, if no treatments needed after 48 hours then discontinue using Per Protocol order mode. If indication present, adjust the RT bronchodilator orders based on the Bronchodilator Assessment Score as indicated below. Use Inhaler orders unless patient has one or more of the following: on home nebulizer, not able to hold breath for 10 seconds, is not alert and oriented, cannot activate and use MDI correctly, or respiratory rate 25 breaths per minute or more, then use the equivalent nebulizer order(s) with same Frequency and PRN reasons based on the score. If a patient is on this medication at home then do not decrease Frequency below that used at home.     0-3 - enter or revise RT bronchodilator order(s) to equivalent RT Bronchodilator order with Frequency of every 4 hours PRN for wheezing or increased work of breathing using Per Protocol order mode. 4-6 - enter or revise RT Bronchodilator order(s) to two equivalent RT bronchodilator orders with one order with BID Frequency and one order with Frequency of every 4 hours PRN wheezing or increased work of breathing using Per Protocol order mode. 7-10 - enter or revise RT Bronchodilator order(s) to two equivalent RT bronchodilator orders with one order with TID Frequency and one order with Frequency of every 4 hours PRN wheezing or increased work of breathing using Per Protocol order mode. 11-13 - enter or revise RT Bronchodilator order(s) to one equivalent RT bronchodilator order with QID Frequency and an Albuterol order with Frequency of every 4 hours PRN wheezing or increased work of breathing using Per Protocol order mode. Greater than 13 - enter or revise RT Bronchodilator order(s) to one equivalent RT bronchodilator order with every 4 hours Frequency and an Albuterol order with Frequency of every 2 hours PRN wheezing or increased work of breathing using Per Protocol order mode. RT to enter RT Home Evaluation for COPD & MDI Assessment order using Per Protocol order mode.     Electronically signed by Tyron Ricks RCP on 1/7/2023 at 7:43 PM

## 2023-01-08 NOTE — PLAN OF CARE
Neurology Plan of Care    Assessment:  Tello Edward is a 63 y.o. male with history of hypertension, diabetes mellitus, coronary artery disease, congestive heart failure and psychogenic nonepileptic seizures & epileptic seizures admitted now w/ breakthrough seizures in setting of recent life stressor of moving from one nursing facility to another. Patient has been significantly stressed by this move. Plan : 1. Reviewed CT head and did not show acute abnormality. 2.Agree with Keppra 1 gm BID and continue lamotrigine at the same dosage. 3.Discussed about seizure precautions and patient is currently not driving. 4.Okay for patient to dispo back to nursing facility   5. Can f/u w/ his neurologic. 6. We will sign off. Please call with questions.      EDUARDA Ley - CNP  01/08/23  6:28 PM

## 2023-01-08 NOTE — PROGRESS NOTES
Patient alert and oriented x4. Complains of mild pain to lower back and L shoulder. Controlled with PRN percocet. Up to chair x1 contact guard assist. Adequate urine output via urinal. No signs of seizure activity this shift. All needs met at this time. Will continue to monitor.

## 2023-01-08 NOTE — PROGRESS NOTES
Speech Language Pathology  Facility/Department: [facility] 5T ORTHO/NEURO   CLINICAL BEDSIDE SWALLOW EVALUATION    NAME: Jose Pope  : 1960  MRN: [id number]    ADMISSION DATE: 2023  ADMITTING DIAGNOSIS: has Hypertension; Diabetes mellitus ([facility] Orthostatic hypotension; Syncope; Morbid obesity with BMI of 50.0-59.9, adult ([facility] Chronic systolic heart failure ([facility] Diabetes mellitus type 2 in obese ([facility] Seizures ([facility] Light headed; Chest pain; Dizziness; LBBB (left bundle branch block); CHF (congestive heart failure) ([facility] Aphasia; S/P coronary angiogram; Breakthrough seizure ([facility] Nonintractable generalized idiopathic epilepsy without status epilepticus ([facility] Nonintractable epilepsy with complex partial seizures ([facility] Encephalopathy acute; and Altered mental status on their problem list.  ONSET DATE: 23    Recent Chest Xray/CT of Chest: Date of Eval: 23  Impression       No acute disease     CT head: 2023  Impression       No evidence of acute intracranial abnormality. Evaluating Therapist: STEVENSON Fishman    Current Diet level:  Current Diet : Regular (Pt instructed to pick softer items at this time; aware of abilities and limitations with food when questioned.)    Previous BSE Impression 17  Dysphagia Diagnosis: Mild oral stage dysphagia; Suspected needs further assessment (d/t edentulous nature)  Dysphagia Impression : Pt presents w/ overall intact swallow function. No overt clinical s/s of aspiration exhibited w/ any consistency; lungs clear per chart review. Mild difficulty masticating solids d/t edentulous nature but pt electing to continue on regular consistency diet. Recommend continue current regular consistency/thin liquid diet.   If s/s of aspiration emerge or lung status declines, make NPO and notify dysphagia team for immediate re-check       Primary Complaint  Patient Complaint: none    Pain:  Pain Assessment  Pain Assessment: None - Denies Pain  Pain Level: 0    Reason for Referral  Candis Nunez was referred for a bedside swallow evaluation to assess the efficiency of his swallow function, identify signs and symptoms of aspiration and make recommendations regarding safe dietary consistencies, effective compensatory strategies, and safe eating environment. Impression  Dysphagia Diagnosis: Swallow function appears WFL  Dysphagia Impression : Pt alert and oriented. No word finding or cognitive issues noted during session. Very mild distortion of sounds on occasion but pt reported speech and cognition is baseline. Pt denies any problems with swallowing, able to indicate foods avoid due to edentulous state when questioned. Pt with fairly timely mastication time with no oral residue or anterior spillage. Swallow initiation timely with no cough/wet vocal quality/throat clearing with any po trials including 3 oz water test except one mild dry cough when chewing cracker x1 (out of 5 bites) which pt reported did not feel related to swallowing. Pt denies globus sensation. Recommend regular with thins (pt instructed to pick softer food items at this time). Will f/u 1-2 sessions to assure consistency of abilities. If any s/s of aspiration or if lung status decline emerges, then d/c po until we recheck. Dysphagia Outcome Severity Scale: Level 6: Within functional limits/Modified independence     Treatment Plan  Requires SLP Intervention: Yes  Duration of Treatment: Dysphagia therapy 1-2 times  D/C Recommendations: To be determined       Recommended Diet and Intervention   Regular with thins -   If any s/s of aspiration or if lung status decline emerges, then d/c po until we recheck.       Recommended Form of Meds: PO  Recommendations: Dysphagia treatment  Therapeutic Interventions: Patient/Family education;Diet tolerance monitoring    Compensatory Swallowing Strategies  Compensatory Swallowing Strategies : Upright as possible for all oral intake;Small bites/sips;Eat/Feed slowly    Treatment/Goals  Dysphagia Goals: The patient will tolerate recommended diet without observed clinical signs of aspiration; The patient/caregiver will demonstrate understanding of compensatory strategies for improved swallowing safety. General  Chart Reviewed: Yes  Behavior/Cognition: Alert; Cooperative;Pleasant mood  Communication Observation: Functional  Follows Directions: Simple  Dentition: Edentulous (Has dentures but not present and appears to not wear)  Patient Positioning: Upright in bed  Baseline Vocal Quality: Normal  Volitional Cough: Strong  Volitional Swallow: Absent  Prior Dysphagia History: Seen in 2017 with regular (pt wishes) and thins. Pt reported had speech therapy assess throat area and swallowing just prior to admit and no diet changes were made at that time. Consistencies Administered: Pureed; Thin - straw; Ice Chips; Easy to chew           Vision/Hearing  Vision  Vision: Impaired  Vision Exceptions: Wears glasses at all times  Hearing  Hearing: Within functional limits    Oral Motor Deficits  Oral/Motor  Oral Hygiene: Moist    Oral Phase Dysfunction  Oral Phase  Oral Phase: WFL - pt with fairly timely mastication time with no oral residue or anterior spillage. Pt is edentulous. Indicators of Pharyngeal Phase Dysfunction   Pharyngeal Phase   Pharyngeal Phase: Swallow initiation timely with no cough/wet vocal quality/throat clearing with any po trials including 3 oz water test except one mild dry cough when chewing cracker which pt reported did not feel related to swallowing. Pt denies globus sensation. Prognosis  Individuals consulted  Consulted and agree with results and recommendations: Patient;RN  RN Name: Cindy Jacobsen    Education  Patient Education: Pt educated to role of dysphagia, what aspiration is and the s/s of it and to notify nursing should it emerge, diet and strategy recommendations reviewed. Pt indicated understanding.    Patient Education Response: Verbalizes understanding  Safety Devices in place: Yes  Type of devices: Bed alarm in place;Call light within reach;Nurse notified       Therapy Time   9:45-10:00 (15 minutes)      Pt's goal: none when questioned      Plan:  Continue goals per POC  Recommended diet: Regular with thins -   If any s/s of aspiration or if lung status decline emerges, then d/c po until we recheck. Total treatment time: 15 minutes  Pt's discharge plan: back to [facility]  Discharge Plan: To be determined closer to discharge  Discussed with RNMelba  Needs within reach.          Armida Mejia MA CCC/SLP [id number]  Speech Language Pathologist  Pager [phone]   This document will serve as a discharge summary if pt discharge before next treatment   session      1/8/2023 10:19 AM

## 2023-01-08 NOTE — PROGRESS NOTES
Physical Therapy  [facility]  Physical Therapy Initial Assessment/Treatment    Name: Jose Pope  : 1960  MRN: [id number]  Date of Service: 2023    Discharge Recommendations:   Jose Pope scored a  on the AM-PAC short mobility form. Current research shows that an AM-PAC score of 17 or less is typically not associated with a discharge to the patient's home setting. Based on the patient's AM-PAC score and their current functional mobility deficits, it is recommended that the patient have 3-5 sessions per week of Physical Therapy at d/c to increase the patient's independence. Please see assessment section for further patient specific details. If patient discharges prior to next session this note will serve as a discharge summary. Please see below for the latest assessment towards goals. PT Equipment Recommendations  Equipment Needed: No (defer)      Patient Diagnosis(es): The primary encounter diagnosis was Seizure ([address] A diagnosis of Disorientation was also pertinent to this visit. Past Medical History:  has a past medical history of Arthritis, Asthma, Bronchitis, CAD (coronary artery disease), CHF (congestive heart failure) ([address] Chronic back pain, Developmental delay, Diabetes mellitus ([address] Generalized headaches, Gout, History of tremor, Hypertension, MI, old, Morbid obesity ([address] Psychogenic nonepileptic seizure, and Seizures ([address] Past Surgical History:  has a past surgical history that includes eye surgery. Assessment   Body Structures, Functions, Activity Limitations Requiring Skilled Therapeutic Intervention: Decreased functional mobility ; Decreased strength;Decreased endurance  Assessment: 63 y.o. male with medical history as below notable for PNES and possible seizures, who presents to [facility] from his facility with possible seizure and fall. Pt currently requiring CGA for bed mobility, Min Ax2 for transfers and short amb with RW. Pt is below his baseline and would benefit from further skilled PT to maximize safety and independence with functional mobility. Will continue to follow. Treatment Diagnosis: Decreased functional mobility  Therapy Prognosis: Good  Decision Making: Medium Complexity  Requires PT Follow-Up: Yes  Activity Tolerance  Activity Tolerance: Patient tolerated treatment well;Patient limited by endurance; Patient limited by fatigue     Plan   Physcial Therapy Plan  General Plan:  (2-5)  Current Treatment Recommendations: Strengthening, Balance training, Endurance training, Functional mobility training, Transfer training, Gait training, Safety education & training  Safety Devices  Type of Devices: Left in chair, Chair alarm in place, Nurse notified, Gait belt, Call light within reach  Restraints  Restraints Initially in Place: No     Restrictions  Position Activity Restriction  Other position/activity restrictions: up as tolerated     Subjective   General  Chart Reviewed: Yes  Additional Pertinent Hx: 63 y.o. male with medical history as below notable for PNES and possible seizures, who presents to [facility] from his facility with possible seizure and fall. Family / Caregiver Present: No  Referring Practitioner: Karel Johns MD  Diagnosis: Seizure  Follows Commands: Within Functional Limits  Subjective  Subjective: Pt found supine in bed upon arrival and agreeable to therapy. Social/Functional History  Social/Functional History  Additional Comments: Pt admitted through ED from SNF. pt reporting at baseline he uses manual w/c in/around apt, transfers to/from w/c IND.   Vision/Hearing  Vision  Vision Exceptions: Wears glasses at all times  Hearing  Hearing: Within functional limits    Cognition   Orientation  Overall Orientation Status: Within Normal Limits  Cognition  Overall Cognitive Status: WFL     Objective   Heart Rate: 86  Heart Rate Source: Monitor  BP: 106/70  BP Location: Left upper arm  BP Method: Automatic  Patient Position: Sitting  MAP (Calculated): 82  Resp: 19  SpO2: 98 %  O2 Device: None (Room air)              AROM RLE (degrees)  RLE AROM: WFL  AROM LLE (degrees)  LLE AROM : WFL  Strength RLE  Strength RLE: WFL  Strength LLE  Strength LLE: WFL        Bed Mobility Training  Bed Mobility Training: Yes  Overall Level of Assistance: Contact-guard assistance  Supine to Sit: Contact-guard assistance  Scooting: Stand-by assistance  Balance  Sitting: Intact  Standing: With support (RW)  Transfer Training  Transfer Training: Yes  Overall Level of Assistance: Assist X2;Minimum assistance  Interventions: Verbal cues; Visual cues; Safety awareness training  Sit to Stand: Minimum assistance;Assist X2  Stand to Sit: Minimum assistance;Assist X2  Gait  Overall Level of Assistance: Minimum assistance;Assist X2  Distance (ft): 17 Feet (2 15)  Bed mobility  Supine to Sit: Contact guard assistance  Scooting: Contact guard assistance  Transfers  Sit to Stand: 2 Person Assistance;Minimal Assistance  Stand to Sit: 2 Person Assistance;Minimal Assistance  Ambulation  Surface: Level tile  Device: Rolling Walker  Assistance: 2 Person assistance;Minimal assistance  Quality of Gait: slow and effortful. Distance: 2'+15'  Comments: Longer distance limited by fatigue.      Balance  Posture: Fair  Sitting - Static: Good  Sitting - Dynamic: Good  Standing - Static: Fair  Standing - Dynamic: Fair     AM-PAC Score  AM-PAC Inpatient Mobility Raw Score : 13 (01/08/23 1252)  AM-PAC Inpatient T-Scale Score : 36.74 (01/08/23 1252)  Mobility Inpatient CMS 0-100% Score: 64.91 (01/08/23 1252)  Mobility Inpatient CMS G-Code Modifier : CL (01/08/23 1252)    Goals  Short Term Goals  Time Frame for Short Term Goals: d/c  Short Term Goal 1: sup<>sit supervision  Short Term Goal 2: sit<>stand supervision  Short Term Goal 3: amb 22' with RW and CGA  Patient Goals   Patient Goals : not stated       Education  Patient Education  Education Provided Comments: role of PT, use of call light, d/c planning - pt verb understanding      Therapy Time   Individual Concurrent Group Co-treatment   Time In 1020         Time Out 1102         Minutes 42           Timed Code Treatment Minutes:  27    Total Treatment Minutes:  42    If the patient is discharged before the next treatment session, this note will serve as the discharge summary.      Mike Aragon, PT, DPT

## 2023-01-08 NOTE — PLAN OF CARE
Problem: Skin/Tissue Integrity  Goal: Absence of new skin breakdown  Description: 1. Monitor for areas of redness and/or skin breakdown  2. Assess vascular access sites hourly  3. Every 4-6 hours minimum:  Change oxygen saturation probe site  4. Every 4-6 hours:  If on nasal continuous positive airway pressure, respiratory therapy assess nares and determine need for appliance change or resting period.   Outcome: Progressing     Problem: ABCDS Injury Assessment  Goal: Absence of physical injury  Outcome: Progressing     Problem: Neurosensory - Adult  Goal: Achieves stable or improved neurological status  Outcome: Progressing

## 2023-01-08 NOTE — PROGRESS NOTES
Reason for Admission: breakthrough seizure     Major Shift Events: no significant events during shift, Pt very pleasant to talk to and cooperative.      Systems Review   Neuro:    A&O: x4   NIHSS: 0  Sedation/RASS   RASS: 0   Pain: pt denies    Cardiac   Rhythm: NSR   Gtts: LR @ 75 ml/hr    Pulmonary   O2 Modality: RA    GI/    Last BM:MELL    I/O:    01/06 0700 01/07 0659 01/07 0700 01/08 0659   P.O. (mL/kg/hr)  360 (0.1)   Total Intake(mL/kg)  360 (2.4)   Urine (mL/kg/hr)  600 (0.2)   Stool (mL/kg)  0 (0)   Total Output(mL/kg)  600 (3.9)   Net  -240        Urine Occurrence  0 x   Stool Occurrence  0 x      NPO/PO/TF/TPN/rate(goal): Regular     Hematology   Blood Products: N/A   VTE Prophylaxis/Anticoagulation: Lovenox    H/H:    Latest Reference Range & Units Most Recent   Hemoglobin Quant 13.5 - 17.5 g/dL 12.5 (L)  1/7/23 05:23   Hematocrit 40.5 - 52.5 % 37.3 (L)  1/7/23 05:23   (L): Data is abnormally low    Infectious Disease   Culture results/pending: N/A   ABX: Ancef    Isolation: None    Skin: abrasions bilat knees, scabbing R second toe, redness coccyx     Lines/tubes: PICC R Arm    Lab or unit collect: lab     Mobility    PT/OT: lawson @ bedside x1

## 2023-01-08 NOTE — PLAN OF CARE
Patient will tolerate least restrictive diet without s/s of aspiration.     Paula Chicas MA CCC/SLP [id number]

## 2023-01-08 NOTE — CONSULTS
NEUROLOGY CONSULT  NOTE :    Reason for Consult: Seizures    History of Present Illness:  Jose Pope is a 63 y.o. male who is being seen in consultation at the request of Dr Zan Flores MD for evaluation of seizures. History was obtained from the patient and according to him, patient was admitted with seizure and had another seizure with jerking all over and was given ativan. Patient is awake, alert and following verbal commands well and denies any vision changes, weakness, tingling numbness, dizziness or balance issues while walking.     Medical History:  Past Medical History:   Diagnosis Date    Arthritis     Asthma     Bronchitis     CAD (coronary artery disease)     CHF (congestive heart failure) ([facility])     Chronic back pain     Developmental delay     per sister    Diabetes mellitus ([facility] [address]     Generalized headaches     Gout     History of tremor     per sister    Hypertension     MI, old 12/01/2013    Nstemi    Morbid obesity ([facility] [address]     Psychogenic nonepileptic seizure     DX at [facility]- pseudo-seizures    Seizures ([facility] [address]      Past Surgical History:   Procedure Laterality Date    EYE SURGERY       Medications Prior to Admission: ceFAZolin (ANCEF) IVPB, Infuse 2,000 mg intravenously in the morning and 2,000 mg at noon and 2,000 mg in the evening.  heparin flush, PF, 10 UNIT/ML injection, Infuse 5 mLs intravenously every 8 (eight) hours use to flush port/lumen before/after IV usage  levETIRAcetam (KEPPRA) 750 MG tablet, Take 750 mg by mouth 2 times daily  albuterol (PROVENTIL) (2.5 MG/3ML) 0.083% nebulizer solution, Take 2.5 mg by nebulization every 12 hours as needed for Wheezing or Shortness of Breath  empagliflozin (JARDIANCE) 10 MG tablet, Take 10 mg by mouth daily  ergocalciferol (ERGOCALCIFEROL) 1.25 MG (59440 UT) capsule, Take 50,000 Units by mouth once a week Takes on Mondays  fluticasone (FLOVENT HFA) 110 MCG/ACT inhaler, Inhale 1 puff into the lungs 2 times daily  meclizine (ANTIVERT) 25 MG tablet, Take 25 mg by mouth 3 times daily as needed  naloxone 4 MG/0.1ML LIQD nasal spray, 1 spray by Nasal route as needed for Opioid Reversal  nitroGLYCERIN (NITROSTAT) 0.4 MG SL tablet, Place 0.4 mg under the tongue every 5 minutes as needed for Chest pain up to max of 3 total doses. If no relief after 1 dose, call 911. sodium chloride flush 0.9 % injection, Infuse 10 mLs intravenously in the morning and 10 mLs at noon and 10 mLs in the evening. For PICC maintenance.   nystatin (MYCOSTATIN) 002512 UNIT/GM powder, Apply topically 2 times daily Apply to abd folds and groin  [DISCONTINUED] ceFAZolin (ANCEF) 2 g injection, Inject 2,000 mg into the muscle every 8 (eight) hours Ordered through 01/17/2023 6356  [DISCONTINUED] Heparin Sodium, Porcine, (HEPARIN LOCK FLUSH IJ), Inject 10 Units/mL as directed 5 ml IV every 8 hours (PICC maintenance)  sacubitril-valsartan (ENTRESTO)  MG per tablet, Take 1 tablet by mouth 2 times daily  carvedilol (COREG) 6.25 MG tablet, Take 6.25 mg by mouth 2 times daily (with meals)  cyanocobalamin 1000 MCG tablet, Take 1,000 mcg by mouth daily  albuterol sulfate HFA (PROVENTIL;VENTOLIN;PROAIR) 108 (90 Base) MCG/ACT inhaler, Inhale 2 puffs into the lungs every 4 hours as needed for Wheezing  lamoTRIgine (LAMICTAL) 200 MG tablet, Take 200 mg by mouth daily as needed (\"for health maintenance\")  montelukast (SINGULAIR) 10 MG tablet, Take 10 mg by mouth nightly  [DISCONTINUED] glipiZIDE (GLUCOTROL) 5 MG tablet, Take 5 mg by mouth 2 times daily (before meals) (Patient not taking: Reported on 1/7/2023)  [DISCONTINUED] chlorthalidone (HYGROTON) 25 MG tablet, Take 25 mg by mouth daily  (Patient not taking: Reported on 1/7/2023)  nortriptyline (PAMELOR) 75 MG capsule, Take 75 mg by mouth nightly  lamoTRIgine (LAMICTAL) 200 MG tablet, Take 600 mg by mouth 2 times daily  atorvastatin (LIPITOR) 80 MG tablet, Take 80 mg by mouth nightly   metFORMIN (GLUCOPHAGE) 1000 MG tablet, Take 1,000 mg by mouth 2 times daily (with meals)   aspirin 81 MG EC tablet, Take 81 mg by mouth daily  famotidine (PEPCID) 40 MG tablet, Take 40 mg by mouth daily  loratadine (CLARITIN) 10 MG tablet, Take 10 mg by mouth nightly   Multiple Vitamin (DAILY TITUS) TABS, Take 1 tablet by mouth daily  [DISCONTINUED] amLODIPine (NORVASC) 10 MG tablet, Take 10 mg by mouth daily  (Patient not taking: Reported on 1/7/2023)  diazepam (VALIUM) 5 MG tablet, Take 5 mg by mouth every 12 hours as needed for Anxiety. oxyCODONE-acetaminophen (PERCOCET) 5-325 MG per tablet, Take 1 tablet by mouth every 6 hours as needed for Pain. allopurinol (ZYLOPRIM) 300 MG tablet, Take 300 mg by mouth nightly   Allergies   Allergen Reactions    Doxycycline Hives    Ibuprofen Hives    Penicillins Hives    Phenobarbital Other (See Comments)     Bad reaction but unsure what.  Patient noted medication was changed due to the severity of reaction per Care Everywhere    Tetracycline     Aspirin Nausea And Vomiting     Sister says he can take the baby asprin     Family History   Problem Relation Age of Onset    Asthma Mother     Obesity Mother     High Blood Pressure Mother     Diabetes Mother     Diabetes Father     Heart Disease Father     Heart Attack Father     High Blood Pressure Father     Diabetes Sister     High Blood Pressure Sister      Social History     Tobacco Use   Smoking Status Former   Smokeless Tobacco Never     Social History     Substance and Sexual Activity   Drug Use No     Social History     Substance and Sexual Activity   Alcohol Use No          Current Facility-Administered Medications:     allopurinol (ZYLOPRIM) tablet 300 mg, 300 mg, Oral, Nightly, Curt Samano MD    aspirin EC tablet 81 mg, 81 mg, Oral, Daily, Curt Samano MD, 81 mg at 01/08/23 0933    atorvastatin (LIPITOR) tablet 80 mg, 80 mg, Oral, Nightly, Curt Samano MD    carvedilol (COREG) tablet 6.25 mg, 6.25 mg, Oral, BID WC, Curt Samano MD, 6.25 mg at 01/08/23 1001    vitamin B-12 (CYANOCOBALAMIN) tablet 1,000 mcg, 1,000 mcg, Oral, Daily, Sonia Martinez MD, 1,000 mcg at 01/08/23 0933    diazePAM (VALIUM) tablet 5 mg, 5 mg, Oral, Q12H PRN, Sonia Martinez MD    [START ON 1/9/2023] vitamin D (ERGOCALCIFEROL) capsule 50,000 Units, 50,000 Units, Oral, Weekly, Sonia Martinez MD    famotidine (PEPCID) tablet 40 mg, 40 mg, Oral, Daily, Sonia Martinez MD, 40 mg at 01/08/23 0933    lamoTRIgine (LAMICTAL) tablet 200 mg, 200 mg, Oral, Daily PRN, Sonia Martinez MD    lamoTRIgine (LAMICTAL) tablet 600 mg, 600 mg, Oral, BID, Sonia Martinez MD, 600 mg at 01/08/23 1001    cetirizine (ZYRTEC) tablet 5 mg, 5 mg, Oral, Nightly, Sonia Martinez MD    montelukast (SINGULAIR) tablet 10 mg, 10 mg, Oral, Nightly, Sonia Martinez MD    nortriptyline (PAMELOR) capsule 75 mg, 75 mg, Oral, Nightly, Sonia Martinez MD    miconazole (MICOTIN) 2 % powder, , Topical, BID, Sonia Martinez MD, Given at 01/08/23 1002    sacubitril-valsartan (ENTRESTO)  MG per tablet 1 tablet, 1 tablet, Oral, BID, Sonia Martinez MD, 1 tablet at 01/08/23 1001    oxyCODONE-acetaminophen (PERCOCET) 5-325 MG per tablet 1 tablet, 1 tablet, Oral, Q6H PRN, Sonia Martinez MD, 1 tablet at 01/08/23 0933    budesonide (PULMICORT) nebulizer suspension 500 mcg, 0.5 mg, Nebulization, BID, Sonia Martinez MD    levETIRAcetam (KEPPRA) 1,000 mg in sodium chloride 0.9 % 100 mL IVPB, 1,000 mg, IntraVENous, Q12H, Sonia Martinez MD, Stopped at 01/08/23 0640    sodium chloride flush 0.9 % injection 5-40 mL, 5-40 mL, IntraVENous, 2 times per day, Sonia Martinez MD, 10 mL at 01/08/23 0935    sodium chloride flush 0.9 % injection 5-40 mL, 5-40 mL, IntraVENous, PRN, Sonia Martinez MD    0.9 % sodium chloride infusion, , IntraVENous, PRN, Sonia Martinez MD    enoxaparin (LOVENOX) injection 40 mg, 40 mg, SubCUTAneous, BID, Sonia Martinez MD, 40 mg at 01/08/23 0933    polyethylene glycol (GLYCOLAX) packet 17 g, 17 g, Oral, Daily PRN, Zan Flores MD    acetaminophen (TYLENOL) tablet 650 mg, 650 mg, Oral, Q6H PRN **OR** acetaminophen (TYLENOL) suppository 650 mg, 650 mg, Rectal, Q6H PRN, Zan Flores MD    LORazepam (ATIVAN) injection 2 mg, 2 mg, IntraVENous, Q4H PRN, Zan Flores MD    ceFAZolin (ANCEF) 2,000 mg in sodium chloride 0.9 % 50 mL IVPB (mini-bag), 2,000 mg, IntraVENous, Q8H, Zan Flores MD, Last Rate: 100 mL/hr at 01/08/23 1004, 2,000 mg at 01/08/23 1004    promethazine (PHENERGAN) injection 12.5 mg, 12.5 mg, IntraMUSCular, Q6H PRN, Zan Flores MD    albuterol (PROVENTIL) nebulizer solution 2.5 mg, 2.5 mg, Nebulization, Q4H PRN, Zan Flores MD    ROS:  Constitutional- No weight loss or fevers  Eyes- No diplopia. No photophobia. Ears/nose/throat- No dysphagia. No Dysarthria  Cardiovascular- No palpitations. No chest pain  Respiratory- No dyspnea. No Cough  Gastrointestinal- No Abdominal pain. No Vomiting. Genitourinary- No incontinence. No urinary retention  Musculoskeletal- No myalgia. No arthralgia  Skin- No rash. No easy bruising. Psychiatric- No depression. No anxiety  Endocrine- No diabetes. No thyroid issues. Hematologic- No bleeding difficulty. No fatigue  Neurologic- No weakness. No Headache. General Examination :  Constitutional    Vital signs: BP, HR, and RR reviewed  Blood pressure 125/83, pulse 80, temperature 98.2 °F (36.8 °C), temperature source Oral, resp. rate 20, height 5' 6\" (1.676 m), weight (!) 337 lb 6.4 oz (153 kg), SpO2 97 %. General appearance: Moderately built, moderately nourished  HEENT: Pupils equal, round, and reactive to light. Conjunctivae/corneas clear. Neck: Supple, with full range of motion. No jugular venous distention. Respiratory:  Breath sounds clear, no rhonchi. Cardiovascular: Regular rate and rhythm with normal S1/S2 without murmurs, rubs or gallops. Abdomen: Soft, non-tender, non-distended with normal bowel sounds.   Musculoskeletal: No clubbing, cyanosis or edema bilaterally. Skin: Skin color, texture, turgor normal.  No rashes or lesions. Neurologic Examination :  Mental status: Orientation to person, place and time. Comprehension, naming and repetition intact. Attention intact and following verbal commands well. Cranial nerves:   CN2: Visual fields intact w/o extinction on confrontational testing, pupils equal round, reactive to light and accomodation. CN 3,4,6: Extraocular muscles intact,  CN5: Facial sensation intact,  CN7: Face symmetric without dysarthria,   CN8: Hearing grossly intact,  CN9: Palate movement symmetric  CN11: Full strength on shoulder shrug  CN12: Tongue midline with protrusion  Motor: Normal bulk/tone. No prontator drift. Good strength in all 4 extremities   Deep tendon reflexes: Normal reflexes in all 4 extremities  Sensory: Light touch intact in all 4 extremities. Cerebellar/coordination: Finger to nose is intact without ataxia  Gait: Deferred due to safety issues.     Labs  Recent Results (from the past 24 hour(s))   Urinalysis with Reflex to Culture    Collection Time: 01/07/23  1:21 PM    Specimen: Urine   Result Value Ref Range    Color, UA Yellow Straw/Yellow    Clarity, UA Clear Clear    Glucose, Ur >=1000 (A) Negative mg/dL    Bilirubin Urine Negative Negative    Ketones, Urine Negative Negative mg/dL    Specific Gravity, UA 1.020 1.005 - 1.030    Blood, Urine Negative Negative    pH, UA 6.0 5.0 - 8.0    Protein, UA TRACE (A) Negative mg/dL    Urobilinogen, Urine 0.2 <2.0 E.U./dL    Nitrite, Urine Negative Negative    Leukocyte Esterase, Urine Negative Negative    Microscopic Examination YES     Urine Type NotGiven     Urine Reflex to Culture Not Indicated    Microscopic Urinalysis    Collection Time: 01/07/23  1:21 PM   Result Value Ref Range    WBC, UA None seen 0 - 5 /HPF    RBC, UA None seen 0 - 4 /HPF   CK    Collection Time: 01/07/23  9:44 PM   Result Value Ref Range    Total CK 72 39 - 308 U/L   Lactic Acid    Collection Time: 01/07/23  9:44 PM   Result Value Ref Range    Lactic Acid 0.8 0.4 - 2.0 mmol/L   Hepatic Function Panel    Collection Time: 01/07/23  9:44 PM   Result Value Ref Range    Total Protein 6.0 (L) 6.4 - 8.2 g/dL    Albumin 2.9 (L) 3.4 - 5.0 g/dL    Alkaline Phosphatase 133 (H) 40 - 129 U/L    ALT <5 (L) 10 - 40 U/L    AST 27 15 - 37 U/L    Total Bilirubin 0.4 0.0 - 1.0 mg/dL    Bilirubin, Direct <0.2 0.0 - 0.3 mg/dL    Bilirubin, Indirect see below 0.0 - 1.0 mg/dL   Ammonia    Collection Time: 01/07/23  9:44 PM   Result Value Ref Range    Ammonia 27 16 - 60 umol/L       Studies  XR CHEST PORTABLE   Final Result      No acute disease         CT Head W/O Contrast   Final Result      No evidence of acute intracranial abnormality. Assessment:  Shannon Patricio is a 63 y.o. male with history of hypertension, diabetes mellitus, coronary artery disease, congestive heart failure and psychogenic nonepileptic seizures in the past was admitted with breakthrough seizures. Plan : 1. Reviewed CT head and did not show acute abnormality. 2.Agree with Keppra 1 gm BID and continue lamotrigine at the same dosage. 3.Discussed about seizure precautions and patient is currently not driving. 4.Will follow the patient during the hospitalization. Thank you for allowing me to participate in the care of the patient. Please feel free to call for any questions at your convenience.     Your's Sincerely,    Carlos Lomas M.D  Board Certified Neurologist  [address] [place] [address] [address]  [phone]

## 2023-01-09 VITALS
BODY MASS INDEX: 50.62 KG/M2 | OXYGEN SATURATION: 94 % | TEMPERATURE: 97.9 F | SYSTOLIC BLOOD PRESSURE: 120 MMHG | HEART RATE: 70 BPM | WEIGHT: 315 LBS | RESPIRATION RATE: 18 BRPM | HEIGHT: 66 IN | DIASTOLIC BLOOD PRESSURE: 77 MMHG

## 2023-01-09 LAB
ANION GAP SERPL CALCULATED.3IONS-SCNC: 8 MMOL/L (ref 3–16)
BASOPHILS ABSOLUTE: 0 K/UL (ref 0–0.2)
BASOPHILS RELATIVE PERCENT: 1 %
BUN BLDV-MCNC: 9 MG/DL (ref 7–20)
CALCIUM SERPL-MCNC: 8.9 MG/DL (ref 8.3–10.6)
CHLORIDE BLD-SCNC: 104 MMOL/L (ref 99–110)
CO2: 26 MMOL/L (ref 21–32)
CREAT SERPL-MCNC: 0.8 MG/DL (ref 0.8–1.3)
EKG ATRIAL RATE: 78 BPM
EKG DIAGNOSIS: NORMAL
EKG P AXIS: 34 DEGREES
EKG P-R INTERVAL: 176 MS
EKG Q-T INTERVAL: 440 MS
EKG QRS DURATION: 176 MS
EKG QTC CALCULATION (BAZETT): 501 MS
EKG R AXIS: -16 DEGREES
EKG T AXIS: 163 DEGREES
EKG VENTRICULAR RATE: 78 BPM
EOSINOPHILS ABSOLUTE: 0.1 K/UL (ref 0–0.6)
EOSINOPHILS RELATIVE PERCENT: 2.8 %
GFR SERPL CREATININE-BSD FRML MDRD: >60 ML/MIN/{1.73_M2}
GLUCOSE BLD-MCNC: 135 MG/DL (ref 70–99)
HCT VFR BLD CALC: 34.8 % (ref 40.5–52.5)
HCT VFR BLD CALC: 35.3 % (ref 40.5–52.5)
HEMOGLOBIN: 11.5 G/DL (ref 13.5–17.5)
HEMOGLOBIN: 11.7 G/DL (ref 13.5–17.5)
LAMOTRIGINE LEVEL: 11.3 UG/ML (ref 3–15)
LYMPHOCYTES ABSOLUTE: 1.5 K/UL (ref 1–5.1)
LYMPHOCYTES RELATIVE PERCENT: 36.6 %
MCH RBC QN AUTO: 32 PG (ref 26–34)
MCHC RBC AUTO-ENTMCNC: 33.5 G/DL (ref 31–36)
MCV RBC AUTO: 95.3 FL (ref 80–100)
MONOCYTES ABSOLUTE: 0.3 K/UL (ref 0–1.3)
MONOCYTES RELATIVE PERCENT: 6.4 %
NEUTROPHILS ABSOLUTE: 2.2 K/UL (ref 1.7–7.7)
NEUTROPHILS RELATIVE PERCENT: 53.2 %
PDW BLD-RTO: 17.4 % (ref 12.4–15.4)
PLATELET # BLD: 177 K/UL (ref 135–450)
PMV BLD AUTO: 8.2 FL (ref 5–10.5)
POTASSIUM SERPL-SCNC: 4 MMOL/L (ref 3.5–5.1)
RBC # BLD: 3.65 M/UL (ref 4.2–5.9)
SODIUM BLD-SCNC: 138 MMOL/L (ref 136–145)
WBC # BLD: 4.2 K/UL (ref 4–11)

## 2023-01-09 PROCEDURE — 85025 COMPLETE CBC W/AUTO DIFF WBC: CPT

## 2023-01-09 PROCEDURE — 94660 CPAP INITIATION&MGMT: CPT

## 2023-01-09 PROCEDURE — 97116 GAIT TRAINING THERAPY: CPT

## 2023-01-09 PROCEDURE — 85014 HEMATOCRIT: CPT

## 2023-01-09 PROCEDURE — 6370000000 HC RX 637 (ALT 250 FOR IP): Performed by: INTERNAL MEDICINE

## 2023-01-09 PROCEDURE — 6360000002 HC RX W HCPCS: Performed by: INTERNAL MEDICINE

## 2023-01-09 PROCEDURE — 94761 N-INVAS EAR/PLS OXIMETRY MLT: CPT

## 2023-01-09 PROCEDURE — 97530 THERAPEUTIC ACTIVITIES: CPT

## 2023-01-09 PROCEDURE — 80048 BASIC METABOLIC PNL TOTAL CA: CPT

## 2023-01-09 PROCEDURE — 2580000003 HC RX 258: Performed by: INTERNAL MEDICINE

## 2023-01-09 PROCEDURE — 36415 COLL VENOUS BLD VENIPUNCTURE: CPT

## 2023-01-09 PROCEDURE — 85018 HEMOGLOBIN: CPT

## 2023-01-09 RX ORDER — DIAZEPAM 5 MG/1
5 TABLET ORAL EVERY 12 HOURS PRN
Qty: 6 TABLET | Refills: 0 | Status: SHIPPED | OUTPATIENT
Start: 2023-01-09 | End: 2023-01-12

## 2023-01-09 RX ORDER — LEVETIRACETAM 1000 MG/1
1000 TABLET ORAL 2 TIMES DAILY
Qty: 60 TABLET | Refills: 0
Start: 2023-01-09 | End: 2023-02-08

## 2023-01-09 RX ORDER — OXYCODONE HYDROCHLORIDE AND ACETAMINOPHEN 5; 325 MG/1; MG/1
1 TABLET ORAL EVERY 6 HOURS PRN
Qty: 12 TABLET | Refills: 0 | Status: SHIPPED | OUTPATIENT
Start: 2023-01-09 | End: 2023-01-12

## 2023-01-09 RX ADMIN — MICONAZOLE NITRATE: 20 POWDER TOPICAL at 10:24

## 2023-01-09 RX ADMIN — ASPIRIN 81 MG: 81 TABLET, COATED ORAL at 10:25

## 2023-01-09 RX ADMIN — CEFAZOLIN 2000 MG: 2 INJECTION, POWDER, FOR SOLUTION INTRAMUSCULAR; INTRAVENOUS at 03:17

## 2023-01-09 RX ADMIN — CEFAZOLIN 2000 MG: 2 INJECTION, POWDER, FOR SOLUTION INTRAMUSCULAR; INTRAVENOUS at 10:39

## 2023-01-09 RX ADMIN — LEVETIRACETAM 1000 MG: 100 INJECTION INTRAVENOUS at 06:52

## 2023-01-09 RX ADMIN — ERGOCALCIFEROL 50000 UNITS: 1.25 CAPSULE ORAL at 10:25

## 2023-01-09 RX ADMIN — FAMOTIDINE 40 MG: 20 TABLET ORAL at 10:25

## 2023-01-09 RX ADMIN — SACUBITRIL AND VALSARTAN 1 TABLET: 97; 103 TABLET, FILM COATED ORAL at 12:04

## 2023-01-09 RX ADMIN — CARVEDILOL 6.25 MG: 6.25 TABLET, FILM COATED ORAL at 17:56

## 2023-01-09 RX ADMIN — SODIUM CHLORIDE, PRESERVATIVE FREE 10 ML: 5 INJECTION INTRAVENOUS at 10:26

## 2023-01-09 RX ADMIN — LAMOTRIGINE 600 MG: 150 TABLET ORAL at 12:03

## 2023-01-09 RX ADMIN — ENOXAPARIN SODIUM 40 MG: 100 INJECTION SUBCUTANEOUS at 10:25

## 2023-01-09 RX ADMIN — OXYCODONE AND ACETAMINOPHEN 1 TABLET: 5; 325 TABLET ORAL at 10:32

## 2023-01-09 RX ADMIN — CYANOCOBALAMIN TAB 1000 MCG 1000 MCG: 1000 TAB at 10:25

## 2023-01-09 RX ADMIN — CARVEDILOL 6.25 MG: 6.25 TABLET, FILM COATED ORAL at 10:27

## 2023-01-09 ASSESSMENT — PAIN DESCRIPTION - ORIENTATION: ORIENTATION: MID

## 2023-01-09 ASSESSMENT — PAIN DESCRIPTION - FREQUENCY: FREQUENCY: CONTINUOUS

## 2023-01-09 ASSESSMENT — PAIN DESCRIPTION - DESCRIPTORS: DESCRIPTORS: ACHING;DISCOMFORT

## 2023-01-09 ASSESSMENT — PAIN DESCRIPTION - LOCATION: LOCATION: BACK

## 2023-01-09 ASSESSMENT — PAIN SCALES - GENERAL: PAINLEVEL_OUTOF10: 8

## 2023-01-09 ASSESSMENT — PAIN - FUNCTIONAL ASSESSMENT: PAIN_FUNCTIONAL_ASSESSMENT: PREVENTS OR INTERFERES SOME ACTIVE ACTIVITIES AND ADLS

## 2023-01-09 ASSESSMENT — PAIN DESCRIPTION - PAIN TYPE: TYPE: SURGICAL PAIN

## 2023-01-09 NOTE — DISCHARGE INSTRUCTIONS
Extra Heart Failure sites:   Http://www.IGIGI.com/aha-heartfailure --- this is American Heart Association interactive Healthier Living with Heart Failure guidebook.  Please copy and paste link into search bar. Use your mouse to scroll through the pages.  Lots and lots of info / tips    HF [place] darryl  --- free smart phone darryl available for IPhone and Android download. Use your phone to track sodium / fluid intake,  symptoms, weight, etc.    Virtualmin - website-- [employer] is a dialysis beModel.  All dialysis patients follow a “renal” diet which IS low sodium!! This website offers free seasonal cookbooks.  Each quarter, they will release 25-30 new recipes with a breakdown of calories, sodium, glucose, etc…    [email or web address][employer]/recipes -- more free recipes

## 2023-01-09 NOTE — PLAN OF CARE
Problem: Skin/Tissue Integrity  Goal: Absence of new skin breakdown  Description: 1. Monitor for areas of redness and/or skin breakdown  2. Assess vascular access sites hourly  3. Every 4-6 hours minimum:  Change oxygen saturation probe site  4. Every 4-6 hours:  If on nasal continuous positive airway pressure, respiratory therapy assess nares and determine need for appliance change or resting period.   1/9/2023 0759 by Teresa Hutton RN  Outcome: Progressing     Problem: ABCDS Injury Assessment  Goal: Absence of physical injury  1/9/2023 0759 by Teresa Hutton RN  Outcome: Progressing     Problem: Neurosensory - Adult  Goal: Achieves stable or improved neurological status  1/9/2023 0759 by Teresa Hutton RN  Outcome: Progressing     Problem: Neurosensory - Adult  Goal: Absence of seizures  1/9/2023 0759 by Teresa Hutton RN  Outcome: Progressing     Problem: Safety - Adult  Goal: Free from fall injury  1/9/2023 0759 by Teresa Hutton RN  Outcome: Progressing     Problem: Discharge Planning  Goal: Discharge to home or other facility with appropriate resources  1/9/2023 0759 by Teresa Hutton RN  Outcome: Progressing     Problem: Chronic Conditions and Co-morbidities  Goal: Patient's chronic conditions and co-morbidity symptoms are monitored and maintained or improved  1/9/2023 0759 by Teresa Hutton RN  Outcome: Progressing

## 2023-01-09 NOTE — DISCHARGE SUMMARY
Hospital Medicine Discharge Summary    Patient ID: Lily Hull      Patient's PCP: Valarie Wilks    Admit Date: 1/7/2023     Discharge Date:   1/09/23    Admitting Physician: Alexsander Dugan MD     Discharge Physician: Cara Feliciano MD     Discharge Diagnoses: Active Hospital Problems    Diagnosis     Breakthrough seizure ([facility] [address] [G40.919]        The patient was seen and examined on day of discharge and this discharge summary is in conjunction with any daily progress note from day of discharge. Hospital Course:   From admitting provider:62 y.o. male with medical history as below notable for PNES and possible seizures, who [facility] to [facility] from his facility with possible seizure and fall. Discussed with ER physician who reports when patient arrived to ER he was not able to communicate verbally but was moving his extremities. His baseline was described by facility as awake and alert, with normal speech and ambulatory. While in the CT scanner patient had witnessed seizure like event with extremity shaking. He was given ativan. Levels for keppra and lamotrigin were drawn. CT head was non acute. Patient is being admitted for neurology evaluation. He is talking a bit more now, does not remember events. Not a good historian. He has PICC line in right arm. \"    Possible breakthrough seizure:  history of PNES and seizures  -Neurology consulted. CT brain no acute findings. Increased keppra to 1000mg bid. Continued lamotrigine.  -No further seizures     Prior MSSA bacteremia, cervical paraspinal phlegmon 11/29:  -Treated at , on Ancef through 1/16/2023, RUE PICC is in place. Continued ancef while admitted. Chronic CHF with reduced EF 40 to 45%  Pt was compensated on exam  -Continue Coreg and Entresto    Discharged back to SNF.     Physical Exam Performed:     /77   Pulse 78   Temp 98.1 °F (36.7 °C) (Oral)   Resp 18   Ht 5' 6\" (1.676 m)   Wt (!) 337 lb 6.4 oz (153 kg)   SpO2 96% BMI 54.46 kg/m²       General appearance:  No apparent distress, appears stated age and cooperative. Obese  HEENT:  Normal cephalic, atraumatic without obvious deformity. Neck: Supple, with full range of motion. No jugular venous distention. Respiratory:  Normal respiratory effort. Clear to auscultation  Cardiovascular:  Regular rate and rhythm with normal S1/S2 without murmurs, rubs or gallops. Abdomen: Soft, non-tender, non-distended with normal bowel sounds. Musculoskeletal:  No clubbing, cyanosis or edema bilaterally. Full range of motion without deformity. Skin: Skin color, texture, turgor normal.  No rashes or lesions. Neurologic:  grossly non-focal.  Psychiatric:  Alert and oriented, thought content appropriate, normal insight    Labs: For convenience and continuity at follow-up the following most recent labs are provided:      CBC:    Lab Results   Component Value Date/Time    WBC 4.2 01/09/2023 06:33 AM    HGB 11.5 01/09/2023 12:13 PM    HCT 35.3 01/09/2023 12:13 PM     01/09/2023 06:33 AM       Renal:    Lab Results   Component Value Date/Time     01/09/2023 06:33 AM    K 4.0 01/09/2023 06:33 AM    K 3.8 01/07/2023 05:23 AM     01/09/2023 06:33 AM    CO2 26 01/09/2023 06:33 AM    BUN 9 01/09/2023 06:33 AM    CREATININE 0.8 01/09/2023 06:33 AM    CALCIUM 8.9 01/09/2023 06:33 AM    PHOS 2.8 04/10/2018 11:18 AM         Significant Diagnostic Studies    Radiology:   XR CHEST PORTABLE   Final Result      No acute disease         CT Head W/O Contrast   Final Result      No evidence of acute intracranial abnormality.                             Consults:     IP CONSULT TO HOSPITALIST  IP CONSULT TO PHARMACY  IP CONSULT TO NEUROLOGY    Disposition:  Return to SNF     Condition at Discharge: Stable    Discharge Instructions/Follow-up:    [facility] [address]  [address]  [phone]    Schedule an appointment as soon as possible for a visit in 2 week(s)      Jennifer Handler  [address]  [address]  [phone]    Schedule an appointment as soon as possible for a visit in 1 week(s)  for seizures       Code Status:  Full Code     Activity: activity as tolerated    Diet: regular diet      Discharge Medications:     Current Discharge Medication List             Details   levETIRAcetam (KEPPRA) 1000 MG tablet Take 1 tablet by mouth 2 times daily  Qty: 60 tablet, Refills: 0                Details   ceFAZolin (ANCEF) IVPB Infuse 2,000 mg intravenously in the morning and 2,000 mg at noon and 2,000 mg in the evening.      heparin flush, PF, 10 UNIT/ML injection Infuse 5 mLs intravenously every 8 (eight) hours use to flush port/lumen before/after IV usage      albuterol (PROVENTIL) (2.5 MG/3ML) 0.083% nebulizer solution Take 2.5 mg by nebulization every 12 hours as needed for Wheezing or Shortness of Breath      empagliflozin (JARDIANCE) 10 MG tablet Take 10 mg by mouth daily      ergocalciferol (ERGOCALCIFEROL) 1.25 MG (68335 UT) capsule Take 50,000 Units by mouth once a week Takes on Mondays      fluticasone (FLOVENT HFA) 110 MCG/ACT inhaler Inhale 1 puff into the lungs 2 times daily      meclizine (ANTIVERT) 25 MG tablet Take 25 mg by mouth 3 times daily as needed      naloxone 4 MG/0.1ML LIQD nasal spray 1 spray by Nasal route as needed for Opioid Reversal      nitroGLYCERIN (NITROSTAT) 0.4 MG SL tablet Place 0.4 mg under the tongue every 5 minutes as needed for Chest pain up to max of 3 total doses. If no relief after 1 dose, call 911. sodium chloride flush 0.9 % injection Infuse 10 mLs intravenously in the morning and 10 mLs at noon and 10 mLs in the evening. For PICC maintenance.       nystatin (MYCOSTATIN) 246711 UNIT/GM powder Apply topically 2 times daily Apply to abd folds and groin      sacubitril-valsartan (ENTRESTO)  MG per tablet Take 1 tablet by mouth 2 times daily      carvedilol (COREG) 6.25 MG tablet Take 6.25 mg by mouth 2 times daily (with meals)      cyanocobalamin 1000 MCG tablet Take 1,000 mcg by mouth daily      albuterol sulfate HFA (PROVENTIL;VENTOLIN;PROAIR) 108 (90 Base) MCG/ACT inhaler Inhale 2 puffs into the lungs every 4 hours as needed for Wheezing      !! lamoTRIgine (LAMICTAL) 200 MG tablet Take 200 mg by mouth daily as needed (\"for health maintenance\")      montelukast (SINGULAIR) 10 MG tablet Take 10 mg by mouth nightly      nortriptyline (PAMELOR) 75 MG capsule Take 75 mg by mouth nightly      !! lamoTRIgine (LAMICTAL) 200 MG tablet Take 600 mg by mouth 2 times daily      atorvastatin (LIPITOR) 80 MG tablet Take 80 mg by mouth nightly       metFORMIN (GLUCOPHAGE) 1000 MG tablet Take 1,000 mg by mouth 2 times daily (with meals)       aspirin 81 MG EC tablet Take 81 mg by mouth daily      famotidine (PEPCID) 40 MG tablet Take 40 mg by mouth daily      loratadine (CLARITIN) 10 MG tablet Take 10 mg by mouth nightly       Multiple Vitamin (DAILY TITUS) TABS Take 1 tablet by mouth daily      diazepam (VALIUM) 5 MG tablet Take 5 mg by mouth every 12 hours as needed for Anxiety. oxyCODONE-acetaminophen (PERCOCET) 5-325 MG per tablet Take 1 tablet by mouth every 6 hours as needed for Pain. allopurinol (ZYLOPRIM) 300 MG tablet Take 300 mg by mouth nightly        !! - Potential duplicate medications found. Please discuss with provider. Time Spent on discharge is more than 30 minutes in the examination, evaluation, counseling and review of medications and discharge plan. Signed:    Sho Mack MD   1/9/2023      Thank you Nathen Giordano for the opportunity to be involved in this patient's care. If you have any questions or concerns please feel free to contact me at [phone].

## 2023-01-09 NOTE — PLAN OF CARE
Problem: Skin/Tissue Integrity  Goal: Absence of new skin breakdown  Description: 1. Monitor for areas of redness and/or skin breakdown  2. Assess vascular access sites hourly  3. Every 4-6 hours minimum:  Change oxygen saturation probe site  4. Every 4-6 hours:  If on nasal continuous positive airway pressure, respiratory therapy assess nares and determine need for appliance change or resting period.   Outcome: Progressing     Problem: ABCDS Injury Assessment  Goal: Absence of physical injury  Outcome: Progressing     Problem: Neurosensory - Adult  Goal: Achieves stable or improved neurological status  Outcome: Progressing  Goal: Absence of seizures  Outcome: Progressing  Goal: Remains free of injury related to seizures activity  Outcome: Progressing     Problem: Safety - Adult  Goal: Free from fall injury  Outcome: Progressing     Problem: Discharge Planning  Goal: Discharge to home or other facility with appropriate resources  Outcome: Progressing     Problem: Chronic Conditions and Co-morbidities  Goal: Patient's chronic conditions and co-morbidity symptoms are monitored and maintained or improved  Outcome: Progressing

## 2023-01-09 NOTE — PROGRESS NOTES
Pt is discharged from the unit along with all of his belongings, accompanied by ambulance team. Report called to a receiving RN at [facility].

## 2023-01-09 NOTE — PROGRESS NOTES
Pt is A&O, VSS, pain is being managed with pain medicine per MAR. skin CDI. No any seizure [place] activities noted. Tolerating diet and fluids. PICC dressing changed using sterile technique, pt tolerated well. All fall precaution is in place. Call light is within the reach.

## 2023-01-09 NOTE — CARE COORDINATION
Case Management Assessment            Discharge Note                    Date / Time of Note: 1/9/2023 3:32 PM                  Discharge Note Completed by: Keyona Hubbard RN    Patient Name: Betsy Gonzalez   YOB: 1960  Diagnosis: Seizure ([address] [R56.9]  Disorientation [R41.0]  Breakthrough seizure ([address] Alexander Dumont   Date / Time: 1/7/2023  5:07 AM    Current PCP: Mallika Schuler patient: No    Hospitalization in the last 30 days: No    Advance Directives:  Code Status: Full Code  [place] DNR form completed and on chart: Not Indicated    Financial:  Payor: MEDICARE / Plan: MEDICARE PART A AND B / Product Type: *No Product type* /      Pharmacy:    [address], [address]  [address] [place] [address] [address]  Phone: [phone] Fax: [phone] [facility] [address], [address] [facility]  [facility] [address] [place] [address]  Phone: [phone] Fax: [phone]      Assistance purchasing medications?: Potential Assistance Purchasing Medications: No  Assistance provided by Case Management: None at this time    Does patient want to participate in local refill/ meds to beds program?: No    Meds To Beds General Rules:  1. Can ONLY be done Monday- Friday between 8:30am-5pm  2. Prescription(s) must be in pharmacy by 3pm to be filled same day  3. Copy of patient's insurance/ prescription drug card and patient face sheet must be sent along with the prescription(s)  4. Cost of Rx cannot be added to hospital bill. If financial assistance is needed, please contact unit  or ;  or  CANNOT provide pharmacy voucher for patients co-pays  5.  Patients can then  the prescription on their way out of the hospital at discharge, or pharmacy can deliver to the bedside if staff is available. (payment due at time of pick-up or delivery - cash, check, or card accepted)     Able to afford home medications/ co-pay costs: Yes    ADLS:  Current PT AM-PAC Score: 16 /24  Current OT AM-PAC Score: 16 /24      DISCHARGE Disposition: [facility] (SNF): Zack Hong  Phone: [phone] Fax: [phone]    LOC at discharge: Skilled  [address] Completed: Yes    Notification completed in HENS/PAS?:  Not Applicable came from SNF    IMM Completed:   Not Indicated    Transportation:  Transportation PLAN for discharge: EMS transportation   Mode of Transport: Ambulance stretcher - BLS  Reason for medical transport: Severe muscular weakness and de-conditioned state due to CHF and requires ambulance transport due to seizures  Name of [address] [place] [address],[facility] [address]: [facility]  Phone: [phone]  Time of Transport: 1800    Transport form completed: Yes    Home Care:  [address] ordered at discharge: Not [address]: Not Applicable  Orders faxed: No    Durable Medical Equipment:  DME Provider: n/a  Equipment obtained during hospitalization: defer    Home Oxygen and Respiratory Equipment:  Oxygen needed at discharge?: Not [address]: Not Applicable  Portable tank available for discharge?: Not Indicated      Additional CM Notes:   Patient will discharge back to SNF at [facility]. Orders faxed to [phone], nurse to call report to [phone]. Patient scheduled for transport at 1800 via [address] CM spoke with patient's sister Rosemary Velázquez for updates.     The Plan for Transition of Care is related to the following treatment goals of [facility] [R56.9]  Disorientation [R41.0]  Breakthrough seizure ([facility] [address] [G40.919]    The Patient and/or patient representative Crystal Gongora and his family were provided with a choice of provider and agrees with the discharge plan Yes    Freedom of choice list was provided with basic dialogue that supports the patient's individualized plan of care/goals and shares the quality data associated with the providers.  Yes    Care Transitions patient: No    Noemi Street RN  [facility]  Case Management Department  Ph: [phone]  Fax: [phone]

## 2023-01-09 NOTE — PROGRESS NOTES
Patient alert and oriented X 4. VSS. No changes in neuro assessment. Tolerating fluids and diet. Taking medication www. Using urinal to void. No c/o pain. All fall precaution in place. Will continue to monitor.

## 2023-01-09 NOTE — DISCHARGE INSTR - COC
Continuity of Care Form    Patient Name: Iker Morataya   :  1960  MRN:  [id number]    Admit date:  2023  Discharge date:  2023      Code Status Order: Full Code   Advance Directives:     Admitting Physician:  Armando Tadeo MD  PCP: Thea Cadena    Discharging Nurse: [facility] Unit/Room#: 1677/7395-01  Discharging Unit Phone Number: [phone]    Emergency Contact:   Extended Emergency Contact Information  Primary Emergency Contact: Lisa Albright  Address: [address] [place] [address] Phone: [phone]  Relation: Brother/Sister  Secondary Emergency Contact: OsmelbrianWhit  Address: [address] [place] [address] Phone: [phone]  Relation: Brother/Sister    Past Surgical History:  Past Surgical History:   Procedure Laterality Date    EYE SURGERY         Immunization History:   Immunization History   Administered Date(s) Administered    COVID-19, MODERNA BLUE border, Primary or Immunocompromised, (age 12y+), IM, 100 mcg/0.5mL 2021    Influenza Virus Vaccine 2018    Pneumococcal Polysaccharide (Bhtzpvvnf61) 10/25/2013, 2014, 10/04/2014    Tdap (Boostrix, Adacel) 2013       Active Problems:  Patient Active Problem List   Diagnosis Code    Hypertension I10    Diabetes mellitus ([facility] E11.9    Orthostatic hypotension I95.1    Syncope R55    Morbid obesity with BMI of 50.0-59.9, adult ([facility]) E66.01, Z68.43    Chronic systolic heart failure ([facility]) I50.22    Diabetes mellitus type 2 in obese ([facility]) E11.69, E66.9    Seizures ([facility] R56.9    Light headed R42    Chest pain R07.9    Dizziness R42    LBBB (left bundle branch block) I44.7    CHF (congestive heart failure) ([facility]) I50.9    Aphasia R47.01    S/P coronary angiogram Z98.890    Breakthrough seizure ([facility] [facility] G40.919    Nonintractable generalized idiopathic epilepsy without status epilepticus ([facility] G40.309 Nonintractable epilepsy with complex partial seizures ([facility] G40.209    Encephalopathy acute G93.40    Altered mental status R41.82       Isolation/Infection:   Isolation            No Isolation          Patient Infection Status       Infection Onset Added Last Indicated Last Indicated By Review Planned Expiration Resolved Resolved By    None active    Resolved    COVID-19 (Rule Out) 01/07/23 01/07/23 01/07/23 COVID-19 & Influenza Combo (Ordered)   01/07/23 Rule-Out Test Resulted            Nurse Assessment:  Last Vital Signs: /77   Pulse 78   Temp 98.1 °F (36.7 °C) (Oral)   Resp 18   Ht 5' 6\" (1.676 m)   Wt (!) 337 lb 6.4 oz (153 kg)   SpO2 96%   BMI 54.46 kg/m²     Last documented pain score (0-10 scale): Pain Level: 8  Last Weight:   Wt Readings from Last 1 Encounters:   01/07/23 (!) 337 lb 6.4 oz (153 kg)     Mental Status:  oriented    IV Access:  PICC line on RUE    Nursing Mobility/ADLs:  Walking   Assisted  Transfer  Assisted  Bathing  Assisted  Dressing  Assisted  Toileting  Assisted  Feeding  Need set up  Med Admin  Assisted  Med Delivery   whole    Wound Care Documentation and Therapy:        Elimination:  Continence: Bowel: Yes  Bladder: Yes  Urinary Catheter: None   Colostomy/Ileostomy/Ileal Conduit: No       Date of Last BM:     Intake/Output Summary (Last 24 hours) at 1/9/2023 1457  Last data filed at 1/9/2023 1300  Gross per 24 hour   Intake 690 ml   Output --   Net 690 ml     I/O last 3 completed shifts:   In: 1080 [P.O.:1080]  Out: 1000 [Urine:1000]    Safety Concerns:     History of Falls (last 30 days)    Impairments/Disabilities:      None    Nutrition Therapy:  Current Nutrition Therapy:   - Oral Diet:  General    Routes of Feeding: Oral  Liquids: No Restrictions  Daily Fluid Restriction: no  Last Modified Barium Swallow with Video (Video Swallowing Test): {Done Not Done LQQA:[id number]}    Treatments at the Time of Hospital Discharge:   Respiratory Treatments:  Oxygen Therapy:  is not on home oxygen therapy. Ventilator:    - No ventilator support    Rehab Therapies: Physical Therapy and Occupational Therapy  Weight Bearing Status/Restrictions: No weight bearing restrictions  Other Medical Equipment (for information only, NOT a DME order):  walker  Other Treatments: ***    Patient's personal belongings (please select all that are sent with patient):  {[facility] DME Belongings:[id number]}    RN SIGNATURE:  Electronically signed by Una Simmons RN on 1/9/23 at 5:09 PM EST    CASE MANAGEMENT/SOCIAL WORK SECTION    Inpatient Status Date: ***    Readmission Risk Assessment Score:  Readmission Risk              Risk of Unplanned Readmission:  28           Discharging to Facility/ Agency   Name: Montse Guerrero  Address:  [facility] [place]  Phone: [phone]        / signature: Electronically signed by Sayra Rawls RN on 1/9/23 at 3:40 PM EST    PHYSICIAN SECTION    Prognosis: Fair    Condition at Discharge: Stable    Rehab Potential (if transferring to Rehab): Good    Recommended Labs or Other Treatments After Discharge:   Weekly CBC and CMP while on cefazolin    Physician Certification: I certify the above information and transfer of Mario Sunshine  is necessary for the continuing treatment of the diagnosis listed and that he requires [facility] for less 30 days.      Update Admission H&P: No change in H&P    PHYSICIAN SIGNATURE:  Electronically signed by Carmelo Ahumada MD on 1/9/23 at 2:58 PM EST

## 2023-01-09 NOTE — PROGRESS NOTES
Physical Therapy  Facility/Department: [facility]  Physical Therapy Treatment    Name: Lauren Banegas  : 1960  MRN: [id number]  Date of Service: 2023    Discharge Recommendations:  Lauren Banegas scored a 16/24 on the AM-PAC short mobility form. Current research shows that an AM-PAC score of 17 or less is typically not associated with a discharge to the patient's home setting. Based on the patient's AM-PAC score and their current functional mobility deficits, it is recommended that the patient have 3-5 sessions per week of Physical Therapy at d/c to increase the patient's independence. Please see assessment section for further patient specific details. If patient discharges prior to next session this note will serve as a discharge summary. Please see below for the latest assessment towards goals. PT Equipment Recommendations  Equipment Needed: No  Other: plan to continue to assess and likely defer recommendations to the next level of care - per patient, owns power w/c and manual w/c - PT recommend RW for transfers and short distance gait with (A)      Patient Diagnosis(es): The primary encounter diagnosis was Seizure ([address] A diagnosis of Disorientation was also pertinent to this visit. Past Medical History:  has a past medical history of Arthritis, Asthma, Bronchitis, CAD (coronary artery disease), CHF (congestive heart failure) ([address] Chronic back pain, Developmental delay, Diabetes mellitus ([address] Generalized headaches, Gout, History of tremor, Hypertension, MI, old, Morbid obesity ([address] Psychogenic nonepileptic seizure, and Seizures ([address] Past Surgical History:  has a past surgical history that includes eye surgery. Assessment   Body Structures, Functions, Activity Limitations Requiring Skilled Therapeutic Intervention: Decreased functional mobility ; Decreased strength;Decreased endurance;Decreased balance; Increased pain  Assessment: Patient demonstrates impaired functional mobility, now requiring CGA to min A with RW for all standing mobility, incluidng short distance gait up to 17ft. Patient reports using power w/c most recently for mobility needs and recent move to new SNF. Yoko will continue to  benefit from additional skilled PT intervention to facilitate safe mobility and to optimize (I) to promote return to prior level of function and increased level of overall activity. Treatment Diagnosis: impaired functional mobility  Therapy Prognosis: Good  Barriers to Learning: none  Requires PT Follow-Up: Yes  Activity Tolerance  Activity Tolerance: Patient limited by endurance; Patient limited by fatigue     Plan   Physcial Therapy Plan  General Plan:  (2-5x/week)  Current Treatment Recommendations: Strengthening, Balance training, Endurance training, Functional mobility training, Transfer training, Gait training, Safety education & training, Patient/Caregiver education & training, Equipment evaluation, education, & procurement, Therapeutic activities, Home exercise program  Safety Devices  Type of Devices: Call light within reach, Chair alarm in place, Gait belt, Left in chair, Nurse notified  Restraints  Restraints Initially in Place: No     Restrictions  Restrictions/Precautions  Restrictions/Precautions: Fall Risk (high fall risk)  Position Activity Restriction  Other position/activity restrictions: seizure precautions, up as tolerated, adult diet - regular, low fat/low chol/high fiber/2 gm Na     Subjective   General  Chart Reviewed: Yes  Additional Pertinent Hx: 63 y.o. male with medical history as below notable for PNES and possible seizures, who presents to [facility] from his facility with possible seizure and fall. Family / Caregiver Present: No  Diagnosis: Seizure  Follows Commands: Within Functional Limits  General Comment  Comments: Patient seated in recliner upon arrival - agreeable to PT.   Subjective  Subjective: Patient reports \"knees sore\" but otherwise denies pain - endorses seizure and fall resulting in admission \"but I don't really remember it - they say it was caused by stress\"     Cognition   Orientation  Overall Orientation Status: Within Functional Limits  Orientation Level: Oriented X4  Cognition  Overall Cognitive Status: WFL  Cognition Comment: apprehensive related to standing mobility     Objective         Transfers  Sit to Stand: Contact guard assistance (to RW)  Stand to Sit: Contact guard assistance (from RW)  Stand Pivot Transfers: Contact guard assistance (with RW)  Ambulation  Surface: Level tile  Assistance: Minimal assistance  Quality of Gait: forward flexed posture, wide base of support, decreased (B) step length, foot clearance, and heel strike; fatigues easily  Distance: 17ft x 2 reps  Comments: seated rest in between trials - patient hesitant to progress distance secondary to fatigue     Balance  Posture: Fair (forward flexed, rounded shoulders)  Sitting - Static: Good  Sitting - Dynamic: Good  Standing - Static: Fair  Standing - Dynamic: Fair;-  Comments: CGA to min A with RW for standing balance during mobility       AM-PAC Score  AM-PAC Inpatient Mobility Raw Score : 16 (01/09/23 1144)  AM-PAC Inpatient T-Scale Score : 40.78 (01/09/23 1144)  Mobility Inpatient CMS 0-100% Score: 54.16 (01/09/23 1144)  Mobility Inpatient CMS G-Code Modifier : CK (01/09/23 1144)      Goals  Short Term Goals  Time Frame for Short Term Goals: d/c - all goals ongoing 1/9  Short Term Goal 1: sup<>sit supervision  Short Term Goal 2: sit<>stand supervision with LRAD  Short Term Goal 3: amb 22' with RW and CGA  Patient Goals   Patient Goals : \"go back to my nursing home\"       Education  Patient Education  Education Given To: Patient  Education Provided: Role of Therapy;Plan of Care  Education Provided Comments: use of call light, PT recommendations  Education Method: Demonstration;Verbal  Barriers to Learning: None  Education Outcome: Verbalized understanding;Continued education needed      Therapy Time   Individual Concurrent Group Co-treatment   Time In 1124         Time Out 1147         Minutes 23                 Timed Code Treatment Minutes: 23 minutes    Total Treatment Minutes: 23 Minutes    If patient discharges prior to next treatment, this note will serve as discharge summary.     Cherise Booker PT, DPT #[id number]

## 2023-04-24 NOTE — DISCHARGE INSTR - COC
Continuity of Care Form    Patient Name: Maryellen Schmitt   :  1960  MRN:  [id number]    Admit date:  2022  Discharge date:  2022    Code Status Order: Full Code   Advance Directives:     Admitting Physician:  Karen Lepe MD  PCP: Princess Lucero    Discharging Nurse: Dayna Harry 23 Unit/Room#: 1994/4803-25  Discharging Unit Phone Number: [phone]    Emergency Contact:   Extended Emergency Contact Information  Primary Emergency Contact: AlbrightLisa hummel  Address: [address] Phone: [phone]  Relation: Brother/Sister  Secondary Emergency Contact: Whit Yeung  Address: [address] Phone: [phone]  Relation: Brother/Sister    Past Surgical History:  Past Surgical History:   Procedure Laterality Date    EYE SURGERY         Immunization History:   Immunization History   Administered Date(s) Administered    COVID-19, MODERNA BLUE border, Primary or Immunocompromised, (age 12y+), IM, 100 mcg/0.5mL 2021    Influenza Virus Vaccine 2018    Pneumococcal Polysaccharide (Wthacwfng99) 10/25/2013, 2014, 10/04/2014    Tdap (Boostrix, Adacel) 2013       Active Problems:  Patient Active Problem List   Diagnosis Code    Hypertension I10    Diabetes mellitus ([facility] E11.9    Orthostatic hypotension I95.1    Syncope R55    Morbid obesity with BMI of 50.0-59.9, adult ([facility]) E66.01, Z68.43    Chronic systolic heart failure ([facility]) I50.22    Diabetes mellitus type 2 in obese ([facility]) E11.69, E66.9    Seizures ([facility] R56.9    Light headed R42    Chest pain R07.9    Dizziness R42    LBBB (left bundle branch block) I44.7    CHF (congestive heart failure) ([facility]) I50.9    Aphasia R47.01    S/P coronary angiogram Z98.890    Breakthrough seizure ([facility] [address] G40.919    Nonintractable generalized idiopathic epilepsy without status epilepticus ([facility] [address] G40.309 Nonintractable epilepsy with complex partial seizures (HCC) G40.209    Encephalopathy acute G93.40    Altered mental status R41.82       Isolation/Infection:   Isolation            No Isolation          Patient Infection Status       None to display            Nurse Assessment:  Last Vital Signs: /74   Pulse 66   Temp 97.8 °F (36.6 °C) (Oral)   Resp 16   Ht 6' (1.829 m)   Wt (!) 375 lb 7.1 oz (170.3 kg)   SpO2 95%   BMI 50.92 kg/m²     Last documented pain score (0-10 scale): Pain Level: 8  Last Weight:   Wt Readings from Last 1 Encounters:   08/22/22 (!) 375 lb 7.1 oz (170.3 kg)     Mental Status:  oriented and alert    IV Access:  - None    Nursing Mobility/ADLs:  Walking   Assisted  Transfer  Assisted  Bathing  Assisted  Dressing  Assisted  Toileting  Assisted  Feeding  Independent  Med Admin  Assisted  Med Delivery   whole    Wound Care Documentation and Therapy:        Elimination:  Continence: Bowel: Yes  Bladder: Yes  Urinary Catheter: None   Colostomy/Ileostomy/Ileal Conduit: No       Date of Last BM: 8/20/2022    Intake/Output Summary (Last 24 hours) at 8/22/2022 1705  Last data filed at 8/22/2022 1614  Gross per 24 hour   Intake 840 ml   Output 1325 ml   Net -485 ml     No intake/output data recorded. Safety Concerns: At Risk for Falls    Impairments/Disabilities:      None    Nutrition Therapy:  Current Nutrition Therapy:   - Oral Diet:  Carb Control 4 carbs/meal (1800kcals/day)    Routes of Feeding: Oral  Liquids: Thin Liquids  Daily Fluid Restriction: no  Last Modified Barium Swallow with Video (Video Swallowing Test): not done    Treatments at the Time of Hospital Discharge:   Respiratory Treatments: NA  Oxygen Therapy:  is not on home oxygen therapy.   Ventilator:    - No ventilator support    Rehab Therapies: Physical Therapy and Occupational Therapy  Weight Bearing Status/Restrictions: No weight bearing restrictions  Other Medical Equipment (for information only, NOT a DME order):  wheelchair and walker  Other Treatments: An    Patient's personal belongings (please select all that are sent with patient):  None    RN SIGNATURE:  Electronically signed by Joni Mccray RN on 8/22/22 at 5:49 PM EDT    CASE MANAGEMENT/SOCIAL WORK SECTION    Inpatient Status Date: ***    Readmission Risk Assessment Score:  Readmission Risk              Risk of Unplanned Readmission:  19           Discharging to Facility/ Agency   Name:   Address:  Phone:  Fax:    Dialysis Facility (if applicable)   Name:  Address:  Dialysis Schedule:  Phone:  Fax:    / signature: {Esignature:[id number]}    PHYSICIAN SECTION    Prognosis: {Prognosis:3988412655}    Condition at Discharge: [address] Patient Condition:[id number]}    Rehab Potential (if transferring to Rehab): {Prognosis:6942390255}    Recommended Labs or Other Treatments After Discharge: ***    Physician Certification: I certify the above information and transfer of Ritesh Garcia  is necessary for the continuing treatment of the diagnosis listed and that he requires {Admit to Appropriate Level of Care:61960} for {GREATER/LESS:298694210} 30 days.      Update Admission H&P: {CHP DME Changes in LXVYJ:832428656}    PHYSICIAN SIGNATURE:  {Esignature:572174551} nazanin Lopez

## 2023-05-14 ENCOUNTER — HOSPITAL ENCOUNTER (OUTPATIENT)
Age: 63
Setting detail: OBSERVATION
Discharge: HOME | End: 2023-05-15
Attending: STUDENT IN AN ORGANIZED HEALTH CARE EDUCATION/TRAINING PROGRAM | Admitting: SURGERY
Payer: MEDICARE

## 2023-05-14 DIAGNOSIS — R56.9 SEIZURE-LIKE ACTIVITY (HCC): Primary | ICD-10-CM

## 2023-05-14 LAB
ANION GAP SERPL CALCULATED.3IONS-SCNC: 9 MMOL/L (ref 3–16)
BACTERIA URNS QL MICRO: ABNORMAL /HPF
BASE EXCESS BLDV CALC-SCNC: 3.6 MMOL/L (ref -2–3)
BASOPHILS # BLD: 0 K/UL (ref 0–0.2)
BASOPHILS NFR BLD: 0.7 %
BILIRUB UR QL STRIP.AUTO: NEGATIVE
BUN SERPL-MCNC: 11 MG/DL (ref 7–20)
CALCIUM SERPL-MCNC: 8.7 MG/DL (ref 8.3–10.6)
CHLORIDE SERPL-SCNC: 106 MMOL/L (ref 99–110)
CK SERPL-CCNC: 101 U/L (ref 39–308)
CLARITY UR: CLEAR
CO2 BLDV-SCNC: 31 MMOL/L
CO2 SERPL-SCNC: 29 MMOL/L (ref 21–32)
COHGB MFR BLDV: 1.1 % (ref 0–1.5)
COLOR UR: YELLOW
CREAT SERPL-MCNC: 0.9 MG/DL (ref 0.8–1.3)
DEPRECATED RDW RBC AUTO: 15 % (ref 12.4–15.4)
DO-HGB MFR BLDV: 26.8 %
EOSINOPHIL # BLD: 0.2 K/UL (ref 0–0.6)
EOSINOPHIL NFR BLD: 2.9 %
EPI CELLS #/AREA URNS HPF: ABNORMAL /HPF (ref 0–5)
GFR SERPLBLD CREATININE-BSD FMLA CKD-EPI: >60 ML/MIN/{1.73_M2}
GLUCOSE SERPL-MCNC: 137 MG/DL (ref 70–99)
GLUCOSE UR STRIP.AUTO-MCNC: NEGATIVE MG/DL
HCO3 BLDV-SCNC: 29.7 MMOL/L (ref 24–28)
HCT VFR BLD AUTO: 38.1 % (ref 40.5–52.5)
HGB BLD-MCNC: 12.5 G/DL (ref 13.5–17.5)
HGB UR QL STRIP.AUTO: NEGATIVE
HYALINE CASTS #/AREA URNS LPF: ABNORMAL /LPF (ref 0–2)
KETONES UR STRIP.AUTO-MCNC: NEGATIVE MG/DL
LACTATE BLDV-SCNC: 1.2 MMOL/L (ref 0.4–2)
LEUKOCYTE ESTERASE UR QL STRIP.AUTO: NEGATIVE
LEVETIRACETAM SERPL-MCNC: 16.3 UG/ML (ref 6–46)
LYMPHOCYTES # BLD: 1.6 K/UL (ref 1–5.1)
LYMPHOCYTES NFR BLD: 29.9 %
MCH RBC QN AUTO: 31.5 PG (ref 26–34)
MCHC RBC AUTO-ENTMCNC: 32.7 G/DL (ref 31–36)
MCV RBC AUTO: 96.4 FL (ref 80–100)
MEDICATION DOSE-MCNC: NORMAL
METHGB MFR BLDV: 0.3 % (ref 0–1.5)
MONOCYTES # BLD: 0.4 K/UL (ref 0–1.3)
MONOCYTES NFR BLD: 6.5 %
NEUTROPHILS # BLD: 3.3 K/UL (ref 1.7–7.7)
NEUTROPHILS NFR BLD: 60 %
NITRITE UR QL STRIP.AUTO: NEGATIVE
PCO2 BLDV: 50.2 MMHG (ref 41–51)
PH BLDV: 7.38 [PH] (ref 7.35–7.45)
PH UR STRIP.AUTO: 6 [PH] (ref 5–8)
PLATELET # BLD AUTO: 188 K/UL (ref 135–450)
PMV BLD AUTO: 8.8 FL (ref 5–10.5)
PO2 BLDV: 39.5 MMHG (ref 25–40)
POTASSIUM SERPL-SCNC: 3.7 MMOL/L (ref 3.5–5.1)
PROT UR STRIP.AUTO-MCNC: ABNORMAL MG/DL
RBC # BLD AUTO: 3.95 M/UL (ref 4.2–5.9)
RBC #/AREA URNS HPF: ABNORMAL /HPF (ref 0–4)
REASON FOR REJECTION: NORMAL
REJECTED TEST: NORMAL
SAO2 % BLDV: 73 %
SODIUM SERPL-SCNC: 144 MMOL/L (ref 136–145)
SP GR UR STRIP.AUTO: 1.02 (ref 1–1.03)
UA COMPLETE W REFLEX CULTURE PNL UR: ABNORMAL
UA DIPSTICK W REFLEX MICRO PNL UR: YES
URN SPEC COLLECT METH UR: ABNORMAL
UROBILINOGEN UR STRIP-ACNC: 0.2 E.U./DL
WBC # BLD AUTO: 5.4 K/UL (ref 4–11)
WBC #/AREA URNS HPF: ABNORMAL /HPF (ref 0–5)

## 2023-05-14 PROCEDURE — 96372 THER/PROPH/DIAG INJ SC/IM: CPT

## 2023-05-14 PROCEDURE — 99285 EMERGENCY DEPT VISIT HI MDM: CPT

## 2023-05-14 PROCEDURE — 96374 THER/PROPH/DIAG INJ IV PUSH: CPT

## 2023-05-14 PROCEDURE — 82550 ASSAY OF CK (CPK): CPT

## 2023-05-14 PROCEDURE — 80048 BASIC METABOLIC PNL TOTAL CA: CPT

## 2023-05-14 PROCEDURE — 80175 DRUG SCREEN QUAN LAMOTRIGINE: CPT

## 2023-05-14 PROCEDURE — G0378 HOSPITAL OBSERVATION PER HR: HCPCS

## 2023-05-14 PROCEDURE — 96360 HYDRATION IV INFUSION INIT: CPT

## 2023-05-14 PROCEDURE — 81001 URINALYSIS AUTO W/SCOPE: CPT

## 2023-05-14 PROCEDURE — 6360000002 HC RX W HCPCS

## 2023-05-14 PROCEDURE — 6370000000 HC RX 637 (ALT 250 FOR IP): Performed by: SURGERY

## 2023-05-14 PROCEDURE — 83605 ASSAY OF LACTIC ACID: CPT

## 2023-05-14 PROCEDURE — 94640 AIRWAY INHALATION TREATMENT: CPT

## 2023-05-14 PROCEDURE — 2580000003 HC RX 258: Performed by: STUDENT IN AN ORGANIZED HEALTH CARE EDUCATION/TRAINING PROGRAM

## 2023-05-14 PROCEDURE — 80177 DRUG SCRN QUAN LEVETIRACETAM: CPT

## 2023-05-14 PROCEDURE — 6360000002 HC RX W HCPCS: Performed by: SURGERY

## 2023-05-14 PROCEDURE — 82803 BLOOD GASES ANY COMBINATION: CPT

## 2023-05-14 PROCEDURE — 85025 COMPLETE CBC W/AUTO DIFF WBC: CPT

## 2023-05-14 PROCEDURE — 2580000003 HC RX 258: Performed by: SURGERY

## 2023-05-14 PROCEDURE — 36415 COLL VENOUS BLD VENIPUNCTURE: CPT

## 2023-05-14 RX ORDER — LAMOTRIGINE 100 MG/1
200 TABLET ORAL DAILY PRN
Status: DISCONTINUED | OUTPATIENT
Start: 2023-05-14 | End: 2023-05-14

## 2023-05-14 RX ORDER — POLYETHYLENE GLYCOL 3350 17 G/17G
17 POWDER, FOR SOLUTION ORAL DAILY PRN
Status: DISCONTINUED | OUTPATIENT
Start: 2023-05-14 | End: 2023-05-15 | Stop reason: HOSPADM

## 2023-05-14 RX ORDER — ENOXAPARIN SODIUM 100 MG/ML
40 INJECTION SUBCUTANEOUS 2 TIMES DAILY
Status: DISCONTINUED | OUTPATIENT
Start: 2023-05-14 | End: 2023-05-15 | Stop reason: HOSPADM

## 2023-05-14 RX ORDER — LEVETIRACETAM 500 MG/1
1000 TABLET ORAL 2 TIMES DAILY
Status: DISCONTINUED | OUTPATIENT
Start: 2023-05-14 | End: 2023-05-15 | Stop reason: HOSPADM

## 2023-05-14 RX ORDER — ACETAMINOPHEN 325 MG/1
650 TABLET ORAL EVERY 6 HOURS PRN
Status: DISCONTINUED | OUTPATIENT
Start: 2023-05-14 | End: 2023-05-15 | Stop reason: HOSPADM

## 2023-05-14 RX ORDER — ACETAMINOPHEN 650 MG/1
650 SUPPOSITORY RECTAL EVERY 6 HOURS PRN
Status: DISCONTINUED | OUTPATIENT
Start: 2023-05-14 | End: 2023-05-15 | Stop reason: HOSPADM

## 2023-05-14 RX ORDER — ONDANSETRON 2 MG/ML
4 INJECTION INTRAMUSCULAR; INTRAVENOUS EVERY 6 HOURS PRN
Status: DISCONTINUED | OUTPATIENT
Start: 2023-05-14 | End: 2023-05-15 | Stop reason: HOSPADM

## 2023-05-14 RX ORDER — ATORVASTATIN CALCIUM 80 MG/1
80 TABLET, FILM COATED ORAL NIGHTLY
Status: DISCONTINUED | OUTPATIENT
Start: 2023-05-14 | End: 2023-05-15 | Stop reason: HOSPADM

## 2023-05-14 RX ORDER — FAMOTIDINE 20 MG/1
40 TABLET, FILM COATED ORAL DAILY
Status: DISCONTINUED | OUTPATIENT
Start: 2023-05-15 | End: 2023-05-15 | Stop reason: HOSPADM

## 2023-05-14 RX ORDER — MONTELUKAST SODIUM 10 MG/1
10 TABLET ORAL NIGHTLY
Status: DISCONTINUED | OUTPATIENT
Start: 2023-05-14 | End: 2023-05-15 | Stop reason: HOSPADM

## 2023-05-14 RX ORDER — LAMOTRIGINE 150 MG/1
600 TABLET ORAL 2 TIMES DAILY
Status: DISCONTINUED | OUTPATIENT
Start: 2023-05-14 | End: 2023-05-15 | Stop reason: HOSPADM

## 2023-05-14 RX ORDER — SODIUM CHLORIDE 0.9 % (FLUSH) 0.9 %
5-40 SYRINGE (ML) INJECTION EVERY 12 HOURS SCHEDULED
Status: DISCONTINUED | OUTPATIENT
Start: 2023-05-14 | End: 2023-05-15 | Stop reason: HOSPADM

## 2023-05-14 RX ORDER — CARVEDILOL 6.25 MG/1
6.25 TABLET ORAL 2 TIMES DAILY WITH MEALS
Status: DISCONTINUED | OUTPATIENT
Start: 2023-05-14 | End: 2023-05-15 | Stop reason: HOSPADM

## 2023-05-14 RX ORDER — BUDESONIDE 0.25 MG/2ML
0.25 INHALANT ORAL 2 TIMES DAILY
Status: DISCONTINUED | OUTPATIENT
Start: 2023-05-14 | End: 2023-05-15 | Stop reason: HOSPADM

## 2023-05-14 RX ORDER — NITROGLYCERIN 0.4 MG/1
0.4 TABLET SUBLINGUAL EVERY 5 MIN PRN
COMMUNITY
End: 2023-05-14

## 2023-05-14 RX ORDER — SODIUM CHLORIDE, SODIUM LACTATE, POTASSIUM CHLORIDE, AND CALCIUM CHLORIDE .6; .31; .03; .02 G/100ML; G/100ML; G/100ML; G/100ML
500 INJECTION, SOLUTION INTRAVENOUS ONCE
Status: COMPLETED | OUTPATIENT
Start: 2023-05-14 | End: 2023-05-14

## 2023-05-14 RX ORDER — SODIUM CHLORIDE 9 MG/ML
INJECTION, SOLUTION INTRAVENOUS PRN
Status: DISCONTINUED | OUTPATIENT
Start: 2023-05-14 | End: 2023-05-15 | Stop reason: HOSPADM

## 2023-05-14 RX ORDER — ONDANSETRON 4 MG/1
4 TABLET, ORALLY DISINTEGRATING ORAL EVERY 8 HOURS PRN
Status: DISCONTINUED | OUTPATIENT
Start: 2023-05-14 | End: 2023-05-15 | Stop reason: HOSPADM

## 2023-05-14 RX ORDER — SODIUM CHLORIDE 0.9 % (FLUSH) 0.9 %
5-40 SYRINGE (ML) INJECTION PRN
Status: DISCONTINUED | OUTPATIENT
Start: 2023-05-14 | End: 2023-05-15 | Stop reason: HOSPADM

## 2023-05-14 RX ORDER — ALLOPURINOL 300 MG/1
300 TABLET ORAL NIGHTLY
Status: DISCONTINUED | OUTPATIENT
Start: 2023-05-14 | End: 2023-05-15 | Stop reason: HOSPADM

## 2023-05-14 RX ORDER — DIAZEPAM 5 MG/1
5 TABLET ORAL 3 TIMES DAILY PRN
COMMUNITY

## 2023-05-14 RX ORDER — DIAZEPAM 5 MG/1
5 TABLET ORAL EVERY 12 HOURS PRN
Status: DISCONTINUED | OUTPATIENT
Start: 2023-05-14 | End: 2023-05-15 | Stop reason: HOSPADM

## 2023-05-14 RX ORDER — OXYCODONE HYDROCHLORIDE AND ACETAMINOPHEN 5; 325 MG/1; MG/1
1 TABLET ORAL EVERY 6 HOURS PRN
Status: ON HOLD | COMMUNITY
End: 2023-05-15 | Stop reason: HOSPADM

## 2023-05-14 RX ORDER — ASPIRIN 81 MG/1
81 TABLET ORAL DAILY
Status: DISCONTINUED | OUTPATIENT
Start: 2023-05-15 | End: 2023-05-15 | Stop reason: HOSPADM

## 2023-05-14 RX ORDER — LANOLIN ALCOHOL/MO/W.PET/CERES
1000 CREAM (GRAM) TOPICAL DAILY
Status: DISCONTINUED | OUTPATIENT
Start: 2023-05-14 | End: 2023-05-15 | Stop reason: HOSPADM

## 2023-05-14 RX ORDER — LORAZEPAM 2 MG/ML
INJECTION INTRAMUSCULAR
Status: COMPLETED
Start: 2023-05-14 | End: 2023-05-14

## 2023-05-14 RX ORDER — FLUTICASONE PROPIONATE 110 UG/1
1 AEROSOL, METERED RESPIRATORY (INHALATION) 2 TIMES DAILY
Status: DISCONTINUED | OUTPATIENT
Start: 2023-05-14 | End: 2023-05-14

## 2023-05-14 RX ADMIN — SODIUM CHLORIDE, SODIUM LACTATE, POTASSIUM CHLORIDE, AND CALCIUM CHLORIDE 500 ML: .6; .31; .03; .02 INJECTION, SOLUTION INTRAVENOUS at 13:10

## 2023-05-14 RX ADMIN — MONTELUKAST 10 MG: 10 TABLET, FILM COATED ORAL at 20:45

## 2023-05-14 RX ADMIN — SODIUM CHLORIDE, PRESERVATIVE FREE 10 ML: 5 INJECTION INTRAVENOUS at 20:50

## 2023-05-14 RX ADMIN — ENOXAPARIN SODIUM 40 MG: 100 INJECTION SUBCUTANEOUS at 20:45

## 2023-05-14 RX ADMIN — CYANOCOBALAMIN TAB 1000 MCG 1000 MCG: 1000 TAB at 18:53

## 2023-05-14 RX ADMIN — ATORVASTATIN CALCIUM 80 MG: 80 TABLET, FILM COATED ORAL at 20:45

## 2023-05-14 RX ADMIN — LEVETIRACETAM 1000 MG: 500 TABLET, FILM COATED ORAL at 20:45

## 2023-05-14 RX ADMIN — LAMOTRIGINE 600 MG: 150 TABLET ORAL at 20:46

## 2023-05-14 RX ADMIN — CARVEDILOL 6.25 MG: 6.25 TABLET, FILM COATED ORAL at 18:53

## 2023-05-14 RX ADMIN — ALLOPURINOL 300 MG: 300 TABLET ORAL at 20:45

## 2023-05-14 RX ADMIN — BUDESONIDE 250 MCG: 0.25 INHALANT RESPIRATORY (INHALATION) at 21:03

## 2023-05-14 RX ADMIN — LORAZEPAM 2 MG: 2 INJECTION INTRAMUSCULAR; INTRAVENOUS at 11:25

## 2023-05-14 ASSESSMENT — PAIN SCALES - GENERAL: PAINLEVEL_OUTOF10: 0

## 2023-05-15 VITALS
HEIGHT: 66 IN | OXYGEN SATURATION: 96 % | SYSTOLIC BLOOD PRESSURE: 167 MMHG | RESPIRATION RATE: 18 BRPM | HEART RATE: 69 BPM | TEMPERATURE: 98 F | BODY MASS INDEX: 50.62 KG/M2 | WEIGHT: 315 LBS | DIASTOLIC BLOOD PRESSURE: 99 MMHG

## 2023-05-15 LAB
ANION GAP SERPL CALCULATED.3IONS-SCNC: 9 MMOL/L (ref 3–16)
BUN SERPL-MCNC: 8 MG/DL (ref 7–20)
CALCIUM SERPL-MCNC: 9.1 MG/DL (ref 8.3–10.6)
CHLORIDE SERPL-SCNC: 100 MMOL/L (ref 99–110)
CO2 SERPL-SCNC: 28 MMOL/L (ref 21–32)
CREAT SERPL-MCNC: 0.9 MG/DL (ref 0.8–1.3)
GFR SERPLBLD CREATININE-BSD FMLA CKD-EPI: >60 ML/MIN/{1.73_M2}
GLUCOSE SERPL-MCNC: 146 MG/DL (ref 70–99)
POTASSIUM SERPL-SCNC: 3.6 MMOL/L (ref 3.5–5.1)
SODIUM SERPL-SCNC: 137 MMOL/L (ref 136–145)

## 2023-05-15 PROCEDURE — 6360000002 HC RX W HCPCS: Performed by: SURGERY

## 2023-05-15 PROCEDURE — 80048 BASIC METABOLIC PNL TOTAL CA: CPT

## 2023-05-15 PROCEDURE — 36415 COLL VENOUS BLD VENIPUNCTURE: CPT

## 2023-05-15 PROCEDURE — 99223 1ST HOSP IP/OBS HIGH 75: CPT | Performed by: PSYCHIATRY & NEUROLOGY

## 2023-05-15 PROCEDURE — 96372 THER/PROPH/DIAG INJ SC/IM: CPT

## 2023-05-15 PROCEDURE — 94761 N-INVAS EAR/PLS OXIMETRY MLT: CPT

## 2023-05-15 PROCEDURE — 2580000003 HC RX 258: Performed by: SURGERY

## 2023-05-15 PROCEDURE — 94660 CPAP INITIATION&MGMT: CPT

## 2023-05-15 PROCEDURE — G0378 HOSPITAL OBSERVATION PER HR: HCPCS

## 2023-05-15 PROCEDURE — 94640 AIRWAY INHALATION TREATMENT: CPT

## 2023-05-15 PROCEDURE — 6370000000 HC RX 637 (ALT 250 FOR IP): Performed by: SURGERY

## 2023-05-15 RX ORDER — LEVETIRACETAM 1000 MG/1
1000 TABLET ORAL 2 TIMES DAILY
Qty: 60 TABLET | Refills: 0 | Status: SHIPPED | OUTPATIENT
Start: 2023-05-15

## 2023-05-15 RX ADMIN — ENOXAPARIN SODIUM 40 MG: 100 INJECTION SUBCUTANEOUS at 10:10

## 2023-05-15 RX ADMIN — ASPIRIN 81 MG: 81 TABLET, COATED ORAL at 10:10

## 2023-05-15 RX ADMIN — LAMOTRIGINE 600 MG: 150 TABLET ORAL at 10:10

## 2023-05-15 RX ADMIN — CARVEDILOL 6.25 MG: 6.25 TABLET, FILM COATED ORAL at 10:10

## 2023-05-15 RX ADMIN — CARVEDILOL 6.25 MG: 6.25 TABLET, FILM COATED ORAL at 17:19

## 2023-05-15 RX ADMIN — LEVETIRACETAM 1000 MG: 500 TABLET, FILM COATED ORAL at 10:10

## 2023-05-15 RX ADMIN — FAMOTIDINE 40 MG: 20 TABLET ORAL at 10:10

## 2023-05-15 RX ADMIN — BUDESONIDE 250 MCG: 0.25 INHALANT RESPIRATORY (INHALATION) at 07:51

## 2023-05-15 RX ADMIN — CYANOCOBALAMIN TAB 1000 MCG 1000 MCG: 1000 TAB at 11:01

## 2023-05-15 RX ADMIN — SODIUM CHLORIDE, PRESERVATIVE FREE 10 ML: 5 INJECTION INTRAVENOUS at 10:16

## 2023-05-15 ASSESSMENT — PAIN DESCRIPTION - ORIENTATION: ORIENTATION: LOWER

## 2023-05-15 ASSESSMENT — PAIN DESCRIPTION - DESCRIPTORS: DESCRIPTORS: ACHING

## 2023-05-15 ASSESSMENT — PAIN SCALES - GENERAL: PAINLEVEL_OUTOF10: 9

## 2023-05-15 ASSESSMENT — PAIN DESCRIPTION - FREQUENCY: FREQUENCY: INTERMITTENT

## 2023-05-15 ASSESSMENT — PAIN DESCRIPTION - ONSET: ONSET: GRADUAL

## 2023-05-15 ASSESSMENT — PAIN DESCRIPTION - LOCATION: LOCATION: BACK

## 2023-05-15 ASSESSMENT — PAIN DESCRIPTION - PAIN TYPE: TYPE: CHRONIC PAIN

## 2023-05-15 ASSESSMENT — PAIN - FUNCTIONAL ASSESSMENT: PAIN_FUNCTIONAL_ASSESSMENT: PREVENTS OR INTERFERES SOME ACTIVE ACTIVITIES AND ADLS

## 2023-05-15 NOTE — PLAN OF CARE
Problem: Safety - Adult  Goal: Free from fall injury  Outcome: Progressing     Problem: Neurosensory - Adult  Goal: Absence of seizures  Outcome: Progressing     Problem: Skin/Tissue Integrity  Goal: Absence of new skin breakdown  Description: 1. Monitor for areas of redness and/or skin breakdown  2. Assess vascular access sites hourly  3. Every 4-6 hours minimum:  Change oxygen saturation probe site  4. Every 4-6 hours:  If on nasal continuous positive airway pressure, respiratory therapy assess nares and determine need for appliance change or resting period.   Outcome: Progressing     Problem: ABCDS Injury Assessment  Goal: Absence of physical injury  Outcome: Progressing     Problem: Pain  Goal: Verbalizes/displays adequate comfort level or baseline comfort level  Outcome: Progressing

## 2023-05-15 NOTE — PROGRESS NOTES
Ride service used by patient for transportation home. PIV x2 removed, dressings placed. Discharge instructions discussed with patient, all questions answered. Keppra prescription sent to preferred pharmacy. Transported in own power wheelchair with all belongings.

## 2023-05-15 NOTE — DISCHARGE INSTRUCTIONS
Do NOT engage in any activity where loss of consciousness would be dangerous to yourself or others (e.g. driving, climbing, swimming alone, caring for anyone unable to call 911 on their own, etc.) until approved by your neurologist.

## 2023-05-15 NOTE — CONSULTS
onset.     The GTC clinical onset begins with the subtle behavioral pause. This was followed by the right and upward head and eye deviation and then immediately progressed into generalized clonic movements with associated abnormal vocalizations. Total duration was about 40-45 seconds. Electrographically, the onset just preceded the clinical onset by 1-2 seconds and was generalized in the form of bursts of 7-8 Hz spikes which was then evolved into diffuse moderate voltage bifrontal predominant 4-5 delta theta activity with intermixed spikes before the record being obscured by muscle artifact. After treatment on Day 3, there was a significant reduction in the burden of the interictal discharges and none were seen to be clinical. The background also improved to a normal posterior dominant rhythm. Laboratory Review: All results below personally reviewed.  Pertinent positives & negatives are addressed in Assessment & Plan section of note  Recent Results (from the past 72 hour(s))   CBC with Auto Differential    Collection Time: 05/14/23 11:39 AM   Result Value Ref Range    WBC 5.4 4.0 - 11.0 K/uL    RBC 3.95 (L) 4.20 - 5.90 M/uL    Hemoglobin 12.5 (L) 13.5 - 17.5 g/dL    Hematocrit 38.1 (L) 40.5 - 52.5 %    MCV 96.4 80.0 - 100.0 fL    MCH 31.5 26.0 - 34.0 pg    MCHC 32.7 31.0 - 36.0 g/dL    RDW 15.0 12.4 - 15.4 %    Platelets 327 205 - 649 K/uL    MPV 8.8 5.0 - 10.5 fL    Neutrophils % 60.0 %    Lymphocytes % 29.9 %    Monocytes % 6.5 %    Eosinophils % 2.9 %    Basophils % 0.7 %    Neutrophils Absolute 3.3 1.7 - 7.7 K/uL    Lymphocytes Absolute 1.6 1.0 - 5.1 K/uL    Monocytes Absolute 0.4 0.0 - 1.3 K/uL    Eosinophils Absolute 0.2 0.0 - 0.6 K/uL    Basophils Absolute 0.0 0.0 - 0.2 K/uL   BMP w/ Reflex to MG    Collection Time: 05/14/23 11:39 AM   Result Value Ref Range    Sodium 144 136 - 145 mmol/L    Potassium reflex Magnesium 3.7 3.5 - 5.1 mmol/L    Chloride 106 99 - 110 mmol/L    CO2 29 21 - 32 mmol/L

## 2023-05-15 NOTE — PROGRESS NOTES
Pt is alert and oriented x4. VSS with exception of BP. BP elevated and MD made aware. No new orders placed. No change in neuro status or any seizures noted this shift.  Tolerating meals and fluids appropriately and voiding without complications via urinal. Bed is locked and in lowest position with bedside table and call light within reach

## 2023-05-15 NOTE — PROGRESS NOTES
Discharge order placed and patient aware. Patient is wheelchair bound at baseline.  Uses a ride service that will be able to transport him home this evening between 5 pm and 6 pm.

## 2023-05-15 NOTE — PROGRESS NOTES
Patient is alert and oriented. Vital signs are stable. No changes in neuro assessment or signs of seizures thus far this shift. Patient denies any pain. Patient voiding adequate amount of urine per urinal. Bed is in the lowest position. Bed alarm is activated. Call light is within reach. Will continue to monitor and reassess.

## 2023-05-15 NOTE — PLAN OF CARE
Problem: Safety - Adult  Goal: Free from fall injury  5/15/2023 1302 by Ellena Olszewski, RN  Outcome: Progressing  Note: Standard safety precautions in place. Bed locked and in lowest position with bedside table, call light, and belongings within reach. Bed alarm turned on. Problem: Neurosensory - Adult  Goal: Absence of seizures  5/15/2023 1302 by Ellena Olszewski, RN  Outcome: Progressing  Note: Pt has not experienced any seizures this shift. No change in neuro status. Problem: Discharge Planning  Goal: Discharge to home or other facility with appropriate resources  Outcome: Progressing  Note: Pt will be returning to independent living facility today. Pt has arranged for ride to pick him up between 5 and 6 tonight.

## 2023-05-15 NOTE — DISCHARGE SUMMARY
V2.0  Discharge Summary    Name:  Ermias Jean /Age/Sex: 1960 (63 y.o. male)   Admit Date: 2023  Discharge Date: 5/15/23    MRN & CSN:  [id number] & [id number] Encounter Date and Time 5/15/23 2:48 PM EDT    Attending:  Meredith Sun MD Discharging Provider: EDUARDA [facility] Course:     Brief HPI: Ermias Jean is a 63 y.o. male who presented to the emergency department on 2023 with complaints of a seizure. The patient does have a known seizure disorder as well as psychogenic nonepileptic seizures. Patient had a witnessed seizure activity with full body jerking movements. Neurology was consulted. During discussion with the family it was noted that the patient has been on 750/750 of Keppra. When he was last here in January his dosage was increased to 1000/1000. Given that the patient was taking a lower dose a new prescription for Keppra 1000 twice daily was sent to his pharmacy. Patient was advised to follow-up with his neurologist in short order. Brief Problem Based Course:   Epileptic Seizure disorder-patient was treated with his stable Lamictal dosage and Keppra 1000 twice daily. He remained seizure through free throughout his stay. Decreased cardiac output-Jani and Jardiance were continued  Type 2 diabetes mellitus without complication, without long term use of insulint- Patient was treated with Low dose sliding scale  Morbid obesity-importance of weight loss was discussed with the patient. The patient expressed appropriate understanding of, and agreement with the discharge recommendations, medications, and plan.      Consults this admission:  IP CONSULT TO NEUROLOGY    Discharge Diagnosis:   Breakthrough seizure [facility])        Discharge Instruction:   Follow up appointments: FU with your neurologist   Primary care physician: Nakia León within 2 weeks  Diet: diabetic diet   Activity: Patient was advised not to participate in any activity

## 2023-05-15 NOTE — PROGRESS NOTES
4 Eyes Skin Assessment       NAME:  Heather Restrepo OF BIRTH:  1960  MEDICAL RECORD NUMBER:  [id number]       The patient is being assessed for   Shift Handoff       I agree that One RN has performed a thorough Head to Toe Skin Assessment on the patient. ALL assessment sites listed below have been assessed. Areas assessed by both nurses:       Head, Face, Ears, Shoulders, Back, Chest, Arms, Elbows, Hands, Sacrum. Buttock, Coccyx, Ischium, Legs. Feet and Heels, and Under Medical Devices                                Does the Patient have a Wound?  No noted wound(s)        James Prevention initiated by RN: Yes   Wound Care Orders initiated by RN: No       Pressure Injury (Stage 3,4, Unstageable, DTI, NWPT, and Complex wounds) if present, place referral order by RN under : No       New and Established Ostomies, if present place, referral order under : NA        Nurse 1 eSignature: Electronically signed by Yariel Quintana RN on 5/15/23 at 2:15 AM EDT       **SHARE this note so that the co-signing nurse can place an eSignature**       Nurse 2 eSignature: Electronically signed by Farshad Sanders RN on 5/15/23 at 2:20 AM EDT

## 2023-05-16 LAB — LAMOTRIGINE SERPL-MCNC: 19.3 UG/ML (ref 3–15)

## 2023-07-21 ENCOUNTER — HOSPITAL ENCOUNTER (INPATIENT)
Age: 63
LOS: 1 days | Discharge: HOME | DRG: 101 | End: 2023-07-22
Attending: EMERGENCY MEDICINE | Admitting: STUDENT IN AN ORGANIZED HEALTH CARE EDUCATION/TRAINING PROGRAM
Payer: MEDICARE

## 2023-07-21 ENCOUNTER — APPOINTMENT (OUTPATIENT)
Dept: CT IMAGING | Age: 63
DRG: 101 | End: 2023-07-21
Payer: MEDICARE

## 2023-07-21 ENCOUNTER — APPOINTMENT (OUTPATIENT)
Dept: GENERAL RADIOLOGY | Age: 63
DRG: 101 | End: 2023-07-21
Payer: MEDICARE

## 2023-07-21 DIAGNOSIS — R56.9 SEIZURE (HCC): Primary | ICD-10-CM

## 2023-07-21 LAB
ALBUMIN SERPL-MCNC: 3.4 G/DL (ref 3.4–5)
ANION GAP SERPL CALCULATED.3IONS-SCNC: 10 MMOL/L (ref 3–16)
BASOPHILS # BLD: 0 K/UL (ref 0–0.2)
BASOPHILS NFR BLD: 0.6 %
BILIRUB UR QL STRIP.AUTO: NEGATIVE
BUN SERPL-MCNC: 14 MG/DL (ref 7–20)
CALCIUM SERPL-MCNC: 8.8 MG/DL (ref 8.3–10.6)
CHLORIDE SERPL-SCNC: 106 MMOL/L (ref 99–110)
CLARITY UR: CLEAR
CO2 SERPL-SCNC: 24 MMOL/L (ref 21–32)
COLOR UR: YELLOW
CREAT SERPL-MCNC: 1 MG/DL (ref 0.8–1.3)
DEPRECATED RDW RBC AUTO: 14.6 % (ref 12.4–15.4)
EOSINOPHIL # BLD: 0.2 K/UL (ref 0–0.6)
EOSINOPHIL NFR BLD: 3.1 %
EPI CELLS #/AREA URNS HPF: ABNORMAL /HPF (ref 0–5)
GFR SERPLBLD CREATININE-BSD FMLA CKD-EPI: >60 ML/MIN/{1.73_M2}
GLUCOSE SERPL-MCNC: 114 MG/DL (ref 70–99)
GLUCOSE UR STRIP.AUTO-MCNC: NEGATIVE MG/DL
HCT VFR BLD AUTO: 37.7 % (ref 40.5–52.5)
HGB BLD-MCNC: 12.5 G/DL (ref 13.5–17.5)
HGB UR QL STRIP.AUTO: NEGATIVE
KETONES UR STRIP.AUTO-MCNC: ABNORMAL MG/DL
LACTATE BLDV-SCNC: 0.9 MMOL/L (ref 0.4–2)
LEUKOCYTE ESTERASE UR QL STRIP.AUTO: NEGATIVE
LEVETIRACETAM SERPL-MCNC: 32.5 UG/ML (ref 6–46)
LYMPHOCYTES # BLD: 1.4 K/UL (ref 1–5.1)
LYMPHOCYTES NFR BLD: 23.7 %
MCH RBC QN AUTO: 32.2 PG (ref 26–34)
MCHC RBC AUTO-ENTMCNC: 33.2 G/DL (ref 31–36)
MCV RBC AUTO: 97 FL (ref 80–100)
MEDICATION DOSE-MCNC: NORMAL
MONOCYTES # BLD: 0.3 K/UL (ref 0–1.3)
MONOCYTES NFR BLD: 4.7 %
NEUTROPHILS # BLD: 3.9 K/UL (ref 1.7–7.7)
NEUTROPHILS NFR BLD: 67.9 %
NITRITE UR QL STRIP.AUTO: NEGATIVE
PH UR STRIP.AUTO: 6 [PH] (ref 5–8)
PHOSPHATE SERPL-MCNC: 2.7 MG/DL (ref 2.5–4.9)
PLATELET # BLD AUTO: 170 K/UL (ref 135–450)
PMV BLD AUTO: 8.3 FL (ref 5–10.5)
POTASSIUM SERPL-SCNC: 3.9 MMOL/L (ref 3.5–5.1)
PROT UR STRIP.AUTO-MCNC: ABNORMAL MG/DL
RBC # BLD AUTO: 3.89 M/UL (ref 4.2–5.9)
RBC #/AREA URNS HPF: ABNORMAL /HPF (ref 0–4)
SODIUM SERPL-SCNC: 140 MMOL/L (ref 136–145)
SP GR UR STRIP.AUTO: >=1.03 (ref 1–1.03)
UA DIPSTICK W REFLEX MICRO PNL UR: YES
URN SPEC COLLECT METH UR: ABNORMAL
UROBILINOGEN UR STRIP-ACNC: 0.2 E.U./DL
WBC # BLD AUTO: 5.7 K/UL (ref 4–11)
WBC #/AREA URNS HPF: ABNORMAL /HPF (ref 0–5)

## 2023-07-21 PROCEDURE — 96361 HYDRATE IV INFUSION ADD-ON: CPT

## 2023-07-21 PROCEDURE — 81001 URINALYSIS AUTO W/SCOPE: CPT

## 2023-07-21 PROCEDURE — 83605 ASSAY OF LACTIC ACID: CPT

## 2023-07-21 PROCEDURE — 6360000002 HC RX W HCPCS: Performed by: STUDENT IN AN ORGANIZED HEALTH CARE EDUCATION/TRAINING PROGRAM

## 2023-07-21 PROCEDURE — 6370000000 HC RX 637 (ALT 250 FOR IP): Performed by: STUDENT IN AN ORGANIZED HEALTH CARE EDUCATION/TRAINING PROGRAM

## 2023-07-21 PROCEDURE — 2060000000 HC ICU INTERMEDIATE R&B

## 2023-07-21 PROCEDURE — 2580000003 HC RX 258: Performed by: STUDENT IN AN ORGANIZED HEALTH CARE EDUCATION/TRAINING PROGRAM

## 2023-07-21 PROCEDURE — 2580000003 HC RX 258

## 2023-07-21 PROCEDURE — 80069 RENAL FUNCTION PANEL: CPT

## 2023-07-21 PROCEDURE — 80177 DRUG SCRN QUAN LEVETIRACETAM: CPT

## 2023-07-21 PROCEDURE — 80175 DRUG SCREEN QUAN LAMOTRIGINE: CPT

## 2023-07-21 PROCEDURE — 85025 COMPLETE CBC W/AUTO DIFF WBC: CPT

## 2023-07-21 PROCEDURE — 70450 CT HEAD/BRAIN W/O DYE: CPT

## 2023-07-21 PROCEDURE — 2500000003 HC RX 250 WO HCPCS: Performed by: STUDENT IN AN ORGANIZED HEALTH CARE EDUCATION/TRAINING PROGRAM

## 2023-07-21 PROCEDURE — 71045 X-RAY EXAM CHEST 1 VIEW: CPT

## 2023-07-21 PROCEDURE — 96360 HYDRATION IV INFUSION INIT: CPT

## 2023-07-21 PROCEDURE — 99285 EMERGENCY DEPT VISIT HI MDM: CPT

## 2023-07-21 RX ORDER — SODIUM CHLORIDE 0.9 % (FLUSH) 0.9 %
5-40 SYRINGE (ML) INJECTION PRN
Status: DISCONTINUED | OUTPATIENT
Start: 2023-07-21 | End: 2023-07-22 | Stop reason: HOSPADM

## 2023-07-21 RX ORDER — CHLORTHALIDONE 25 MG/1
25 TABLET ORAL DAILY
Status: DISCONTINUED | OUTPATIENT
Start: 2023-07-22 | End: 2023-07-22 | Stop reason: HOSPADM

## 2023-07-21 RX ORDER — ONDANSETRON 2 MG/ML
4 INJECTION INTRAMUSCULAR; INTRAVENOUS EVERY 6 HOURS PRN
Status: DISCONTINUED | OUTPATIENT
Start: 2023-07-21 | End: 2023-07-22 | Stop reason: HOSPADM

## 2023-07-21 RX ORDER — CARVEDILOL 6.25 MG/1
6.25 TABLET ORAL 2 TIMES DAILY WITH MEALS
Status: DISCONTINUED | OUTPATIENT
Start: 2023-07-21 | End: 2023-07-22 | Stop reason: HOSPADM

## 2023-07-21 RX ORDER — FAMOTIDINE 20 MG/1
40 TABLET, FILM COATED ORAL NIGHTLY
Status: DISCONTINUED | OUTPATIENT
Start: 2023-07-22 | End: 2023-07-22 | Stop reason: HOSPADM

## 2023-07-21 RX ORDER — SODIUM CHLORIDE, SODIUM LACTATE, POTASSIUM CHLORIDE, CALCIUM CHLORIDE 600; 310; 30; 20 MG/100ML; MG/100ML; MG/100ML; MG/100ML
INJECTION, SOLUTION INTRAVENOUS CONTINUOUS
Status: ACTIVE | OUTPATIENT
Start: 2023-07-21 | End: 2023-07-22

## 2023-07-21 RX ORDER — FAMOTIDINE 20 MG/1
20 TABLET, FILM COATED ORAL DAILY
Status: DISCONTINUED | OUTPATIENT
Start: 2023-07-21 | End: 2023-07-21

## 2023-07-21 RX ORDER — CETIRIZINE HYDROCHLORIDE 10 MG/1
10 TABLET ORAL DAILY
Status: DISCONTINUED | OUTPATIENT
Start: 2023-07-22 | End: 2023-07-22 | Stop reason: HOSPADM

## 2023-07-21 RX ORDER — ACETAMINOPHEN 325 MG/1
650 TABLET ORAL EVERY 6 HOURS PRN
Status: DISCONTINUED | OUTPATIENT
Start: 2023-07-21 | End: 2023-07-22 | Stop reason: HOSPADM

## 2023-07-21 RX ORDER — NORTRIPTYLINE HYDROCHLORIDE 25 MG/1
75 CAPSULE ORAL NIGHTLY
Status: DISCONTINUED | OUTPATIENT
Start: 2023-07-21 | End: 2023-07-22 | Stop reason: HOSPADM

## 2023-07-21 RX ORDER — ENOXAPARIN SODIUM 100 MG/ML
30 INJECTION SUBCUTANEOUS 2 TIMES DAILY
Status: DISCONTINUED | OUTPATIENT
Start: 2023-07-21 | End: 2023-07-22 | Stop reason: HOSPADM

## 2023-07-21 RX ORDER — CHLORTHALIDONE 25 MG/1
25 TABLET ORAL DAILY
COMMUNITY

## 2023-07-21 RX ORDER — ALLOPURINOL 300 MG/1
300 TABLET ORAL NIGHTLY
Status: DISCONTINUED | OUTPATIENT
Start: 2023-07-21 | End: 2023-07-22 | Stop reason: HOSPADM

## 2023-07-21 RX ORDER — MONTELUKAST SODIUM 10 MG/1
10 TABLET ORAL NIGHTLY
Status: DISCONTINUED | OUTPATIENT
Start: 2023-07-21 | End: 2023-07-22 | Stop reason: HOSPADM

## 2023-07-21 RX ORDER — LANOLIN ALCOHOL/MO/W.PET/CERES
1000 CREAM (GRAM) TOPICAL DAILY
Status: DISCONTINUED | OUTPATIENT
Start: 2023-07-22 | End: 2023-07-22 | Stop reason: HOSPADM

## 2023-07-21 RX ORDER — SODIUM CHLORIDE 9 MG/ML
INJECTION, SOLUTION INTRAVENOUS PRN
Status: DISCONTINUED | OUTPATIENT
Start: 2023-07-21 | End: 2023-07-22 | Stop reason: HOSPADM

## 2023-07-21 RX ORDER — LEVETIRACETAM 500 MG/1
1000 TABLET ORAL 2 TIMES DAILY
Status: DISCONTINUED | OUTPATIENT
Start: 2023-07-21 | End: 2023-07-22

## 2023-07-21 RX ORDER — SODIUM CHLORIDE, SODIUM LACTATE, POTASSIUM CHLORIDE, AND CALCIUM CHLORIDE .6; .31; .03; .02 G/100ML; G/100ML; G/100ML; G/100ML
1000 INJECTION, SOLUTION INTRAVENOUS ONCE
Status: COMPLETED | OUTPATIENT
Start: 2023-07-21 | End: 2023-07-21

## 2023-07-21 RX ORDER — POLYETHYLENE GLYCOL 3350 17 G/17G
17 POWDER, FOR SOLUTION ORAL DAILY PRN
Status: DISCONTINUED | OUTPATIENT
Start: 2023-07-21 | End: 2023-07-22 | Stop reason: HOSPADM

## 2023-07-21 RX ORDER — SODIUM CHLORIDE 0.9 % (FLUSH) 0.9 %
5-40 SYRINGE (ML) INJECTION EVERY 12 HOURS SCHEDULED
Status: DISCONTINUED | OUTPATIENT
Start: 2023-07-21 | End: 2023-07-22 | Stop reason: HOSPADM

## 2023-07-21 RX ORDER — ACETAMINOPHEN 650 MG/1
650 SUPPOSITORY RECTAL EVERY 6 HOURS PRN
Status: DISCONTINUED | OUTPATIENT
Start: 2023-07-21 | End: 2023-07-22 | Stop reason: HOSPADM

## 2023-07-21 RX ORDER — ATORVASTATIN CALCIUM 80 MG/1
80 TABLET, FILM COATED ORAL NIGHTLY
Status: DISCONTINUED | OUTPATIENT
Start: 2023-07-21 | End: 2023-07-22 | Stop reason: HOSPADM

## 2023-07-21 RX ORDER — CETIRIZINE HYDROCHLORIDE 10 MG/1
5 TABLET ORAL DAILY
Status: DISCONTINUED | OUTPATIENT
Start: 2023-07-21 | End: 2023-07-21

## 2023-07-21 RX ORDER — LAMOTRIGINE 150 MG/1
600 TABLET ORAL 2 TIMES DAILY
Status: DISCONTINUED | OUTPATIENT
Start: 2023-07-21 | End: 2023-07-22 | Stop reason: HOSPADM

## 2023-07-21 RX ORDER — MAGNESIUM OXIDE 400 MG/1
400 TABLET ORAL DAILY
COMMUNITY
Start: 2023-02-07

## 2023-07-21 RX ORDER — ASPIRIN 81 MG/1
81 TABLET ORAL DAILY
Status: DISCONTINUED | OUTPATIENT
Start: 2023-07-22 | End: 2023-07-22 | Stop reason: HOSPADM

## 2023-07-21 RX ORDER — ALBUTEROL SULFATE 2.5 MG/3ML
2.5 SOLUTION RESPIRATORY (INHALATION) EVERY 4 HOURS PRN
Status: DISCONTINUED | OUTPATIENT
Start: 2023-07-21 | End: 2023-07-22 | Stop reason: HOSPADM

## 2023-07-21 RX ORDER — LORAZEPAM 2 MG/ML
1 INJECTION INTRAMUSCULAR EVERY 5 MIN PRN
Status: DISCONTINUED | OUTPATIENT
Start: 2023-07-21 | End: 2023-07-22 | Stop reason: HOSPADM

## 2023-07-21 RX ORDER — LANOLIN ALCOHOL/MO/W.PET/CERES
400 CREAM (GRAM) TOPICAL DAILY
Status: DISCONTINUED | OUTPATIENT
Start: 2023-07-22 | End: 2023-07-22 | Stop reason: HOSPADM

## 2023-07-21 RX ORDER — ONDANSETRON 4 MG/1
4 TABLET, ORALLY DISINTEGRATING ORAL EVERY 8 HOURS PRN
Status: DISCONTINUED | OUTPATIENT
Start: 2023-07-21 | End: 2023-07-22 | Stop reason: HOSPADM

## 2023-07-21 RX ORDER — MULTIVITAMIN WITH IRON
1 TABLET ORAL DAILY
Status: DISCONTINUED | OUTPATIENT
Start: 2023-07-22 | End: 2023-07-22 | Stop reason: HOSPADM

## 2023-07-21 RX ADMIN — SODIUM CHLORIDE, POTASSIUM CHLORIDE, SODIUM LACTATE AND CALCIUM CHLORIDE: 600; 310; 30; 20 INJECTION, SOLUTION INTRAVENOUS at 20:36

## 2023-07-21 RX ADMIN — ATORVASTATIN CALCIUM 80 MG: 80 TABLET, FILM COATED ORAL at 22:59

## 2023-07-21 RX ADMIN — LAMOTRIGINE 600 MG: 150 TABLET ORAL at 22:59

## 2023-07-21 RX ADMIN — MICONAZOLE NITRATE: 2 POWDER TOPICAL at 23:01

## 2023-07-21 RX ADMIN — LEVETIRACETAM 1000 MG: 500 TABLET, FILM COATED ORAL at 22:59

## 2023-07-21 RX ADMIN — NORTRIPTYLINE HYDROCHLORIDE 75 MG: 25 CAPSULE ORAL at 22:59

## 2023-07-21 RX ADMIN — ENOXAPARIN SODIUM 30 MG: 100 INJECTION SUBCUTANEOUS at 22:59

## 2023-07-21 RX ADMIN — CARVEDILOL 6.25 MG: 6.25 TABLET, FILM COATED ORAL at 23:06

## 2023-07-21 RX ADMIN — SODIUM CHLORIDE, POTASSIUM CHLORIDE, SODIUM LACTATE AND CALCIUM CHLORIDE 1000 ML: 600; 310; 30; 20 INJECTION, SOLUTION INTRAVENOUS at 14:53

## 2023-07-21 RX ADMIN — SACUBITRIL AND VALSARTAN 1 TABLET: 97; 103 TABLET, FILM COATED ORAL at 23:00

## 2023-07-21 RX ADMIN — MONTELUKAST 10 MG: 10 TABLET, FILM COATED ORAL at 22:59

## 2023-07-21 ASSESSMENT — PAIN - FUNCTIONAL ASSESSMENT: PAIN_FUNCTIONAL_ASSESSMENT: NONE - DENIES PAIN

## 2023-07-21 NOTE — ED PROVIDER NOTES
ED Attending Attestation Note     Date of evaluation: 7/21/2023    This patient was seen by the resident. I have seen and examined the patient, agree with the workup, evaluation, management and diagnosis. The care plan has been discussed. My assessment reveals a 68-year-old male who presents with a chief complaint of altered mental status. Patient reportedly has a history of seizures and pseudoseizures and had a seizure today. Patient does not remember anything. With significant prompting can follow all commands in both arms and legs, no obvious focal neurologic deficits. Ash Castelan MD  07/21/23 [id number]

## 2023-07-22 VITALS
OXYGEN SATURATION: 97 % | HEIGHT: 66 IN | HEART RATE: 71 BPM | BODY MASS INDEX: 50.62 KG/M2 | SYSTOLIC BLOOD PRESSURE: 146 MMHG | DIASTOLIC BLOOD PRESSURE: 78 MMHG | RESPIRATION RATE: 18 BRPM | TEMPERATURE: 98.7 F | WEIGHT: 315 LBS

## 2023-07-22 LAB
ANION GAP SERPL CALCULATED.3IONS-SCNC: 8 MMOL/L (ref 3–16)
BASOPHILS # BLD: 0 K/UL (ref 0–0.2)
BASOPHILS NFR BLD: 0.9 %
BUN SERPL-MCNC: 12 MG/DL (ref 7–20)
CALCIUM SERPL-MCNC: 8.9 MG/DL (ref 8.3–10.6)
CHLORIDE SERPL-SCNC: 104 MMOL/L (ref 99–110)
CO2 SERPL-SCNC: 29 MMOL/L (ref 21–32)
CREAT SERPL-MCNC: 0.8 MG/DL (ref 0.8–1.3)
DEPRECATED RDW RBC AUTO: 14.5 % (ref 12.4–15.4)
EOSINOPHIL # BLD: 0.2 K/UL (ref 0–0.6)
EOSINOPHIL NFR BLD: 3.7 %
GFR SERPLBLD CREATININE-BSD FMLA CKD-EPI: >60 ML/MIN/{1.73_M2}
GLUCOSE SERPL-MCNC: 110 MG/DL (ref 70–99)
HCT VFR BLD AUTO: 36.5 % (ref 40.5–52.5)
HGB BLD-MCNC: 12.4 G/DL (ref 13.5–17.5)
LYMPHOCYTES # BLD: 1.9 K/UL (ref 1–5.1)
LYMPHOCYTES NFR BLD: 35.4 %
MCH RBC QN AUTO: 32.4 PG (ref 26–34)
MCHC RBC AUTO-ENTMCNC: 33.9 G/DL (ref 31–36)
MCV RBC AUTO: 95.6 FL (ref 80–100)
MONOCYTES # BLD: 0.3 K/UL (ref 0–1.3)
MONOCYTES NFR BLD: 6 %
NEUTROPHILS # BLD: 2.9 K/UL (ref 1.7–7.7)
NEUTROPHILS NFR BLD: 54 %
PLATELET # BLD AUTO: 173 K/UL (ref 135–450)
PMV BLD AUTO: 8.6 FL (ref 5–10.5)
POTASSIUM SERPL-SCNC: 3.8 MMOL/L (ref 3.5–5.1)
RBC # BLD AUTO: 3.82 M/UL (ref 4.2–5.9)
SODIUM SERPL-SCNC: 141 MMOL/L (ref 136–145)
WBC # BLD AUTO: 5.4 K/UL (ref 4–11)

## 2023-07-22 PROCEDURE — 6360000002 HC RX W HCPCS: Performed by: STUDENT IN AN ORGANIZED HEALTH CARE EDUCATION/TRAINING PROGRAM

## 2023-07-22 PROCEDURE — 6370000000 HC RX 637 (ALT 250 FOR IP): Performed by: NURSE PRACTITIONER

## 2023-07-22 PROCEDURE — 36415 COLL VENOUS BLD VENIPUNCTURE: CPT

## 2023-07-22 PROCEDURE — 80048 BASIC METABOLIC PNL TOTAL CA: CPT

## 2023-07-22 PROCEDURE — 6370000000 HC RX 637 (ALT 250 FOR IP): Performed by: INTERNAL MEDICINE

## 2023-07-22 PROCEDURE — 6370000000 HC RX 637 (ALT 250 FOR IP): Performed by: STUDENT IN AN ORGANIZED HEALTH CARE EDUCATION/TRAINING PROGRAM

## 2023-07-22 PROCEDURE — 2580000003 HC RX 258: Performed by: STUDENT IN AN ORGANIZED HEALTH CARE EDUCATION/TRAINING PROGRAM

## 2023-07-22 PROCEDURE — 85025 COMPLETE CBC W/AUTO DIFF WBC: CPT

## 2023-07-22 RX ORDER — LEVETIRACETAM 500 MG/1
1000 TABLET ORAL 2 TIMES DAILY
Status: DISCONTINUED | OUTPATIENT
Start: 2023-07-22 | End: 2023-07-22 | Stop reason: HOSPADM

## 2023-07-22 RX ORDER — LEVETIRACETAM 1000 MG/1
1000 TABLET ORAL 2 TIMES DAILY
Qty: 60 TABLET | Refills: 3 | Status: SHIPPED | OUTPATIENT
Start: 2023-07-22

## 2023-07-22 RX ADMIN — ENOXAPARIN SODIUM 30 MG: 100 INJECTION SUBCUTANEOUS at 09:27

## 2023-07-22 RX ADMIN — SACUBITRIL AND VALSARTAN 1 TABLET: 97; 103 TABLET, FILM COATED ORAL at 09:29

## 2023-07-22 RX ADMIN — CETIRIZINE HYDROCHLORIDE 10 MG: 10 TABLET, FILM COATED ORAL at 09:28

## 2023-07-22 RX ADMIN — Medication 400 MG: at 09:28

## 2023-07-22 RX ADMIN — THERA TABS 1 TABLET: TAB at 09:28

## 2023-07-22 RX ADMIN — CARVEDILOL 6.25 MG: 6.25 TABLET, FILM COATED ORAL at 17:49

## 2023-07-22 RX ADMIN — SODIUM CHLORIDE, PRESERVATIVE FREE 10 ML: 5 INJECTION INTRAVENOUS at 09:28

## 2023-07-22 RX ADMIN — MICONAZOLE NITRATE: 2 POWDER TOPICAL at 09:36

## 2023-07-22 RX ADMIN — LAMOTRIGINE 600 MG: 150 TABLET ORAL at 09:29

## 2023-07-22 RX ADMIN — CYANOCOBALAMIN TAB 1000 MCG 1000 MCG: 1000 TAB at 09:28

## 2023-07-22 RX ADMIN — ASPIRIN 81 MG: 81 TABLET, COATED ORAL at 09:28

## 2023-07-22 RX ADMIN — CHLORTHALIDONE 25 MG: 25 TABLET ORAL at 09:28

## 2023-07-22 RX ADMIN — LEVETIRACETAM 1000 MG: 500 TABLET, FILM COATED ORAL at 09:28

## 2023-07-22 RX ADMIN — CARVEDILOL 6.25 MG: 6.25 TABLET, FILM COATED ORAL at 09:28

## 2023-07-22 RX ADMIN — LEVETIRACETAM 1000 MG: 500 TABLET, FILM COATED ORAL at 17:49

## 2023-07-22 NOTE — PROGRESS NOTES
Pt discharged home, no DME or homecare needs at this time. Keppra dose adjusted, nightly dose given due to pharmacy being closed and pt unsure if he had enough for dose. Discharge instructions reviewed with pt, denied any further questions, PIV removed.  Discharged home via transport company that transports pt through [facility]

## 2023-07-22 NOTE — PLAN OF CARE
Problem: Skin/Tissue Integrity  Goal: Absence of new skin breakdown  Description: 1. Monitor for areas of redness and/or skin breakdown  2. Assess vascular access sites hourly  3. Every 4-6 hours minimum:  Change oxygen saturation probe site  4. Every 4-6 hours:  If on nasal continuous positive airway pressure, respiratory therapy assess nares and determine need for appliance change or resting period.   Outcome: Completed     Problem: ABCDS Injury Assessment  Goal: Absence of physical injury  Outcome: Completed     Problem: Discharge Planning  Goal: Discharge to home or other facility with appropriate resources  Outcome: Completed     Problem: Safety - Adult  Goal: Free from fall injury  Outcome: Completed     Problem: Chronic Conditions and Co-morbidities  Goal: Patient's chronic conditions and co-morbidity symptoms are monitored and maintained or improved  Outcome: Completed

## 2023-07-22 NOTE — PROGRESS NOTES
Hospitalist Progress Note      PCP: Sourav Caballero    Date of Admission: 7/21/2023    LOS: 1    Chief Complaint:   Chief Complaint   Patient presents with    Altered Mental Status       Case Summary:   44-year-old gentleman with history of chronic systolic heart failure, type 2 diabetes, hypertension, CAD, cognitive delay and a history of seizures on Keppra who presented with breakthrough seizure. Active Hospital Problems    Diagnosis Date Noted    Seizure [facility]) [R56.9] 07/21/2023         Principal Problem:    Seizure [facility]): No further seizure activity since admission. No headaches. Remains on Keppra and lamotrigine. Await neurology consult and further recommendations. Active Problems:    Hypertension: Stable on Coreg, chlorthalidone and Entresto    Chronic systolic heart failure ([address]): Remains euvolemic. Will monitor. On Entresto and Coreg    Diabetes mellitus type 2 in obese [facility]): Stable.   Will add sliding scale insulin            Medications:  Reviewed  Infusion Medications    sodium chloride       Scheduled Medications    sodium chloride flush  5-40 mL IntraVENous 2 times per day    enoxaparin  30 mg SubCUTAneous BID    allopurinol  300 mg Oral Nightly    aspirin  81 mg Oral Daily    atorvastatin  80 mg Oral Nightly    carvedilol  6.25 mg Oral BID WC    chlorthalidone  25 mg Oral Daily    cyanocobalamin  1,000 mcg Oral Daily    lamoTRIgine  600 mg Oral BID    levETIRAcetam  1,000 mg Oral BID    magnesium oxide  400 mg Oral Daily    sacubitril-valsartan  1 tablet Oral BID    miconazole   Topical BID    nortriptyline  75 mg Oral Nightly    multivitamin  1 tablet Oral Daily    montelukast  10 mg Oral Nightly    cetirizine  10 mg Oral Daily    famotidine  40 mg Oral Nightly     PRN Meds: LORazepam, sodium chloride flush, sodium chloride, ondansetron **OR** ondansetron, polyethylene glycol, acetaminophen **OR** acetaminophen, albuterol      DVT Prophylaxis: Subcut enoxaparin  Diet: ADULT DIET; 05/14/2023 07:00 PM    RBCUA 5-10 07/21/2023 05:07 PM    BLOODU Negative 07/21/2023 05:07 PM    SPECGRAV >=1.030 07/21/2023 05:07 PM    GLUCOSEU Negative 07/21/2023 05:07 PM       Radiology:  CT HEAD WO CONTRAST   Final Result   1. Atrophic changes, otherwise normal brain with no evidence of acute hemorrhage   or extra-axial fluid collections. 2. Chronic patchy right ethmoid sinusitis      XR CHEST PORTABLE   Final Result   Bilateral pulmonary vascular and interstitial prominence. Findings   can be seen in the setting of pulmonary edema/vascular congestion. Uma Rai MD      Please excuse brevity and/or typos. This report was transcribed using voice recognition software. Every effort was made to ensure accuracy, however, inadvertent computerized transcription errors may be present.

## 2023-07-22 NOTE — PROGRESS NOTES
Pt arrived from ED for further observation s/p breakthrough sz activity. Pt alert/oriented x4, although does endorse feeling \"sleepy\" likely d/y post ictal state. Pt able to follow commands, bilat pupils equal and reactive, denies significant pain but does exhibit some pain with movement r/t torn ligmanents to L shoulder per pt. Pt oriented to room, seizure precautions as well as fall and safety precautions established and maintained at this time.

## 2023-07-22 NOTE — H&P
07/21/2023 05:07 PM    PHUR 6.0 07/21/2023 05:07 PM    WBCUA 0-2 07/21/2023 05:07 PM    RBCUA 5-10 07/21/2023 05:07 PM    MUCUS 2+ 12/10/2018 05:10 PM    BACTERIA 1+ 05/14/2023 07:00 PM    CLARITYU Clear 07/21/2023 05:07 PM    SPECGRAV >=1.030 07/21/2023 05:07 PM    LEUKOCYTESUR Negative 07/21/2023 05:07 PM    UROBILINOGEN 0.2 07/21/2023 05:07 PM    BILIRUBINUR Negative 07/21/2023 05:07 PM    BLOODU Negative 07/21/2023 05:07 PM    GLUCOSEU Negative 07/21/2023 05:07 PM    KETUA TRACE 07/21/2023 05:07 PM     Urine Cultures:   Lab Results   Component Value Date/Time    LABURIN No growth at 18-36 hours 07/19/2017 06:40 PM     Blood Cultures: No results found for: BC  No results found for: BLOODCULT2  Organism: No results found for: ORG    Imaging/Diagnostics Last 24 Hours   CT HEAD WO CONTRAST    Result Date: 7/21/2023  PROCEDURE: CT HEAD WO CONTRAST INDICATION: ams COMPARISON: 1/7/2023 TECHNICAL FACTORS: Multiplanar contiguous spiral axial scanning  Skull base to high convexities with multi-reformatted images. CT radiation dose optimization techniques (automated exposure control, use of a iterative reconstruction techniques, or adjustment of the mA or KV according to the patients size) were used to limit patient radiation dose. FINDINGS: Noncontrast axial images reveal midline ventricles. Ventricular size and cortical sulci are prominent compatible with atrophic changes. No intra-axial/extra-axial masses or fluid collections. No evidence of acute intracranial hemorrhage. Posterior fossa is within normal limits Paranasal sinuses: Patchy ethmoid air cell disease, chronic ethmoid sinusitis. Orbits: Normal Mastoids and Temporal Bones: Normal Skull base and Bony calvarium remains intact, no destructive lesions     1. Atrophic changes, otherwise normal brain with no evidence of acute hemorrhage or extra-axial fluid collections.  2. Chronic patchy right ethmoid sinusitis    XR CHEST PORTABLE    Result Date: 7/21/2023  XR CHEST PORTABLE Indication: altered COMPARISON: 11/25/2022 Findings: AP upright view of the chest was obtained. The heart is stably enlarged. There is bilateral pulmonary vascular and interstitial prominence. No confluent infiltrate, effusion or pneumothorax. Bilateral pulmonary vascular and interstitial prominence. Findings can be seen in the setting of pulmonary edema/vascular congestion.         Electronically signed by Siva Partida MD on 7/21/2023 at 11:56 PM

## 2023-07-22 NOTE — PROGRESS NOTES
Pt A&Ox4, continent of bowel and bladder, voids via urinal. Denied any pain. Seizure and fall precautions in place. Denied any further needs at time.  Call device within reach

## 2023-07-22 NOTE — PROGRESS NOTES
4 Eyes Admission Assessment     I agree as the admission nurse that 2 RN's have performed a thorough Head to Toe Skin Assessment on the patient. ALL assessment sites listed below have been assessed on admission. Areas assessed by both nurses: yes  [x]   Head, Face, and Ears   [x]   Shoulders, Back, and Chest  [x]   Arms, Elbows, and Hands   [x]   Coccyx, Sacrum, and Ischum- blanchable redness, redness to abd  [x]   Legs, Feet, and Heels        Does the Patient have Skin Breakdown?   No         James Prevention initiated:  NA   Wound Care Orders initiated:  NA      WOC nurse consulted for Pressure Injury (Stage 3,4, Unstageable, DTI, NWPT, and Complex wounds):  NA      Nurse 1 eSignature: Electronically signed by Moraima Ro RN on 7/22/23 at 6:10 AM EDT    **SHARE this note so that the co-signing nurse is able to place an eSignature**    Nurse 2 eSignature: {Esignature:[id number]}

## 2023-07-23 LAB — LAMOTRIGINE SERPL-MCNC: 24 UG/ML (ref 3–15)

## 2023-08-09 ENCOUNTER — APPOINTMENT (OUTPATIENT)
Dept: CT IMAGING | Age: 63
End: 2023-08-09
Payer: MEDICARE

## 2023-08-09 ENCOUNTER — HOSPITAL ENCOUNTER (OUTPATIENT)
Age: 63
Setting detail: OBSERVATION
Discharge: HOME | End: 2023-08-10
Attending: STUDENT IN AN ORGANIZED HEALTH CARE EDUCATION/TRAINING PROGRAM | Admitting: STUDENT IN AN ORGANIZED HEALTH CARE EDUCATION/TRAINING PROGRAM
Payer: MEDICARE

## 2023-08-09 ENCOUNTER — APPOINTMENT (OUTPATIENT)
Dept: GENERAL RADIOLOGY | Age: 63
End: 2023-08-09
Payer: MEDICARE

## 2023-08-09 DIAGNOSIS — G40.909 SEIZURE DISORDER (HCC): Primary | ICD-10-CM

## 2023-08-09 DIAGNOSIS — R41.82 ALTERED MENTAL STATUS, UNSPECIFIED ALTERED MENTAL STATUS TYPE: ICD-10-CM

## 2023-08-09 PROBLEM — F44.5 PSYCHIATRIC PSEUDOSEIZURE: Status: ACTIVE | Noted: 2023-08-09

## 2023-08-09 LAB
ALBUMIN SERPL-MCNC: 3.7 G/DL (ref 3.4–5)
ALBUMIN/GLOB SERPL: 1.4 {RATIO} (ref 1.1–2.2)
ALP SERPL-CCNC: 140 U/L (ref 40–129)
ALT SERPL-CCNC: 11 U/L (ref 10–40)
ANION GAP SERPL CALCULATED.3IONS-SCNC: 12 MMOL/L (ref 3–16)
AST SERPL-CCNC: 20 U/L (ref 15–37)
BASOPHILS # BLD: 0.1 K/UL (ref 0–0.2)
BASOPHILS NFR BLD: 1.3 %
BILIRUB SERPL-MCNC: 0.4 MG/DL (ref 0–1)
BILIRUB UR QL STRIP.AUTO: NEGATIVE
BUN SERPL-MCNC: 12 MG/DL (ref 7–20)
CALCIUM SERPL-MCNC: 9 MG/DL (ref 8.3–10.6)
CHLORIDE SERPL-SCNC: 103 MMOL/L (ref 99–110)
CLARITY UR: CLEAR
CO2 SERPL-SCNC: 25 MMOL/L (ref 21–32)
COLOR UR: YELLOW
CREAT SERPL-MCNC: 0.8 MG/DL (ref 0.8–1.3)
DEPRECATED RDW RBC AUTO: 14.5 % (ref 12.4–15.4)
EKG ATRIAL RATE: 75 BPM
EKG DIAGNOSIS: NORMAL
EKG P AXIS: 39 DEGREES
EKG P-R INTERVAL: 196 MS
EKG Q-T INTERVAL: 440 MS
EKG QRS DURATION: 182 MS
EKG QTC CALCULATION (BAZETT): 491 MS
EKG R AXIS: -43 DEGREES
EKG T AXIS: 119 DEGREES
EKG VENTRICULAR RATE: 75 BPM
EOSINOPHIL # BLD: 0.2 K/UL (ref 0–0.6)
EOSINOPHIL NFR BLD: 3.9 %
GFR SERPLBLD CREATININE-BSD FMLA CKD-EPI: >60 ML/MIN/{1.73_M2}
GLUCOSE BLD-MCNC: 98 MG/DL (ref 70–99)
GLUCOSE SERPL-MCNC: 100 MG/DL (ref 70–99)
GLUCOSE UR STRIP.AUTO-MCNC: NEGATIVE MG/DL
HCT VFR BLD AUTO: 41 % (ref 40.5–52.5)
HGB BLD-MCNC: 13.8 G/DL (ref 13.5–17.5)
HGB UR QL STRIP.AUTO: NEGATIVE
KETONES UR STRIP.AUTO-MCNC: NEGATIVE MG/DL
LACTATE BLDV-SCNC: 1.3 MMOL/L (ref 0.4–1.9)
LACTATE BLDV-SCNC: 1.5 MMOL/L (ref 0.4–1.9)
LEUKOCYTE ESTERASE UR QL STRIP.AUTO: NEGATIVE
LEVETIRACETAM SERPL-MCNC: 28.6 UG/ML (ref 6–46)
LYMPHOCYTES # BLD: 2 K/UL (ref 1–5.1)
LYMPHOCYTES NFR BLD: 37.2 %
MCH RBC QN AUTO: 32.4 PG (ref 26–34)
MCHC RBC AUTO-ENTMCNC: 33.7 G/DL (ref 31–36)
MCV RBC AUTO: 96.3 FL (ref 80–100)
MEDICATION DOSE-MCNC: NORMAL
MONOCYTES # BLD: 0.4 K/UL (ref 0–1.3)
MONOCYTES NFR BLD: 6.5 %
NEUTROPHILS # BLD: 2.8 K/UL (ref 1.7–7.7)
NEUTROPHILS NFR BLD: 51.1 %
NITRITE UR QL STRIP.AUTO: NEGATIVE
PERFORMED ON: NORMAL
PH UR STRIP.AUTO: 6 [PH] (ref 5–8)
PLATELET # BLD AUTO: 182 K/UL (ref 135–450)
PMV BLD AUTO: 8.9 FL (ref 5–10.5)
POTASSIUM SERPL-SCNC: 3.9 MMOL/L (ref 3.5–5.1)
PROT SERPL-MCNC: 6.4 G/DL (ref 6.4–8.2)
PROT UR STRIP.AUTO-MCNC: 30 MG/DL
RBC # BLD AUTO: 4.26 M/UL (ref 4.2–5.9)
RBC #/AREA URNS HPF: NORMAL /HPF (ref 0–4)
SODIUM SERPL-SCNC: 140 MMOL/L (ref 136–145)
SP GR UR STRIP.AUTO: >=1.03 (ref 1–1.03)
UA COMPLETE W REFLEX CULTURE PNL UR: ABNORMAL
UA DIPSTICK W REFLEX MICRO PNL UR: YES
URN SPEC COLLECT METH UR: ABNORMAL
UROBILINOGEN UR STRIP-ACNC: 0.2 E.U./DL
WBC # BLD AUTO: 5.4 K/UL (ref 4–11)
WBC #/AREA URNS HPF: NORMAL /HPF (ref 0–5)

## 2023-08-09 PROCEDURE — 83605 ASSAY OF LACTIC ACID: CPT

## 2023-08-09 PROCEDURE — 93005 ELECTROCARDIOGRAM TRACING: CPT | Performed by: STUDENT IN AN ORGANIZED HEALTH CARE EDUCATION/TRAINING PROGRAM

## 2023-08-09 PROCEDURE — 71045 X-RAY EXAM CHEST 1 VIEW: CPT

## 2023-08-09 PROCEDURE — 36415 COLL VENOUS BLD VENIPUNCTURE: CPT

## 2023-08-09 PROCEDURE — 85025 COMPLETE CBC W/AUTO DIFF WBC: CPT

## 2023-08-09 PROCEDURE — G0378 HOSPITAL OBSERVATION PER HR: HCPCS

## 2023-08-09 PROCEDURE — 99285 EMERGENCY DEPT VISIT HI MDM: CPT

## 2023-08-09 PROCEDURE — 81001 URINALYSIS AUTO W/SCOPE: CPT

## 2023-08-09 PROCEDURE — 80177 DRUG SCRN QUAN LEVETIRACETAM: CPT

## 2023-08-09 PROCEDURE — 80053 COMPREHEN METABOLIC PANEL: CPT

## 2023-08-09 PROCEDURE — 70450 CT HEAD/BRAIN W/O DYE: CPT

## 2023-08-09 PROCEDURE — 84443 ASSAY THYROID STIM HORMONE: CPT

## 2023-08-09 PROCEDURE — 95717 EEG PHYS/QHP 2-12 HR W/O VID: CPT | Performed by: PSYCHIATRY & NEUROLOGY

## 2023-08-09 RX ORDER — LORAZEPAM 2 MG/ML
1 INJECTION INTRAMUSCULAR ONCE
Status: CANCELLED | OUTPATIENT
Start: 2023-08-09 | End: 2023-08-09

## 2023-08-09 NOTE — ED PROVIDER NOTES
[facility] ENCOUNTER          ATTENDING PHYSICIAN NOTE       Date of evaluation: 8/9/2023    Chief Complaint     Seizures (Patient lived in an assisted living complex and arrived via EMS. EMS stated that the staff told them that he was shaking and had some seizure like activity. EMS was unable to get him to answer any questions and wincing.)    History of Present Illness     Sunil Park is a 63 y.o. male with pertinent PMHx including some developmental delay and cognitive impairment, seizure disorder, PNES, diabetes, hypertension, hyperlipidemia, CAD's, CHF who presents from his independent living facility with concerns for altered mental status. History is very limited as patient is unable to provide any information and unfortunately, it is unknown exactly what facility the patient is coming from so collateral information cannot initially be obtained. Per EMS, they have been called to this patient's apartment multiple times for similar complaints and he appears to be acting relatively the same as in those previous episodes. They said they found him on the floor, having some abnormal but not rhythmic motor movements of all 4 extremities as well as some wincing, grimacing and crying. Glucose in route was normal as were all vital signs. No interventions were given. Collateral information later obtained from the patient's sister and [facility]. She states that the patient's lives independently in an apartment where there are staff available during daytime hours who are able to check on him but he manages his own medications and completes all ADLs. He is wheelchair-bound at baseline. She speaks with him via FaceTime twice daily in order to help facilitate taking his medications and spoke with him earlier today at 10 AM where he seemed to be in his usual state of health.     Nursing notes, PSHx, Social history, current medications and allergies were reviewed as documented in

## 2023-08-10 VITALS
DIASTOLIC BLOOD PRESSURE: 79 MMHG | OXYGEN SATURATION: 94 % | HEART RATE: 71 BPM | HEIGHT: 66 IN | RESPIRATION RATE: 18 BRPM | TEMPERATURE: 98.3 F | SYSTOLIC BLOOD PRESSURE: 139 MMHG | BODY MASS INDEX: 50.62 KG/M2 | WEIGHT: 315 LBS

## 2023-08-10 LAB
ANION GAP SERPL CALCULATED.3IONS-SCNC: 11 MMOL/L (ref 3–16)
BASOPHILS # BLD: 0.1 K/UL (ref 0–0.2)
BASOPHILS NFR BLD: 1 %
BUN SERPL-MCNC: 11 MG/DL (ref 7–20)
CALCIUM SERPL-MCNC: 8.7 MG/DL (ref 8.3–10.6)
CHLORIDE SERPL-SCNC: 104 MMOL/L (ref 99–110)
CO2 SERPL-SCNC: 26 MMOL/L (ref 21–32)
CREAT SERPL-MCNC: 0.8 MG/DL (ref 0.8–1.3)
DEPRECATED RDW RBC AUTO: 14.3 % (ref 12.4–15.4)
EOSINOPHIL # BLD: 0.2 K/UL (ref 0–0.6)
EOSINOPHIL NFR BLD: 3.8 %
GFR SERPLBLD CREATININE-BSD FMLA CKD-EPI: >60 ML/MIN/{1.73_M2}
GLUCOSE BLD-MCNC: 140 MG/DL (ref 70–99)
GLUCOSE BLD-MCNC: 151 MG/DL (ref 70–99)
GLUCOSE BLD-MCNC: 89 MG/DL (ref 70–99)
GLUCOSE SERPL-MCNC: 88 MG/DL (ref 70–99)
HCT VFR BLD AUTO: 37.8 % (ref 40.5–52.5)
HGB BLD-MCNC: 13.1 G/DL (ref 13.5–17.5)
LYMPHOCYTES # BLD: 2 K/UL (ref 1–5.1)
LYMPHOCYTES NFR BLD: 35.2 %
MAGNESIUM SERPL-MCNC: 1.6 MG/DL (ref 1.8–2.4)
MCH RBC QN AUTO: 33 PG (ref 26–34)
MCHC RBC AUTO-ENTMCNC: 34.7 G/DL (ref 31–36)
MCV RBC AUTO: 95.1 FL (ref 80–100)
MONOCYTES # BLD: 0.3 K/UL (ref 0–1.3)
MONOCYTES NFR BLD: 5.6 %
NEUTROPHILS # BLD: 3 K/UL (ref 1.7–7.7)
NEUTROPHILS NFR BLD: 54.4 %
PERFORMED ON: ABNORMAL
PERFORMED ON: ABNORMAL
PERFORMED ON: NORMAL
PLATELET # BLD AUTO: 173 K/UL (ref 135–450)
PMV BLD AUTO: 8.4 FL (ref 5–10.5)
POTASSIUM SERPL-SCNC: 3.5 MMOL/L (ref 3.5–5.1)
RBC # BLD AUTO: 3.97 M/UL (ref 4.2–5.9)
SODIUM SERPL-SCNC: 141 MMOL/L (ref 136–145)
TSH SERPL DL<=0.005 MIU/L-ACNC: 0.72 UIU/ML (ref 0.27–4.2)
WBC # BLD AUTO: 5.6 K/UL (ref 4–11)

## 2023-08-10 PROCEDURE — 97535 SELF CARE MNGMENT TRAINING: CPT

## 2023-08-10 PROCEDURE — 96372 THER/PROPH/DIAG INJ SC/IM: CPT

## 2023-08-10 PROCEDURE — 6370000000 HC RX 637 (ALT 250 FOR IP): Performed by: STUDENT IN AN ORGANIZED HEALTH CARE EDUCATION/TRAINING PROGRAM

## 2023-08-10 PROCEDURE — 6360000002 HC RX W HCPCS: Performed by: HOSPITALIST

## 2023-08-10 PROCEDURE — 97530 THERAPEUTIC ACTIVITIES: CPT

## 2023-08-10 PROCEDURE — 96366 THER/PROPH/DIAG IV INF ADDON: CPT

## 2023-08-10 PROCEDURE — G0378 HOSPITAL OBSERVATION PER HR: HCPCS

## 2023-08-10 PROCEDURE — 2580000003 HC RX 258: Performed by: STUDENT IN AN ORGANIZED HEALTH CARE EDUCATION/TRAINING PROGRAM

## 2023-08-10 PROCEDURE — 80048 BASIC METABOLIC PNL TOTAL CA: CPT

## 2023-08-10 PROCEDURE — 97162 PT EVAL MOD COMPLEX 30 MIN: CPT

## 2023-08-10 PROCEDURE — 36415 COLL VENOUS BLD VENIPUNCTURE: CPT

## 2023-08-10 PROCEDURE — 96365 THER/PROPH/DIAG IV INF INIT: CPT

## 2023-08-10 PROCEDURE — 6360000002 HC RX W HCPCS: Performed by: STUDENT IN AN ORGANIZED HEALTH CARE EDUCATION/TRAINING PROGRAM

## 2023-08-10 PROCEDURE — 85025 COMPLETE CBC W/AUTO DIFF WBC: CPT

## 2023-08-10 PROCEDURE — 83735 ASSAY OF MAGNESIUM: CPT

## 2023-08-10 PROCEDURE — 97166 OT EVAL MOD COMPLEX 45 MIN: CPT

## 2023-08-10 RX ORDER — FAMOTIDINE 20 MG/1
40 TABLET, FILM COATED ORAL DAILY
Status: DISCONTINUED | OUTPATIENT
Start: 2023-08-10 | End: 2023-08-10 | Stop reason: HOSPADM

## 2023-08-10 RX ORDER — LEVETIRACETAM 500 MG/1
1000 TABLET ORAL 2 TIMES DAILY
Status: DISCONTINUED | OUTPATIENT
Start: 2023-08-10 | End: 2023-08-10 | Stop reason: HOSPADM

## 2023-08-10 RX ORDER — ENOXAPARIN SODIUM 100 MG/ML
30 INJECTION SUBCUTANEOUS 2 TIMES DAILY
Status: DISCONTINUED | OUTPATIENT
Start: 2023-08-10 | End: 2023-08-10 | Stop reason: HOSPADM

## 2023-08-10 RX ORDER — LANOLIN ALCOHOL/MO/W.PET/CERES
1000 CREAM (GRAM) TOPICAL DAILY
Status: DISCONTINUED | OUTPATIENT
Start: 2023-08-10 | End: 2023-08-10 | Stop reason: HOSPADM

## 2023-08-10 RX ORDER — ACETAMINOPHEN 325 MG/1
650 TABLET ORAL EVERY 6 HOURS PRN
Status: DISCONTINUED | OUTPATIENT
Start: 2023-08-10 | End: 2023-08-10 | Stop reason: HOSPADM

## 2023-08-10 RX ORDER — INSULIN LISPRO 100 [IU]/ML
0-4 INJECTION, SOLUTION INTRAVENOUS; SUBCUTANEOUS NIGHTLY
Status: DISCONTINUED | OUTPATIENT
Start: 2023-08-10 | End: 2023-08-10 | Stop reason: HOSPADM

## 2023-08-10 RX ORDER — CARVEDILOL 6.25 MG/1
6.25 TABLET ORAL 2 TIMES DAILY WITH MEALS
Status: DISCONTINUED | OUTPATIENT
Start: 2023-08-10 | End: 2023-08-10 | Stop reason: HOSPADM

## 2023-08-10 RX ORDER — SODIUM CHLORIDE 9 MG/ML
INJECTION, SOLUTION INTRAVENOUS PRN
Status: DISCONTINUED | OUTPATIENT
Start: 2023-08-10 | End: 2023-08-10 | Stop reason: HOSPADM

## 2023-08-10 RX ORDER — SODIUM CHLORIDE 0.9 % (FLUSH) 0.9 %
5-40 SYRINGE (ML) INJECTION EVERY 12 HOURS SCHEDULED
Status: DISCONTINUED | OUTPATIENT
Start: 2023-08-10 | End: 2023-08-10 | Stop reason: HOSPADM

## 2023-08-10 RX ORDER — NORTRIPTYLINE HYDROCHLORIDE 25 MG/1
75 CAPSULE ORAL NIGHTLY
Status: DISCONTINUED | OUTPATIENT
Start: 2023-08-10 | End: 2023-08-10 | Stop reason: HOSPADM

## 2023-08-10 RX ORDER — ALLOPURINOL 300 MG/1
300 TABLET ORAL NIGHTLY
Status: DISCONTINUED | OUTPATIENT
Start: 2023-08-10 | End: 2023-08-10 | Stop reason: HOSPADM

## 2023-08-10 RX ORDER — ASPIRIN 81 MG/1
81 TABLET ORAL DAILY
Status: DISCONTINUED | OUTPATIENT
Start: 2023-08-10 | End: 2023-08-10 | Stop reason: HOSPADM

## 2023-08-10 RX ORDER — ACETAMINOPHEN 650 MG/1
650 SUPPOSITORY RECTAL EVERY 6 HOURS PRN
Status: DISCONTINUED | OUTPATIENT
Start: 2023-08-10 | End: 2023-08-10 | Stop reason: HOSPADM

## 2023-08-10 RX ORDER — DEXTROSE MONOHYDRATE 100 MG/ML
INJECTION, SOLUTION INTRAVENOUS CONTINUOUS PRN
Status: DISCONTINUED | OUTPATIENT
Start: 2023-08-10 | End: 2023-08-10 | Stop reason: HOSPADM

## 2023-08-10 RX ORDER — MAGNESIUM SULFATE IN WATER 40 MG/ML
2000 INJECTION, SOLUTION INTRAVENOUS ONCE
Status: COMPLETED | OUTPATIENT
Start: 2023-08-10 | End: 2023-08-10

## 2023-08-10 RX ORDER — CHLORTHALIDONE 25 MG/1
25 TABLET ORAL DAILY
Status: DISCONTINUED | OUTPATIENT
Start: 2023-08-10 | End: 2023-08-10 | Stop reason: HOSPADM

## 2023-08-10 RX ORDER — ATORVASTATIN CALCIUM 80 MG/1
80 TABLET, FILM COATED ORAL NIGHTLY
Status: DISCONTINUED | OUTPATIENT
Start: 2023-08-10 | End: 2023-08-10 | Stop reason: HOSPADM

## 2023-08-10 RX ORDER — SODIUM CHLORIDE 0.9 % (FLUSH) 0.9 %
5-40 SYRINGE (ML) INJECTION PRN
Status: DISCONTINUED | OUTPATIENT
Start: 2023-08-10 | End: 2023-08-10 | Stop reason: HOSPADM

## 2023-08-10 RX ORDER — INSULIN LISPRO 100 [IU]/ML
0-8 INJECTION, SOLUTION INTRAVENOUS; SUBCUTANEOUS
Status: DISCONTINUED | OUTPATIENT
Start: 2023-08-10 | End: 2023-08-10 | Stop reason: HOSPADM

## 2023-08-10 RX ORDER — ONDANSETRON 4 MG/1
4 TABLET, ORALLY DISINTEGRATING ORAL EVERY 8 HOURS PRN
Status: DISCONTINUED | OUTPATIENT
Start: 2023-08-10 | End: 2023-08-10 | Stop reason: HOSPADM

## 2023-08-10 RX ORDER — POLYETHYLENE GLYCOL 3350 17 G/17G
17 POWDER, FOR SOLUTION ORAL DAILY PRN
Status: DISCONTINUED | OUTPATIENT
Start: 2023-08-10 | End: 2023-08-10 | Stop reason: HOSPADM

## 2023-08-10 RX ORDER — DIAZEPAM 5 MG/1
5 TABLET ORAL 3 TIMES DAILY PRN
Status: DISCONTINUED | OUTPATIENT
Start: 2023-08-10 | End: 2023-08-10 | Stop reason: HOSPADM

## 2023-08-10 RX ORDER — ONDANSETRON 2 MG/ML
4 INJECTION INTRAMUSCULAR; INTRAVENOUS EVERY 6 HOURS PRN
Status: DISCONTINUED | OUTPATIENT
Start: 2023-08-10 | End: 2023-08-10 | Stop reason: HOSPADM

## 2023-08-10 RX ORDER — ALBUTEROL SULFATE 2.5 MG/3ML
2.5 SOLUTION RESPIRATORY (INHALATION) EVERY 4 HOURS PRN
Status: DISCONTINUED | OUTPATIENT
Start: 2023-08-10 | End: 2023-08-10 | Stop reason: HOSPADM

## 2023-08-10 RX ADMIN — CARVEDILOL 6.25 MG: 6.25 TABLET, FILM COATED ORAL at 08:39

## 2023-08-10 RX ADMIN — Medication 10 ML: at 08:44

## 2023-08-10 RX ADMIN — LEVETIRACETAM 1000 MG: 500 TABLET, FILM COATED ORAL at 08:40

## 2023-08-10 RX ADMIN — ENOXAPARIN SODIUM 30 MG: 100 INJECTION SUBCUTANEOUS at 08:41

## 2023-08-10 RX ADMIN — SACUBITRIL AND VALSARTAN 1 TABLET: 97; 103 TABLET, FILM COATED ORAL at 03:28

## 2023-08-10 RX ADMIN — ASPIRIN 81 MG: 81 TABLET, COATED ORAL at 08:40

## 2023-08-10 RX ADMIN — CHLORTHALIDONE 25 MG: 25 TABLET ORAL at 08:40

## 2023-08-10 RX ADMIN — FAMOTIDINE 40 MG: 20 TABLET, FILM COATED ORAL at 08:40

## 2023-08-10 RX ADMIN — LEVETIRACETAM 1000 MG: 500 TABLET, FILM COATED ORAL at 03:28

## 2023-08-10 RX ADMIN — CYANOCOBALAMIN TAB 1000 MCG 1000 MCG: 1000 TAB at 08:40

## 2023-08-10 RX ADMIN — MAGNESIUM SULFATE HEPTAHYDRATE 2000 MG: 40 INJECTION, SOLUTION INTRAVENOUS at 13:19

## 2023-08-10 RX ADMIN — SACUBITRIL AND VALSARTAN 1 TABLET: 97; 103 TABLET, FILM COATED ORAL at 08:45

## 2023-08-10 NOTE — H&P
08/09/2023 03:13 PM    WBCUA 0-2 08/09/2023 03:13 PM    RBCUA 0-2 08/09/2023 03:13 PM    MUCUS 2+ 12/10/2018 05:10 PM    BACTERIA 1+ 05/14/2023 07:00 PM    CLARITYU Clear 08/09/2023 03:13 PM    SPECGRAV >=1.030 08/09/2023 03:13 PM    LEUKOCYTESUR Negative 08/09/2023 03:13 PM    UROBILINOGEN 0.2 08/09/2023 03:13 PM    BILIRUBINUR Negative 08/09/2023 03:13 PM    BLOODU Negative 08/09/2023 03:13 PM    GLUCOSEU Negative 08/09/2023 03:13 PM    KETUA Negative 08/09/2023 03:13 PM     Urine Cultures:   Lab Results   Component Value Date/Time    LABURIN No growth at 18-36 hours 07/19/2017 06:40 PM     Blood Cultures: No results found for: BC  No results found for: BLOODCULT2  Organism: No results found for: ORG    Imaging/Diagnostics Last 24 Hours   CT Head W/O Contrast    Result Date: 8/9/2023  CT HEAD WO CONTRAST HISTORY: 63 years Male; \"persistent AMS\" COMPARISON: CT brain 7/21/2023 TECHNIQUE: Axial CT imaging obtained from vertex to skull base. Axial images and multiplanar reformatted images reviewed. Up-to-date CT equipment and radiation dose reduction techniques were employed. IV Contrast: None. FINDINGS: No acute intracranial hemorrhage, mass effect, midline shift, or hydrocephalus. Gray-white matter differentiation is within normal limits. Ventricles and sulci are prominent compatible with cerebral volume loss. Basal cisterns are clear. Calvarium is intact. Mild paranasal sinus mucosal thickening. Visualized mastoid air cells are clear. No acute intracranial hemorrhage or mass effect. Electronically signed by Adelso Hackett MD    XR CHEST PORTABLE    Result Date: 8/9/2023  EXAM: XR CHEST PORTABLE. DATE: 8/9/2023 3:17 PM. INDICATION: AMS COMPARISON: Chest x-ray 7/21/2023 Limitations: Patient rotation, left apex excluded FINDINGS: Support devices: None Cardiomediastinum: Cardiomediastinal silhouette normal. Lungs and Pleura: No focal consolidation, pleural fluid collection, or pneumothorax.  Bone and soft tissues: No acute osseous abnormality.       No acute cardiopulmonary abnormality        Electronically signed by Rosita Vizcarra MD on 8/10/2023 at 3:35 AM

## 2023-08-10 NOTE — ED NOTES
I spoke to patient POA to give her an update and to get a better understanding of the patients baseline. Patient lives in the [facility] that houses patients with mental handicaps. Patient typically does not verbally respond but will follow commands when asked following seizures. She stated that the grimacing is normal because he \"feels like he brings on the seizures\".  POA was handed off to Lou Arguelles MD.      Calvin Perez RN  08/09/23 2028       Calvin Perez RN  08/09/23 3540

## 2023-08-10 NOTE — PROGRESS NOTES
Patient is discharging home. Discharge instructions and medication instructions were discussed. Patient verbalized understanding. IV was removed with no complications noted.

## 2023-08-10 NOTE — PROGRESS NOTES
Physical Therapy  Facility/Department: [address]  Physical Therapy Initial Assessment and Treatment    Name: Tawni Habermann  : 1960  MRN: [id number]  Date of Service: 8/10/2023    Discharge Recommendations:    Tawni Habermann scored a 13/ on the AM-PAC short mobility form. Current research shows that an AM-PAC score of 17 or less is typically not associated with a discharge to the patient's home setting. Based on the patient's AM-PAC score and their current functional mobility deficits, it is recommended that the patient have 3-5 sessions per week of Physical Therapy at d/c to increase the patient's independence. Please see assessment section for further patient specific details. If patient discharges prior to next session this note will serve as a discharge summary. Please see below for the latest assessment towards goals. PT Equipment Recommendations  Equipment Needed: No      Patient Diagnosis(es): The primary encounter diagnosis was Seizure disorder ([address]). A diagnosis of Altered mental status, unspecified altered mental status type was also pertinent to this visit. Past Medical History:  has a past medical history of Arthritis, Asthma, Bronchitis, CAD (coronary artery disease), CHF (congestive heart failure) ([address]), Chronic back pain, Developmental delay, Diabetes mellitus ([address]), Generalized headaches, Gout, History of tremor, Hypertension, MI, old, Morbid obesity ([address]), Psychiatric pseudoseizure, Psychogenic nonepileptic seizure, and Seizures ([address]). Past Surgical History:  has a past surgical history that includes eye surgery. Assessment   Body Structures, Functions, Activity Limitations Requiring Skilled Therapeutic Intervention: Decreased functional mobility ; Decreased strength;Decreased balance  Assessment: Pt reports he feels close to his baseline however needing more assist than normal.  Pt reports normally independent with transfers and ADLs.   Currently needing SBA for

## 2023-08-10 NOTE — PROGRESS NOTES
4 Eyes Skin Assessment     NAME:  Saranya Burgess OF BIRTH:  1960  MEDICAL RECORD NUMBER:  [id number]    The patient is being assessed for  Admission    I agree that at least one RN has performed a thorough Head to Toe Skin Assessment on the patient. ALL assessment sites listed below have been assessed. Areas assessed by both nurses:    Head, Face, Ears, Shoulders, Back, Chest, Arms, Elbows, Hands, Sacrum. Buttock, Coccyx, Ischium, Legs. Feet and Heels, Under Medical Devices , and Other ***        Does the Patient have a Wound?  No noted wound(s)       James Prevention initiated by RN: Yes  Wound Care Orders initiated by RN: No    Pressure Injury (Stage 3,4, Unstageable, DTI, NWPT, and Complex wounds) if present, place Wound referral order by RN under : No    New Ostomies, if present place, Ostomy referral order under : No     Nurse 1 eSignature: Electronically signed by Molly Velasquez RN on 8/10/23 at 6:01 AM EDT    **SHARE this note so that the co-signing nurse can place an eSignature**    Nurse 2 eSignature: {Esignature:[id number]}

## 2023-08-10 NOTE — DISCHARGE INSTRUCTIONS
Extra Heart Failure sites:     https://Noemalife. com/publication/?d=601648   --- this is American Heart Association interactive Healthier Living with Heart Failure guidebook. Please click hyperlink or copy / paste link into search bar. Use your mouse to scroll through the pages. Lots of information about weight monitoring, diet tips, activity, meds, etc    HF Sebec chani  -- this is a free smart phone chani available for iPhone and Android download. Use your phone to track sodium / fluid intake, zone tool symptom tracking, weights, medications, etc. Click on this hyperlink  HF Sebec Chani   for QR code for easy download. DASH (Dietary Approach to Stop Hypertension) diet --  [email or web address].no -- this diet is a flexible eating plan that promotes heart healthy eating style. Click on hyperlink or copy / paste link into search bar. Lots of low sodium recipes and tips.     [email or web address].ca  -- more free recipes

## 2023-08-10 NOTE — ED NOTES
ED TO INPATIENT SBAR HANDOFF    Patient Name: Malena Cardoso   :  1960  63 y.o. MRN:  [id number]  Preferred Name  [facility]  ED Room #:  R37/Y91-62  Family/Caregiver Present no   Restraints no   Sitter no   Sepsis Risk Score Sepsis Risk Score: 0.93    Situation  Code Status: Prior No additional code details. Allergies: Doxycycline, Ibuprofen, Penicillins, Phenobarbital, Tetracycline, and Aspirin  Weight: Patient Vitals for the past 96 hrs (Last 3 readings):   Weight   23 1512 (!) 327 lb (148.3 kg)     Arrived from: home  Chief Complaint:   Chief Complaint   Patient presents with    Seizures     Patient lived in an assisted living complex and arrived via EMS. EMS stated that the staff told them that he was shaking and had some seizure like activity. EMS was unable to get him to answer any questions and wincing. Hospital Problem/Diagnosis:  Principal Problem:    Psychiatric pseudoseizure  Resolved Problems:    * No resolved hospital problems. *    Imaging:   CT Head W/O Contrast   Final Result      No acute intracranial hemorrhage or mass effect.       Electronically signed by Erin Castro MD      XR CHEST PORTABLE   Final Result    No acute cardiopulmonary abnormality        Abnormal labs:   Abnormal Labs Reviewed   COMPREHENSIVE METABOLIC PANEL W/ REFLEX TO MG FOR LOW K - Abnormal; Notable for the following components:       Result Value    Glucose 100 (*)     Alkaline Phosphatase 140 (*)     All other components within normal limits   URINALYSIS WITH REFLEX TO CULTURE - Abnormal; Notable for the following components:    Protein, UA 30 (*)     All other components within normal limits     Critical values: no     Abnormal Assessment Findings: postictal when he first arrived but is not alert and oriented x4    Background  History:   Past Medical History:   Diagnosis Date    Arthritis     Asthma     Bronchitis     CAD (coronary artery disease)     CHF (congestive heart failure) (HCC)     Chronic back

## 2023-08-10 NOTE — CARE COORDINATION
Case Management Assessment            Discharge Note                    Date / Time of Note: 8/10/2023 1:35 PM                  Discharge Note Completed by: KATHARINA Emmanuel LSW    Patient Name: Charisse Fisher   YOB: 1960  Diagnosis: Psychiatric pseudoseizure [F44.5]  Seizure disorder ([address]) [F28.102]  Altered mental status, unspecified altered mental status type [R41.82]   Date / Time: 8/9/2023  2:20 PM    Current PCP: [address] [place] [address] patient: No    Hospitalization in the last 30 days: Yes    Readmission Assessment  Number of Days since last admission?: 8-30 days  Previous Disposition: Home Alone (and SAY services/supervision)  Who is being Interviewed: Patient  What was the patient's/caregiver's perception as to why they think they needed to return back to the hospital?: Other (Comment) (new medical issue)  Did you visit your Primary Care Physician after you left the hospital, before you returned this time?: No  Why weren't you able to visit your PCP?: Did not have an appointment  Did you see a specialist, such as Cardiac, Pulmonary, Orthopedic Physician, etc. after you left the hospital?: No  Who advised the patient to return to the hospital?: Other Nurse (Navigator, CTN)  Does the patient report anything that got in the way of taking their medications?: No  In our efforts to provide the best possible care to you and others like you, can you think of anything that we could have done to help you after you left the hospital the first time, so that you might not have needed to return so soon?: Other (Comment) (NA)        Advance Directives:  Code Status: Full Code  [place] DNR form completed and on chart: Yes    Financial:  Payor: MEDICARE / Plan: MEDICARE PART A AND B / Product Type: *No Product type* /      Pharmacy:    [facility]/pharmacy #[id number] -  [phone]  [address] [place] [address]  Phone: [phone] Fax:

## 2023-08-10 NOTE — ED NOTES
POA stated that the patient is typically self sufficient and lives alone. He gets around in an electric wheel chair.       Margarita Coon RN  08/09/23 3490       Margarita Coon RN  08/09/23 2938

## 2023-08-10 NOTE — PROGRESS NOTES
Occupational Therapy  Facility/Department: [address] [place] [address]  Occupational Therapy Initial Assessment/Tx    Name: Greg Molina  : 1960  MRN: [id number]  Date of Service: 8/10/2023    Discharge Recommendations: Greg Molina scored a 14/24 on the AM-PAC ADL Inpatient form. Current research shows that an AM-PAC score of 17 or less is typically not associated with a discharge to the patient's home setting. Based on the patient's AM-PAC score and their current ADL deficits, it is recommended that the patient have 3-5 sessions per week of Occupational Therapy at d/c to increase the patient's independence. Please see assessment section for further patient specific details. If patient discharges prior to next session this note will serve as a discharge summary. Please see below for the latest assessment towards goals. If home, recommend initial 24hr and HOME HEALTH CARE: LEVEL 1 STANDARD    - Initial home health evaluation to occur within 24-48 hours, in patient home   - Therapy to evaluate with goal of regaining prior level of functioning   - Therapy to evaluate if patient has [address] needs for personal care       OT Equipment Recommendations  Equipment Needed: No  Other: defer ; pt has most DME in home setting       Patient Diagnosis(es): The primary encounter diagnosis was Seizure disorder ([address]). A diagnosis of Altered mental status, unspecified altered mental status type was also pertinent to this visit. Past Medical History:  has a past medical history of Arthritis, Asthma, Bronchitis, CAD (coronary artery disease), CHF (congestive heart failure) ([address]), Chronic back pain, Developmental delay, Diabetes mellitus ([address]), Generalized headaches, Gout, History of tremor, Hypertension, MI, old, Morbid obesity ([address]), Psychiatric pseudoseizure, Psychogenic nonepileptic seizure, and Seizures ([address]). Past Surgical History:  has a past surgical history that includes eye surgery.     Treatment Clinical Factors: for socks and brief  Toileting: Dependent/Total  Toileting Skilled Clinical Factors: pt in urine soaked brief upon OT arrival with purwick; pt needed total A to exchange brief; pt reports minimal success with urinal and states, \"that's why I have to go to the toilet at home\"  Additional Comments: pt is w/c level at baseline           Transfers  Stand Pivot Transfers: Minimal assistance;Contact guard assistance;2 Person assistance (bed to chair)  Sit to stand:  Moderate assistance;2 Person assistance (from recliner chair ; failed attempt on first trial)  Stand to sit: Moderate assistance;2 Person assistance (quick descend)  Vision  Vision: Impaired  Vision Exceptions: Wears glasses at all times  Hearing  Hearing: Within functional limits  Cognition  Overall Cognitive Status: WFL  Cognition Comment: decreased awareness of deficits  Orientation  Overall Orientation Status: Within Functional Limits                  Education Given To: Patient  Education Provided: Role of Therapy;Plan of Care  Education Method: Demonstration;Verbal  Barriers to Learning: None  Education Outcome: Verbalized understanding                                                                          AM-PAC Score        AM-PAC Inpatient Daily Activity Raw Score: 14 (08/10/23 1409)  AM-PAC Inpatient ADL T-Scale Score : 33.39 (08/10/23 1409)  ADL Inpatient CMS 0-100% Score: 59.67 (08/10/23 1409)  ADL Inpatient CMS G-Code Modifier : CK (08/10/23 1409)           Goals  Short Term Goals  Time Frame for Short Term Goals: by discharge  Short Term Goal 1: pt will perform toileting with min A  Short Term Goal 2: pt will perform toilet tx with min A  Short Term Goal 3: pt will perform LB dressing with min A  Patient Goals   Patient goals : to go home       Therapy Time   Individual Concurrent Group Co-treatment   Time In 1321         Time Out 1404         Minutes 43         Timed Code Treatment Minutes: 28 Minutes (+ 15 min OT eval)

## 2023-08-10 NOTE — PLAN OF CARE
Problem: Discharge Planning  Goal: Discharge to home or other facility with appropriate resources  Outcome: Adequate for Discharge  Flowsheets (Taken 8/10/2023 0115 by Henrique Stokes RN)  Discharge to home or other facility with appropriate resources: Identify barriers to discharge with patient and caregiver     Problem: Safety - Adult  Goal: Free from fall injury  Outcome: Adequate for Discharge     Problem: Skin/Tissue Integrity  Goal: Absence of new skin breakdown  Description: 1. Monitor for areas of redness and/or skin breakdown  2. Assess vascular access sites hourly  3. Every 4-6 hours minimum:  Change oxygen saturation probe site  4. Every 4-6 hours:  If on nasal continuous positive airway pressure, respiratory therapy assess nares and determine need for appliance change or resting period.   Outcome: Adequate for Discharge

## 2023-08-10 NOTE — PROGRESS NOTES
V2.0    [facility] Progress Note      Name:  Ever Moody /Age/Sex: 1960  (63 y.o. male)   MRN & CSN:  [id number] & [id number] Encounter Date/Time: 8/10/2023 12:44 PM EDT   Location:  9084/8576-50 PCP: Pasha George, [address] [address] Day: 2    Assessment and Recommendations     Patient is a 61-year-old male past medical history of psychiatric pseudoseizure, anxiety, history of seizure on Keppra, MRDD, systolic congestive heart failure, diabetes, morbid obesity who was admitted for altered mental status and pseudoseizure disorder. Patient was primary excepted for admission for overnight observation since he lives alone. Patient now remains medically stable for discharge pending PT/OT eval for SNF placement versus home with home care. #.  Altered mental status: Resolved  #. History of psychiatric pseudoseizure disorder  3. History of seizures on Keppra  #. History of anxiety  #. Other chronic medical problems include. Systolic congestive heart failure, MRDD, diabetes, morbid obesity    Plan:  Patient is awake and alert and engaging in conversation  Abdomen status resolved  Continue home medications for chronic medical problems  Will resume all her home medications upon DC  Consult PT/OT  Depending on PT/OT eval, patient may need SNF placement for rehab. Patient now remains medically stable for discharge. DVT Prophylaxis: Lovenox. Code Status: Total Support. Barrier to DC: Now remains medically stable for discharge. Subjective:     Patient was seen and examined today. No acute events overnight. Patient paula afebrile with stable vital signs. Review of Systems:      Pertinent positives and negatives discussed in HPI    Objective:      Intake/Output Summary (Last 24 hours) at 8/10/2023 1244  Last data filed at 8/10/2023 1202  Gross per 24 hour   Intake 360 ml   Output --   Net 360 ml      Vitals:   Vitals:    08/10/23 0157 08/10/23 0410 08/10/23 0801 hours.  UA:  Lab Results   Component Value Date/Time    NITRU Negative 08/09/2023 03:13 PM    COLORU Yellow 08/09/2023 03:13 PM    PHUR 6.0 08/09/2023 03:13 PM    WBCUA 0-2 08/09/2023 03:13 PM    RBCUA 0-2 08/09/2023 03:13 PM    MUCUS 2+ 12/10/2018 05:10 PM    BACTERIA 1+ 05/14/2023 07:00 PM    CLARITYU Clear 08/09/2023 03:13 PM    SPECGRAV >=1.030 08/09/2023 03:13 PM    LEUKOCYTESUR Negative 08/09/2023 03:13 PM    UROBILINOGEN 0.2 08/09/2023 03:13 PM    BILIRUBINUR Negative 08/09/2023 03:13 PM    BLOODU Negative 08/09/2023 03:13 PM    GLUCOSEU Negative 08/09/2023 03:13 PM    KETUA Negative 08/09/2023 03:13 PM     Urine Cultures:   Lab Results   Component Value Date/Time    LABURIN No growth at 18-36 hours 07/19/2017 06:40 PM     Blood Cultures: No results found for: BC  No results found for: BLOODCULT2  Organism: No results found for: [facility]      Electronically signed by Eva Peña MD on 8/10/2023 at 12:44 PM

## 2023-08-10 NOTE — PROCEDURES
INTERPRETATION:  This 4-hour, rapid 8-channel EEG recording is abnormal.  It showed mild to moderate degree of continuous generalized slowing background activity. No potentially epileptiform activity was present during the recording. CLASSIFICATION:  Dysrhythmia grade 2, generalized. DESCRIPTION:  BACKGROUND:  The EEG background showed continuous generalized low amplitude intermixed 2 to 7 Hz theta/delta activity. The EEG showed fair variability and reactivity.     INTERICTAL DISCHARGES: None    SEIZURE BURDEN: 0%

## 2023-08-11 NOTE — DISCHARGE SUMMARY
V2.0  Discharge Summary    Name:  Burak Branch /Age/Sex: 1960 (63 y.o. male)   Admit Date: 2023  Discharge Date: 8/10/23    MRN & CSN:  [id number] & [id number] Encounter Date and Time 23 10:03 AM EDT    Attending:  No att. providers found Discharging Provider: Carline Dennis MD       Hospital Course:     Patient is a 82-year-old male past medical history of psychiatric pseudoseizure, anxiety, history of seizure on Keppra, MRDD, systolic congestive heart failure, diabetes, morbid obesity who was admitted for altered mental status and pseudoseizure disorder. Patient was primary accepted for admission for overnight observation since he lives alone. he was evaluated by PT/OT. He is at his baseline mentation. He now remains medically stable for DC. He was advised to follow up with his pcp for transition of care. #.  Altered mental status: Resolved  #. History of psychiatric pseudoseizure disorder  3. History of seizures on Keppra  #. History of anxiety  #. Other chronic medical problems include. Systolic congestive heart failure, MRDD, diabetes, morbid obesity    His home medications were resumed upon DC.      Discharge Instruction:     Primary care physician: Tutu Swain within 2 weeks  Diet: cardiac diet   Activity: activity as tolerated  Disposition: Discharged to:   []Home, [x][facility], []SNF, []Acute Rehab, []Hospice   Condition on discharge: Stable    Discharge Medications:        Medication List        CONTINUE taking these medications      * albuterol sulfate  (90 Base) MCG/ACT inhaler  Commonly known as: PROVENTIL;VENTOLIN;PROAIR     allopurinol 300 MG tablet  Commonly known as: ZYLOPRIM     aspirin 81 MG EC tablet     atorvastatin 80 MG tablet  Commonly known as: LIPITOR     carvedilol 6.25 MG tablet  Commonly known as: COREG     chlorthalidone 25 MG tablet  Commonly known as: HYGROTON     cyanocobalamin 1000 MCG tablet     Daily Cookie Tabs     diazePAM 5 MG

## 2023-08-23 ENCOUNTER — HOSPITAL ENCOUNTER (EMERGENCY)
Age: 63
Discharge: HOME | End: 2023-08-23
Attending: EMERGENCY MEDICINE
Payer: MEDICARE

## 2023-08-23 VITALS
HEART RATE: 70 BPM | SYSTOLIC BLOOD PRESSURE: 171 MMHG | RESPIRATION RATE: 18 BRPM | OXYGEN SATURATION: 96 % | TEMPERATURE: 98.3 F | DIASTOLIC BLOOD PRESSURE: 81 MMHG

## 2023-08-23 DIAGNOSIS — T23.261A PARTIAL THICKNESS BURN OF BACK OF RIGHT HAND, INITIAL ENCOUNTER: Primary | ICD-10-CM

## 2023-08-23 PROCEDURE — 99283 EMERGENCY DEPT VISIT LOW MDM: CPT

## 2023-08-23 PROCEDURE — 6370000000 HC RX 637 (ALT 250 FOR IP): Performed by: EMERGENCY MEDICINE

## 2023-08-23 RX ORDER — BACITRACIN ZINC AND POLYMYXIN B SULFATE 500; 1000 [USP'U]/G; [USP'U]/G
OINTMENT TOPICAL ONCE
Status: COMPLETED | OUTPATIENT
Start: 2023-08-23 | End: 2023-08-23

## 2023-08-23 RX ADMIN — BACITRACIN ZINC AND POLYMYXIN B SULFATE: 500; 10000 OINTMENT TOPICAL at 01:01

## 2023-08-23 ASSESSMENT — ENCOUNTER SYMPTOMS
RESPIRATORY NEGATIVE: 1
ROS SKIN COMMENTS: SEE HPI
EYES NEGATIVE: 1
GASTROINTESTINAL NEGATIVE: 1

## 2023-08-23 ASSESSMENT — PAIN SCALES - GENERAL: PAINLEVEL_OUTOF10: 9

## 2023-08-23 ASSESSMENT — PAIN DESCRIPTION - ORIENTATION: ORIENTATION: RIGHT

## 2023-08-23 ASSESSMENT — PAIN - FUNCTIONAL ASSESSMENT: PAIN_FUNCTIONAL_ASSESSMENT: 0-10

## 2023-08-23 ASSESSMENT — PAIN DESCRIPTION - LOCATION: LOCATION: HAND

## 2023-08-23 NOTE — DISCHARGE INSTRUCTIONS
Use dressings with antibiotic ointment and Vaseline gauze twice daily until the skin has healed. Use Tylenol or ibuprofen as needed for pain. Follow-up with your primary care provider as needed. Return to the emergency department for redness around the wound, fevers, or any other concerns.

## 2023-11-06 ENCOUNTER — HOSPITAL ENCOUNTER (EMERGENCY)
Age: 63
Discharge: HOME | End: 2023-11-06
Attending: EMERGENCY MEDICINE
Payer: MEDICARE

## 2023-11-06 VITALS
OXYGEN SATURATION: 98 % | SYSTOLIC BLOOD PRESSURE: 166 MMHG | TEMPERATURE: 98.1 F | DIASTOLIC BLOOD PRESSURE: 86 MMHG | HEART RATE: 71 BPM | BODY MASS INDEX: 50.62 KG/M2 | RESPIRATION RATE: 19 BRPM | HEIGHT: 66 IN | WEIGHT: 315 LBS

## 2023-11-06 DIAGNOSIS — R53.81 PHYSICAL DECONDITIONING: ICD-10-CM

## 2023-11-06 DIAGNOSIS — F44.5 PSYCHOGENIC NONEPILEPTIC SEIZURE: Primary | ICD-10-CM

## 2023-11-06 LAB
ANION GAP SERPL CALCULATED.3IONS-SCNC: 12 MMOL/L (ref 3–16)
BASE EXCESS BLDV CALC-SCNC: 4.1 MMOL/L (ref -2–3)
BASOPHILS # BLD: 0 K/UL (ref 0–0.2)
BASOPHILS NFR BLD: 0.8 %
BUN SERPL-MCNC: 11 MG/DL (ref 7–20)
CALCIUM SERPL-MCNC: 9 MG/DL (ref 8.3–10.6)
CHLORIDE SERPL-SCNC: 102 MMOL/L (ref 99–110)
CO2 BLDV-SCNC: 33 MMOL/L
CO2 SERPL-SCNC: 27 MMOL/L (ref 21–32)
COHGB MFR BLDV: 1.2 % (ref 0–1.5)
CREAT SERPL-MCNC: 0.9 MG/DL (ref 0.8–1.3)
DEPRECATED RDW RBC AUTO: 14.5 % (ref 12.4–15.4)
DO-HGB MFR BLDV: 41 %
EOSINOPHIL # BLD: 0.2 K/UL (ref 0–0.6)
EOSINOPHIL NFR BLD: 4 %
GFR SERPLBLD CREATININE-BSD FMLA CKD-EPI: >60 ML/MIN/{1.73_M2}
GLUCOSE SERPL-MCNC: 133 MG/DL (ref 70–99)
HCO3 BLDV-SCNC: 31.2 MMOL/L (ref 24–28)
HCT VFR BLD AUTO: 42 % (ref 40.5–52.5)
HGB BLD-MCNC: 14.1 G/DL (ref 13.5–17.5)
LACTATE BLDV-SCNC: 2.2 MMOL/L (ref 0.4–2)
LEVETIRACETAM SERPL-MCNC: 32.8 UG/ML (ref 6–46)
LYMPHOCYTES # BLD: 1.8 K/UL (ref 1–5.1)
LYMPHOCYTES NFR BLD: 33.1 %
MCH RBC QN AUTO: 32.7 PG (ref 26–34)
MCHC RBC AUTO-ENTMCNC: 33.6 G/DL (ref 31–36)
MCV RBC AUTO: 97.2 FL (ref 80–100)
MEDICATION DOSE-MCNC: NORMAL
METHGB MFR BLDV: 0.4 % (ref 0–1.5)
MONOCYTES # BLD: 0.3 K/UL (ref 0–1.3)
MONOCYTES NFR BLD: 6.2 %
NEUTROPHILS # BLD: 3 K/UL (ref 1.7–7.7)
NEUTROPHILS NFR BLD: 55.9 %
PCO2 BLDV: 56.2 MMHG (ref 41–51)
PH BLDV: 7.35 [PH] (ref 7.35–7.45)
PLATELET # BLD AUTO: 182 K/UL (ref 135–450)
PMV BLD AUTO: 8.7 FL (ref 5–10.5)
PO2 BLDV: 33.1 MMHG (ref 25–40)
POTASSIUM SERPL-SCNC: 4 MMOL/L (ref 3.5–5.1)
RBC # BLD AUTO: 4.32 M/UL (ref 4.2–5.9)
SAO2 % BLDV: 58 %
SODIUM SERPL-SCNC: 141 MMOL/L (ref 136–145)
WBC # BLD AUTO: 5.5 K/UL (ref 4–11)

## 2023-11-06 PROCEDURE — 83605 ASSAY OF LACTIC ACID: CPT

## 2023-11-06 PROCEDURE — 82803 BLOOD GASES ANY COMBINATION: CPT

## 2023-11-06 PROCEDURE — 80177 DRUG SCRN QUAN LEVETIRACETAM: CPT

## 2023-11-06 PROCEDURE — 80048 BASIC METABOLIC PNL TOTAL CA: CPT

## 2023-11-06 PROCEDURE — 36415 COLL VENOUS BLD VENIPUNCTURE: CPT

## 2023-11-06 PROCEDURE — 99283 EMERGENCY DEPT VISIT LOW MDM: CPT

## 2023-11-06 PROCEDURE — 85025 COMPLETE CBC W/AUTO DIFF WBC: CPT

## 2023-11-06 NOTE — DISCHARGE INSTRUCTIONS
Your episode today does not appear to have been a epileptic seizure. Your laboratory work-up is reassuring. Please follow-up with your primary care to discuss further strengthening through physical therapy to improve your mobility as we discussed.

## 2023-11-13 LAB — LAMOTRIGINE SERPL-MCNC: 25.9 UG/ML

## 2024-01-25 NOTE — ED NOTES
ED TO INPATIENT SBAR HANDOFF    Patient Name: Destinee Mcduffie   :  1960  63 y.o. MRN:  [id number]  Preferred Name  [facility]  ED Room #:  Q31/T23-40  Family/Caregiver Present no   Restraints no   Sitter no   Sepsis Risk Score Sepsis Risk Score: 0.79    Situation  Code Status: Full Code No additional code details. Allergies: Doxycycline, Ibuprofen, Penicillins, Phenobarbital, Tetracycline, and Aspirin  Weight: Patient Vitals for the past 96 hrs (Last 3 readings):   Weight   23 1320 (!) 320 lb (145.2 kg)     Arrived from: home  Chief Complaint:   Chief Complaint   Patient presents with    Altered Mental Status     Hospital Problem/Diagnosis:  Principal Problem:    Seizure ([address])  Resolved Problems:    * No resolved hospital problems. *    Imaging:   CT HEAD WO CONTRAST   Final Result   1. Atrophic changes, otherwise normal brain with no evidence of acute hemorrhage   or extra-axial fluid collections. 2. Chronic patchy right ethmoid sinusitis      XR CHEST PORTABLE   Final Result   Bilateral pulmonary vascular and interstitial prominence. Findings   can be seen in the setting of pulmonary edema/vascular congestion.         Abnormal labs:   Abnormal Labs Reviewed   CBC WITH AUTO DIFFERENTIAL - Abnormal; Notable for the following components:       Result Value    RBC 3.89 (*)     Hemoglobin 12.5 (*)     Hematocrit 37.7 (*)     All other components within normal limits   RENAL FUNCTION PANEL - Abnormal; Notable for the following components:    Glucose 114 (*)     All other components within normal limits   URINALYSIS WITH MICROSCOPIC - Abnormal; Notable for the following components:    Ketones, Urine TRACE (*)     Protein, UA TRACE (*)     All other components within normal limits     Critical values: no     Abnormal Assessment Findings: none    Background  History:   Past Medical History:   Diagnosis Date    Arthritis     Asthma     Bronchitis     CAD (coronary artery disease)     CHF (congestive heart
Inpatient floor aware patient sbar completed and will be transported after sterile cockpit.       Roxanna Mann RN  07/21/23 1156
Pt currently on phone with sister/SARAH Forrester [phone]     Boris Cowden, RN  07/21/23 3093
Pt transported to ct.       Danielle Naidu RN  07/21/23 0193
verbal cues/2 person assist

## 2025-04-12 ENCOUNTER — HOSPITAL ENCOUNTER (INPATIENT)
Age: 65
LOS: 4 days | Discharge: NURSING, REHAB OR LONG-TERM CARE | DRG: 623 | End: 2025-04-16
Attending: EMERGENCY MEDICINE | Admitting: INTERNAL MEDICINE
Payer: MEDICARE

## 2025-04-12 ENCOUNTER — APPOINTMENT (OUTPATIENT)
Dept: GENERAL RADIOLOGY | Age: 65
DRG: 623 | End: 2025-04-12
Payer: MEDICARE

## 2025-04-12 DIAGNOSIS — R07.9 CHEST PAIN, UNSPECIFIED TYPE: ICD-10-CM

## 2025-04-12 DIAGNOSIS — L03.116 CELLULITIS OF LEFT LEG: Primary | ICD-10-CM

## 2025-04-12 PROBLEM — L03.90 CELLULITIS: Status: ACTIVE | Noted: 2025-04-12

## 2025-04-12 LAB
ALBUMIN SERPL-MCNC: 3 G/DL (ref 3.4–5)
ALBUMIN/GLOB SERPL: 1.2 {RATIO} (ref 1.1–2.2)
ALP SERPL-CCNC: 103 U/L (ref 40–129)
ALT SERPL-CCNC: ABNORMAL U/L (ref 10–40)
ANION GAP SERPL CALCULATED.3IONS-SCNC: 8 MMOL/L (ref 3–16)
AST SERPL-CCNC: 55 U/L (ref 15–37)
BASOPHILS # BLD: 0 K/UL (ref 0–0.2)
BASOPHILS NFR BLD: 0.5 %
BILIRUB SERPL-MCNC: 0.4 MG/DL (ref 0–1)
BUN SERPL-MCNC: 13 MG/DL (ref 7–20)
CALCIUM SERPL-MCNC: 7.5 MG/DL (ref 8.3–10.6)
CHLORIDE SERPL-SCNC: 109 MMOL/L (ref 99–110)
CO2 SERPL-SCNC: 19 MMOL/L (ref 21–32)
CREAT SERPL-MCNC: 0.9 MG/DL (ref 0.8–1.3)
CRP SERPL-MCNC: 43.8 MG/L (ref 0–5.1)
DEPRECATED RDW RBC AUTO: 15.8 % (ref 12.4–15.4)
EOSINOPHIL # BLD: 0.2 K/UL (ref 0–0.6)
EOSINOPHIL NFR BLD: 2.7 %
ERYTHROCYTE [SEDIMENTATION RATE] IN BLOOD BY WESTERGREN METHOD: 40 MM/HR (ref 0–20)
GFR SERPLBLD CREATININE-BSD FMLA CKD-EPI: >90 ML/MIN/{1.73_M2}
GLUCOSE BLD-MCNC: 116 MG/DL (ref 70–99)
GLUCOSE SERPL-MCNC: 109 MG/DL (ref 70–99)
HCT VFR BLD AUTO: 42.3 % (ref 40.5–52.5)
HGB BLD-MCNC: 13.7 G/DL (ref 13.5–17.5)
LYMPHOCYTES # BLD: 1.5 K/UL (ref 1–5.1)
LYMPHOCYTES NFR BLD: 25.5 %
MCH RBC QN AUTO: 30.9 PG (ref 26–34)
MCHC RBC AUTO-ENTMCNC: 32.4 G/DL (ref 31–36)
MCV RBC AUTO: 95.3 FL (ref 80–100)
MONOCYTES # BLD: 0.5 K/UL (ref 0–1.3)
MONOCYTES NFR BLD: 7.4 %
NEUTROPHILS # BLD: 3.9 K/UL (ref 1.7–7.7)
NEUTROPHILS NFR BLD: 63.9 %
NT-PROBNP SERPL-MCNC: 95 PG/ML (ref 0–124)
PERFORMED ON: ABNORMAL
PLATELET # BLD AUTO: 217 K/UL (ref 135–450)
PMV BLD AUTO: 8.9 FL (ref 5–10.5)
POTASSIUM SERPL-SCNC: 4 MMOL/L (ref 3.5–5.1)
POTASSIUM SERPL-SCNC: ABNORMAL MMOL/L (ref 3.5–5.1)
PROT SERPL-MCNC: 5.5 G/DL (ref 6.4–8.2)
RBC # BLD AUTO: 4.43 M/UL (ref 4.2–5.9)
REASON FOR REJECTION: NORMAL
REJECTED TEST: NORMAL
SODIUM SERPL-SCNC: 136 MMOL/L (ref 136–145)
TROPONIN, HIGH SENSITIVITY: 14 NG/L (ref 0–22)
TROPONIN, HIGH SENSITIVITY: 18 NG/L (ref 0–22)
WBC # BLD AUTO: 6.1 K/UL (ref 4–11)

## 2025-04-12 PROCEDURE — 2500000003 HC RX 250 WO HCPCS: Performed by: INTERNAL MEDICINE

## 2025-04-12 PROCEDURE — 36415 COLL VENOUS BLD VENIPUNCTURE: CPT

## 2025-04-12 PROCEDURE — 80053 COMPREHEN METABOLIC PANEL: CPT

## 2025-04-12 PROCEDURE — 84484 ASSAY OF TROPONIN QUANT: CPT

## 2025-04-12 PROCEDURE — 84132 ASSAY OF SERUM POTASSIUM: CPT

## 2025-04-12 PROCEDURE — 6360000002 HC RX W HCPCS: Performed by: INTERNAL MEDICINE

## 2025-04-12 PROCEDURE — 83880 ASSAY OF NATRIURETIC PEPTIDE: CPT

## 2025-04-12 PROCEDURE — 86140 C-REACTIVE PROTEIN: CPT

## 2025-04-12 PROCEDURE — 85025 COMPLETE CBC W/AUTO DIFF WBC: CPT

## 2025-04-12 PROCEDURE — 93005 ELECTROCARDIOGRAM TRACING: CPT

## 2025-04-12 PROCEDURE — 83036 HEMOGLOBIN GLYCOSYLATED A1C: CPT

## 2025-04-12 PROCEDURE — 6360000002 HC RX W HCPCS: Performed by: PHYSICIAN ASSISTANT

## 2025-04-12 PROCEDURE — 71045 X-RAY EXAM CHEST 1 VIEW: CPT

## 2025-04-12 PROCEDURE — 85652 RBC SED RATE AUTOMATED: CPT

## 2025-04-12 PROCEDURE — 6370000000 HC RX 637 (ALT 250 FOR IP): Performed by: PHYSICIAN ASSISTANT

## 2025-04-12 PROCEDURE — 73630 X-RAY EXAM OF FOOT: CPT

## 2025-04-12 PROCEDURE — 99285 EMERGENCY DEPT VISIT HI MDM: CPT

## 2025-04-12 PROCEDURE — 1200000000 HC SEMI PRIVATE

## 2025-04-12 PROCEDURE — 2500000003 HC RX 250 WO HCPCS: Performed by: PHYSICIAN ASSISTANT

## 2025-04-12 PROCEDURE — 6370000000 HC RX 637 (ALT 250 FOR IP): Performed by: INTERNAL MEDICINE

## 2025-04-12 PROCEDURE — 96374 THER/PROPH/DIAG INJ IV PUSH: CPT

## 2025-04-12 PROCEDURE — 93005 ELECTROCARDIOGRAM TRACING: CPT | Performed by: PHYSICIAN ASSISTANT

## 2025-04-12 RX ORDER — ZONISAMIDE 100 MG/1
100 CAPSULE ORAL NIGHTLY
COMMUNITY

## 2025-04-12 RX ORDER — ACETAMINOPHEN 325 MG/1
650 TABLET ORAL EVERY 6 HOURS PRN
Status: DISCONTINUED | OUTPATIENT
Start: 2025-04-12 | End: 2025-04-16 | Stop reason: HOSPADM

## 2025-04-12 RX ORDER — SODIUM CHLORIDE 9 MG/ML
INJECTION, SOLUTION INTRAVENOUS PRN
Status: DISCONTINUED | OUTPATIENT
Start: 2025-04-12 | End: 2025-04-16 | Stop reason: HOSPADM

## 2025-04-12 RX ORDER — CARVEDILOL 6.25 MG/1
6.25 TABLET ORAL 2 TIMES DAILY WITH MEALS
Status: DISCONTINUED | OUTPATIENT
Start: 2025-04-12 | End: 2025-04-12

## 2025-04-12 RX ORDER — FLUTICASONE FUROATE 200 UG/1
1 POWDER RESPIRATORY (INHALATION) DAILY
COMMUNITY

## 2025-04-12 RX ORDER — SODIUM CHLORIDE 0.9 % (FLUSH) 0.9 %
5-40 SYRINGE (ML) INJECTION EVERY 12 HOURS SCHEDULED
Status: DISCONTINUED | OUTPATIENT
Start: 2025-04-12 | End: 2025-04-16 | Stop reason: HOSPADM

## 2025-04-12 RX ORDER — POTASSIUM CHLORIDE 1500 MG/1
40 TABLET, EXTENDED RELEASE ORAL PRN
Status: ACTIVE | OUTPATIENT
Start: 2025-04-12 | End: 2025-04-13

## 2025-04-12 RX ORDER — MAGNESIUM SULFATE IN WATER 40 MG/ML
2000 INJECTION, SOLUTION INTRAVENOUS PRN
Status: DISCONTINUED | OUTPATIENT
Start: 2025-04-12 | End: 2025-04-16 | Stop reason: HOSPADM

## 2025-04-12 RX ORDER — ENOXAPARIN SODIUM 100 MG/ML
40 INJECTION SUBCUTANEOUS DAILY
Status: DISCONTINUED | OUTPATIENT
Start: 2025-04-12 | End: 2025-04-14

## 2025-04-12 RX ORDER — ASPIRIN 81 MG/1
81 TABLET ORAL DAILY
Status: DISCONTINUED | OUTPATIENT
Start: 2025-04-13 | End: 2025-04-16 | Stop reason: HOSPADM

## 2025-04-12 RX ORDER — SULFAMETHOXAZOLE AND TRIMETHOPRIM 800; 160 MG/1; MG/1
1 TABLET ORAL EVERY 12 HOURS SCHEDULED
Status: DISCONTINUED | OUTPATIENT
Start: 2025-04-13 | End: 2025-04-16

## 2025-04-12 RX ORDER — ACETAMINOPHEN 650 MG/1
650 SUPPOSITORY RECTAL EVERY 6 HOURS PRN
Status: DISCONTINUED | OUTPATIENT
Start: 2025-04-12 | End: 2025-04-16 | Stop reason: HOSPADM

## 2025-04-12 RX ORDER — NORTRIPTYLINE HYDROCHLORIDE 25 MG/1
75 CAPSULE ORAL NIGHTLY
Status: DISCONTINUED | OUTPATIENT
Start: 2025-04-12 | End: 2025-04-16 | Stop reason: HOSPADM

## 2025-04-12 RX ORDER — GLUCAGON 1 MG/ML
1 KIT INJECTION PRN
Status: DISCONTINUED | OUTPATIENT
Start: 2025-04-12 | End: 2025-04-16 | Stop reason: HOSPADM

## 2025-04-12 RX ORDER — ERGOCALCIFEROL 1.25 MG/1
50000 CAPSULE ORAL
COMMUNITY

## 2025-04-12 RX ORDER — DEXTROSE MONOHYDRATE 100 MG/ML
INJECTION, SOLUTION INTRAVENOUS CONTINUOUS PRN
Status: DISCONTINUED | OUTPATIENT
Start: 2025-04-12 | End: 2025-04-16 | Stop reason: HOSPADM

## 2025-04-12 RX ORDER — SULFAMETHOXAZOLE AND TRIMETHOPRIM 800; 160 MG/1; MG/1
1 TABLET ORAL ONCE
Status: COMPLETED | OUTPATIENT
Start: 2025-04-12 | End: 2025-04-12

## 2025-04-12 RX ORDER — SODIUM CHLORIDE 0.9 % (FLUSH) 0.9 %
5-40 SYRINGE (ML) INJECTION PRN
Status: DISCONTINUED | OUTPATIENT
Start: 2025-04-12 | End: 2025-04-16 | Stop reason: HOSPADM

## 2025-04-12 RX ORDER — ZONISAMIDE 100 MG/1
100 CAPSULE ORAL
Status: DISCONTINUED | OUTPATIENT
Start: 2025-04-12 | End: 2025-04-16 | Stop reason: HOSPADM

## 2025-04-12 RX ORDER — OXYCODONE HYDROCHLORIDE 5 MG/1
5 TABLET ORAL EVERY 4 HOURS PRN
Status: COMPLETED | OUTPATIENT
Start: 2025-04-12 | End: 2025-04-16

## 2025-04-12 RX ORDER — HYDROMORPHONE HYDROCHLORIDE 1 MG/ML
0.5 INJECTION, SOLUTION INTRAMUSCULAR; INTRAVENOUS; SUBCUTANEOUS EVERY 4 HOURS PRN
Status: DISCONTINUED | OUTPATIENT
Start: 2025-04-12 | End: 2025-04-16 | Stop reason: HOSPADM

## 2025-04-12 RX ORDER — OXYCODONE AND ACETAMINOPHEN 5; 325 MG/1; MG/1
1 TABLET ORAL EVERY 4 HOURS PRN
COMMUNITY

## 2025-04-12 RX ORDER — ALLOPURINOL 300 MG/1
300 TABLET ORAL DAILY
Status: DISCONTINUED | OUTPATIENT
Start: 2025-04-13 | End: 2025-04-16 | Stop reason: HOSPADM

## 2025-04-12 RX ORDER — ATORVASTATIN CALCIUM 80 MG/1
80 TABLET, FILM COATED ORAL NIGHTLY
Status: DISCONTINUED | OUTPATIENT
Start: 2025-04-12 | End: 2025-04-16 | Stop reason: HOSPADM

## 2025-04-12 RX ORDER — POLYETHYLENE GLYCOL 3350 17 G/17G
17 POWDER, FOR SOLUTION ORAL DAILY PRN
Status: DISCONTINUED | OUTPATIENT
Start: 2025-04-12 | End: 2025-04-16 | Stop reason: HOSPADM

## 2025-04-12 RX ORDER — LAMOTRIGINE 150 MG/1
600 TABLET ORAL 2 TIMES DAILY
Status: DISCONTINUED | OUTPATIENT
Start: 2025-04-12 | End: 2025-04-16 | Stop reason: HOSPADM

## 2025-04-12 RX ORDER — LEVETIRACETAM 750 MG/1
1500 TABLET ORAL 2 TIMES DAILY
Status: DISCONTINUED | OUTPATIENT
Start: 2025-04-12 | End: 2025-04-16 | Stop reason: HOSPADM

## 2025-04-12 RX ORDER — POTASSIUM CHLORIDE 7.45 MG/ML
10 INJECTION INTRAVENOUS PRN
Status: ACTIVE | OUTPATIENT
Start: 2025-04-12 | End: 2025-04-13

## 2025-04-12 RX ORDER — INSULIN LISPRO 100 [IU]/ML
0-4 INJECTION, SOLUTION INTRAVENOUS; SUBCUTANEOUS
Status: DISCONTINUED | OUTPATIENT
Start: 2025-04-12 | End: 2025-04-16 | Stop reason: HOSPADM

## 2025-04-12 RX ORDER — LEVETIRACETAM 750 MG/1
1500 TABLET ORAL 2 TIMES DAILY
COMMUNITY

## 2025-04-12 RX ORDER — LEVETIRACETAM 500 MG/1
1000 TABLET ORAL 2 TIMES DAILY
Status: DISCONTINUED | OUTPATIENT
Start: 2025-04-12 | End: 2025-04-12

## 2025-04-12 RX ADMIN — SACUBITRIL AND VALSARTAN 1 TABLET: 97; 103 TABLET, FILM COATED ORAL at 21:03

## 2025-04-12 RX ADMIN — ATORVASTATIN CALCIUM 80 MG: 80 TABLET, FILM COATED ORAL at 21:02

## 2025-04-12 RX ADMIN — SODIUM CHLORIDE, PRESERVATIVE FREE 5 ML: 5 INJECTION INTRAVENOUS at 21:03

## 2025-04-12 RX ADMIN — WATER 2000 MG: 1 INJECTION INTRAMUSCULAR; INTRAVENOUS; SUBCUTANEOUS at 22:18

## 2025-04-12 RX ADMIN — ZONISAMIDE 100 MG: 100 CAPSULE ORAL at 21:26

## 2025-04-12 RX ADMIN — LEVETIRACETAM 1500 MG: 750 TABLET, FILM COATED ORAL at 21:27

## 2025-04-12 RX ADMIN — SULFAMETHOXAZOLE AND TRIMETHOPRIM 1 TABLET: 800; 160 TABLET ORAL at 14:26

## 2025-04-12 RX ADMIN — NORTRIPTYLINE HYDROCHLORIDE 75 MG: 25 CAPSULE ORAL at 21:01

## 2025-04-12 RX ADMIN — WATER 2000 MG: 1 INJECTION INTRAMUSCULAR; INTRAVENOUS; SUBCUTANEOUS at 14:27

## 2025-04-12 RX ADMIN — LAMOTRIGINE 600 MG: 150 TABLET ORAL at 21:05

## 2025-04-12 RX ADMIN — ENOXAPARIN SODIUM 40 MG: 100 INJECTION SUBCUTANEOUS at 21:01

## 2025-04-12 RX ADMIN — OXYCODONE 5 MG: 5 TABLET ORAL at 18:43

## 2025-04-12 ASSESSMENT — PAIN DESCRIPTION - DESCRIPTORS
DESCRIPTORS: POUNDING
DESCRIPTORS: THROBBING;SHOOTING;BURNING

## 2025-04-12 ASSESSMENT — PAIN DESCRIPTION - ORIENTATION
ORIENTATION: LEFT
ORIENTATION: LOWER;LEFT

## 2025-04-12 ASSESSMENT — PAIN SCALES - GENERAL
PAINLEVEL_OUTOF10: 9
PAINLEVEL_OUTOF10: 8
PAINLEVEL_OUTOF10: 2

## 2025-04-12 ASSESSMENT — PAIN DESCRIPTION - LOCATION: LOCATION: CHEST

## 2025-04-12 ASSESSMENT — LIFESTYLE VARIABLES: HOW OFTEN DO YOU HAVE A DRINK CONTAINING ALCOHOL: NEVER

## 2025-04-12 ASSESSMENT — PAIN - FUNCTIONAL ASSESSMENT: PAIN_FUNCTIONAL_ASSESSMENT: 0-10

## 2025-04-12 NOTE — ED NOTES
Patient Name: Cheng Maxwell  : 1960 64 y.o.  MRN: [id number]  ED Room #: A05/A05-05     Chief complaint:   Chief Complaint   Patient presents with    Chest Pain    Fatigue     Pt presents for generalized weakness and chest pain x 2 weeks. Denies NVD. Pt states he doesn't normally walk but has been feeling short of breath at baseline.     Hospital Problem/Diagnosis:   Hospital Problems           Last Modified POA    * (Principal) Cellulitis 2025 Yes         O2 Flow Rate:O2 Device: None (Room air)   (if applicable)  Cardiac Rhythm:   (if applicable)  Active LDA's:   Peripheral IV 25 Distal;Right;Dorsal Forearm (Active)   Site Assessment Clean, dry & intact 25 1255   Line Status Blood return noted;Flushed;Specimen collected;Normal saline locked 25 1255   Phlebitis Assessment No symptoms 25 1255   Infiltration Assessment 0 25 1255   Dressing Status New dressing applied 25 1255   Dressing Type Transparent 25 1255   Dressing Intervention New 25 1255            How does patient ambulate? Wheelchair    2. How does patient take pills? Whole with Water    3. Is patient alert? Alert    4. Is patient oriented? To Person, To Place, To Time, and To Situation    5.   Patient arrived from:  home  Facility Name: ___________________________________________    6. If patient is disoriented or from a Skill Nursing Facility has family been notified of admission?       7. Patient belongings? Belongings: Cell Phone and Clothing    Disposition of belongings? Kept with Patient     8. Any specific patient or family belongings/needs/dynamics?   a. Family at bedside    9. Miscellaneous comments/pending orders?  a. 20ga IV right wrist, draws and flushes.      If there are any additional questions please reach out to the Emergency Department.      Tony Martell, RN  25 1731

## 2025-04-12 NOTE — ED PROVIDER NOTES
ED Attending Attestation Note     Date of evaluation: 4/12/2025    This patient was seen by the advance practice provider.  I have seen and examined the patient, agree with the workup, evaluation, management and diagnosis. The care plan has been discussed.  I have reviewed the ECG and concur with the SANDY's interpretation.  My assessment reveals adult male who presents with a couple weeks of generalized fatigue, some intermittent chest pain over the last 2 weeks that does not disappear to be associated with exertion or really provokable per his report.  On exam he is in no acute distress, lungs clear on my exam.  His abdomen is soft nontender.  He was noted to have some blood on his sock overlying the left great toe, underlying appears to be a blister with some erythema extending up towards the mid calf on the medial aspect and then circumferentially about the calf, the likely cellulitis with an unroofed bullae on the medial aspect of the great toe.  Per patient unclear exactly when that happened, but I suspect he may have somewhat of spreading cellulitis that may be causing his generalized feeling of fatigue, unclear if it is really been going on for 2 weeks but certainly appears to be an acute issue now..       Jorden Luevano MD  04/12/25 8749

## 2025-04-12 NOTE — ED PROVIDER NOTES
THE [facility]  EMERGENCY DEPARTMENT ENCOUNTER          PHYSICIAN ASSISTANT NOTE       Date of evaluation: 4/12/2025    Chief Complaint     Chest Pain and Fatigue (Pt presents for generalized weakness and chest pain x 2 weeks. Denies NVD. Pt states he doesn't normally walk but has been feeling short of breath at baseline.)      History of Present Illness     Cheng Maxwell is a 64 y.o. male with PMH of morbid obesity, DM2, HTN, CAD, CHF, Seizures who presents via EMS from home with general illness x 2 weeks.  Patient endorses fatigue, lightheadedness, intermittent sharp left-sided chest discomfort for the past few weeks.  He is also endorsing shortness of breath but feels that this is baseline.  He denies fevers, chills, nausea or vomiting, abdominal pain, dysuria, hematuria, change in bowel habits.  He does report a wound to his left great toe with pain and erythema for the last few days.  Patient states that he gets around via electric wheelchair.  He does do his own transfers. Recently he has felt that he may fall due to dizziness.    ASSESSMENT / PLAN  (MEDICAL DECISION MAKING)     INITIAL VITALS: BP: 128/73, Temp: 97.6 °F (36.4 °C), Pulse: 68, Respirations: 16, SpO2: 99 %    Cheng Maxwell is a 64 y.o. male with PMH of morbid obesity, DM2, HTN, CAD, CHF, Seizures who presents via EMS from home with feeling unwell for the past few weeks.  Patient endorses fatigue, dizziness, intermittent vague chest discomfort.  He denies fevers, cough, GI symptoms, change in urinary habits.  He does report a wound to his left great toe for the last few days with pain and erythema.  On exam, he is afebrile with normal vital signs.  He is morbidly obese.  Heart rhythm is regular.  Lungs clear to auscultation.  Abdomen soft and nontender. There is de-roofed blister to the left great toe. There is diffuse erythema to the left great toe. There is additionally erythema to the left anterior shin. There is increased warmth and

## 2025-04-12 NOTE — H&P
V2.0  History and Physical      Name:  Cheng Maxwell /Age/Sex: 1960  (64 y.o. male)   MRN & CSN:  [id number] & [id number] Encounter Date/Time: 2025 2:57 PM EDT   Location:  [place] [address] PCP: Paola Davila MD       Hospital Day: 1    Assessment and Plan:   Cheng Maxwell is a 64 y.o. male with a pmh of essential hypertension, CAD, CHF, DM-2, seizure disorder, morbid obesity, mentally challenged per his sister who is the POA - 1, who presents with left foot pain with erythema and generalized malaise and not feeling well for the past 2 weeks    Hospital Problems           Last Modified POA    * (Principal) Cellulitis 2025 Yes   #Left leg cellulitis  - History of DM-2  - Elevated inflammatory markers (CRP = 44, ESR = 46)  - Started as a blister in the left great toe which has been uprooted with extending erythema and swelling up to the mid shin medially  - Continue on Bactrim and Ancef  - Elevate the affected limb  - Pain relief as ordered  -Consider venous Doppler ultrasound of the left leg    #Chest pain-intermittent, sharp left-sided for the past few weeks  -High-sensitivity troponin within normal range  - EKG, nonischemic with chronic LBBB  - Continue on aspirin, statins   - Continue to monitor  - Possible cardiac workup as outpatient    #DM-2  - Hold metformin  - Continue on current insulin regimen with hypoglycemia protocol, follow A1c    #CAD  #Essential hypertension  #Chronic HFrEF, EF on echocardiogram from  = 45%  #Morbid obesity with BMI of 52  -Continue on aspirin, statins, nd Entresto  - Supportive therapy    #Seizure disorder  - Continued on Keppra, Vimpat and Zonegran  - Follows with  neurology    #Prolonged QT-chronic  - Avoid QT prolonging medications    Patient's sister who is the POA-1 does not want PT/OT evaluation as patient is mostly wheelchair-bound.  Able to transfer from his electric wheelchair to regular wheelchair versus toilet and bed    Disposition:   Current

## 2025-04-12 NOTE — ED NOTES
RN requested Lab to draw repeat potassium and troponin due to hemolysis.  expressed understanding and stated they would send someone to redraw.     Tony Martell, CARROLL  04/12/25 [id number]

## 2025-04-13 LAB
ANION GAP SERPL CALCULATED.3IONS-SCNC: 8 MMOL/L (ref 3–16)
BILIRUB UR QL STRIP.AUTO: NEGATIVE
BUN SERPL-MCNC: 12 MG/DL (ref 7–20)
CALCIUM SERPL-MCNC: 8.8 MG/DL (ref 8.3–10.6)
CHLORIDE SERPL-SCNC: 104 MMOL/L (ref 99–110)
CLARITY UR: CLEAR
CO2 SERPL-SCNC: 27 MMOL/L (ref 21–32)
COLOR UR: YELLOW
CREAT SERPL-MCNC: 1 MG/DL (ref 0.8–1.3)
EKG ATRIAL RATE: 68 BPM
EKG DIAGNOSIS: NORMAL
EKG P AXIS: 38 DEGREES
EKG P-R INTERVAL: 194 MS
EKG Q-T INTERVAL: 464 MS
EKG QRS DURATION: 188 MS
EKG QTC CALCULATION (BAZETT): 493 MS
EKG R AXIS: -50 DEGREES
EKG T AXIS: 119 DEGREES
EKG VENTRICULAR RATE: 68 BPM
EPI CELLS #/AREA URNS HPF: NORMAL /HPF (ref 0–5)
EST. AVERAGE GLUCOSE BLD GHB EST-MCNC: 137 MG/DL
GFR SERPLBLD CREATININE-BSD FMLA CKD-EPI: 85 ML/MIN/{1.73_M2}
GLUCOSE BLD-MCNC: 128 MG/DL (ref 70–99)
GLUCOSE BLD-MCNC: 133 MG/DL (ref 70–99)
GLUCOSE BLD-MCNC: 136 MG/DL (ref 70–99)
GLUCOSE BLD-MCNC: 98 MG/DL (ref 70–99)
GLUCOSE SERPL-MCNC: 114 MG/DL (ref 70–99)
GLUCOSE UR STRIP.AUTO-MCNC: NEGATIVE MG/DL
HBA1C MFR BLD: 6.4 %
HGB UR QL STRIP.AUTO: NEGATIVE
HYALINE CASTS #/AREA URNS LPF: NORMAL /LPF (ref 0–2)
KETONES UR STRIP.AUTO-MCNC: 15 MG/DL
LEUKOCYTE ESTERASE UR QL STRIP.AUTO: NEGATIVE
NITRITE UR QL STRIP.AUTO: NEGATIVE
PERFORMED ON: ABNORMAL
PERFORMED ON: NORMAL
PH UR STRIP.AUTO: 6 [PH] (ref 5–8)
POTASSIUM SERPL-SCNC: 4.1 MMOL/L (ref 3.5–5.1)
PROT UR STRIP.AUTO-MCNC: ABNORMAL MG/DL
RBC #/AREA URNS HPF: NORMAL /HPF (ref 0–4)
SODIUM SERPL-SCNC: 139 MMOL/L (ref 136–145)
SP GR UR STRIP.AUTO: 1.02 (ref 1–1.03)
UA COMPLETE W REFLEX CULTURE PNL UR: ABNORMAL
UA DIPSTICK W REFLEX MICRO PNL UR: YES
URN SPEC COLLECT METH UR: ABNORMAL
UROBILINOGEN UR STRIP-ACNC: 0.2 E.U./DL
WBC #/AREA URNS HPF: NORMAL /HPF (ref 0–5)

## 2025-04-13 PROCEDURE — 6370000000 HC RX 637 (ALT 250 FOR IP): Performed by: INTERNAL MEDICINE

## 2025-04-13 PROCEDURE — 81001 URINALYSIS AUTO W/SCOPE: CPT

## 2025-04-13 PROCEDURE — 2500000003 HC RX 250 WO HCPCS: Performed by: INTERNAL MEDICINE

## 2025-04-13 PROCEDURE — 1200000000 HC SEMI PRIVATE

## 2025-04-13 PROCEDURE — 6360000002 HC RX W HCPCS: Performed by: INTERNAL MEDICINE

## 2025-04-13 PROCEDURE — 80048 BASIC METABOLIC PNL TOTAL CA: CPT

## 2025-04-13 PROCEDURE — 36415 COLL VENOUS BLD VENIPUNCTURE: CPT

## 2025-04-13 RX ADMIN — SULFAMETHOXAZOLE AND TRIMETHOPRIM 1 TABLET: 800; 160 TABLET ORAL at 20:52

## 2025-04-13 RX ADMIN — LAMOTRIGINE 600 MG: 150 TABLET ORAL at 20:52

## 2025-04-13 RX ADMIN — SODIUM CHLORIDE, PRESERVATIVE FREE 10 ML: 5 INJECTION INTRAVENOUS at 09:33

## 2025-04-13 RX ADMIN — ZONISAMIDE 100 MG: 100 CAPSULE ORAL at 20:52

## 2025-04-13 RX ADMIN — SACUBITRIL AND VALSARTAN 1 TABLET: 97; 103 TABLET, FILM COATED ORAL at 09:31

## 2025-04-13 RX ADMIN — ALLOPURINOL 300 MG: 300 TABLET ORAL at 09:30

## 2025-04-13 RX ADMIN — LEVETIRACETAM 1500 MG: 750 TABLET, FILM COATED ORAL at 20:52

## 2025-04-13 RX ADMIN — ASPIRIN 81 MG: 81 TABLET, COATED ORAL at 09:33

## 2025-04-13 RX ADMIN — NORTRIPTYLINE HYDROCHLORIDE 75 MG: 25 CAPSULE ORAL at 20:52

## 2025-04-13 RX ADMIN — SACUBITRIL AND VALSARTAN 1 TABLET: 97; 103 TABLET, FILM COATED ORAL at 20:52

## 2025-04-13 RX ADMIN — WATER 2000 MG: 1 INJECTION INTRAMUSCULAR; INTRAVENOUS; SUBCUTANEOUS at 14:14

## 2025-04-13 RX ADMIN — SULFAMETHOXAZOLE AND TRIMETHOPRIM 1 TABLET: 800; 160 TABLET ORAL at 02:19

## 2025-04-13 RX ADMIN — ENOXAPARIN SODIUM 40 MG: 100 INJECTION SUBCUTANEOUS at 09:31

## 2025-04-13 RX ADMIN — ATORVASTATIN CALCIUM 80 MG: 80 TABLET, FILM COATED ORAL at 20:52

## 2025-04-13 RX ADMIN — WATER 2000 MG: 1 INJECTION INTRAMUSCULAR; INTRAVENOUS; SUBCUTANEOUS at 06:24

## 2025-04-13 RX ADMIN — WATER 2000 MG: 1 INJECTION INTRAMUSCULAR; INTRAVENOUS; SUBCUTANEOUS at 23:27

## 2025-04-13 RX ADMIN — LAMOTRIGINE 600 MG: 150 TABLET ORAL at 09:32

## 2025-04-13 RX ADMIN — LEVETIRACETAM 1500 MG: 750 TABLET, FILM COATED ORAL at 09:31

## 2025-04-13 ASSESSMENT — PAIN SCALES - GENERAL: PAINLEVEL_OUTOF10: 0

## 2025-04-13 NOTE — PLAN OF CARE
Problem: Skin/Tissue Integrity  Goal: Skin integrity remains intact  Description: 1.  Monitor for areas of redness and/or skin breakdown2.  Assess vascular access sites hourly3.  Every 4-6 hours minimum:  Change oxygen saturation probe site4.  Every 4-6 hours:  If on nasal continuous positive airway pressure, respiratory therapy assess nares and determine need for appliance change or resting period  Outcome: Progressing     Problem: Safety - Adult  Goal: Free from fall injury  Outcome: Progressing     Problem: Chronic Conditions and Co-morbidities  Goal: Patient's chronic conditions and co-morbidity symptoms are monitored and maintained or improved  Outcome: Progressing

## 2025-04-14 ENCOUNTER — APPOINTMENT (OUTPATIENT)
Dept: VASCULAR LAB | Age: 65
DRG: 623 | End: 2025-04-14
Payer: MEDICARE

## 2025-04-14 LAB
BASOPHILS # BLD: 0 K/UL (ref 0–0.2)
BASOPHILS NFR BLD: 0.5 %
DEPRECATED RDW RBC AUTO: 16.2 % (ref 12.4–15.4)
ECHO BSA: 2.83 M2
EKG ATRIAL RATE: 65 BPM
EKG DIAGNOSIS: NORMAL
EKG P AXIS: 53 DEGREES
EKG P-R INTERVAL: 186 MS
EKG Q-T INTERVAL: 474 MS
EKG QRS DURATION: 188 MS
EKG QTC CALCULATION (BAZETT): 492 MS
EKG R AXIS: -55 DEGREES
EKG T AXIS: 109 DEGREES
EKG VENTRICULAR RATE: 65 BPM
EOSINOPHIL # BLD: 0.2 K/UL (ref 0–0.6)
EOSINOPHIL NFR BLD: 3.2 %
GLUCOSE BLD-MCNC: 118 MG/DL (ref 70–99)
GLUCOSE BLD-MCNC: 125 MG/DL (ref 70–99)
GLUCOSE BLD-MCNC: 154 MG/DL (ref 70–99)
GLUCOSE BLD-MCNC: 161 MG/DL (ref 70–99)
HCT VFR BLD AUTO: 40.2 % (ref 40.5–52.5)
HGB BLD-MCNC: 13.5 G/DL (ref 13.5–17.5)
LYMPHOCYTES # BLD: 1 K/UL (ref 1–5.1)
LYMPHOCYTES NFR BLD: 20.3 %
MCH RBC QN AUTO: 31.2 PG (ref 26–34)
MCHC RBC AUTO-ENTMCNC: 33.7 G/DL (ref 31–36)
MCV RBC AUTO: 92.5 FL (ref 80–100)
MONOCYTES # BLD: 0.3 K/UL (ref 0–1.3)
MONOCYTES NFR BLD: 7.2 %
NEUTROPHILS # BLD: 3.3 K/UL (ref 1.7–7.7)
NEUTROPHILS NFR BLD: 68.8 %
PERFORMED ON: ABNORMAL
PLATELET # BLD AUTO: 232 K/UL (ref 135–450)
PMV BLD AUTO: 8.1 FL (ref 5–10.5)
PREALB SERPL-MCNC: 22.3 MG/DL (ref 20–40)
RBC # BLD AUTO: 4.34 M/UL (ref 4.2–5.9)
WBC # BLD AUTO: 4.8 K/UL (ref 4–11)

## 2025-04-14 PROCEDURE — 36415 COLL VENOUS BLD VENIPUNCTURE: CPT

## 2025-04-14 PROCEDURE — 93971 EXTREMITY STUDY: CPT | Performed by: SURGERY

## 2025-04-14 PROCEDURE — 84134 ASSAY OF PREALBUMIN: CPT

## 2025-04-14 PROCEDURE — 93010 ELECTROCARDIOGRAM REPORT: CPT | Performed by: INTERNAL MEDICINE

## 2025-04-14 PROCEDURE — 2500000003 HC RX 250 WO HCPCS: Performed by: INTERNAL MEDICINE

## 2025-04-14 PROCEDURE — 6360000002 HC RX W HCPCS: Performed by: INTERNAL MEDICINE

## 2025-04-14 PROCEDURE — 0JBR0ZZ EXCISION OF LEFT FOOT SUBCUTANEOUS TISSUE AND FASCIA, OPEN APPROACH: ICD-10-PCS | Performed by: INTERNAL MEDICINE

## 2025-04-14 PROCEDURE — 6370000000 HC RX 637 (ALT 250 FOR IP): Performed by: INTERNAL MEDICINE

## 2025-04-14 PROCEDURE — 85025 COMPLETE CBC W/AUTO DIFF WBC: CPT

## 2025-04-14 PROCEDURE — 93971 EXTREMITY STUDY: CPT

## 2025-04-14 PROCEDURE — 1200000000 HC SEMI PRIVATE

## 2025-04-14 RX ORDER — ENOXAPARIN SODIUM 100 MG/ML
40 INJECTION SUBCUTANEOUS 2 TIMES DAILY
Status: DISCONTINUED | OUTPATIENT
Start: 2025-04-14 | End: 2025-04-16 | Stop reason: HOSPADM

## 2025-04-14 RX ADMIN — ALLOPURINOL 300 MG: 300 TABLET ORAL at 08:44

## 2025-04-14 RX ADMIN — SULFAMETHOXAZOLE AND TRIMETHOPRIM 1 TABLET: 800; 160 TABLET ORAL at 08:46

## 2025-04-14 RX ADMIN — ZONISAMIDE 100 MG: 100 CAPSULE ORAL at 22:17

## 2025-04-14 RX ADMIN — LAMOTRIGINE 600 MG: 150 TABLET ORAL at 08:45

## 2025-04-14 RX ADMIN — ATORVASTATIN CALCIUM 80 MG: 80 TABLET, FILM COATED ORAL at 22:15

## 2025-04-14 RX ADMIN — LEVETIRACETAM 1500 MG: 750 TABLET, FILM COATED ORAL at 08:44

## 2025-04-14 RX ADMIN — LAMOTRIGINE 600 MG: 150 TABLET ORAL at 22:17

## 2025-04-14 RX ADMIN — WATER 2000 MG: 1 INJECTION INTRAMUSCULAR; INTRAVENOUS; SUBCUTANEOUS at 15:23

## 2025-04-14 RX ADMIN — SULFAMETHOXAZOLE AND TRIMETHOPRIM 1 TABLET: 800; 160 TABLET ORAL at 22:15

## 2025-04-14 RX ADMIN — ASPIRIN 81 MG: 81 TABLET, COATED ORAL at 08:45

## 2025-04-14 RX ADMIN — SACUBITRIL AND VALSARTAN 1 TABLET: 97; 103 TABLET, FILM COATED ORAL at 22:16

## 2025-04-14 RX ADMIN — ENOXAPARIN SODIUM 40 MG: 100 INJECTION SUBCUTANEOUS at 08:44

## 2025-04-14 RX ADMIN — WATER 2000 MG: 1 INJECTION INTRAMUSCULAR; INTRAVENOUS; SUBCUTANEOUS at 22:15

## 2025-04-14 RX ADMIN — NORTRIPTYLINE HYDROCHLORIDE 75 MG: 25 CAPSULE ORAL at 22:14

## 2025-04-14 RX ADMIN — SODIUM CHLORIDE, PRESERVATIVE FREE 10 ML: 5 INJECTION INTRAVENOUS at 22:13

## 2025-04-14 RX ADMIN — WATER 2000 MG: 1 INJECTION INTRAMUSCULAR; INTRAVENOUS; SUBCUTANEOUS at 06:10

## 2025-04-14 RX ADMIN — ENOXAPARIN SODIUM 40 MG: 100 INJECTION SUBCUTANEOUS at 22:14

## 2025-04-14 RX ADMIN — LEVETIRACETAM 1500 MG: 750 TABLET, FILM COATED ORAL at 22:14

## 2025-04-14 RX ADMIN — SACUBITRIL AND VALSARTAN 1 TABLET: 97; 103 TABLET, FILM COATED ORAL at 08:44

## 2025-04-14 RX ADMIN — SODIUM CHLORIDE, PRESERVATIVE FREE 10 ML: 5 INJECTION INTRAVENOUS at 08:44

## 2025-04-14 NOTE — CONSULTS
Department of Podiatry Consult Note  Resident       Reason for Consult:  wound left foot  Requesting Physician:  Dr. Reji Velázquez MD    CHIEF COMPLAINT: Left hallux wound    HISTORY OF PRESENT ILLNESS:                The patient is a 64 y.o. male with significant past medical history as seen below who is consulted for wound left foot.  Patient has a wound on his left hallux that he thinks has been there for the past few days however is unsure of exactly how he got it he does endorse a recent fall but does not specifically recall if this is what caused his wound.  Patient has not followed with an outpatient podiatrist however would like to establish care. Patient denies any other pedal complaints at this time. Denies F/C/N/V.  .    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     Current Outpatient Medications on File Prior to Encounter   Medication Sig Dispense Refill    ergocalciferol (ERGOCALCIFEROL) 1.25 MG (59468 UT) capsule Take 1 capsule by mouth every 14 days      levETIRAcetam (KEPPRA) 750 MG tablet Take 2 tablets by mouth 2 times daily      metFORMIN (GLUCOPHAGE) 1000 MG tablet Take 1 tablet by mouth 2 times daily (with meals)

## 2025-04-14 NOTE — DISCHARGE INSTR - COC
Continuity of Care Form    Patient Name: Cheng Maxwell   :  1960  MRN:  [id number]    Admit date:  2025  Discharge date:  2025    Code Status Order: Full Code   Advance Directives:     Admitting Physician:  El Espinosa MD  PCP: Paola Davila MD    Discharging Nurse: Gianna Ortiz Hospital Unit/Room#: 5304/5304-01  Discharging Unit Phone Number: [phone]    Emergency Contact:   Extended Emergency Contact Information  Primary Emergency Contact: Lisa Albright  Address: UNKNOWN           [place] [id number] [facility]  Home Phone: [phone]  Relation: Brother/Sister  Secondary Emergency Contact: Whit Yeung  Address: UNKNOWN           [place] [id number] [facility]  Home Phone: [phone]  Relation: Brother/Sister    Past Surgical History:  Past Surgical History:   Procedure Laterality Date    EYE SURGERY         Immunization History:   Immunization History   Administered Date(s) Administered    COVID-19, MODERNA BLUE border, Primary or Immunocompromised, (age 12y+), IM, 100 mcg/0.5mL 2021    COVID-19, MODERNA Bivalent, (age 12y+), IM, 50 mcg/0.5 mL 10/25/2022    COVID-19, MODERNA Booster BLUE border, (age 18y+), IM, 50mcg/0.25mL 2021    COVID-19, MODERNA, , (age 12y+), IM, 50mcg/0.5mL 2024    COVID-19, PFIZER, , (age 12y+), IM, 30mcg/0.3mL 10/16/2023    Influenza Virus Vaccine 2018    Pneumococcal, PPSV23, PNEUMOVAX 23, (age 2y+), SC/IM, 0.5mL 10/25/2013, 2014, 10/04/2014    TDaP, ADACEL (age 10y-64y), BOOSTRIX (age 10y+), IM, 0.5mL 2013       Active Problems:  Patient Active Problem List   Diagnosis Code    Hypertension I10    Diabetes mellitus (HCC) E11.9    Orthostatic hypotension I95.1    Syncope R55    Morbid obesity with BMI of 50.0-59.9, adult E66.01, Z68.43    Chronic systolic heart failure (HCC) I50.22    Type 2 diabetes mellitus with obesity (HCC) E11.69, E66.9    Light

## 2025-04-14 NOTE — PLAN OF CARE
Problem: Chronic Conditions and Co-morbidities  Goal: Patient's chronic conditions and co-morbidity symptoms are monitored and maintained or improved  Outcome: Progressing     Problem: Pain  Goal: Verbalizes/displays adequate comfort level or baseline comfort level  Outcome: Progressing     Problem: Skin/Tissue Integrity  Goal: Skin integrity remains intact  Description: 1.  Monitor for areas of redness and/or skin breakdown2.  Assess vascular access sites hourly3.  Every 4-6 hours minimum:  Change oxygen saturation probe site4.  Every 4-6 hours:  If on nasal continuous positive airway pressure, respiratory therapy assess nares and determine need for appliance change or resting period  Outcome: Progressing     Problem: ABCDS Injury Assessment  Goal: Absence of physical injury  Outcome: Progressing     Problem: Safety - Adult  Goal: Free from fall injury  Outcome: Progressing

## 2025-04-14 NOTE — CARE COORDINATION
ADDENDUM:  Veronica with [facility] called [phone] and is able to work with Josh from [facility] to get services in for this patient.   Electronically signed by Zoey Guan RN on 4/14/2025 at 3:10 PM  [phone]    Case Management Assessment  Initial Evaluation    Date/Time of Evaluation: 4/14/2025 2:10 PM  Assessment Completed by: Zoey Guan RN    If patient is discharged prior to next notation, then this note serves as note for discharge by case management.    Patient Name: Cheng Maxwell                   YOB: 1960  Diagnosis: Cellulitis [L03.90]  Cellulitis of left leg [L03.116]  Chest pain, unspecified type [R07.9]                   Date / Time: 4/12/2025 11:57 AM    Patient Admission Status: Inpatient   Readmission Risk (Low < 19, Mod (19-27), High > 27): Readmission Risk Score: 13.4    Current PCP: Paola Davila MD  PCP verified by CM? Yes    Chart Reviewed: Yes      History Provided by: Patient  Patient Orientation: Alert and Oriented, Person, Place, Situation    Patient Cognition: Alert    Hospitalization in the last 30 days (Readmission):  No    If yes, Readmission Assessment in CM Navigator will be completed.    Advance Directives:      Code Status: Full Code   Patient's Primary Decision Maker is: Named in Scanned ACP Document    Primary Decision Maker: Lisa Albright (POA) - Brother/Sister - [phone]    Secondary Decision Maker: Whit Yeung (POA) - Brother/Sister - [phone]    Discharge Planning:    Patient lives with: Alone Type of Home: Apartment  Primary Care Giver: Self  Patient Support Systems include: Family Members   Current Financial resources: Medicaid, Medicare  Current community resources: None  Current services prior to admission: Durable Medical Equipment            Current DME: Wheelchair            Type of Home Care services:  Nursing Services    ADLS  Prior functional level: Assistance with the following:, Housework  Current functional level:

## 2025-04-14 NOTE — DISCHARGE INSTRUCTIONS
Podiatry Wound Care Discharge Instructions  Please perform every day dressing changes to left lower extremity as follows:    -Apply saline moistened gauze to the wound on the left lower extremity  -Next apply gauze to the top of the left foot, ankle, and leg. This step is critical as kerlix is abrasive and will rub wounds at the front of the ankle   -Next loosely wrap the left lower extremity with Kerlix starting from just in front of the toes and ending just above the ankle. Kerlix should be rolled on with absolutely no compression  -Next gently wrap the left foot with 4 inch Ace bandage starting from just in front of the toes and ending just above the ankle    Please follow-up with Dr. Bhavana Ponce DPM for continued wound care       Extra Heart Failure Education/ Tools/ Resources:     https://[employer]/publication/?u=863843   --- this is American Heart Association interactive Healthier Living with Heart Failure guidebook.  Please click hyperlink or copy / paste link into search bar. The QR Code is also available below. Use your mouse to scroll through the pages.  Lots of information about weight monitoring, diet tips, activity, meds, etc    Heart Failure Tools and Resources QR Code is below. It includes multiple resources to include symptom tracker, med tracker, further HF info, and access to a HF Support Network online Community    HF [place] Chani  -- this is a free smart phone chani available for iPhone and Android download.  Use your phone to track sodium / fluid intake, zone tool symptom tracking, weights, medications, etc. Click on this hyperlink  HF [place] Chani   for QR code for easy download or the link is also found in the below HF Tools and Resources.      DASH (Dietary Approach to Stop Hypertension) diet --  https://www.nhlbi.nih.gov/education/dash-eating-plan -- this diet is a flexible eating plan that promotes heart healthy eating style.  Click on hyperlink or copy / paste link into search bar.

## 2025-04-15 LAB
BASOPHILS # BLD: 0 K/UL (ref 0–0.2)
BASOPHILS NFR BLD: 0.6 %
DEPRECATED RDW RBC AUTO: 16 % (ref 12.4–15.4)
EOSINOPHIL # BLD: 0.2 K/UL (ref 0–0.6)
EOSINOPHIL NFR BLD: 3.6 %
GLUCOSE BLD-MCNC: 131 MG/DL (ref 70–99)
GLUCOSE BLD-MCNC: 135 MG/DL (ref 70–99)
GLUCOSE BLD-MCNC: 146 MG/DL (ref 70–99)
GLUCOSE BLD-MCNC: 171 MG/DL (ref 70–99)
HCT VFR BLD AUTO: 40.8 % (ref 40.5–52.5)
HGB BLD-MCNC: 13.6 G/DL (ref 13.5–17.5)
LYMPHOCYTES # BLD: 1.4 K/UL (ref 1–5.1)
LYMPHOCYTES NFR BLD: 26.4 %
MCH RBC QN AUTO: 31.6 PG (ref 26–34)
MCHC RBC AUTO-ENTMCNC: 33.2 G/DL (ref 31–36)
MCV RBC AUTO: 95.1 FL (ref 80–100)
MONOCYTES # BLD: 0.4 K/UL (ref 0–1.3)
MONOCYTES NFR BLD: 7.8 %
NEUTROPHILS # BLD: 3.3 K/UL (ref 1.7–7.7)
NEUTROPHILS NFR BLD: 61.6 %
PERFORMED ON: ABNORMAL
PLATELET # BLD AUTO: 234 K/UL (ref 135–450)
PMV BLD AUTO: 8.8 FL (ref 5–10.5)
RBC # BLD AUTO: 4.29 M/UL (ref 4.2–5.9)
URATE SERPL-MCNC: 3.3 MG/DL (ref 3.5–7.2)
WBC # BLD AUTO: 5.3 K/UL (ref 4–11)

## 2025-04-15 PROCEDURE — 6370000000 HC RX 637 (ALT 250 FOR IP): Performed by: INTERNAL MEDICINE

## 2025-04-15 PROCEDURE — 6360000002 HC RX W HCPCS: Performed by: FAMILY MEDICINE

## 2025-04-15 PROCEDURE — 6360000002 HC RX W HCPCS: Performed by: INTERNAL MEDICINE

## 2025-04-15 PROCEDURE — 1200000000 HC SEMI PRIVATE

## 2025-04-15 PROCEDURE — 85025 COMPLETE CBC W/AUTO DIFF WBC: CPT

## 2025-04-15 PROCEDURE — 84550 ASSAY OF BLOOD/URIC ACID: CPT

## 2025-04-15 PROCEDURE — 2500000003 HC RX 250 WO HCPCS: Performed by: INTERNAL MEDICINE

## 2025-04-15 PROCEDURE — 36415 COLL VENOUS BLD VENIPUNCTURE: CPT

## 2025-04-15 RX ORDER — PROCHLORPERAZINE EDISYLATE 5 MG/ML
5 INJECTION INTRAMUSCULAR; INTRAVENOUS EVERY 6 HOURS PRN
Status: DISCONTINUED | OUTPATIENT
Start: 2025-04-15 | End: 2025-04-16 | Stop reason: HOSPADM

## 2025-04-15 RX ORDER — PROCHLORPERAZINE MALEATE 5 MG/1
5 TABLET ORAL EVERY 8 HOURS PRN
Status: DISCONTINUED | OUTPATIENT
Start: 2025-04-15 | End: 2025-04-16 | Stop reason: HOSPADM

## 2025-04-15 RX ADMIN — SULFAMETHOXAZOLE AND TRIMETHOPRIM 1 TABLET: 800; 160 TABLET ORAL at 20:18

## 2025-04-15 RX ADMIN — LEVETIRACETAM 1500 MG: 750 TABLET, FILM COATED ORAL at 08:21

## 2025-04-15 RX ADMIN — SULFAMETHOXAZOLE AND TRIMETHOPRIM 1 TABLET: 800; 160 TABLET ORAL at 08:21

## 2025-04-15 RX ADMIN — SACUBITRIL AND VALSARTAN 1 TABLET: 97; 103 TABLET, FILM COATED ORAL at 20:19

## 2025-04-15 RX ADMIN — LEVETIRACETAM 1500 MG: 750 TABLET, FILM COATED ORAL at 20:17

## 2025-04-15 RX ADMIN — WATER 2000 MG: 1 INJECTION INTRAMUSCULAR; INTRAVENOUS; SUBCUTANEOUS at 22:54

## 2025-04-15 RX ADMIN — ENOXAPARIN SODIUM 40 MG: 100 INJECTION SUBCUTANEOUS at 08:20

## 2025-04-15 RX ADMIN — OXYCODONE 5 MG: 5 TABLET ORAL at 20:18

## 2025-04-15 RX ADMIN — LAMOTRIGINE 600 MG: 150 TABLET ORAL at 20:19

## 2025-04-15 RX ADMIN — ALLOPURINOL 300 MG: 300 TABLET ORAL at 08:21

## 2025-04-15 RX ADMIN — ENOXAPARIN SODIUM 40 MG: 100 INJECTION SUBCUTANEOUS at 20:20

## 2025-04-15 RX ADMIN — PROCHLORPERAZINE EDISYLATE 5 MG: 5 INJECTION INTRAMUSCULAR; INTRAVENOUS at 09:59

## 2025-04-15 RX ADMIN — NORTRIPTYLINE HYDROCHLORIDE 75 MG: 25 CAPSULE ORAL at 20:18

## 2025-04-15 RX ADMIN — SACUBITRIL AND VALSARTAN 1 TABLET: 97; 103 TABLET, FILM COATED ORAL at 08:21

## 2025-04-15 RX ADMIN — SODIUM CHLORIDE, PRESERVATIVE FREE 10 ML: 5 INJECTION INTRAVENOUS at 08:22

## 2025-04-15 RX ADMIN — WATER 2000 MG: 1 INJECTION INTRAMUSCULAR; INTRAVENOUS; SUBCUTANEOUS at 14:25

## 2025-04-15 RX ADMIN — ATORVASTATIN CALCIUM 80 MG: 80 TABLET, FILM COATED ORAL at 20:18

## 2025-04-15 RX ADMIN — WATER 2000 MG: 1 INJECTION INTRAMUSCULAR; INTRAVENOUS; SUBCUTANEOUS at 06:25

## 2025-04-15 RX ADMIN — LAMOTRIGINE 600 MG: 150 TABLET ORAL at 08:21

## 2025-04-15 RX ADMIN — ASPIRIN 81 MG: 81 TABLET, COATED ORAL at 08:21

## 2025-04-15 RX ADMIN — SODIUM CHLORIDE, PRESERVATIVE FREE 10 ML: 5 INJECTION INTRAVENOUS at 20:20

## 2025-04-15 RX ADMIN — ZONISAMIDE 100 MG: 100 CAPSULE ORAL at 20:19

## 2025-04-15 ASSESSMENT — PAIN DESCRIPTION - PAIN TYPE: TYPE: SURGICAL PAIN

## 2025-04-15 ASSESSMENT — PAIN DESCRIPTION - DESCRIPTORS: DESCRIPTORS: THROBBING;SHOOTING

## 2025-04-15 ASSESSMENT — PAIN SCALES - GENERAL
PAINLEVEL_OUTOF10: 0
PAINLEVEL_OUTOF10: 8

## 2025-04-15 ASSESSMENT — PAIN DESCRIPTION - LOCATION: LOCATION: FOOT

## 2025-04-15 ASSESSMENT — PAIN DESCRIPTION - ORIENTATION: ORIENTATION: LOWER;LEFT

## 2025-04-15 ASSESSMENT — PAIN DESCRIPTION - FREQUENCY: FREQUENCY: CONTINUOUS

## 2025-04-15 ASSESSMENT — PAIN DESCRIPTION - ONSET: ONSET: GRADUAL

## 2025-04-15 ASSESSMENT — PAIN - FUNCTIONAL ASSESSMENT: PAIN_FUNCTIONAL_ASSESSMENT: PREVENTS OR INTERFERES SOME ACTIVE ACTIVITIES AND ADLS

## 2025-04-15 NOTE — PLAN OF CARE
Problem: Discharge Planning  Goal: Discharge to home or other facility with appropriate resources  4/15/2025 1117 by Gianna Campbell, RN  Flowsheets (Taken 4/15/2025 1117)  Discharge to home or other facility with appropriate resources:   Identify barriers to discharge with patient and caregiver   Arrange for needed discharge resources and transportation as appropriate

## 2025-04-15 NOTE — CARE COORDINATION
Patient is from home and lives in an apartment alone. He is WC bound at baseline and has some assistance with cleaning at home. His sister Lisa also provides some assistance. He will have transport to home at d/c and he has agreed to home care. [facility] is able to provide this care and they have been in contact with Veronica with [phone] in order to get more visits covered as he will not be able to change the dressing himself.     Per podiatry note, patient will need 1-2 more days of IV ABX.     Electronically signed by Zoey Guan RN on 4/15/2025 at 11:09 AM  [phone]

## 2025-04-15 NOTE — PLAN OF CARE
Problem: Chronic Conditions and Co-morbidities  Goal: Patient's chronic conditions and co-morbidity symptoms are monitored and maintained or improved  Outcome: Progressing  Flowsheets (Taken 4/15/2025 0407)  Care Plan - Patient's Chronic Conditions and Co-Morbidity Symptoms are Monitored and Maintained or Improved:   Monitor and assess patient's chronic conditions and comorbid symptoms for stability, deterioration, or improvement   Collaborate with multidisciplinary team to address chronic and comorbid conditions and prevent exacerbation or deterioration   Update acute care plan with appropriate goals if chronic or comorbid symptoms are exacerbated and prevent overall improvement and discharge     Problem: Discharge Planning  Goal: Discharge to home or other facility with appropriate resources  Outcome: Progressing     Problem: Pain  Goal: Verbalizes/displays adequate comfort level or baseline comfort level  Outcome: Progressing  Flowsheets (Taken 4/15/2025 0407)  Verbalizes/displays adequate comfort level or baseline comfort level:   Encourage patient to monitor pain and request assistance   Administer analgesics based on type and severity of pain and evaluate response   Assess pain using appropriate pain scale   Implement non-pharmacological measures as appropriate and evaluate response   Consider cultural and social influences on pain and pain management   Notify Licensed Independent Practitioner if interventions unsuccessful or patient reports new pain     Problem: Skin/Tissue Integrity  Goal: Skin integrity remains intact  Description: 1.  Monitor for areas of redness and/or skin breakdown2.  Assess vascular access sites hourly3.  Every 4-6 hours minimum:  Change oxygen saturation probe site4.  Every 4-6 hours:  If on nasal continuous positive airway pressure, respiratory therapy assess nares and determine need for appliance change or resting period  Outcome: Progressing  Flowsheets (Taken 4/15/2025

## 2025-04-16 VITALS
HEIGHT: 69 IN | OXYGEN SATURATION: 99 % | BODY MASS INDEX: 46.65 KG/M2 | DIASTOLIC BLOOD PRESSURE: 73 MMHG | WEIGHT: 315 LBS | RESPIRATION RATE: 18 BRPM | SYSTOLIC BLOOD PRESSURE: 140 MMHG | TEMPERATURE: 97.7 F | HEART RATE: 72 BPM

## 2025-04-16 LAB
ANION GAP SERPL CALCULATED.3IONS-SCNC: 9 MMOL/L (ref 3–16)
BASOPHILS # BLD: 0 K/UL (ref 0–0.2)
BASOPHILS NFR BLD: 0.3 %
BUN SERPL-MCNC: 12 MG/DL (ref 7–20)
CALCIUM SERPL-MCNC: 9.4 MG/DL (ref 8.3–10.6)
CHLORIDE SERPL-SCNC: 102 MMOL/L (ref 99–110)
CO2 SERPL-SCNC: 26 MMOL/L (ref 21–32)
CREAT SERPL-MCNC: 1.2 MG/DL (ref 0.8–1.3)
DEPRECATED RDW RBC AUTO: 16.3 % (ref 12.4–15.4)
EOSINOPHIL # BLD: 0.2 K/UL (ref 0–0.6)
EOSINOPHIL NFR BLD: 4.3 %
GFR SERPLBLD CREATININE-BSD FMLA CKD-EPI: 68 ML/MIN/{1.73_M2}
GLUCOSE BLD-MCNC: 121 MG/DL (ref 70–99)
GLUCOSE BLD-MCNC: 174 MG/DL (ref 70–99)
GLUCOSE SERPL-MCNC: 129 MG/DL (ref 70–99)
HCT VFR BLD AUTO: 41.6 % (ref 40.5–52.5)
HGB BLD-MCNC: 14.2 G/DL (ref 13.5–17.5)
LYMPHOCYTES # BLD: 1.5 K/UL (ref 1–5.1)
LYMPHOCYTES NFR BLD: 32.3 %
MCH RBC QN AUTO: 31.6 PG (ref 26–34)
MCHC RBC AUTO-ENTMCNC: 34 G/DL (ref 31–36)
MCV RBC AUTO: 93.1 FL (ref 80–100)
MONOCYTES # BLD: 0.3 K/UL (ref 0–1.3)
MONOCYTES NFR BLD: 6 %
NEUTROPHILS # BLD: 2.6 K/UL (ref 1.7–7.7)
NEUTROPHILS NFR BLD: 57.1 %
PERFORMED ON: ABNORMAL
PERFORMED ON: ABNORMAL
PLATELET # BLD AUTO: 255 K/UL (ref 135–450)
PMV BLD AUTO: 8.2 FL (ref 5–10.5)
POTASSIUM SERPL-SCNC: 4.1 MMOL/L (ref 3.5–5.1)
RBC # BLD AUTO: 4.47 M/UL (ref 4.2–5.9)
SODIUM SERPL-SCNC: 137 MMOL/L (ref 136–145)
WBC # BLD AUTO: 4.6 K/UL (ref 4–11)

## 2025-04-16 PROCEDURE — 36415 COLL VENOUS BLD VENIPUNCTURE: CPT

## 2025-04-16 PROCEDURE — 97530 THERAPEUTIC ACTIVITIES: CPT

## 2025-04-16 PROCEDURE — 6370000000 HC RX 637 (ALT 250 FOR IP): Performed by: INTERNAL MEDICINE

## 2025-04-16 PROCEDURE — 97167 OT EVAL HIGH COMPLEX 60 MIN: CPT

## 2025-04-16 PROCEDURE — 6360000002 HC RX W HCPCS: Performed by: INTERNAL MEDICINE

## 2025-04-16 PROCEDURE — 2500000003 HC RX 250 WO HCPCS: Performed by: INTERNAL MEDICINE

## 2025-04-16 PROCEDURE — 97163 PT EVAL HIGH COMPLEX 45 MIN: CPT

## 2025-04-16 PROCEDURE — 85025 COMPLETE CBC W/AUTO DIFF WBC: CPT

## 2025-04-16 PROCEDURE — 80048 BASIC METABOLIC PNL TOTAL CA: CPT

## 2025-04-16 PROCEDURE — 6360000002 HC RX W HCPCS: Performed by: FAMILY MEDICINE

## 2025-04-16 RX ORDER — SULFAMETHOXAZOLE AND TRIMETHOPRIM 800; 160 MG/1; MG/1
1 TABLET ORAL EVERY 12 HOURS SCHEDULED
Status: DISCONTINUED | OUTPATIENT
Start: 2025-04-16 | End: 2025-04-16 | Stop reason: HOSPADM

## 2025-04-16 RX ORDER — SULFAMETHOXAZOLE AND TRIMETHOPRIM 800; 160 MG/1; MG/1
1 TABLET ORAL EVERY 12 HOURS SCHEDULED
Qty: 28 TABLET | Refills: 0 | Status: SHIPPED | OUTPATIENT
Start: 2025-04-16 | End: 2025-04-30

## 2025-04-16 RX ADMIN — SACUBITRIL AND VALSARTAN 1 TABLET: 97; 103 TABLET, FILM COATED ORAL at 07:58

## 2025-04-16 RX ADMIN — SODIUM CHLORIDE, PRESERVATIVE FREE 10 ML: 5 INJECTION INTRAVENOUS at 07:58

## 2025-04-16 RX ADMIN — OXYCODONE 5 MG: 5 TABLET ORAL at 06:11

## 2025-04-16 RX ADMIN — LEVETIRACETAM 1500 MG: 750 TABLET, FILM COATED ORAL at 07:57

## 2025-04-16 RX ADMIN — SULFAMETHOXAZOLE AND TRIMETHOPRIM 1 TABLET: 800; 160 TABLET ORAL at 07:57

## 2025-04-16 RX ADMIN — ENOXAPARIN SODIUM 40 MG: 100 INJECTION SUBCUTANEOUS at 07:57

## 2025-04-16 RX ADMIN — ALLOPURINOL 300 MG: 300 TABLET ORAL at 07:57

## 2025-04-16 RX ADMIN — ASPIRIN 81 MG: 81 TABLET, COATED ORAL at 07:57

## 2025-04-16 RX ADMIN — WATER 2000 MG: 1 INJECTION INTRAMUSCULAR; INTRAVENOUS; SUBCUTANEOUS at 06:03

## 2025-04-16 RX ADMIN — PROCHLORPERAZINE EDISYLATE 5 MG: 5 INJECTION INTRAMUSCULAR; INTRAVENOUS at 10:25

## 2025-04-16 RX ADMIN — LAMOTRIGINE 600 MG: 150 TABLET ORAL at 07:58

## 2025-04-16 ASSESSMENT — PAIN DESCRIPTION - LOCATION: LOCATION: FOOT

## 2025-04-16 ASSESSMENT — PAIN - FUNCTIONAL ASSESSMENT: PAIN_FUNCTIONAL_ASSESSMENT: PREVENTS OR INTERFERES SOME ACTIVE ACTIVITIES AND ADLS

## 2025-04-16 ASSESSMENT — PAIN SCALES - GENERAL
PAINLEVEL_OUTOF10: 3
PAINLEVEL_OUTOF10: 9

## 2025-04-16 ASSESSMENT — PAIN DESCRIPTION - ONSET: ONSET: GRADUAL

## 2025-04-16 ASSESSMENT — PAIN DESCRIPTION - DESCRIPTORS: DESCRIPTORS: THROBBING;SHOOTING

## 2025-04-16 ASSESSMENT — PAIN DESCRIPTION - ORIENTATION: ORIENTATION: LOWER;LEFT

## 2025-04-16 ASSESSMENT — PAIN DESCRIPTION - PAIN TYPE: TYPE: SURGICAL PAIN

## 2025-04-16 ASSESSMENT — PAIN DESCRIPTION - FREQUENCY: FREQUENCY: CONTINUOUS

## 2025-04-16 NOTE — DISCHARGE SUMMARY
V2.0  Discharge Summary    Name:  Cheng Maxwell /Age/Sex: 1960 (64 y.o. male)   Admit Date: 2025  Discharge Date: 25    MRN & CSN:  [id number] & [id number] Encounter Date and Time 25 8:35 AM EDT    Attending:  Tiffany Schmitt,* Discharging Provider: Cecile Scott, EDUARDA - [facility]       Hospital Course:     Brief HPI: Cheng Maxwell is a 64 y.o. male who presented with  pmh of HTN, CAD, CHF, DM-2, SZ Disorder, Morbid Obesity, Mentally Challenged per his sister who is the POA presents with complaints of generalized weakness and chest pain-intermittently for the past 2 weeks.  Patient states that he has been feeling lightheaded, fatigued and intermittent sharp left-sided chest discomfort for the past few weeks.  Patient does not normally walk but has been feeling SOB and states he feels that this is at his baseline.  Chest pain is nonradiating and not associated with nausea, vomiting or diaphoresis.  Patient also complains of wound to his left big toe with pain and erythema for the last few days. He denies fever, chills, purulent discharge from the foot. ESR 40, CRP 43.8; X Ray: Soft tissue edema of great toe. No radiographic evidence of osteomyelitis. Podiatry consulted and following. IV Ancef and PO Bactrim started. Podiatry recommended to continue IV ABX for 1 - 2 days. With continued improvement of cellulitis of LEFT hallux, de escalation of IV ABX from podiatry standpoint okay to oral doxycycline x 2 weeks. Pt has allergy listed and reviewed EMR; will continue PO Bactrim x 14 days at discharge. Pt is WC bound at baseline. PT/OT consulted with recs for SNF vs 24 hour supervision or assist, home with home health PT. Pt is not safe to D/C home alone. Pt agreeable for discharge to SNF for inpt PT. Pt will need to continue with wound care as ordered and follow up with podiatry as directed. Pt is medically stable for discharge.     Problems addressed during this hospitalization:

## 2025-04-16 NOTE — CARE COORDINATION
Case Management Assessment            Discharge Note                    Date / Time of Note: 4/16/2025 2:17 PM                  Discharge Note Completed by: Zoey Guan RN    Patient Name: Cheng Maxwell   YOB: 1960  Diagnosis: Cellulitis [L03.90]  Cellulitis of left leg [L03.116]  Chest pain, unspecified type [R07.9]   Date / Time: 4/12/2025 11:57 AM    Current PCP: Paola Davila MD  Clinic patient: No    Hospitalization in the last 30 days: No       Advance Directives:  Code Status: Full Code  [place] DNR form completed and on chart: No    Financial:  Payor: MEDICARE / Plan: MEDICARE PART A AND B / Product Type: *No Product type* /      Pharmacy:    [id number] [place], [place] - [address] [facility] [phone] - F [phone]  [address] [place] [id number]  Phone: [phone] Fax: [phone]    [facility] [id number] [address] [facility] [phone] - F [phone]  [address] [facility] [address]  Phone: [phone] Fax: [phone]      Assistance purchasing medications?: Potential Assistance Purchasing Medications: No  Assistance provided by Case Management: None at this time    Does patient want to participate in local refill/ meds to beds program?: Yes    Meds To Beds General Rules:  1. Can ONLY be done Monday- Friday between 8:30am-5pm  2. Prescription(s) must be in pharmacy by 3pm to be filled same day  3.Copy of patient's insurance/ prescription drug card and patient face sheet must be sent along with the prescription(s)  4. Cost of Rx cannot be added to hospital bill. If financial assistance is needed, please contact unit  or ;  or  CANNOT provide pharmacy voucher for patients co-pays  5. Patients can then  the prescription on their way out of the hospital at discharge, or pharmacy can deliver to the bedside if staff is available. (payment due at time of

## 2025-04-16 NOTE — PROGRESS NOTES
Hospitalist Progress Note      Name:  Cheng Maxwell /Age/Sex: 1960  (64 y.o. male)   MRN & CSN:  [id number] & [id number] Encounter Date/Time: 4/15/2025 8:09 AM EDT    Location:  [facility]304-01 PCP: Paola Davila MD       Hospital Day: 4         Date of Admission: 2025    Chief Complaint: Infected Blister Hallux, LEFT    Hospital Course: Cheng Maxwell is a 64 y.o. male with pmh of HTN, CAD, CHF, DM-2, SZ Disorder, Morbid Obesity, Mentally Challenged per his sister who is the POA presents with complaints of generalized weakness and chest pain-intermittently for the past 2 weeks.  Patient states that he has been feeling lightheaded, fatigued and intermittent sharp left-sided chest discomfort for the past few weeks.  Patient does not normally walk but has been feeling SOB and states he feels that this is at his baseline.  Chest pain is nonradiating and not associated with nausea, vomiting or diaphoresis.  Patient also complains of wound to his left big toe with pain and erythema for the last few days. He denies fever, chills, purulent discharge from the foot. ESR 40, CRP 43.8; X Ray: Soft tissue edema of great toe. No radiographic evidence of osteomyelitis. Podiatry consulted and following. IV Ancef and PO Bactrim started.     Subjective: Denies complaints overnight, mild nausea this AM    Assessment and Recommendations:    LEFT LEG cellulitis (Improving)  Full Thickness ulceration, LEFT Foot (Improving)   Podiatry consulted, following  Remains afebrile, WBC 5.3   ESR 40, CRP 43.8; afebrile at admission    Started as blister in LEFT hallux; uprooted w/erythema    & swelling up to mid shin medially   US LEFT LE (): No evidence of acute DVT   Wound culture not obtained at this time  no active drainage   LEFT hallux marked with extent of cellulitis, recessed vs    Continue IV ABX: Ancef, Bactrim    Podiatry recommended to continue IV ABX 1 - 2 more days   LLE dressed with adaptic, gauze, 
  [facility] Medicine Progress Note  V 1.6      Date of Admission: 4/12/2025    Hospital Day: 3      Chief Admission Complaint: Infected blister of the great  toe    Subjective: Feeling okay denies any new complaints blood sugar has been stable    Presenting Admission History:       Cheng Maxwell is a 64 y.o. male with a pmh of essential hypertension, CAD, CHF, DM-2, seizure disorder, morbid obesity, mentally challenged per his sister who is the POA - 1, who presents with left foot pain with erythema and generalized malaise and not feeling well for the past 2 weeks     Assessment/Plan:      Current Principal Problem:  Cellulitis    #Left leg cellulitis  - History of DM-2  - Elevated inflammatory markers (CRP = 44, ESR = 46)  - Started as a blister in the left great toe which has been uprooted with extending erythema and swelling up to the mid shin medially  - Continue on Bactrim and Ancef  - Elevate the affected limb  - Pain relief as ordered  --venous Doppler ultrasound of the left leg pending  - podiatry consult ordered-s/p bedside debridement; continue IV antibiotics local wound care.  No weightbearing restrictions     #Chest pain-intermittent, sharp left-sided for the past few weeks  -High-sensitivity troponin within normal range  - EKG, nonischemic with chronic LBBB  - Continue on aspirin, statins   - Continue to monitor  - Possible cardiac workup as outpatient     #DM-2  - Hold metformin  - Continue on current insulin regimen with hypoglycemia protocol,   - HbA1c on 4/13 is  6.4     #CAD  #Essential hypertension  #Chronic HFrEF, EF on echocardiogram from 2021 = 45%  #Morbid obesity with BMI of 52  -Continue on aspirin, statins, nd Entresto  - Supportive therapy     #Seizure disorder  - Continued on Keppra, Vimpat and Zonegran  - Follows with  neurology     #Prolonged QT-chronic  - Avoid QT prolonging medications    Ongoing threat to life and/or bodily function without ongoing treatment due to:      Consults:  
  Physician Progress Note      PATIENT:               CRYSTAL NIETO  CSN #:                  [id number]  :                       1960  ADMIT DATE:       2025 11:57 AM  DISCH DATE:  RESPONDING  PROVIDER #:        Clive Coreas          QUERY TEXT:    Excisional debridement of left foot is documented in the medical record on   .  To accurately reflect the procedure performed please document the   deepest depth of tissue removed as down to and including:    The clinical indicators include:  - Left foot ulceration  - Per  podiatry progress note \"Using a sterile #15 blade, the wound noted   to the left foot was excisionally debrided down to and including retained   tissue to healthy, bleeding tissue margins.\"  Options provided:  -- Excisional debridement of skin  -- Excisional debridement of subcutaneous tissue  -- Excisional debridement of fascia  -- Excisional debridement of muscle  -- Excisional debridement of bone  -- Other - I will add my own diagnosis  -- Disagree - Not applicable / Not valid  -- Disagree - Clinically unable to determine / Unknown  -- Refer to Clinical Documentation Reviewer    PROVIDER RESPONSE TEXT:    Excisional debridement of subcutaneous tissue was performed.    Query created by: Kristy Dennison on 4/15/2025 10:26 AM      Electronically signed by:  Clive Coreas 4/15/2025 1:45 PM          
  Physician Progress Note      PATIENT:               CRYSTAL NIETO  CSN #:                  [id number]  :                       1960  ADMIT DATE:       2025 11:57 AM  DISCH DATE:  RESPONDING  PROVIDER #:        KAL DANIEL          QUERY TEXT:    Please document the relationship, if any, between the cellulitis and diabetes:    The clinical indicators include:  - Started with blister of left great toe, now with erythema and swelling up to   the mid shin  - DM type 2  - CRP 44, ESR 46  - Being treated with Bactrim and Ancef  - Podiatry consult pending  Options provided:  -- Left leg cellulitis related to Diabetes  -- Left leg cellulitis unrelated to Diabetes  -- Other - I will add my own diagnosis  -- Disagree - Not applicable / Not valid  -- Disagree - Clinically unable to determine / Unknown  -- Refer to Clinical Documentation Reviewer    PROVIDER RESPONSE TEXT:    Left leg cellulitis related to Diabetes    Query created by: Kristy Dennison on 2025 9:12 AM      Electronically signed by:  KAL DANIEL 2025 10:53 AM          
  [facility] Medicine Progress Note  V 1.6      Date of Admission: 4/12/2025    Hospital Day: 2      Chief Admission Complaint: Infected blister of the great  toe    Subjective: Feeling okay denies any new complaints blood sugar has been stable    Presenting Admission History:       Cheng Maxwell is a 64 y.o. male with a pmh of essential hypertension, CAD, CHF, DM-2, seizure disorder, morbid obesity, mentally challenged per his sister who is the POA - 1, who presents with left foot pain with erythema and generalized malaise and not feeling well for the past 2 weeks     Assessment/Plan:      Current Principal Problem:  Cellulitis    #Left leg cellulitis  - History of DM-2  - Elevated inflammatory markers (CRP = 44, ESR = 46)  - Started as a blister in the left great toe which has been uprooted with extending erythema and swelling up to the mid shin medially  - Continue on Bactrim and Ancef  - Elevate the affected limb  - Pain relief as ordered  -Consider venous Doppler ultrasound of the left leg  - Venous Doppler ordered   - podiatry consult ordered     #Chest pain-intermittent, sharp left-sided for the past few weeks  -High-sensitivity troponin within normal range  - EKG, nonischemic with chronic LBBB  - Continue on aspirin, statins   - Continue to monitor  - Possible cardiac workup as outpatient     #DM-2  - Hold metformin  - Continue on current insulin regimen with hypoglycemia protocol, follow A1c     #CAD  #Essential hypertension  #Chronic HFrEF, EF on echocardiogram from 2021 = 45%  #Morbid obesity with BMI of 52  -Continue on aspirin, statins, nd Entresto  - Supportive therapy     #Seizure disorder  - Continued on Keppra, Vimpat and Zonegran  - Follows with  neurology     #Prolonged QT-chronic  - Avoid QT prolonging medications    Ongoing threat to life and/or bodily function without ongoing treatment due to:      Consults:      IP CONSULT TO PODIATRY  
4 Eyes Admission Assessment     I agree as the admission nurse that 2 RN's have performed a thorough Head to Toe Skin Assessment on the patient. ALL assessment sites listed below have been assessed on admission.       Areas assessed by both nurses:   [x]   Head, Face, and Ears   [x]   Shoulders, Back, and Chest  [x]   Arms, Elbows, and Hands   [x]   Coccyx, Sacrum, and Ischum  [x]   Legs, Feet, and Heels        Does the Patient have Skin Breakdown?  Yes a wound was noted on the Admission Assessment and an LDA was Initiated documentation include the Ary-wound, Wound Assessment, Measurements, Dressing Treatment, Drainage, and Color\",     L great toe- wound  DTI- Black L 2nd toe   DTI- Black  R  3/4 toe  Blanchable redness  R heel  L heel DTI  R ankle/calf wound/scab  Scattered scabs/abrasions Bilat lower legs  Excoriation groin / scrotum/ ary area        James Prevention initiated:  Yes   Wound Care Orders initiated:  Yes      WOC nurse consulted for Pressure Injury (Stage 3,4, Unstageable, DTI, NWPT, and Complex wounds):  Yes      Nurse 1 eSignature: Electronically signed by Isabel Broussard RN on 4/12/25 at 6:29 PM EDT    **SHARE this note so that the co-signing nurse is able to place an eSignature**    Nurse 2 eSignature: Electronically signed by Michelle Vallejo RN on 4/13/25 at 12:41 PM EDT            
Occupational Therapy  Facility/Department: [address]  Occupational Therapy Initial Assessment/tx    Name: Cheng Maxwell  : 1960  MRN: [id number]  Date of Service: 2025    Discharge Recommendations:  IP Rehab, Home with Home health OT, 24 hour supervision or assist, Subacute/Skilled Nursing Facility          Patient Diagnosis(es): The primary encounter diagnosis was Cellulitis of left leg. A diagnosis of Chest pain, unspecified type was also pertinent to this visit.  Past Medical History:  has a past medical history of Arthritis, Asthma, Bronchitis, CAD (coronary artery disease), CHF (congestive heart failure) ([facility]), Chronic back pain, Developmental delay, Diabetes mellitus ([facility]), Generalized headaches, Gout, History of tremor, Hypertension, MI, old, Morbid obesity, Psychiatric pseudoseizure, Psychogenic nonepileptic seizure, and Seizures ([facility]).  Past Surgical History:  has a past surgical history that includes eye surgery.    Treatment Diagnosis: impaired ADLs and mobilty      Assessment  Performance deficits / Impairments: Decreased functional mobility ;Decreased ADL status;Decreased endurance  Assessment: Pt admitted with chest pain, L LE cellulits.  He required A for supine><sit, dep with LE dressing, declined transferring to recliner.  Prior to admission, pt resided alone, used electric w/c and was indep transferring with use of floor to ceiling grab bar/pole.  Safety concerns for patient to return home alone.  During session and after session, this OT spoke to patient's sister/POA regarding patient's status and recommendations.   present/on speaker phone when conversation took place after session.   Recommend ARU/SNF vs home with 24 hr A and home OT.  Treatment Diagnosis: impaired ADLs and mobilty  Prognosis: Good  Decision Making: High Complexity  REQUIRES OT FOLLOW-UP: Yes  Activity Tolerance  Activity Tolerance Comments: pt c/o feeling dizzy while sitting on side of bed  
Pharmacy Note - VTE Prophylaxis Dosing    Pt is on Enoxaparin 40mg SQ daily for VTE ppx. Per [facility] VTE SQ Prophylaxis Policy, Enoxaparin dose will be changed to 40mg SQ BID as pt wt > 150 kg (see policy below).     Estimated Creatinine Clearance: Estimated Creatinine Clearance: 117 mL/min (based on SCr of 1 mg/dL).  Dialysis Status, ADRI, CKD: n/a  Wt Readings from Last 1 Encounters:   04/12/25 (!) 164 kg (361 lb 8.9 oz)       Pharmacy will continue to monitor renal function and adjust dose as necessary.      Please call with questions--  Thanks--  Teresita Reed, PharmD, BCPS, BCGP  [phone] ([facility])   4/14/2025 12:02 PM            
Physical Therapy  Facility/Department: [address]  Physical Therapy Initial Assessment / Treatment    Name: Cheng Maxwell  : 1960  MRN: [id number]  Date of Service: 2025    Discharge Recommendations:  Subacute/Skilled Nursing Facility, 24 hour supervision or assist, Home with Home health PT (pt likely refuse SNF)   PT Equipment Recommendations  Equipment Needed: No      Patient Diagnosis(es): The primary encounter diagnosis was Cellulitis of left leg. A diagnosis of Chest pain, unspecified type was also pertinent to this visit.  Past Medical History:  has a past medical history of Arthritis, Asthma, Bronchitis, CAD (coronary artery disease), CHF (congestive heart failure) ([facility]), Chronic back pain, Developmental delay, Diabetes mellitus (H[facility]), Generalized headaches, Gout, History of tremor, Hypertension, MI, old, Morbid obesity, Psychiatric pseudoseizure, Psychogenic nonepileptic seizure, and Seizures ([facility]).  Past Surgical History:  has a past surgical history that includes eye surgery.    Assessment  Body Structures, Functions, Activity Limitations Requiring Skilled Therapeutic Intervention: Decreased functional mobility   Assessment: Pt is 64 y.o. male admit with LE cellulitis. Pt with h/o developmental delay, uses electric w/c for mobility and is indep with transfers and self care. Mobility limited today by dizziness with sitting EOB. Unable to attempt transfer to simulated w/c today - unclear if pt limited by dizziness, fear of falling, or weakness. Pt has specific set up at home for transfers by using a pole. LEs slightly weaker than baseline. Pt not safe to d/c home alone. If home at d/c, rec 24hr assist (family unable to provide) and home PT (declines). Rec continued IP PT. Discussed with CM. Will follow  Treatment Diagnosis: impaired transfers  Therapy Prognosis: Good  Decision Making: High Complexity  Requires PT Follow-Up: Yes  Activity Tolerance  Activity Tolerance Comments: BP 
Podiatric Surgery Daily Progress Note  Cheng Maxwell      Subjective :   Patient seen and examined this am at the bedside. Patient denies any acute overnight events. Patient denies N/V/F/C/SOB. Patient denies calf pain, thigh pain, chest pain.     Review of Systems: A 12 point review of symptoms is unremarkable with the exception of the chief complaint. Patient specifically denies nausea, fever, vomiting, chills, shortness of breath, chest pain, abdominal pain, constipation or difficulty urinating.       Objective     /63   Pulse 70   Temp 97.9 °F (36.6 °C) (Oral)   Resp 16   Ht 1.753 m (5' 9\")   Wt (!) 162.4 kg (358 lb 0.4 oz)   SpO2 96%   BMI 52.87 kg/m²      I/O:  Intake/Output Summary (Last 24 hours) at 4/16/2025 0714  Last data filed at 4/16/2025 0633  Gross per 24 hour   Intake 480 ml   Output 1150 ml   Net -670 ml              Wt Readings from Last 3 Encounters:   04/15/25 (!) 162.4 kg (358 lb 0.4 oz)   11/06/23 (!) 145 kg (319 lb 10.7 oz)   08/10/23 (!) 145.5 kg (320 lb 12.3 oz)       LABS:    Recent Labs     04/14/25  0633 04/15/25  0341   WBC 4.8 5.3   HGB 13.5 13.6   HCT 40.2* 40.8    234        No results for input(s): \"NA\", \"K\", \"CL\", \"CO2\", \"PHOS\", \"BUN\", \"CREATININE\" in the last 72 hours.    Invalid input(s): \"CA\"     No results for input(s): \"INR\", \"APTT\" in the last 72 hours.    Invalid input(s): \"PROT\"        LOWER EXTREMITY EXAMINATION    Dressing to left LE intact. No strikethrough noted to the external dressing.  Moderate drainage noted to the internal layers of the dressing.     VASCULAR: DP and PT pulses are palpable 2/4 b/l. CFT is brisk to the digits of the foot b/l. Skin temperature is warm to cool from proximal to distal with no focal calor noted.  No edema noted. No pain with calf compression b/l.     NEUROLOGIC: Gross and epicritic sensation is diminished b/l. Protective sensation is diminished at all pedal sites b/l.     DERMATOLOGIC:   Patient provided verbal 
Podiatric Surgery Daily Progress Note  Cheng Maxwell      Subjective :   Patient seen and examined this am at the bedside. Patient denies any acute overnight events. Patient denies N/V/F/C/SOB. Patient denies calf pain, thigh pain, chest pain.     Review of Systems: A 12 point review of symptoms is unremarkable with the exception of the chief complaint. Patient specifically denies nausea, fever, vomiting, chills, shortness of breath, chest pain, abdominal pain, constipation or difficulty urinating.       Objective     BP (!) 140/79   Pulse 80   Temp 98.2 °F (36.8 °C) (Oral)   Resp 17   Ht 1.753 m (5' 9\")   Wt (!) 162.4 kg (358 lb 0.4 oz)   SpO2 96%   BMI 52.87 kg/m²      I/O:  Intake/Output Summary (Last 24 hours) at 4/15/2025 0957  Last data filed at 4/15/2025 0820  Gross per 24 hour   Intake 240 ml   Output 700 ml   Net -460 ml              Wt Readings from Last 3 Encounters:   04/15/25 (!) 162.4 kg (358 lb 0.4 oz)   11/06/23 (!) 145 kg (319 lb 10.7 oz)   08/10/23 (!) 145.5 kg (320 lb 12.3 oz)       LABS:    Recent Labs     04/14/25  0633 04/15/25  0341   WBC 4.8 5.3   HGB 13.5 13.6   HCT 40.2* 40.8    234        Recent Labs     04/13/25  0657      K 4.1      CO2 27   BUN 12   CREATININE 1.0      No results for input(s): \"INR\", \"APTT\" in the last 72 hours.    Invalid input(s): \"PROT\"        LOWER EXTREMITY EXAMINATION    Dressing to left LE intact. No strikethrough noted to the external dressing.  Moderate drainage noted to the internal layers of the dressing.     VASCULAR: DP and PT pulses are palpable 2/4 b/l. CFT is brisk to the digits of the foot b/l. Skin temperature is warm to cool from proximal to distal with no focal calor noted.  No edema noted. No pain with calf compression b/l.     NEUROLOGIC: Gross and epicritic sensation is diminished b/l. Protective sensation is diminished at all pedal sites b/l.     DERMATOLOGIC:   Patient provided verbal consent for photos to be taken 
TIMES DAILY    levETIRAcetam (KEPPRA) 1,500 mg, 2 TIMES DAILY    loratadine (CLARITIN) 10 mg, DAILY    magnesium oxide (MAG-OX) 400 mg, DAILY    metFORMIN (GLUCOPHAGE) 1,000 mg, 2 TIMES DAILY WITH MEALS    montelukast (SINGULAIR) 10 mg, NIGHTLY    Multiple Vitamin (DAILY TITUS) TABS 1 tablet, DAILY    nortriptyline (PAMELOR) 75 mg, NIGHTLY    nystatin (MYCOSTATIN) 339702 UNIT/GM powder 2 TIMES DAILY    oxyCODONE-acetaminophen (PERCOCET) 5-325 MG per tablet 1 tablet, EVERY 4 HOURS PRN    sacubitril-valsartan (ENTRESTO)  MG per tablet 1 tablet, 2 TIMES DAILY    zonisamide (ZONEGRAN) 100 mg, Nightly       Razia Chaves [facility] PharmD, [facility]   Inpatient pharmacy [phone]

## 2025-04-16 NOTE — PLAN OF CARE
Problem: Discharge Planning  Goal: Discharge to home or other facility with appropriate resources  4/16/2025 0939 by Gianna Campbell, RN  Flowsheets (Taken 4/15/2025 1117)  Discharge to home or other facility with appropriate resources:   Identify barriers to discharge with patient and caregiver   Arrange for needed discharge resources and transportation as appropriate  4/15/2025 2120 by Elena Davis, RN  Outcome: Progressing

## 2025-04-16 NOTE — PLAN OF CARE
Problem: Chronic Conditions and Co-morbidities  Goal: Patient's chronic conditions and co-morbidity symptoms are monitored and maintained or improved  Outcome: Progressing     Problem: Discharge Planning  Goal: Discharge to home or other facility with appropriate resources  4/15/2025 2120 by Elena Davis RN  Outcome: Progressing  4/15/2025 1117 by Gianna Campbell RN  Flowsheets (Taken 4/15/2025 1117)  Discharge to home or other facility with appropriate resources:   Identify barriers to discharge with patient and caregiver   Arrange for needed discharge resources and transportation as appropriate     Problem: Pain  Goal: Verbalizes/displays adequate comfort level or baseline comfort level  Outcome: Progressing     Problem: ABCDS Injury Assessment  Goal: Absence of physical injury  Outcome: Progressing

## 2025-07-28 ENCOUNTER — APPOINTMENT (OUTPATIENT)
Dept: CT IMAGING | Age: 65
DRG: 101 | End: 2025-07-28
Payer: MEDICARE

## 2025-07-28 ENCOUNTER — HOSPITAL ENCOUNTER (INPATIENT)
Age: 65
LOS: 3 days | Discharge: HOME | DRG: 101 | End: 2025-07-31
Attending: EMERGENCY MEDICINE
Payer: MEDICARE

## 2025-07-28 DIAGNOSIS — R41.82 ALTERED MENTAL STATUS, UNSPECIFIED ALTERED MENTAL STATUS TYPE: Primary | ICD-10-CM

## 2025-07-28 DIAGNOSIS — R40.0 SOMNOLENCE: ICD-10-CM

## 2025-07-28 DIAGNOSIS — G40.A19 ABSENCE SEIZURES, INTRACTABLE (HCC): ICD-10-CM

## 2025-07-28 PROBLEM — R94.01 ABNORMAL EEG: Status: ACTIVE | Noted: 2025-07-28

## 2025-07-28 LAB
ANION GAP SERPL CALCULATED.3IONS-SCNC: 9 MMOL/L (ref 3–16)
BASE EXCESS BLDV CALC-SCNC: -2.8 MMOL/L (ref -2–3)
BASOPHILS # BLD: 0.1 K/UL (ref 0–0.2)
BASOPHILS NFR BLD: 1 %
BUN SERPL-MCNC: 15 MG/DL (ref 7–20)
CALCIUM SERPL-MCNC: 9.1 MG/DL (ref 8.3–10.6)
CHLORIDE SERPL-SCNC: 107 MMOL/L (ref 99–110)
CO2 BLDV-SCNC: 25 MMOL/L
CO2 SERPL-SCNC: 22 MMOL/L (ref 21–32)
COHGB MFR BLDV: 1.1 % (ref 0–1.5)
CREAT SERPL-MCNC: 0.9 MG/DL (ref 0.8–1.3)
DEPRECATED RDW RBC AUTO: 15.1 % (ref 12.4–15.4)
DO-HGB MFR BLDV: 47.5 %
EKG ATRIAL RATE: 77 BPM
EKG DIAGNOSIS: NORMAL
EKG P AXIS: 60 DEGREES
EKG P-R INTERVAL: 190 MS
EKG Q-T INTERVAL: 436 MS
EKG QRS DURATION: 182 MS
EKG QTC CALCULATION (BAZETT): 493 MS
EKG R AXIS: -64 DEGREES
EKG T AXIS: 102 DEGREES
EKG VENTRICULAR RATE: 77 BPM
EOSINOPHIL # BLD: 0.2 K/UL (ref 0–0.6)
EOSINOPHIL NFR BLD: 2.8 %
GFR SERPLBLD CREATININE-BSD FMLA CKD-EPI: >90 ML/MIN/{1.73_M2}
GLUCOSE BLD-MCNC: 104 MG/DL (ref 70–99)
GLUCOSE SERPL-MCNC: 153 MG/DL (ref 70–99)
HCO3 BLDV-SCNC: 23.6 MMOL/L (ref 24–28)
HCT VFR BLD AUTO: 37.9 % (ref 40.5–52.5)
HGB BLD-MCNC: 12.6 G/DL (ref 13.5–17.5)
LACTATE BLDV-SCNC: 1.4 MMOL/L (ref 0.4–2)
LEVETIRACETAM SERPL-MCNC: 38 UG/ML (ref 6–46)
LYMPHOCYTES # BLD: 1.3 K/UL (ref 1–5.1)
LYMPHOCYTES NFR BLD: 20.4 %
MAGNESIUM SERPL-MCNC: 1.68 MG/DL (ref 1.8–2.4)
MCH RBC QN AUTO: 31.1 PG (ref 26–34)
MCHC RBC AUTO-ENTMCNC: 33.1 G/DL (ref 31–36)
MCV RBC AUTO: 94 FL (ref 80–100)
MEDICATION DOSE-MCNC: NORMAL
METHGB MFR BLDV: 0.4 % (ref 0–1.5)
MONOCYTES # BLD: 0.4 K/UL (ref 0–1.3)
MONOCYTES NFR BLD: 6.6 %
NEUTROPHILS # BLD: 4.3 K/UL (ref 1.7–7.7)
NEUTROPHILS NFR BLD: 69.2 %
NT-PROBNP SERPL-MCNC: 313 PG/ML (ref 0–124)
PCO2 BLDV: 46.1 MMHG (ref 41–51)
PERFORMED ON: ABNORMAL
PH BLDV: 7.32 [PH] (ref 7.35–7.45)
PLATELET # BLD AUTO: 206 K/UL (ref 135–450)
PMV BLD AUTO: 8.4 FL (ref 5–10.5)
PO2 BLDV: 30.4 MMHG (ref 25–40)
POTASSIUM SERPL-SCNC: 3.8 MMOL/L (ref 3.5–5.1)
RBC # BLD AUTO: 4.03 M/UL (ref 4.2–5.9)
SAO2 % BLDV: 52 %
SODIUM SERPL-SCNC: 138 MMOL/L (ref 136–145)
TROPONIN, HIGH SENSITIVITY: 14 NG/L (ref 0–22)
TROPONIN, HIGH SENSITIVITY: 14 NG/L (ref 0–22)
WBC # BLD AUTO: 6.2 K/UL (ref 4–11)

## 2025-07-28 PROCEDURE — 96375 TX/PRO/DX INJ NEW DRUG ADDON: CPT

## 2025-07-28 PROCEDURE — 93005 ELECTROCARDIOGRAM TRACING: CPT | Performed by: EMERGENCY MEDICINE

## 2025-07-28 PROCEDURE — 84484 ASSAY OF TROPONIN QUANT: CPT

## 2025-07-28 PROCEDURE — 6370000000 HC RX 637 (ALT 250 FOR IP)

## 2025-07-28 PROCEDURE — 2060000000 HC ICU INTERMEDIATE R&B

## 2025-07-28 PROCEDURE — 83880 ASSAY OF NATRIURETIC PEPTIDE: CPT

## 2025-07-28 PROCEDURE — 85025 COMPLETE CBC W/AUTO DIFF WBC: CPT

## 2025-07-28 PROCEDURE — 6360000002 HC RX W HCPCS: Performed by: EMERGENCY MEDICINE

## 2025-07-28 PROCEDURE — 83735 ASSAY OF MAGNESIUM: CPT

## 2025-07-28 PROCEDURE — 80048 BASIC METABOLIC PNL TOTAL CA: CPT

## 2025-07-28 PROCEDURE — 70450 CT HEAD/BRAIN W/O DYE: CPT

## 2025-07-28 PROCEDURE — 95718 EEG PHYS/QHP 2-12 HR W/VEEG: CPT | Performed by: PSYCHIATRY & NEUROLOGY

## 2025-07-28 PROCEDURE — 83605 ASSAY OF LACTIC ACID: CPT

## 2025-07-28 PROCEDURE — 96365 THER/PROPH/DIAG IV INF INIT: CPT

## 2025-07-28 PROCEDURE — 95700 EEG CONT REC W/VID EEG TECH: CPT

## 2025-07-28 PROCEDURE — 95713 VEEG 2-12 HR CONT MNTR: CPT

## 2025-07-28 PROCEDURE — 6360000002 HC RX W HCPCS

## 2025-07-28 PROCEDURE — 82803 BLOOD GASES ANY COMBINATION: CPT

## 2025-07-28 PROCEDURE — 36415 COLL VENOUS BLD VENIPUNCTURE: CPT

## 2025-07-28 PROCEDURE — 80177 DRUG SCRN QUAN LEVETIRACETAM: CPT

## 2025-07-28 PROCEDURE — 99285 EMERGENCY DEPT VISIT HI MDM: CPT

## 2025-07-28 RX ORDER — ATORVASTATIN CALCIUM 80 MG/1
80 TABLET, FILM COATED ORAL NIGHTLY
Status: DISCONTINUED | OUTPATIENT
Start: 2025-07-28 | End: 2025-07-31 | Stop reason: HOSPADM

## 2025-07-28 RX ORDER — ACETAMINOPHEN 650 MG/1
650 SUPPOSITORY RECTAL EVERY 6 HOURS PRN
Status: DISCONTINUED | OUTPATIENT
Start: 2025-07-28 | End: 2025-07-31 | Stop reason: HOSPADM

## 2025-07-28 RX ORDER — MIDAZOLAM HYDROCHLORIDE 1 MG/ML
2 INJECTION, SOLUTION INTRAMUSCULAR; INTRAVENOUS ONCE
Status: COMPLETED | OUTPATIENT
Start: 2025-07-28 | End: 2025-07-28

## 2025-07-28 RX ORDER — MAGNESIUM SULFATE IN WATER 40 MG/ML
2000 INJECTION, SOLUTION INTRAVENOUS ONCE
Status: COMPLETED | OUTPATIENT
Start: 2025-07-28 | End: 2025-07-28

## 2025-07-28 RX ORDER — SODIUM CHLORIDE 0.9 % (FLUSH) 0.9 %
5-40 SYRINGE (ML) INJECTION PRN
Status: DISCONTINUED | OUTPATIENT
Start: 2025-07-28 | End: 2025-07-31 | Stop reason: HOSPADM

## 2025-07-28 RX ORDER — ONDANSETRON 4 MG/1
4 TABLET, ORALLY DISINTEGRATING ORAL EVERY 8 HOURS PRN
Status: DISCONTINUED | OUTPATIENT
Start: 2025-07-28 | End: 2025-07-31 | Stop reason: HOSPADM

## 2025-07-28 RX ORDER — ENOXAPARIN SODIUM 100 MG/ML
40 INJECTION SUBCUTANEOUS 2 TIMES DAILY
Status: DISCONTINUED | OUTPATIENT
Start: 2025-07-28 | End: 2025-07-31 | Stop reason: HOSPADM

## 2025-07-28 RX ORDER — FAMOTIDINE 20 MG/1
40 TABLET, FILM COATED ORAL DAILY
Status: DISCONTINUED | OUTPATIENT
Start: 2025-07-29 | End: 2025-07-31 | Stop reason: HOSPADM

## 2025-07-28 RX ORDER — LEVETIRACETAM 750 MG/1
1500 TABLET ORAL 2 TIMES DAILY
Status: DISCONTINUED | OUTPATIENT
Start: 2025-07-28 | End: 2025-07-31 | Stop reason: HOSPADM

## 2025-07-28 RX ORDER — POLYETHYLENE GLYCOL 3350 17 G/17G
17 POWDER, FOR SOLUTION ORAL DAILY PRN
Status: DISCONTINUED | OUTPATIENT
Start: 2025-07-28 | End: 2025-07-31 | Stop reason: HOSPADM

## 2025-07-28 RX ORDER — MAGNESIUM SULFATE IN WATER 40 MG/ML
2000 INJECTION, SOLUTION INTRAVENOUS PRN
Status: DISCONTINUED | OUTPATIENT
Start: 2025-07-28 | End: 2025-07-31 | Stop reason: HOSPADM

## 2025-07-28 RX ORDER — SODIUM CHLORIDE 0.9 % (FLUSH) 0.9 %
5-40 SYRINGE (ML) INJECTION EVERY 12 HOURS SCHEDULED
Status: DISCONTINUED | OUTPATIENT
Start: 2025-07-28 | End: 2025-07-31 | Stop reason: HOSPADM

## 2025-07-28 RX ORDER — ONDANSETRON 2 MG/ML
4 INJECTION INTRAMUSCULAR; INTRAVENOUS EVERY 6 HOURS PRN
Status: DISCONTINUED | OUTPATIENT
Start: 2025-07-28 | End: 2025-07-31 | Stop reason: HOSPADM

## 2025-07-28 RX ORDER — INSULIN LISPRO 100 [IU]/ML
0-4 INJECTION, SOLUTION INTRAVENOUS; SUBCUTANEOUS
Status: DISCONTINUED | OUTPATIENT
Start: 2025-07-28 | End: 2025-07-31 | Stop reason: HOSPADM

## 2025-07-28 RX ORDER — SODIUM CHLORIDE 9 MG/ML
INJECTION, SOLUTION INTRAVENOUS PRN
Status: DISCONTINUED | OUTPATIENT
Start: 2025-07-28 | End: 2025-07-31 | Stop reason: HOSPADM

## 2025-07-28 RX ORDER — GLUCAGON 1 MG/ML
1 KIT INJECTION PRN
Status: DISCONTINUED | OUTPATIENT
Start: 2025-07-28 | End: 2025-07-31 | Stop reason: HOSPADM

## 2025-07-28 RX ORDER — ASPIRIN 81 MG/1
81 TABLET ORAL DAILY
Status: DISCONTINUED | OUTPATIENT
Start: 2025-07-28 | End: 2025-07-31 | Stop reason: HOSPADM

## 2025-07-28 RX ORDER — ALLOPURINOL 300 MG/1
300 TABLET ORAL DAILY
Status: DISCONTINUED | OUTPATIENT
Start: 2025-07-29 | End: 2025-07-31 | Stop reason: HOSPADM

## 2025-07-28 RX ORDER — LAMOTRIGINE 150 MG/1
600 TABLET ORAL 2 TIMES DAILY
Status: DISCONTINUED | OUTPATIENT
Start: 2025-07-28 | End: 2025-07-31 | Stop reason: HOSPADM

## 2025-07-28 RX ORDER — OXYCODONE AND ACETAMINOPHEN 5; 325 MG/1; MG/1
1 TABLET ORAL EVERY 4 HOURS PRN
Refills: 0 | Status: DISCONTINUED | OUTPATIENT
Start: 2025-07-28 | End: 2025-07-29

## 2025-07-28 RX ORDER — NORTRIPTYLINE HYDROCHLORIDE 25 MG/1
75 CAPSULE ORAL NIGHTLY
Status: DISCONTINUED | OUTPATIENT
Start: 2025-07-28 | End: 2025-07-31 | Stop reason: HOSPADM

## 2025-07-28 RX ORDER — CARVEDILOL 6.25 MG/1
6.25 TABLET ORAL 2 TIMES DAILY WITH MEALS
Status: DISCONTINUED | OUTPATIENT
Start: 2025-07-28 | End: 2025-07-31 | Stop reason: HOSPADM

## 2025-07-28 RX ORDER — POTASSIUM CHLORIDE 1500 MG/1
40 TABLET, EXTENDED RELEASE ORAL PRN
Status: DISCONTINUED | OUTPATIENT
Start: 2025-07-28 | End: 2025-07-31 | Stop reason: HOSPADM

## 2025-07-28 RX ORDER — POTASSIUM CHLORIDE 7.45 MG/ML
10 INJECTION INTRAVENOUS PRN
Status: DISCONTINUED | OUTPATIENT
Start: 2025-07-28 | End: 2025-07-31 | Stop reason: HOSPADM

## 2025-07-28 RX ORDER — DIAZEPAM 5 MG/1
5 TABLET ORAL EVERY 12 HOURS PRN
Status: DISCONTINUED | OUTPATIENT
Start: 2025-07-28 | End: 2025-07-31 | Stop reason: HOSPADM

## 2025-07-28 RX ORDER — MONTELUKAST SODIUM 10 MG/1
10 TABLET ORAL NIGHTLY
Status: DISCONTINUED | OUTPATIENT
Start: 2025-07-28 | End: 2025-07-31 | Stop reason: HOSPADM

## 2025-07-28 RX ORDER — ZONISAMIDE 100 MG/1
300 CAPSULE ORAL NIGHTLY
Status: DISCONTINUED | OUTPATIENT
Start: 2025-07-28 | End: 2025-07-31 | Stop reason: HOSPADM

## 2025-07-28 RX ORDER — SACUBITRIL AND VALSARTAN 97; 103 MG/1; MG/1
1 TABLET, FILM COATED ORAL 2 TIMES DAILY
Status: DISCONTINUED | OUTPATIENT
Start: 2025-07-28 | End: 2025-07-31 | Stop reason: HOSPADM

## 2025-07-28 RX ORDER — DEXTROSE MONOHYDRATE 100 MG/ML
INJECTION, SOLUTION INTRAVENOUS CONTINUOUS PRN
Status: DISCONTINUED | OUTPATIENT
Start: 2025-07-28 | End: 2025-07-31 | Stop reason: HOSPADM

## 2025-07-28 RX ORDER — ACETAMINOPHEN 325 MG/1
650 TABLET ORAL EVERY 6 HOURS PRN
Status: DISCONTINUED | OUTPATIENT
Start: 2025-07-28 | End: 2025-07-31 | Stop reason: HOSPADM

## 2025-07-28 RX ADMIN — MAGNESIUM SULFATE HEPTAHYDRATE 2000 MG: 40 INJECTION, SOLUTION INTRAVENOUS at 14:39

## 2025-07-28 RX ADMIN — ASPIRIN 81 MG: 81 TABLET, COATED ORAL at 21:48

## 2025-07-28 RX ADMIN — MIDAZOLAM HYDROCHLORIDE 2 MG: 1 INJECTION, SOLUTION INTRAMUSCULAR; INTRAVENOUS at 13:03

## 2025-07-28 RX ADMIN — SACUBITRIL AND VALSARTAN 1 TABLET: 97; 103 TABLET, FILM COATED ORAL at 21:48

## 2025-07-28 RX ADMIN — MONTELUKAST 10 MG: 10 TABLET, FILM COATED ORAL at 21:48

## 2025-07-28 RX ADMIN — LAMOTRIGINE 600 MG: 150 TABLET ORAL at 21:49

## 2025-07-28 RX ADMIN — ENOXAPARIN SODIUM 40 MG: 100 INJECTION SUBCUTANEOUS at 21:49

## 2025-07-28 RX ADMIN — NORTRIPTYLINE HYDROCHLORIDE 75 MG: 25 CAPSULE ORAL at 21:49

## 2025-07-28 RX ADMIN — ZONISAMIDE 300 MG: 100 CAPSULE ORAL at 21:49

## 2025-07-28 RX ADMIN — CARVEDILOL 6.25 MG: 6.25 TABLET, FILM COATED ORAL at 18:54

## 2025-07-28 RX ADMIN — LEVETIRACETAM 1500 MG: 750 TABLET, FILM COATED ORAL at 21:49

## 2025-07-28 RX ADMIN — ATORVASTATIN CALCIUM 80 MG: 80 TABLET, FILM COATED ORAL at 21:48

## 2025-07-28 RX ADMIN — MIDAZOLAM HYDROCHLORIDE 2 MG: 1 INJECTION, SOLUTION INTRAMUSCULAR; INTRAVENOUS at 14:58

## 2025-07-28 NOTE — H&P
Name:  Cheng Maxwell /Age/Sex: 1960  (64 y.o. male)   MRN & CSN:  [id number] & [id number] Encounter Date/Time: 2025 5:06 PM EDT   Location:  [facility] PCP: Paola Davila MD     Attending:Cathryn Bansal MD       Cheng Maxwell is a 64 y.o. male who presented with     Chief Complaint   Patient presents with    Altered Mental Status     Pt presents to the ED due to AMS. Per EMS pt was like this yesterday per sister.  Pt is alert, follows commands, but does not respond to questions. Pt has history of seizures and CHF.  Pt given 2 of versed on arrival to ED. Pt from [facility] per EMS run.           Assessment and Plan     Concern for breakthrough seizures  Generalized epilepsy  History of status epilepticus  Patient presented to the ER with decreased level of consciousness, received Versed in the ER with improvement  Neurology consulted and recommended cEEG monitoring  Continue home Lamictal, Keppra and zonisamide for now  Seizure precautions    Type 2 diabetes mellitus  Low-dose sliding scale insulin for now  Monitor with fingerstick checks    CAD  Continue home aspirin and statin    Heart failure with reduced ejection fraction  EF of 20 to 25%  Not in acute exacerbation  Continue GDMT with Coreg Entresto    Hyperlipidemia  Continue home statin    GERD  Continue famotidine    Mood disorder  Continue home medications    HPI :      Cheng Maxwell is a 64 y.o. male with  has a past medical history of Arthritis, Asthma, Bronchitis, CAD (coronary artery disease), CHF (congestive heart failure) ([facility]), Chronic back pain, Developmental delay, Diabetes mellitus (HCC), Generalized headaches, Gout, History of tremor, Hypertension, MI, old, Morbid obesity ([facility]), Psychiatric pseudoseizure, Psychogenic nonepileptic seizure, and Seizures ([facility]). who presented with chief complaints of decreased level of responsiveness.  Patient was at his assisted living center and was noted to be less

## 2025-07-28 NOTE — CONSULTS
Neurology Consult Note    Patient: Cheng Maxwell MRN: [id number]    YOB: 1960  Age: 64 y.o.  Sex: male   Unit: [facility] EMERGENCY DEPT  Room/Bed: 2/B22-22 Location: [facility]    Date of Consultation: 7/28/2025  Date of Admission: 7/28/2025 12:54 PM ( LOS: 0 days )  Primary Care Physician: Paola Davila MD   Consult Requested By: Cathryn Bansal MD    Reason for Consult: AMS, h/o seizures    IMPRESSION & RECOMMENDATIONS     IMPRESSION:  63 y/o man with a h/o developmental delay, nonambulatory at baseline, with symptomatic generalized refractory epilepsy (on home keppra + zonegran + lamictal) who is presenting with AMS.  No collateral available at the bedside, but apparently he is not at his baseline.  Last visit with his outpatient epileptologist was 2w ago (7/7/25) where his zonegran was increased from 200 to 300mg qhs, and his keppra was maintained at 1500mg bid and lamictal 600mg bid.  Neuro exam significant for myoclonus at rest and with posture w/out any clear alteration of mental status.    1) AMS  2) myoclonus  3) epilepsy  4) developmental delay    RECOMMENDATIONS:  - cEEG given his history of status epilepticus  - medical workup for myoclonus  - continue home meds (keppra 1500mg bid, lamictal 600mg bid, zonegran 300mg qhs)  - if myclonus continues, EEG shows no seizures, and medical workup is otherwise negative, we may want to decrease his zonegran back to 200mg qhs in case its contributing to myoclonus  - check ammonia    Maria Luisa Lobato MD   Neurology  7/28/2025 6:24 PM  [employer]: [facility] Neurology    History of Present Illness     Cheng Maxwell is a 64 y.o. y/o male with history significant for symptomatic generalized refractory epilepsy and non-epileptic spells who is presenting with AMS.     There is very limited information about his presentation at the time of my evaluation.  The patient is developmentally delayed and cannot relay any significant history, and his

## 2025-07-28 NOTE — ED PROVIDER NOTES
(L) 24.0 - 28.0 mmol/L    Base Excess, Ramiro -2.8 (L) -2.0 - 3.0 mmol/L    O2 Sat, Ramiro 52 Not established %    Carboxyhemoglobin 1.1 0.0 - 1.5 %    MetHgb, Ramiro 0.4 0.0 - 1.5 %    TC02 (Calc), Ramiro 25 mmol/L    Hemoglobin, Ramiro, Reduced 47.50 %   Lactic Acid   Result Value Ref Range    Lactic Acid 1.4 0.4 - 2.0 mmol/L   Levetiracetam Level   Result Value Ref Range    KEPPRA Dose Amt Unknown    EKG 12 Lead   Result Value Ref Range    Ventricular Rate 77 BPM    Atrial Rate 77 BPM    P-R Interval 190 ms    QRS Duration 182 ms    Q-T Interval 436 ms    QTc Calculation (Bazett) 493 ms    P Axis 60 degrees    R Axis -64 degrees    T Axis 102 degrees    Diagnosis       Normal sinus rhythmLeft axis deviationLeft bundle branch blockAbnormal ECGEKG performed in ER and to be interpreted by ER physician.Confirmed by MD, ER ([id number]),  BOYD POLLOCK ([id number]) on 7/28/2025 1:19:22 PM   MOST RECENT VITALS:  BP: (!) 160/88, Temp: 97.7 °F (36.5 °C), Pulse: 68, Respirations: 11, SpO2: 99 %     ED Course          The patient was given the following medications:  Orders Placed This Encounter   Medications    midazolam (VERSED) IntraVENous 2 mg    magnesium sulfate 2000 mg in 50 mL IVPB premix    midazolam (VERSED) IntraVENous 2 mg    sodium chloride flush 0.9 % injection 5-40 mL    sodium chloride flush 0.9 % injection 5-40 mL    0.9 % sodium chloride infusion    OR Linked Order Group     potassium chloride (KLOR-CON M) extended release tablet 40 mEq     potassium bicarb-citric acid (EFFER-K) effervescent tablet 40 mEq     potassium chloride 10 mEq/100 mL IVPB (Peripheral Line)    magnesium sulfate 2000 mg in 50 mL IVPB premix    enoxaparin (LOVENOX) injection 40 mg     Indication of Use:   Prophylaxis-DVT/PE    OR Linked Order Group     ondansetron (ZOFRAN-ODT) disintegrating tablet 4 mg     ondansetron (ZOFRAN) injection 4 mg    polyethylene glycol (GLYCOLAX) packet 17 g    OR Linked Order Group     acetaminophen (TYLENOL) tablet 650 mg 
Lead   Result Value Ref Range    Ventricular Rate 77 BPM    Atrial Rate 77 BPM    P-R Interval 190 ms    QRS Duration 182 ms    Q-T Interval 436 ms    QTc Calculation (Bazett) 493 ms    P Axis 60 degrees    R Axis -64 degrees    T Axis 102 degrees    Diagnosis       Normal sinus rhythmLeft axis deviationLeft bundle branch blockAbnormal ECGEKG performed in ER and to be interpreted by ER physician.Confirmed by MD, ER ([id number]),  BOYD POLLOCK ([id number]) on 7/28/2025 1:19:22 PM       EKG   Left axis deviation left bundle branch block with a ventricular rate of 75 no evidence of any significant ST segment or T wave changes by Sgarbossa criteria.    ED BEDSIDE ULTRASOUND:  No results found.    MOST RECENT VITALS:  BP: (!) 182/80,Temp: 97.9 °F (36.6 °C), Pulse: 70, Respirations: 14, SpO2: 98 %       ED Course     Nursing Notes, Past Medical Hx, Past Surgical Hx, Social Hx,Allergies, and Family Hx were reviewed.         The patient was given the following medications:  Orders Placed This Encounter   Medications    midazolam (VERSED) IntraVENous 2 mg    magnesium sulfate 2000 mg in 50 mL IVPB premix    midazolam (VERSED) IntraVENous 2 mg       CONSULTS:  IP CONSULT TO NEUROLOGY    Review of Systems     As documented in the HPI, otherwise all other systems were reviewed and were negative.    Past Medical, Surgical, Family, and Social History     He has a past medical history of Arthritis, Asthma, Bronchitis, CAD (coronary artery disease), CHF (congestive heart failure) ([facility]), Chronic back pain, Developmental delay, Diabetes mellitus ([facility]), Generalized headaches, Gout, History of tremor, Hypertension, MI, old, Morbid obesity ([facility]), Psychiatric pseudoseizure, Psychogenic nonepileptic seizure, and Seizures ([facility]).  He has a past surgical history that includes eye surgery.  His family history includes Asthma in his mother; Diabetes in his father, mother, and sister; Heart Attack in his father; Heart Disease in his father; High

## 2025-07-28 NOTE — ED NOTES
Patient Name: Cheng Maxwell  : 1960 64 y.o.  MRN: [id number]  ED Room #: B22/B22-22     Chief complaint:   Chief Complaint   Patient presents with    Altered Mental Status     Pt presents to the ED due to AMS. Per EMS pt was like this yesterday per sister.  Pt is alert, follows commands, but does not respond to questions. Pt has history of seizures and CHF.  Pt given 2 of versed on arrival to ED. Pt from [facility] per EMS run.      Hospital Problem/Diagnosis:   Hospital Problems           Last Modified POA    * (Principal) Altered mental status 2025 Yes         O2 Flow Rate:O2 Device: None (Room air)   (if applicable)  Cardiac Rhythm:   (if applicable)  Active LDA's:   Peripheral IV 25 Proximal;Right Forearm (Active)       Peripheral IV 25 Right Antecubital (Active)      External Urinary Catheter (Active)          How does patient ambulate? Wheelchair    2. How does patient take pills? Unknown, no oral medications were given in the Emergency Department    3. Is patient alert? Alert    4. Is patient oriented? To Person and To Place    5.   Patient arrived from:  home; independent living senior apartment  Facility Name: ___________________________________________    6. If patient is disoriented or from a Skill Nursing Facility has family been notified of admission? No    7. Patient belongings? Did not inventory pt belongings in ED    Disposition of belongings? Kept with Patient     8. Any specific patient or family belongings/needs/dynamics?   a. Wheelchair bound.     9. Miscellaneous comments/pending orders?  a. UA needs collected. On male purewick.       If there are any additional questions please reach out to the Emergency Department.       Norm Juarez, CARROLL  [phone]

## 2025-07-28 NOTE — ED NOTES
Assisted patient with a urinal in bed. Patient unable to urinate.  External male purewick in place.      Norm Juarez, RN  07/28/25 [id number]

## 2025-07-29 LAB
AMORPH SED URNS QL MICRO: ABNORMAL /HPF
ANION GAP SERPL CALCULATED.3IONS-SCNC: 12 MMOL/L (ref 3–16)
BASOPHILS # BLD: 0 K/UL (ref 0–0.2)
BASOPHILS NFR BLD: 0.6 %
BILIRUB UR QL STRIP.AUTO: NEGATIVE
BUN SERPL-MCNC: 13 MG/DL (ref 7–20)
CALCIUM SERPL-MCNC: 8.9 MG/DL (ref 8.3–10.6)
CHLORIDE SERPL-SCNC: 109 MMOL/L (ref 99–110)
CLARITY UR: CLEAR
CO2 SERPL-SCNC: 20 MMOL/L (ref 21–32)
COLOR UR: YELLOW
CREAT SERPL-MCNC: 0.9 MG/DL (ref 0.8–1.3)
CRYSTALS URNS MICRO: ABNORMAL /HPF
DEPRECATED RDW RBC AUTO: 14.8 % (ref 12.4–15.4)
EOSINOPHIL # BLD: 0.2 K/UL (ref 0–0.6)
EOSINOPHIL NFR BLD: 3.2 %
EPI CELLS #/AREA URNS HPF: ABNORMAL /HPF (ref 0–5)
GFR SERPLBLD CREATININE-BSD FMLA CKD-EPI: >90 ML/MIN/{1.73_M2}
GLUCOSE BLD-MCNC: 106 MG/DL (ref 70–99)
GLUCOSE BLD-MCNC: 130 MG/DL (ref 70–99)
GLUCOSE BLD-MCNC: 158 MG/DL (ref 70–99)
GLUCOSE BLD-MCNC: 164 MG/DL (ref 70–99)
GLUCOSE SERPL-MCNC: 110 MG/DL (ref 70–99)
GLUCOSE UR STRIP.AUTO-MCNC: NEGATIVE MG/DL
HCT VFR BLD AUTO: 38.3 % (ref 40.5–52.5)
HGB BLD-MCNC: 12.6 G/DL (ref 13.5–17.5)
HGB UR QL STRIP.AUTO: NEGATIVE
KETONES UR STRIP.AUTO-MCNC: NEGATIVE MG/DL
LEUKOCYTE ESTERASE UR QL STRIP.AUTO: NEGATIVE
LYMPHOCYTES # BLD: 1.8 K/UL (ref 1–5.1)
LYMPHOCYTES NFR BLD: 32.7 %
MCH RBC QN AUTO: 31.1 PG (ref 26–34)
MCHC RBC AUTO-ENTMCNC: 33 G/DL (ref 31–36)
MCV RBC AUTO: 94.3 FL (ref 80–100)
MONOCYTES # BLD: 0.4 K/UL (ref 0–1.3)
MONOCYTES NFR BLD: 8 %
NEUTROPHILS # BLD: 3 K/UL (ref 1.7–7.7)
NEUTROPHILS NFR BLD: 55.5 %
NITRITE UR QL STRIP.AUTO: NEGATIVE
PERFORMED ON: ABNORMAL
PH UR STRIP.AUTO: 7 [PH] (ref 5–8)
PLATELET # BLD AUTO: 212 K/UL (ref 135–450)
PMV BLD AUTO: 8.6 FL (ref 5–10.5)
POTASSIUM SERPL-SCNC: 4 MMOL/L (ref 3.5–5.1)
PROT UR STRIP.AUTO-MCNC: NEGATIVE MG/DL
RBC # BLD AUTO: 4.06 M/UL (ref 4.2–5.9)
RBC #/AREA URNS HPF: ABNORMAL /HPF (ref 0–4)
SODIUM SERPL-SCNC: 141 MMOL/L (ref 136–145)
SP GR UR STRIP.AUTO: 1.02 (ref 1–1.03)
UA DIPSTICK W REFLEX MICRO PNL UR: ABNORMAL
URN SPEC COLLECT METH UR: ABNORMAL
UROBILINOGEN UR STRIP-ACNC: 0.2 E.U./DL
WBC # BLD AUTO: 5.4 K/UL (ref 4–11)
WBC #/AREA URNS HPF: ABNORMAL /HPF (ref 0–5)

## 2025-07-29 PROCEDURE — 95720 EEG PHY/QHP EA INCR W/VEEG: CPT | Performed by: PSYCHIATRY & NEUROLOGY

## 2025-07-29 PROCEDURE — 99232 SBSQ HOSP IP/OBS MODERATE 35: CPT | Performed by: NURSE PRACTITIONER

## 2025-07-29 PROCEDURE — 36415 COLL VENOUS BLD VENIPUNCTURE: CPT

## 2025-07-29 PROCEDURE — 80175 DRUG SCREEN QUAN LAMOTRIGINE: CPT

## 2025-07-29 PROCEDURE — 85025 COMPLETE CBC W/AUTO DIFF WBC: CPT

## 2025-07-29 PROCEDURE — 2500000003 HC RX 250 WO HCPCS

## 2025-07-29 PROCEDURE — 6360000002 HC RX W HCPCS

## 2025-07-29 PROCEDURE — 2060000000 HC ICU INTERMEDIATE R&B

## 2025-07-29 PROCEDURE — 6370000000 HC RX 637 (ALT 250 FOR IP)

## 2025-07-29 PROCEDURE — 80048 BASIC METABOLIC PNL TOTAL CA: CPT

## 2025-07-29 PROCEDURE — 81001 URINALYSIS AUTO W/SCOPE: CPT

## 2025-07-29 RX ORDER — OXYCODONE HYDROCHLORIDE 5 MG/1
5 TABLET ORAL EVERY 4 HOURS PRN
Refills: 0 | Status: DISCONTINUED | OUTPATIENT
Start: 2025-07-29 | End: 2025-07-31 | Stop reason: HOSPADM

## 2025-07-29 RX ORDER — ACETAMINOPHEN 325 MG/1
325 TABLET ORAL EVERY 4 HOURS PRN
Status: DISCONTINUED | OUTPATIENT
Start: 2025-07-29 | End: 2025-07-31 | Stop reason: HOSPADM

## 2025-07-29 RX ADMIN — FAMOTIDINE 40 MG: 20 TABLET, FILM COATED ORAL at 08:38

## 2025-07-29 RX ADMIN — MONTELUKAST 10 MG: 10 TABLET, FILM COATED ORAL at 21:26

## 2025-07-29 RX ADMIN — LEVETIRACETAM 1500 MG: 750 TABLET, FILM COATED ORAL at 08:38

## 2025-07-29 RX ADMIN — ZONISAMIDE 300 MG: 100 CAPSULE ORAL at 21:26

## 2025-07-29 RX ADMIN — ALLOPURINOL 300 MG: 300 TABLET ORAL at 08:38

## 2025-07-29 RX ADMIN — SACUBITRIL AND VALSARTAN 1 TABLET: 97; 103 TABLET, FILM COATED ORAL at 08:42

## 2025-07-29 RX ADMIN — ENOXAPARIN SODIUM 40 MG: 100 INJECTION SUBCUTANEOUS at 21:27

## 2025-07-29 RX ADMIN — SACUBITRIL AND VALSARTAN 1 TABLET: 97; 103 TABLET, FILM COATED ORAL at 21:26

## 2025-07-29 RX ADMIN — SODIUM CHLORIDE, PRESERVATIVE FREE 10 ML: 5 INJECTION INTRAVENOUS at 08:42

## 2025-07-29 RX ADMIN — ATORVASTATIN CALCIUM 80 MG: 80 TABLET, FILM COATED ORAL at 21:26

## 2025-07-29 RX ADMIN — LAMOTRIGINE 600 MG: 150 TABLET ORAL at 21:26

## 2025-07-29 RX ADMIN — NORTRIPTYLINE HYDROCHLORIDE 75 MG: 25 CAPSULE ORAL at 21:26

## 2025-07-29 RX ADMIN — ACETAMINOPHEN 650 MG: 325 TABLET ORAL at 23:09

## 2025-07-29 RX ADMIN — CARVEDILOL 6.25 MG: 6.25 TABLET, FILM COATED ORAL at 08:38

## 2025-07-29 RX ADMIN — SODIUM CHLORIDE, PRESERVATIVE FREE 10 ML: 5 INJECTION INTRAVENOUS at 21:27

## 2025-07-29 RX ADMIN — LAMOTRIGINE 600 MG: 150 TABLET ORAL at 09:05

## 2025-07-29 RX ADMIN — ACETAMINOPHEN 325 MG: 325 TABLET ORAL at 18:37

## 2025-07-29 RX ADMIN — ENOXAPARIN SODIUM 40 MG: 100 INJECTION SUBCUTANEOUS at 08:38

## 2025-07-29 RX ADMIN — LEVETIRACETAM 1500 MG: 750 TABLET, FILM COATED ORAL at 21:26

## 2025-07-29 RX ADMIN — ASPIRIN 81 MG: 81 TABLET, COATED ORAL at 08:38

## 2025-07-29 RX ADMIN — OXYCODONE 5 MG: 5 TABLET ORAL at 18:37

## 2025-07-29 RX ADMIN — CARVEDILOL 6.25 MG: 6.25 TABLET, FILM COATED ORAL at 16:40

## 2025-07-29 ASSESSMENT — PAIN DESCRIPTION - DIRECTION: RADIATING_TOWARDS: NO

## 2025-07-29 ASSESSMENT — PAIN SCALES - GENERAL
PAINLEVEL_OUTOF10: 1
PAINLEVEL_OUTOF10: 3
PAINLEVEL_OUTOF10: 7
PAINLEVEL_OUTOF10: 7
PAINLEVEL_OUTOF10: 0

## 2025-07-29 ASSESSMENT — PAIN DESCRIPTION - LOCATION
LOCATION: HEAD
LOCATION: SHOULDER
LOCATION: SHOULDER

## 2025-07-29 ASSESSMENT — PAIN DESCRIPTION - FREQUENCY: FREQUENCY: INTERMITTENT

## 2025-07-29 ASSESSMENT — PAIN DESCRIPTION - DESCRIPTORS
DESCRIPTORS: SHARP
DESCRIPTORS: ACHING
DESCRIPTORS: ACHING

## 2025-07-29 ASSESSMENT — PAIN - FUNCTIONAL ASSESSMENT
PAIN_FUNCTIONAL_ASSESSMENT: PREVENTS OR INTERFERES SOME ACTIVE ACTIVITIES AND ADLS

## 2025-07-29 ASSESSMENT — PAIN DESCRIPTION - ONSET: ONSET: SUDDEN

## 2025-07-29 ASSESSMENT — PAIN DESCRIPTION - ORIENTATION
ORIENTATION: LEFT
ORIENTATION: LEFT
ORIENTATION: ANTERIOR

## 2025-07-29 ASSESSMENT — PAIN DESCRIPTION - PAIN TYPE: TYPE: ACUTE PAIN

## 2025-07-29 NOTE — CARE COORDINATION
Case Management Assessment  Initial Evaluation    Date/Time of Evaluation: 7/29/2025 11:26 AM  Assessment Completed by: RADHA Portillo    If patient is discharged prior to next notation, then this note serves as note for discharge by case management.    Patient Name: Cheng Maxwell                   YOB: 1960  Diagnosis: Somnolence [R40.0]  Altered mental status [R41.82]  Absence seizures, intractable (HCC) [G40.A19]  Altered mental status, unspecified altered mental status type [R41.82]                   Date / Time: 7/28/2025 12:54 PM    Patient Admission Status: Inpatient   Readmission Risk (Low < 19, Mod (19-27), High > 27): Readmission Risk Score: 13.8    Current PCP: Paola Davila MD  PCP verified by CM? Yes    Chart Reviewed: Yes      History Provided by: Patient, Medical Record  Patient Orientation: Alert and Oriented    Patient Cognition: Alert    Hospitalization in the last 30 days (Readmission):  No    If yes, Readmission Assessment in CM Navigator will be completed.    Advance Directives:      Code Status: Full Code   Patient's Primary Decision Maker is: Named in Scanned ACP Document    Primary Decision Maker: Lisa Albright (POA) - Brother/Sister - [phone]    Secondary Decision Maker: Whit Yeung (POA) - Brother/Sister - [phone]    Discharge Planning:    Patient lives with: Alone Type of Home: Apartment  Primary Care Giver: Self  Patient Support Systems include: Family Members   Current Financial resources: Medicaid, Medicare  Current community resources: None  Current services prior to admission: Durable Medical Equipment, Meals On Wheels, Transportation            Current DME: Wheelchair            Type of Home Care services:  None    ADLS  Prior functional level: Assistance with the following:, Cooking, Housework, Shopping, Mobility  Current functional level: Assistance with the following:, Cooking, Housework, Shopping, Mobility    PT AM-PAC:   /24  OT AM-PAC:

## 2025-07-29 NOTE — PLAN OF CARE
Problem: Chronic Conditions and Co-morbidities  Goal: Patient's chronic conditions and co-morbidity symptoms are monitored and maintained or improved  7/29/2025 1913 by Karen Alamo RN  Outcome: Progressing     Problem: Discharge Planning  Goal: Discharge to home or other facility with appropriate resources  7/29/2025 1913 by Karen Alamo RN  Outcome: Progressing     Problem: Skin/Tissue Integrity  Goal: Skin integrity remains intact  Description: 1.  Monitor for areas of redness and/or skin breakdown  2.  Assess vascular access sites hourly  3.  Every 4-6 hours minimum:  Change oxygen saturation probe site  4.  Every 4-6 hours:  If on nasal continuous positive airway pressure, respiratory therapy assess nares and determine need for appliance change or resting period  7/29/2025 1913 by Karen Alamo RN  Outcome: Progressing     Problem: Safety - Adult  Goal: Free from fall injury  7/29/2025 1913 by Karen Alamo RN  Outcome: Progressing     Problem: ABCDS Injury Assessment  Goal: Absence of physical injury  7/29/2025 1913 by Karen Alamo RN  Outcome: Progressing     Problem: Neurosensory - Adult  Goal: Achieves stable or improved neurological status  Outcome: Progressing     Problem: Neurosensory - Adult  Goal: Absence of seizures  Outcome: Progressing     Problem: Neurosensory - Adult  Goal: Achieves maximal functionality and self care  Outcome: Progressing      not applicable (Male)

## 2025-07-29 NOTE — PLAN OF CARE
Problem: Chronic Conditions and Co-morbidities  Goal: Patient's chronic conditions and co-morbidity symptoms are monitored and maintained or improved  7/29/2025 0926 by Saud Carbajal RN  Outcome: Progressing  7/28/2025 2020 by Dav More RN  Outcome: Progressing  Flowsheets (Taken 7/28/2025 2020)  Care Plan - Patient's Chronic Conditions and Co-Morbidity Symptoms are Monitored and Maintained or Improved:   Monitor and assess patient's chronic conditions and comorbid symptoms for stability, deterioration, or improvement   Collaborate with multidisciplinary team to address chronic and comorbid conditions and prevent exacerbation or deterioration     Problem: Discharge Planning  Goal: Discharge to home or other facility with appropriate resources  7/29/2025 0926 by Saud Carbajal RN  Outcome: Progressing  7/28/2025 2020 by Dav More RN  Outcome: Progressing  Flowsheets (Taken 7/28/2025 2020)  Discharge to home or other facility with appropriate resources:   Identify barriers to discharge with patient and caregiver   Arrange for needed discharge resources and transportation as appropriate   Identify discharge learning needs (meds, wound care, etc)     Problem: Skin/Tissue Integrity  Goal: Skin integrity remains intact  Description: 1.  Monitor for areas of redness and/or skin breakdown  2.  Assess vascular access sites hourly  3.  Every 4-6 hours minimum:  Change oxygen saturation probe site  4.  Every 4-6 hours:  If on nasal continuous positive airway pressure, respiratory therapy assess nares and determine need for appliance change or resting period  7/29/2025 0926 by Saud Carbajal RN  Outcome: Progressing  Flowsheets (Taken 7/29/2025 0926)  Skin Integrity Remains Intact: Monitor for areas of redness and/or skin breakdown  7/28/2025 2020 by Dav More RN  Outcome: Progressing  Flowsheets (Taken 7/28/2025 2020)  Skin Integrity Remains Intact:   Monitor for areas of redness and/or skin breakdown

## 2025-07-29 NOTE — PLAN OF CARE
Problem: Chronic Conditions and Co-morbidities  Goal: Patient's chronic conditions and co-morbidity symptoms are monitored and maintained or improved  Outcome: Progressing  Flowsheets (Taken 7/28/2025 2020)  Care Plan - Patient's Chronic Conditions and Co-Morbidity Symptoms are Monitored and Maintained or Improved:   Monitor and assess patient's chronic conditions and comorbid symptoms for stability, deterioration, or improvement   Collaborate with multidisciplinary team to address chronic and comorbid conditions and prevent exacerbation or deterioration     Problem: Discharge Planning  Goal: Discharge to home or other facility with appropriate resources  Outcome: Progressing  Flowsheets (Taken 7/28/2025 2020)  Discharge to home or other facility with appropriate resources:   Identify barriers to discharge with patient and caregiver   Arrange for needed discharge resources and transportation as appropriate   Identify discharge learning needs (meds, wound care, etc)     Problem: Skin/Tissue Integrity  Goal: Skin integrity remains intact  Description: 1.  Monitor for areas of redness and/or skin breakdown  2.  Assess vascular access sites hourly  3.  Every 4-6 hours minimum:  Change oxygen saturation probe site  4.  Every 4-6 hours:  If on nasal continuous positive airway pressure, respiratory therapy assess nares and determine need for appliance change or resting period  Outcome: Progressing  Flowsheets (Taken 7/28/2025 2020)  Skin Integrity Remains Intact:   Monitor for areas of redness and/or skin breakdown   Assess vascular access sites hourly   Every 4-6 hours minimum:  Change oxygen saturation probe site   Every 4-6 hours:  If on nasal continuous positive airway pressure, assess nares and determine need for appliance change or resting period   Turn and reposition as indicated   Positioning devices   Pressure redistribution bed/mattress (bed type)     Problem: Safety - Adult  Goal: Free from fall

## 2025-07-30 LAB
ANION GAP SERPL CALCULATED.3IONS-SCNC: 11 MMOL/L (ref 3–16)
BASOPHILS # BLD: 0 K/UL (ref 0–0.2)
BASOPHILS NFR BLD: 0.4 %
BUN SERPL-MCNC: 12 MG/DL (ref 7–20)
CALCIUM SERPL-MCNC: 8.8 MG/DL (ref 8.3–10.6)
CHLORIDE SERPL-SCNC: 105 MMOL/L (ref 99–110)
CO2 SERPL-SCNC: 22 MMOL/L (ref 21–32)
CREAT SERPL-MCNC: 0.9 MG/DL (ref 0.8–1.3)
DEPRECATED RDW RBC AUTO: 15 % (ref 12.4–15.4)
EOSINOPHIL # BLD: 0.1 K/UL (ref 0–0.6)
EOSINOPHIL NFR BLD: 2.4 %
GFR SERPLBLD CREATININE-BSD FMLA CKD-EPI: >90 ML/MIN/{1.73_M2}
GLUCOSE BLD-MCNC: 119 MG/DL (ref 70–99)
GLUCOSE BLD-MCNC: 158 MG/DL (ref 70–99)
GLUCOSE BLD-MCNC: 165 MG/DL (ref 70–99)
GLUCOSE BLD-MCNC: 194 MG/DL (ref 70–99)
GLUCOSE SERPL-MCNC: 161 MG/DL (ref 70–99)
HCT VFR BLD AUTO: 38.1 % (ref 40.5–52.5)
HGB BLD-MCNC: 12.7 G/DL (ref 13.5–17.5)
LYMPHOCYTES # BLD: 1.2 K/UL (ref 1–5.1)
LYMPHOCYTES NFR BLD: 20.2 %
MCH RBC QN AUTO: 31.1 PG (ref 26–34)
MCHC RBC AUTO-ENTMCNC: 33.3 G/DL (ref 31–36)
MCV RBC AUTO: 93.4 FL (ref 80–100)
MONOCYTES # BLD: 0.3 K/UL (ref 0–1.3)
MONOCYTES NFR BLD: 5.6 %
NEUTROPHILS # BLD: 4.1 K/UL (ref 1.7–7.7)
NEUTROPHILS NFR BLD: 71.4 %
PERFORMED ON: ABNORMAL
PLATELET # BLD AUTO: 201 K/UL (ref 135–450)
PMV BLD AUTO: 8.3 FL (ref 5–10.5)
POTASSIUM SERPL-SCNC: 3.6 MMOL/L (ref 3.5–5.1)
RBC # BLD AUTO: 4.08 M/UL (ref 4.2–5.9)
SODIUM SERPL-SCNC: 138 MMOL/L (ref 136–145)
WBC # BLD AUTO: 5.8 K/UL (ref 4–11)

## 2025-07-30 PROCEDURE — 2500000003 HC RX 250 WO HCPCS

## 2025-07-30 PROCEDURE — 36415 COLL VENOUS BLD VENIPUNCTURE: CPT

## 2025-07-30 PROCEDURE — 2060000000 HC ICU INTERMEDIATE R&B

## 2025-07-30 PROCEDURE — 85025 COMPLETE CBC W/AUTO DIFF WBC: CPT

## 2025-07-30 PROCEDURE — 6360000002 HC RX W HCPCS

## 2025-07-30 PROCEDURE — 99232 SBSQ HOSP IP/OBS MODERATE 35: CPT

## 2025-07-30 PROCEDURE — 6370000000 HC RX 637 (ALT 250 FOR IP)

## 2025-07-30 PROCEDURE — 80048 BASIC METABOLIC PNL TOTAL CA: CPT

## 2025-07-30 PROCEDURE — 95716 VEEG EA 12-26HR CONT MNTR: CPT

## 2025-07-30 PROCEDURE — 95720 EEG PHY/QHP EA INCR W/VEEG: CPT | Performed by: PSYCHIATRY & NEUROLOGY

## 2025-07-30 RX ORDER — ZONISAMIDE 100 MG/1
100 CAPSULE ORAL NIGHTLY
Qty: 30 CAPSULE | Refills: 0 | Status: SHIPPED | OUTPATIENT
Start: 2025-07-30

## 2025-07-30 RX ADMIN — LAMOTRIGINE 600 MG: 150 TABLET ORAL at 08:12

## 2025-07-30 RX ADMIN — SODIUM CHLORIDE, PRESERVATIVE FREE 10 ML: 5 INJECTION INTRAVENOUS at 20:41

## 2025-07-30 RX ADMIN — LEVETIRACETAM 1500 MG: 750 TABLET, FILM COATED ORAL at 20:40

## 2025-07-30 RX ADMIN — ATORVASTATIN CALCIUM 80 MG: 80 TABLET, FILM COATED ORAL at 20:40

## 2025-07-30 RX ADMIN — ENOXAPARIN SODIUM 40 MG: 100 INJECTION SUBCUTANEOUS at 20:40

## 2025-07-30 RX ADMIN — ALLOPURINOL 300 MG: 300 TABLET ORAL at 08:11

## 2025-07-30 RX ADMIN — ASPIRIN 81 MG: 81 TABLET, COATED ORAL at 08:09

## 2025-07-30 RX ADMIN — CARVEDILOL 6.25 MG: 6.25 TABLET, FILM COATED ORAL at 16:15

## 2025-07-30 RX ADMIN — FAMOTIDINE 40 MG: 20 TABLET, FILM COATED ORAL at 08:09

## 2025-07-30 RX ADMIN — ZONISAMIDE 300 MG: 100 CAPSULE ORAL at 20:40

## 2025-07-30 RX ADMIN — INSULIN LISPRO 1 UNITS: 100 INJECTION, SOLUTION INTRAVENOUS; SUBCUTANEOUS at 17:15

## 2025-07-30 RX ADMIN — SACUBITRIL AND VALSARTAN 1 TABLET: 97; 103 TABLET, FILM COATED ORAL at 20:40

## 2025-07-30 RX ADMIN — LAMOTRIGINE 600 MG: 150 TABLET ORAL at 20:40

## 2025-07-30 RX ADMIN — SODIUM CHLORIDE, PRESERVATIVE FREE 10 ML: 5 INJECTION INTRAVENOUS at 08:13

## 2025-07-30 RX ADMIN — LEVETIRACETAM 1500 MG: 750 TABLET, FILM COATED ORAL at 08:11

## 2025-07-30 RX ADMIN — NORTRIPTYLINE HYDROCHLORIDE 75 MG: 25 CAPSULE ORAL at 20:40

## 2025-07-30 RX ADMIN — MONTELUKAST 10 MG: 10 TABLET, FILM COATED ORAL at 20:40

## 2025-07-30 RX ADMIN — CARVEDILOL 6.25 MG: 6.25 TABLET, FILM COATED ORAL at 08:12

## 2025-07-30 RX ADMIN — ENOXAPARIN SODIUM 40 MG: 100 INJECTION SUBCUTANEOUS at 08:10

## 2025-07-30 RX ADMIN — SACUBITRIL AND VALSARTAN 1 TABLET: 97; 103 TABLET, FILM COATED ORAL at 08:10

## 2025-07-30 NOTE — CARE COORDINATION
1:10 PM  Pt has a developmental delay. Pt lives in an apt alone. Pt's sisters and brother help with chores around the house. Pt stated that he is wheelchair bound at baseline. Pt has no current services at home.   Pt plans to DC back home and his sister will transport. Pt denied needs from  for DC.     cvEEG, neuro follwoing    Electronically signed by Lily Pratt, KATHARINA, ESTHERW, STEWART on 7/30/2025 at 1:10 PM  [phone]

## 2025-07-30 NOTE — PLAN OF CARE
Problem: Chronic Conditions and Co-morbidities  Goal: Patient's chronic conditions and co-morbidity symptoms are monitored and maintained or improved  Outcome: Progressing     Problem: Discharge Planning  Goal: Discharge to home or other facility with appropriate resources  Outcome: Progressing     Problem: Skin/Tissue Integrity  Goal: Skin integrity remains intact  Description: 1.  Monitor for areas of redness and/or skin breakdown  2.  Assess vascular access sites hourly  3.  Every 4-6 hours minimum:  Change oxygen saturation probe site  4.  Every 4-6 hours:  If on nasal continuous positive airway pressure, respiratory therapy assess nares and determine need for appliance change or resting period  Outcome: Progressing  Flowsheets (Taken 7/30/2025 0947 by Saud Carbajal, RN)  Skin Integrity Remains Intact: Monitor for areas of redness and/or skin breakdown     Problem: Safety - Adult  Goal: Free from fall injury  Outcome: Progressing  Flowsheets (Taken 7/30/2025 0947 by Saud Carbajal, RN)  Free From Fall Injury: Instruct family/caregiver on patient safety     Problem: ABCDS Injury Assessment  Goal: Absence of physical injury  Outcome: Progressing  Flowsheets (Taken 7/30/2025 0947 by Saud Carbajal, RN)  Absence of Physical Injury: Implement safety measures based on patient assessment     Problem: Neurosensory - Adult  Goal: Achieves stable or improved neurological status  Outcome: Progressing     Problem: Neurosensory - Adult  Goal: Absence of seizures  Outcome: Progressing     Problem: Neurosensory - Adult  Goal: Achieves maximal functionality and self care  Outcome: Progressing     Problem: Pain  Goal: Verbalizes/displays adequate comfort level or baseline comfort level  Outcome: Progressing

## 2025-07-31 VITALS
RESPIRATION RATE: 16 BRPM | SYSTOLIC BLOOD PRESSURE: 129 MMHG | HEIGHT: 69 IN | HEART RATE: 69 BPM | TEMPERATURE: 98.2 F | DIASTOLIC BLOOD PRESSURE: 72 MMHG | WEIGHT: 315 LBS | BODY MASS INDEX: 46.65 KG/M2 | OXYGEN SATURATION: 98 %

## 2025-07-31 LAB
ANION GAP SERPL CALCULATED.3IONS-SCNC: 9 MMOL/L (ref 3–16)
BASOPHILS # BLD: 0 K/UL (ref 0–0.2)
BASOPHILS NFR BLD: 0.4 %
BUN SERPL-MCNC: 9 MG/DL (ref 7–20)
CALCIUM SERPL-MCNC: 8.8 MG/DL (ref 8.3–10.6)
CHLORIDE SERPL-SCNC: 108 MMOL/L (ref 99–110)
CO2 SERPL-SCNC: 24 MMOL/L (ref 21–32)
CREAT SERPL-MCNC: 0.9 MG/DL (ref 0.8–1.3)
DEPRECATED RDW RBC AUTO: 15.1 % (ref 12.4–15.4)
EOSINOPHIL # BLD: 0.2 K/UL (ref 0–0.6)
EOSINOPHIL NFR BLD: 2.9 %
GFR SERPLBLD CREATININE-BSD FMLA CKD-EPI: >90 ML/MIN/{1.73_M2}
GLUCOSE BLD-MCNC: 112 MG/DL (ref 70–99)
GLUCOSE BLD-MCNC: 167 MG/DL (ref 70–99)
GLUCOSE SERPL-MCNC: 112 MG/DL (ref 70–99)
HCT VFR BLD AUTO: 37.3 % (ref 40.5–52.5)
HGB BLD-MCNC: 12.5 G/DL (ref 13.5–17.5)
LAMOTRIGINE SERPL-MCNC: 16.2 UG/ML (ref 3–15)
LYMPHOCYTES # BLD: 1.7 K/UL (ref 1–5.1)
LYMPHOCYTES NFR BLD: 29.4 %
MCH RBC QN AUTO: 31.2 PG (ref 26–34)
MCHC RBC AUTO-ENTMCNC: 33.6 G/DL (ref 31–36)
MCV RBC AUTO: 92.7 FL (ref 80–100)
MONOCYTES # BLD: 0.4 K/UL (ref 0–1.3)
MONOCYTES NFR BLD: 7.4 %
NEUTROPHILS # BLD: 3.5 K/UL (ref 1.7–7.7)
NEUTROPHILS NFR BLD: 59.9 %
PERFORMED ON: ABNORMAL
PERFORMED ON: ABNORMAL
PLATELET # BLD AUTO: 208 K/UL (ref 135–450)
PMV BLD AUTO: 8.5 FL (ref 5–10.5)
POTASSIUM SERPL-SCNC: 3.7 MMOL/L (ref 3.5–5.1)
RBC # BLD AUTO: 4.02 M/UL (ref 4.2–5.9)
SODIUM SERPL-SCNC: 141 MMOL/L (ref 136–145)
WBC # BLD AUTO: 5.8 K/UL (ref 4–11)

## 2025-07-31 PROCEDURE — 36415 COLL VENOUS BLD VENIPUNCTURE: CPT

## 2025-07-31 PROCEDURE — 80048 BASIC METABOLIC PNL TOTAL CA: CPT

## 2025-07-31 PROCEDURE — 6360000002 HC RX W HCPCS

## 2025-07-31 PROCEDURE — 85025 COMPLETE CBC W/AUTO DIFF WBC: CPT

## 2025-07-31 PROCEDURE — 6370000000 HC RX 637 (ALT 250 FOR IP)

## 2025-07-31 RX ADMIN — ENOXAPARIN SODIUM 40 MG: 100 INJECTION SUBCUTANEOUS at 09:44

## 2025-07-31 RX ADMIN — LEVETIRACETAM 1500 MG: 750 TABLET, FILM COATED ORAL at 09:44

## 2025-07-31 RX ADMIN — FAMOTIDINE 40 MG: 20 TABLET, FILM COATED ORAL at 09:45

## 2025-07-31 RX ADMIN — LAMOTRIGINE 600 MG: 150 TABLET ORAL at 09:49

## 2025-07-31 RX ADMIN — SACUBITRIL AND VALSARTAN 1 TABLET: 97; 103 TABLET, FILM COATED ORAL at 09:45

## 2025-07-31 RX ADMIN — ALLOPURINOL 300 MG: 300 TABLET ORAL at 09:45

## 2025-07-31 RX ADMIN — CARVEDILOL 6.25 MG: 6.25 TABLET, FILM COATED ORAL at 09:45

## 2025-07-31 RX ADMIN — ASPIRIN 81 MG: 81 TABLET, COATED ORAL at 09:44

## 2025-07-31 NOTE — CARE COORDINATION
Case Management Assessment            Discharge Note                    Date / Time of Note: 7/31/2025 11:10 AM                  Discharge Note Completed by: TIMOTHY WIN    Patient Name: Cheng Maxwell   YOB: 1960  Diagnosis: Somnolence [R40.0]  Altered mental status [R41.82]  Absence seizures, intractable (HCC) [G40.A19]  Altered mental status, unspecified altered mental status type [R41.82]   Date / Time: 7/28/2025 12:54 PM    Current PCP: Paola Davila MD  Clinic patient: No    Hospitalization in the last 30 days: No       Advance Directives:  Code Status: Full Code  [place] DNR form completed and on chart: Not Indicated    Financial:  Payor: MEDICARE / Plan: MEDICARE PART A AND B / Product Type: *No Product type* /      Pharmacy:    [id number] [place], [place] - [address] [facility] [facility] [phone] - F [phone]  [address] [place] [id number]  Phone: [phone] Fax: [phone]      Assistance purchasing medications?: Potential Assistance Purchasing Medications: No  Assistance provided by Case Management: None at this time    Does patient want to participate in local refill/ meds to beds program?: Yes    Meds To Beds General Rules:  1. Can ONLY be done Monday- Friday between 8:30am-5pm  2. Prescription(s) must be in pharmacy by 3pm to be filled same day  3.Copy of patient's insurance/ prescription drug card and patient face sheet must be sent along with the prescription(s)  4. Cost of Rx cannot be added to hospital bill. If financial assistance is needed, please contact unit  or ;  or  CANNOT provide pharmacy voucher for patients co-pays  5. Patients can then  the prescription on their way out of the hospital at discharge, or pharmacy can deliver to the bedside if staff is available. (payment due at time of pick-up or delivery - cash, check, or card accepted)     Able to afford home

## 2025-07-31 NOTE — PLAN OF CARE
Problem: Safety - Adult  Goal: Free from fall injury  7/31/2025 0754 by Ryanne Balbuena, RN  Outcome: Progressing   Standard safety measures in place - call light within reach  Problem: Pain  Goal: Verbalizes/displays adequate comfort level or baseline comfort level  7/31/2025 0754 by Ryanne Balbuena, RN  Outcome: Progressing   Pt endorses pain - medication administered per MAR

## 2025-07-31 NOTE — DISCHARGE SUMMARY
V2.0  Discharge Summary    Name:  Cheng Maxwell /Age/Sex: 1960 (64 y.o. male)   Admit Date: 2025  Discharge Date: 25    MRN & CSN:  [id number] & [id number] Encounter Date and Time 25 10:07 AM EDT    Attending:  Arron Vidales MD Discharging Provider: EDUARDA Eilse [facility] Course:     Brief HPI: Cheng Maxwell is a 64 y.o. male who presented with a past medical history of Arthritis, Asthma, Bronchitis, CAD (coronary artery disease), CHF (congestive heart failure) (HCC), Chronic back pain, Developmental delay, Diabetes mellitus (HCC), Generalized headaches, Gout, History of tremor, Hypertension, MI, old, Morbid obesity (HCC), Psychiatric pseudoseizure, Psychogenic nonepileptic seizure, and Seizures ([facility]). who presented with chief complaints of decreased level of responsiveness. Patient was at his assisted living center and was noted to be less responsive than typical, patient is a poor historian but family member present at bedside and reported he has been spells of intermittent confusion on and off. Reports being compliant with the antiepileptic medications. Patient to be admitted for further evaluation of altered mental status     Brief Problem Based Course:     Concern for breakthrough seizures  Generalized epilepsy  History of status epilepticus  Patient presented to the ER with decreased level of consciousness, received Versed in the ER with improvement  Neurology consulted and recommended cEEG monitoring  home  mg QHS, LEV 1500 mg BID, and  mg BID   Lamotrigine level pending  keppra level- 38   Continue   Seizure precautions        Type 2 diabetes mellitus  Low-dose sliding scale insulin for now  Monitor with fingerstick checks     CAD  Continue home aspirin and statin     Heart failure with reduced ejection fraction  EF of 20 to 25%  Not in acute exacerbation  Continue GDMT with Coreg Entresto     Hyperlipidemia  Continue home statin

## 2025-07-31 NOTE — PROGRESS NOTES
NEUROCRITICAL CARE PROGRESS NOTE       Patient Name: Cheng Maxwell YOB: 1960   Sex: Male Age: 64 yrs     CC / Reason for Consult: AMS    Changes over last 24 hours:   -No further seizures   -Exam is stable  -Ammonia normal     ROS: No complaints this morning    ASSESSMENT & RECOMMENDATIONS   Assessment:  65 y/o man with a h/o developmental delay, nonambulatory at baseline, with symptomatic generalized refractory epilepsy (on home keppra + zonegran + lamictal) and status epilepticus who is presents with AMS described as behavior arrest, decreased responsiveness, lethargy and trouble speaking but no witnessed GTC.    Suspect this is breakthrough seizure however the provoking factor is unclear.    Last visit with his outpatient epileptologist was 3w ago (7/7/25) where his zonegran was increased from 200 to 300mg qhs, and his keppra was maintained at 1500mg bid and lamictal 600mg bid.    Documented seizure semiologies: behavioral arrest, staring, upward eyeball rolling, stuttering, GTC, full body shaking     Plan:  #Altered mental status  #Suspected Breakthrough seizure  #Epilepsy   Continue AED's as follows (no changes)  -Zonegran 300 mg QHS  -Keppra 1500 mg BID,  -Lamotrigine 600 mg BID   -Lamictal level still pending  -Follow up with Dr. Connelly as soon as you can    We will sign off at this time. Please do not hesitate to reach out with any questions or concerns     Discussed with patient and his sister who is his POA.    EDUARDA Garcia - CNP   Neurocritical Care   7/30/2025 1:30 PM  [facility]: [facility]  [phone]  HISTORY   Interval History: As above    PMH Past Medical History:   Diagnosis Date    Arthritis     Asthma     Bronchitis     CAD (coronary artery disease)     CHF (congestive heart failure) (HCC)     Chronic back pain     Developmental delay     per sister    Diabetes mellitus (HCC)     Generalized headaches     Gout     History of tremor     per sister 
      [facility] Medicine Progress Note  V 7.24      Date of Admission: 7/28/2025    Hospital Day: 3      Chief Admission Complaint: Altered mental    Subjective: Denies any new complaint; no seizures noted on continuous EEG    Presenting Admission History:       Cheng Maxwell is a 64 y.o. male with  has a past medical history of Arthritis, Asthma, Bronchitis, CAD (coronary artery disease), CHF (congestive heart failure) ([facility]), Chronic back pain, Developmental delay, Diabetes mellitus ([facility]), Generalized headaches, Gout, History of tremor, Hypertension, MI, old, Morbid obesity ([facility]), Psychiatric pseudoseizure, Psychogenic nonepileptic seizure, and Seizures ([facility]). who presented with chief complaints of decreased level of responsiveness.  Patient was at his assisted living center and was noted to be less responsive than typical, patient is a poor historian but family member present at bedside and reported he has been spells of intermittent confusion on and off. Reports being compliant with the antiepileptic medications.  Patient to be admitted for further evaluation of altered mental status     Assessment/Plan:      Concern for breakthrough seizures  Generalized epilepsy  History of status epilepticus  Patient presented to the ER with decreased level of consciousness, received Versed in the ER with improvement  Neurology consulted and recommended cEEG monitoring  home  mg QHS, LEV 1500 mg BID, and  mg BID   Check LTG level- 38   Continue   Seizure precautions       Type 2 diabetes mellitus  Low-dose sliding scale insulin for now  Monitor with fingerstick checks     CAD  Continue home aspirin and statin     Heart failure with reduced ejection fraction  EF of 20 to 25%  Not in acute exacerbation  Continue GDMT with Coreg Entresto     Hyperlipidemia  Continue home statin     GERD  Continue famotidine     Mood disorder  Continue home medications    Ongoing threat to life and/or bodily function without ongoing 
      [facility] Medicine Progress Note  V 7.24      Date of Admission: 7/28/2025    Hospital Day: 2      Chief Admission Complaint: Altered mental    Subjective: Denies any new complaint    Presenting Admission History:       Cheng Maxwell is a 64 y.o. male with  has a past medical history of Arthritis, Asthma, Bronchitis, CAD (coronary artery disease), CHF (congestive heart failure) ([facility]), Chronic back pain, Developmental delay, Diabetes mellitus ([facility]), Generalized headaches, Gout, History of tremor, Hypertension, MI, old, Morbid obesity ([facility]), Psychiatric pseudoseizure, Psychogenic nonepileptic seizure, and Seizures ([facility]). who presented with chief complaints of decreased level of responsiveness.  Patient was at his assisted living center and was noted to be less responsive than typical, patient is a poor historian but family member present at bedside and reported he has been spells of intermittent confusion on and off. Reports being compliant with the antiepileptic medications.  Patient to be admitted for further evaluation of altered mental status     Assessment/Plan:      Concern for breakthrough seizures  Generalized epilepsy  History of status epilepticus  Patient presented to the ER with decreased level of consciousness, received Versed in the ER with improvement  Neurology consulted and recommended cEEG monitoring  home  mg QHS, LEV 1500 mg BID, and  mg BID   Check LTG level- 38   Continue   Seizure precautions       Type 2 diabetes mellitus  Low-dose sliding scale insulin for now  Monitor with fingerstick checks     CAD  Continue home aspirin and statin     Heart failure with reduced ejection fraction  EF of 20 to 25%  Not in acute exacerbation  Continue GDMT with Coreg Entresto     Hyperlipidemia  Continue home statin     GERD  Continue famotidine     Mood disorder  Continue home medications    Ongoing threat to life and/or bodily function without ongoing treatment due to:      Consults:  
    CONTINUOUS EEG    Name:  Cheng Maxwell  Medical Record Number:  [id number]  Age: 64 y.o.   Gender: male  : 1960  Today's Date:  2025  Room:  [facility]2-01  Vital Signs   BP (!) 140/53   Pulse 61   Temp 97.7 °F (36.5 °C) (Oral)   Resp 16   Ht 1.753 m (5' 9\")   Wt (!) 153.2 kg (337 lb 12.8 oz)   SpO2 96%   BMI 49.88 kg/m²           Continuous EEG Testing Start Time:  .      Comments: Vicky at [facility] contacted  for monitoring. Dr Vela at [facility] contacted  for reading. Impedence on all leads are within normal limits. Today is the initial set up day for cvEEG. Patient head is NOT wrapped. Dav ZACARIAS  is aware that this patients cvEEG will be repositioned on  at . Dav ZACARIAS was notified at  by this EEG technician. Patient's head was placed on pillow upon completion of cvEEG placement.      Plan of Care: Begin monitoring.    Electronically signed by Pattie Moore on 2025 at 9:34 PM   
4 Eyes Skin Assessment     NAME:  Cheng Maxwell  YOB: 1960  MEDICAL RECORD NUMBER:  [id number]    The patient is being assessed for  Admission    I agree that at least one RN has performed a thorough Head to Toe Skin Assessment on the patient. ALL assessment sites listed below have been assessed.      Areas assessed by both nurses:    Head, Face, Ears, Shoulders, Back, Chest, Arms, Elbows, Hands, Sacrum. Buttock, Coccyx, Ischium, Legs. Feet and Heels, and Under Medical Devices         Does the Patient have a Wound? Blanchable redness on buttocks and right buttocks, discoloration on bilateral shins, thick and discolored nails on BLE, excoriation under abdominal pannus and groin area       James Prevention initiated by RN: Yes  Wound Care Orders initiated by RN: Yes    For hospital-acquired stage 1 & 2 and ALL Stage 3,4, Unstageable, DTI, NWPT, and Complex wounds: place order “IP Wound Care/Ostomy Nurse Eval and Treat” by RN under : No    New Ostomies, if present place, Ostomy referral order under : No     Nurse 1 eSignature: Electronically signed by Radha Kelly RN on 7/28/25 at 6:43 PM EDT    **SHARE this note so that the co-signing nurse can place an eSignature**    Nurse 2 eSignature: Electronically signed by Isabel Todd RN on 7/28/25 at 6:46 PM EDT   
LONG-TERM EEG-VIDEO MONITORING   CLINICAL NEUROPHYSIOLOGY LABORATORY  DEPARTMENT OF NEUROLOGY  [facility]    Patient: Cheng Maxwell  Age: 64 y.o.  MRN: [id number]    Referring Physician: No ref. provider found  History: The patient is a 64 y.o. male with hx of refractory epilepsy who presented with AMS. This long-term video-EEG monitoring study was performed to evaluate for seizures. The patient is on neuroactive medications.   Cheng Maxwell   Current Facility-Administered Medications   Medication Dose Route Frequency Provider Last Rate Last Admin    sodium chloride flush 0.9 % injection 5-40 mL  5-40 mL IntraVENous 2 times per day Cathryn Bansal MD   10 mL at 07/29/25 0842    sodium chloride flush 0.9 % injection 5-40 mL  5-40 mL IntraVENous PRN Cathryn Bansal MD        0.9 % sodium chloride infusion   IntraVENous PRN Cathryn Bansal MD        potassium chloride (KLOR-CON M) extended release tablet 40 mEq  40 mEq Oral PRN Cathryn Bansal MD        Or    potassium bicarb-citric acid (EFFER-K) effervescent tablet 40 mEq  40 mEq Oral PRN Cathryn Bansal MD        Or    potassium chloride 10 mEq/100 mL IVPB (Peripheral Line)  10 mEq IntraVENous PRN Cathryn Bansal MD        magnesium sulfate 2000 mg in 50 mL IVPB premix  2,000 mg IntraVENous PRN Cathryn Bansal MD        enoxaparin (LOVENOX) injection 40 mg  40 mg SubCUTAneous BID Cathryn Bansal MD   40 mg at 07/29/25 0838    ondansetron (ZOFRAN-ODT) disintegrating tablet 4 mg  4 mg Oral Q8H PRN Cathryn Bansal MD        Or    ondansetron (ZOFRAN) injection 4 mg  4 mg IntraVENous Q6H PRN Cathryn Bansal MD        polyethylene glycol (GLYCOLAX) packet 17 g  17 g Oral Daily PRN Cathryn Bansal MD        acetaminophen (TYLENOL) tablet 650 mg  650 mg Oral Q6H PRN Cathryn Bansal MD        Or    acetaminophen (TYLENOL) suppository 650 mg  650 mg Rectal Q6H PRN Cathryn Bansal MD        lamoTRIgine (LAMICTAL) tablet 600 mg  600 mg Oral BID Cathryn Bansal MD   600 mg at 07/28/25 
LONG-TERM EEG-VIDEO MONITORING   CLINICAL NEUROPHYSIOLOGY LABORATORY  DEPARTMENT OF NEUROLOGY  [facility]    Patient: Cheng Maxwell  Age: 64 y.o.  MRN: [id number]    Referring Physician: No ref. provider found  History: The patient is a 64 y.o. male with hx of refractory epilepsy who presented with AMS. This long-term video-EEG monitoring study was performed to evaluate for seizures. The patient is on neuroactive medications.   Cheng Maxwell   Current Facility-Administered Medications   Medication Dose Route Frequency Provider Last Rate Last Admin    oxyCODONE (ROXICODONE) immediate release tablet 5 mg  5 mg Oral Q4H PRN Cathryn Bansal MD   5 mg at 07/29/25 1837    And    acetaminophen (TYLENOL) tablet 325 mg  325 mg Oral Q4H PRN Cathryn Bansal MD   325 mg at 07/29/25 1837    sodium chloride flush 0.9 % injection 5-40 mL  5-40 mL IntraVENous 2 times per day Cathryn Bansal MD   10 mL at 07/29/25 2127    sodium chloride flush 0.9 % injection 5-40 mL  5-40 mL IntraVENous PRN Cathryn Bansal MD        0.9 % sodium chloride infusion   IntraVENous PRN Cathryn Bansal MD        potassium chloride (KLOR-CON M) extended release tablet 40 mEq  40 mEq Oral PRN Cathryn Bansal MD        Or    potassium bicarb-citric acid (EFFER-K) effervescent tablet 40 mEq  40 mEq Oral PRN Cathryn Bansal MD        Or    potassium chloride 10 mEq/100 mL IVPB (Peripheral Line)  10 mEq IntraVENous PRN Cathryn Bansal MD        magnesium sulfate 2000 mg in 50 mL IVPB premix  2,000 mg IntraVENous PRN Cathryn Bansal MD        enoxaparin (LOVENOX) injection 40 mg  40 mg SubCUTAneous BID Cathryn Bansal MD   40 mg at 07/29/25 2127    ondansetron (ZOFRAN-ODT) disintegrating tablet 4 mg  4 mg Oral Q8H PRN Cathryn Bansal MD        Or    ondansetron (ZOFRAN) injection 4 mg  4 mg IntraVENous Q6H PRN Cathryn Bansal MD        polyethylene glycol (GLYCOLAX) packet 17 g  17 g Oral Daily PRN Cathryn Bansal MD        acetaminophen (TYLENOL) tablet 650 mg  650 mg 
LONG-TERM EEG-VIDEO MONITORING   CLINICAL NEUROPHYSIOLOGY LABORATORY  DEPARTMENT OF NEUROLOGY  [facility]    Patient: Cheng Maxwell  Age: 64 y.o.  MRN: [id number]    Referring Physician: No ref. provider found  History: The patient is a 64 y.o. male with hx of refractory epilepsy who presented with AMS. This long-term video-EEG monitoring study was performed to evaluate for seizures. The patient is on neuroactive medications.   Cheng Maxwell   Current Facility-Administered Medications   Medication Dose Route Frequency Provider Last Rate Last Admin    sodium chloride flush 0.9 % injection 5-40 mL  5-40 mL IntraVENous 2 times per day Cathryn Bansal MD        sodium chloride flush 0.9 % injection 5-40 mL  5-40 mL IntraVENous PRN Cathryn Bansal MD        0.9 % sodium chloride infusion   IntraVENous PRN Cathryn Bansal MD        potassium chloride (KLOR-CON M) extended release tablet 40 mEq  40 mEq Oral PRN Cathryn Bansal MD        Or    potassium bicarb-citric acid (EFFER-K) effervescent tablet 40 mEq  40 mEq Oral PRN Cathryn Bansal MD        Or    potassium chloride 10 mEq/100 mL IVPB (Peripheral Line)  10 mEq IntraVENous PRN Cathryn Bansal MD        magnesium sulfate 2000 mg in 50 mL IVPB premix  2,000 mg IntraVENous PRN Cathryn Bansal MD        enoxaparin (LOVENOX) injection 40 mg  40 mg SubCUTAneous BID Cathryn Bansal MD   40 mg at 07/28/25 2149    ondansetron (ZOFRAN-ODT) disintegrating tablet 4 mg  4 mg Oral Q8H PRN Cathryn Bansal MD        Or    ondansetron (ZOFRAN) injection 4 mg  4 mg IntraVENous Q6H PRN Cathryn Bansal MD        polyethylene glycol (GLYCOLAX) packet 17 g  17 g Oral Daily PRN Cathryn Bansal MD        acetaminophen (TYLENOL) tablet 650 mg  650 mg Oral Q6H PRN Cathryn Bansal MD        Or    acetaminophen (TYLENOL) suppository 650 mg  650 mg Rectal Q6H PRN Cathryn Bansal MD        lamoTRIgine (LAMICTAL) tablet 600 mg  600 mg Oral BID Cathryn Bansal MD   600 mg at 07/28/25 2149    levETIRAcetam 
Pt aox 4 w/ developmental delay. cEEG DCed this shift. VSS and no acute events. All fall and seizure precautions in place. Call light and personal items kept w/in reach.  
Pt aox4 w/ developmental delay. Remains on cEEG. Pain managed w/ MAR. VSS and no acute events. All fall and seizure precautions in place. Call light and personal items kept w/in reach.  
Pt assessed and documented. Np acute changes this shift. Pt is on cEEG. Pt voiding well via pure wick. Pain has been controlled with MAR. All fall precautions in place, call light within reach, free from fall injury. Plan of care ongoing.   
Pt is alert and oriented X4. VS taken and recorded, Spo2 maintained at RA. Pain denies pain. Is on oral feed and tolerating well. Pt voiding well via pure wick. No BM this shift. All fall precautions in place, call light within reach, free from fall injury. Plan of care ongoing.   
Reviewed discharge instructions with patient and sister. Pt  and sister verbalized understanding.  IV removed without complications.Education and care plan complete. Pt discharged home. Pt denies needs. Pt discharged via wheelchair without complications.     
nortriptyline, 75 mg, Oral, Nightly    sacubitril-valsartan, 1 tablet, Oral, BID    insulin lispro, 0-4 Units, SubCUTAneous, 4x Daily AC & HS   Infusions    sodium chloride      dextrose                LABS   Metabolic Panel Recent Labs     07/28/25  1310      K 3.8      CO2 22   BUN 15   CREATININE 0.9   GLUCOSE 153*   CALCIUM 9.1   MG 1.68*      CBC / Coags Recent Labs     07/28/25  1310   WBC 6.2   RBC 4.03*   HGB 12.6*   HCT 37.9*         Other   Recent Labs     07/28/25  1310   LACTA 1.4        DIAGNOSTICS   IMAGES:    Head CT w/o Contrast:  No acute intracranial findings.    CVEEG:  Day 1 - 7/28/25, starting at 21:14     Interictal EEG Samples: The background was continuous, mildly disorganized without a well-sustained AP gradient, and mildly slowed with excess theta and occasional delta during wakefulness.  There was a symmetric PDR up to 7-8 Hz, at times poorly modulated. During drowsiness there were bursts of diffuse slowing and waxing and waning of the posterior dominant rhythm.  Deeper stages of sleep were not yet recorded.     There were occasional 1-2 Hz bursts of bifrontal predominant generalized spike/polyspike and wave discharges without associated clinical symptoms.     Ictal EEG Recording / Patient Events: During this period the patient had no events or seizures.     Summary: During this day of recording no events were recorded. The interictal EEG was abnormal due to:  - Mild diffuse background slowing and disorganization  - Occasional bifrontal predominant generalized spike/polyspike and wave discharges     The background abnormalities indicated a mild encephalopathy, nonspecific to etiology.  The generalized spike/polyspike and wave discharges indicated an increased risk of generalized onset seizures.  No seizures were recorded.    PHYSICAL EXAMINATION     PHYSICAL EXAM:  Vitals:    07/28/25 1815 07/28/25 1840 07/28/25 2144 07/29/25 0430   BP: (!) 162/77 (!) 140/53 (!) 151/88